# Patient Record
Sex: FEMALE | Race: WHITE | NOT HISPANIC OR LATINO | Employment: UNEMPLOYED | URBAN - METROPOLITAN AREA
[De-identification: names, ages, dates, MRNs, and addresses within clinical notes are randomized per-mention and may not be internally consistent; named-entity substitution may affect disease eponyms.]

---

## 2017-03-02 ENCOUNTER — CONVERSION ENCOUNTER (OUTPATIENT)
Dept: MAMMOGRAPHY | Facility: HOSPITAL | Age: 52
End: 2017-03-02

## 2018-10-01 ENCOUNTER — CONVERSION ENCOUNTER (OUTPATIENT)
Dept: MAMMOGRAPHY | Facility: HOSPITAL | Age: 53
End: 2018-10-01

## 2019-03-28 ENCOUNTER — OFFICE VISIT CONVERTED (OUTPATIENT)
Dept: INTERNAL MEDICINE | Facility: CLINIC | Age: 54
End: 2019-03-28
Attending: NURSE PRACTITIONER

## 2019-03-28 ENCOUNTER — HOSPITAL ENCOUNTER (OUTPATIENT)
Dept: OTHER | Facility: HOSPITAL | Age: 54
Discharge: HOME | End: 2019-03-28
Attending: NURSE PRACTITIONER

## 2019-03-28 LAB
ALBUMIN SERPL-MCNC: 4.4 G/DL (ref 3.5–5)
ALBUMIN/GLOB SERPL: 1.5 {RATIO} (ref 1.4–2.6)
ALP SERPL-CCNC: 60 U/L (ref 53–141)
ALT SERPL-CCNC: 11 U/L (ref 10–40)
ANION GAP SERPL CALC-SCNC: 15 MMOL/L (ref 8–19)
AST SERPL-CCNC: 19 U/L (ref 15–50)
BASOPHILS # BLD AUTO: 0.06 10*3/UL (ref 0–0.2)
BASOPHILS NFR BLD AUTO: 1.2 % (ref 0–3)
BILIRUB SERPL-MCNC: 0.4 MG/DL (ref 0.2–1.3)
BUN SERPL-MCNC: 12 MG/DL (ref 5–25)
BUN/CREAT SERPL: 16 {RATIO} (ref 6–20)
CALCIUM SERPL-MCNC: 9.1 MG/DL (ref 8.7–10.4)
CHLORIDE SERPL-SCNC: 105 MMOL/L (ref 99–111)
CHOLEST SERPL-MCNC: 203 MG/DL (ref 107–200)
CHOLEST/HDLC SERPL: 3.8 {RATIO} (ref 3–6)
CONV ABS IMM GRAN: 0.01 10*3/UL (ref 0–0.2)
CONV CO2: 25 MMOL/L (ref 22–32)
CONV IMMATURE GRAN: 0.2 % (ref 0–1.8)
CONV TOTAL PROTEIN: 7.4 G/DL (ref 6.3–8.2)
CREAT UR-MCNC: 0.77 MG/DL (ref 0.5–0.9)
DEPRECATED RDW RBC AUTO: 41.2 FL (ref 36.4–46.3)
EOSINOPHIL # BLD AUTO: 0.16 10*3/UL (ref 0–0.7)
EOSINOPHIL # BLD AUTO: 3.2 % (ref 0–7)
ERYTHROCYTE [DISTWIDTH] IN BLOOD BY AUTOMATED COUNT: 12.5 % (ref 11.7–14.4)
GFR SERPLBLD BASED ON 1.73 SQ M-ARVRAT: >60 ML/MIN/{1.73_M2}
GLOBULIN UR ELPH-MCNC: 3 G/DL (ref 2–3.5)
GLUCOSE SERPL-MCNC: 84 MG/DL (ref 65–99)
HBA1C MFR BLD: 12.8 G/DL (ref 12–16)
HCT VFR BLD AUTO: 39 % (ref 37–47)
HDLC SERPL-MCNC: 54 MG/DL (ref 40–60)
LDLC SERPL CALC-MCNC: 134 MG/DL (ref 70–100)
LYMPHOCYTES # BLD AUTO: 2.02 10*3/UL (ref 1–5)
MCH RBC QN AUTO: 29.7 PG (ref 27–31)
MCHC RBC AUTO-ENTMCNC: 32.8 G/DL (ref 33–37)
MCV RBC AUTO: 90.5 FL (ref 81–99)
MONOCYTES # BLD AUTO: 0.49 10*3/UL (ref 0.2–1.2)
MONOCYTES NFR BLD AUTO: 9.7 % (ref 3–10)
NEUTROPHILS # BLD AUTO: 2.32 10*3/UL (ref 2–8)
NEUTROPHILS NFR BLD AUTO: 45.8 % (ref 30–85)
NRBC CBCN: 0 % (ref 0–0.7)
OSMOLALITY SERPL CALC.SUM OF ELEC: 291 MOSM/KG (ref 273–304)
PLATELET # BLD AUTO: 223 10*3/UL (ref 130–400)
PMV BLD AUTO: 11.7 FL (ref 9.4–12.3)
POTASSIUM SERPL-SCNC: 4.3 MMOL/L (ref 3.5–5.3)
RBC # BLD AUTO: 4.31 10*6/UL (ref 4.2–5.4)
SODIUM SERPL-SCNC: 141 MMOL/L (ref 135–147)
TRIGL SERPL-MCNC: 77 MG/DL (ref 40–150)
VARIANT LYMPHS NFR BLD MANUAL: 39.9 % (ref 20–45)
VLDLC SERPL-MCNC: 15 MG/DL (ref 5–37)
WBC # BLD AUTO: 5.06 10*3/UL (ref 4.8–10.8)

## 2019-10-03 ENCOUNTER — HOSPITAL ENCOUNTER (OUTPATIENT)
Dept: MAMMOGRAPHY | Facility: HOSPITAL | Age: 54
Discharge: HOME | End: 2019-10-03
Attending: OBSTETRICS & GYNECOLOGY

## 2020-07-07 ENCOUNTER — HOSPITAL ENCOUNTER (OUTPATIENT)
Dept: OTHER | Facility: HOSPITAL | Age: 55
Discharge: HOME | End: 2020-07-07
Attending: NURSE PRACTITIONER

## 2020-07-07 ENCOUNTER — OFFICE VISIT CONVERTED (OUTPATIENT)
Dept: INTERNAL MEDICINE | Facility: CLINIC | Age: 55
End: 2020-07-07
Attending: NURSE PRACTITIONER

## 2020-07-07 LAB
ALBUMIN SERPL-MCNC: 4.6 G/DL (ref 3.5–5)
ALBUMIN/GLOB SERPL: 1.5 {RATIO} (ref 1.4–2.6)
ALP SERPL-CCNC: 65 U/L (ref 53–141)
ALT SERPL-CCNC: 13 U/L (ref 10–40)
ANION GAP SERPL CALC-SCNC: 18 MMOL/L (ref 8–19)
AST SERPL-CCNC: 20 U/L (ref 15–50)
BASOPHILS # BLD AUTO: 0.04 10*3/UL (ref 0–0.2)
BASOPHILS NFR BLD AUTO: 0.6 % (ref 0–3)
BILIRUB SERPL-MCNC: 0.35 MG/DL (ref 0.2–1.3)
BUN SERPL-MCNC: 10 MG/DL (ref 5–25)
BUN/CREAT SERPL: 11 {RATIO} (ref 6–20)
CALCIUM SERPL-MCNC: 9.6 MG/DL (ref 8.7–10.4)
CHLORIDE SERPL-SCNC: 106 MMOL/L (ref 99–111)
CHOLEST SERPL-MCNC: 219 MG/DL (ref 107–200)
CHOLEST/HDLC SERPL: 4.8 {RATIO} (ref 3–6)
CONV ABS IMM GRAN: 0.02 10*3/UL (ref 0–0.2)
CONV CO2: 23 MMOL/L (ref 22–32)
CONV IMMATURE GRAN: 0.3 % (ref 0–1.8)
CONV TOTAL PROTEIN: 7.6 G/DL (ref 6.3–8.2)
CREAT UR-MCNC: 0.88 MG/DL (ref 0.5–0.9)
DEPRECATED RDW RBC AUTO: 41.9 FL (ref 36.4–46.3)
EOSINOPHIL # BLD AUTO: 0.16 10*3/UL (ref 0–0.7)
EOSINOPHIL # BLD AUTO: 2.5 % (ref 0–7)
ERYTHROCYTE [DISTWIDTH] IN BLOOD BY AUTOMATED COUNT: 12.3 % (ref 11.7–14.4)
GFR SERPLBLD BASED ON 1.73 SQ M-ARVRAT: >60 ML/MIN/{1.73_M2}
GLOBULIN UR ELPH-MCNC: 3 G/DL (ref 2–3.5)
GLUCOSE SERPL-MCNC: 93 MG/DL (ref 65–99)
HCT VFR BLD AUTO: 40.1 % (ref 37–47)
HDLC SERPL-MCNC: 46 MG/DL (ref 40–60)
HGB BLD-MCNC: 13 G/DL (ref 12–16)
LDLC SERPL CALC-MCNC: 144 MG/DL (ref 70–100)
LYMPHOCYTES # BLD AUTO: 2.24 10*3/UL (ref 1–5)
LYMPHOCYTES NFR BLD AUTO: 34.7 % (ref 20–45)
MCH RBC QN AUTO: 30 PG (ref 27–31)
MCHC RBC AUTO-ENTMCNC: 32.4 G/DL (ref 33–37)
MCV RBC AUTO: 92.4 FL (ref 81–99)
MONOCYTES # BLD AUTO: 0.57 10*3/UL (ref 0.2–1.2)
MONOCYTES NFR BLD AUTO: 8.8 % (ref 3–10)
NEUTROPHILS # BLD AUTO: 3.43 10*3/UL (ref 2–8)
NEUTROPHILS NFR BLD AUTO: 53.1 % (ref 30–85)
NRBC CBCN: 0 % (ref 0–0.7)
OSMOLALITY SERPL CALC.SUM OF ELEC: 295 MOSM/KG (ref 273–304)
PLATELET # BLD AUTO: 243 10*3/UL (ref 130–400)
PMV BLD AUTO: 10.9 FL (ref 9.4–12.3)
POTASSIUM SERPL-SCNC: 4.4 MMOL/L (ref 3.5–5.3)
RBC # BLD AUTO: 4.34 10*6/UL (ref 4.2–5.4)
SODIUM SERPL-SCNC: 143 MMOL/L (ref 135–147)
TRIGL SERPL-MCNC: 145 MG/DL (ref 40–150)
TSH SERPL-ACNC: 1.73 M[IU]/L (ref 0.27–4.2)
VLDLC SERPL-MCNC: 29 MG/DL (ref 5–37)
WBC # BLD AUTO: 6.46 10*3/UL (ref 4.8–10.8)

## 2021-03-09 ENCOUNTER — OFFICE VISIT CONVERTED (OUTPATIENT)
Dept: INTERNAL MEDICINE | Facility: CLINIC | Age: 56
End: 2021-03-09
Attending: NURSE PRACTITIONER

## 2021-03-12 ENCOUNTER — HOSPITAL ENCOUNTER (OUTPATIENT)
Dept: MAMMOGRAPHY | Facility: HOSPITAL | Age: 56
Discharge: HOME | End: 2021-03-12
Attending: OBSTETRICS & GYNECOLOGY

## 2021-03-23 ENCOUNTER — HOSPITAL ENCOUNTER (OUTPATIENT)
Dept: MAMMOGRAPHY | Facility: HOSPITAL | Age: 56
Discharge: HOME | End: 2021-03-23
Attending: OBSTETRICS & GYNECOLOGY

## 2021-04-06 ENCOUNTER — HOSPITAL ENCOUNTER (OUTPATIENT)
Dept: ULTRASOUND IMAGING | Facility: HOSPITAL | Age: 56
Discharge: HOME | End: 2021-04-06
Attending: OBSTETRICS & GYNECOLOGY

## 2021-05-14 VITALS
WEIGHT: 153 LBS | BODY MASS INDEX: 27.11 KG/M2 | HEIGHT: 63 IN | HEART RATE: 52 BPM | SYSTOLIC BLOOD PRESSURE: 115 MMHG | OXYGEN SATURATION: 96 % | TEMPERATURE: 97.3 F | DIASTOLIC BLOOD PRESSURE: 62 MMHG

## 2021-05-14 NOTE — PROGRESS NOTES
"   Progress Note      Patient Name: Madina Reddy   Patient ID: [id number]   Sex: Female   YOB: 1965    Primary Care Provider: Kady BILL   Referring Provider: Kady BILL    Visit Date: March 9, 2021    Provider: LANDY Borrego   Location: [facility]   Location Address: [address] [place] [address], [address]  [place], [place]  [id number]   Location Phone: [phone]          Chief Complaint  · Pressure in ears       History Of Present Illness  Madina Reddy is a 55 year old /White female who presents for evaluation and treatment of:      Patient reports right ear fullness and pressure x 2 weeks, started after she had an upper respiratory infection. Patient states she flew to [place] to be with her mom after the loss of her stepdad, flying did not seem to worsen or improve the pressure. She has been treating with an OTC decongestant medication without relief. Has also tried peroxide. Denies cough, congestion, fever, chills, lightheadedness. Patient is scheduled with ENT next week.       Past Medical History  Disease Name Date Onset Notes   Anemia --  --    Broken Bones --  --    Forgetfulness --  --    Hyperlipidemia --  --    Night sweats --  --    Seasonal allergies --  --    Sinus trouble --  --          Past Surgical History  Procedure Name Date Notes   heart surgery --  \"closed heart\" 1966         Medication List  Name Date Started Instructions   Black Cohosh Menopause Complex -50(d)/20 -200-100 mg (n) oral tablets, sequential  take as directed   loratadine 10 mg oral tablet  take 1 tablet by oral route daily as needed   multivitamin oral tablet  take 1 tablet by oral route daily         Allergy List  Allergen Name Date Reaction Notes   No known history of drug allergy --  --  --          Family Medical History  Disease Name Relative/Age Notes   Breast Neoplasm, Malignant  --    Heart Disease  --    Diabetes, unspecified type  --    - No Family History of " "Colorectal Cancer  --          Social History  Finding Status Start/Stop Quantity Notes   Alcohol Never --/-- --  --    Tobacco Never --/-- --  --          Review of Systems  · Constitutional  o Denies  o : fever, fatigue, weight loss, weight gain  · HENT  o Admits  o : ear fullness  o Denies  o : headaches, vertigo, lightheadedness, hearing loss, tinnitus, ear pain  · Cardiovascular  o Denies  o : lower extremity edema, chest pressure, palpitations  · Respiratory  o Denies  o : shortness of breath, wheezing, cough, dyspnea on exertion  · Gastrointestinal  o Denies  o : nausea, vomiting, diarrhea, constipation, abdominal pain      Vitals  Date Time BP Position Site L\R Cuff Size HR RR TEMP (F) WT  HT  BMI kg/m2 BSA m2 O2 Sat FR L/min FiO2 HC       03/28/2019 08:35 /78 Sitting    72 - R 16 97.4 141lbs 6oz 5'  3\" 25.04 1.69 98 %      07/07/2020 10:54 /82 Sitting    65 - R 16 98.6 147lbs 8oz    98 %  21%    03/09/2021 01:57 /62 Sitting    52 - R  97.3 153lbs 0oz 5'  3\" 27.1 1.76 96 %            Physical Examination  · Constitutional  o Appearance  o : no acute distress, well-nourished  · Head and Face  o Head  o :   § Inspection  § : atraumatic, normocephalic  · Ears, Nose, Mouth and Throat  o Ears  o :   § External Ears  § : normal  § Otoscopic Examination  § : bilateral TM nonpurulent effusion  o Nose  o :   § Intranasal Exam  § : nares patent  o Oral Cavity  o :   § Oral Mucosa  § : moist mucous membranes  · Respiratory  o Respiratory Effort  o : breathing comfortably, symmetric chest rise  o Auscultation of Lungs  o : clear to asculatation bilaterally, no wheezes, rales, or rhonchii  · Cardiovascular  o Heart  o :   § Auscultation of Heart  § : regular rate and rhythm, no murmurs, rubs, or gallops  o Peripheral Vascular System  o :   § Extremities  § : no edema  · Lymphatic  o Neck  o : no lymphadenopathy present  o Supraclavicular Nodes  o : no supraclavicular nodes  · Skin and Subcutaneous " Tissue  o General Inspection  o : no lesions present, no areas of discoloration, skin turgor normal  · Neurologic  o Mental Status Examination  o :   § Orientation  § : grossly oriented to person, place and time  o Gait and Station  o :   § Gait Screening  § : normal gait              Assessment  · Ear fullness, right     388.8/H93.8X1  Will trial treatment with Flonase and daily Claritin. Patient to call or return to clinic if symptoms worsen or persist. May follow up with ENT as scheduled if symptoms do not improve.    Problems Reconciled  Plan  · Orders  o ACO-39: Current medications updated and reviewed (, 1159F) - - 03/09/2021  · Medications  o Flonase Allergy Relief 50 mcg/actuation nasal spray,suspension   SIG: spray 1 spray (50 mcg) in each nostril by intranasal route once daily   DISP: (1) Container with 2 refills  Prescribed on 03/09/2021     o Claritin 10 mg oral tablet   SIG: take 1 tablet (10 mg) by oral route once daily   DISP: (30) Tablet with 0 refills  Prescribed on 03/09/2021     o loratadine 10 mg oral tablet   SIG: take 1 tablet by oral route daily as needed   DISP: (0) tablet with 0 refills  Discontinued on 03/09/2021     o Medications have been Reconciled  o Transition of Care or Provider Policy  · Instructions  o Take all medications as prescribed/directed.  o Patient was educated/instructed on their diagnosis, treatment and medications prior to discharge from the clinic today.  o Patient instructed to seek medical attention urgently for new or worsening symptoms.  o Call the office with any concerns or questions.  · Disposition  o Call or Return if symptoms worsen or persist.  o Prescriptions sent to pharmacy            Electronically Signed by: LANDY Borrego -Author on March 9, 2021 02:44:51 PM

## 2021-05-15 VITALS
SYSTOLIC BLOOD PRESSURE: 122 MMHG | TEMPERATURE: 97.4 F | HEIGHT: 63 IN | RESPIRATION RATE: 16 BRPM | HEART RATE: 72 BPM | DIASTOLIC BLOOD PRESSURE: 78 MMHG | OXYGEN SATURATION: 98 % | BODY MASS INDEX: 25.05 KG/M2 | WEIGHT: 141.37 LBS

## 2021-05-15 VITALS
SYSTOLIC BLOOD PRESSURE: 120 MMHG | DIASTOLIC BLOOD PRESSURE: 82 MMHG | TEMPERATURE: 98.6 F | RESPIRATION RATE: 16 BRPM | WEIGHT: 147.5 LBS | OXYGEN SATURATION: 98 % | HEART RATE: 65 BPM

## 2021-07-02 PROCEDURE — 86666 EHRLICHIA ANTIBODY: CPT | Performed by: FAMILY MEDICINE

## 2021-07-02 PROCEDURE — 86757 RICKETTSIA ANTIBODY: CPT | Performed by: FAMILY MEDICINE

## 2021-07-02 PROCEDURE — 86618 LYME DISEASE ANTIBODY: CPT | Performed by: FAMILY MEDICINE

## 2021-11-18 ENCOUNTER — TELEPHONE (OUTPATIENT)
Dept: INTERNAL MEDICINE | Facility: CLINIC | Age: 56
End: 2021-11-18

## 2021-11-18 NOTE — TELEPHONE ENCOUNTER
Caller: Madina Reddy    Relationship: Self    Best call back number: [phone]     What is the best time to reach you:any      Who are you requesting to speak with (clinical staff, provider,  specific staff member): CLINICAL     What was the call regarding: PATIENT STATED SHE HAS BEEN EXPERIENCING COLD SYMPTOMS SINCE 11/12 PATIENT STATES SHE IS TAKING MUCINEX AND IT HAS NOT BEEN HELPING PATIENT WOULD LIKE A CALL BACK TO SPEAK WITH NURSE TO SEE IF SHE SHOULD KEEP TAKING MUCINEX     Do you require a callback: YES

## 2021-11-19 ENCOUNTER — OFFICE VISIT (OUTPATIENT)
Dept: INTERNAL MEDICINE | Facility: CLINIC | Age: 56
End: 2021-11-19

## 2021-11-19 VITALS
BODY MASS INDEX: 26.58 KG/M2 | WEIGHT: 150 LBS | OXYGEN SATURATION: 99 % | HEIGHT: 63 IN | TEMPERATURE: 99.5 F | DIASTOLIC BLOOD PRESSURE: 82 MMHG | HEART RATE: 86 BPM | SYSTOLIC BLOOD PRESSURE: 118 MMHG

## 2021-11-19 DIAGNOSIS — R09.81 SINUS CONGESTION: ICD-10-CM

## 2021-11-19 DIAGNOSIS — R52 BODY ACHES: ICD-10-CM

## 2021-11-19 DIAGNOSIS — J01.10 ACUTE NON-RECURRENT FRONTAL SINUSITIS: Primary | ICD-10-CM

## 2021-11-19 LAB
EXPIRATION DATE: NORMAL
FLUAV AG UPPER RESP QL IA.RAPID: NOT DETECTED
FLUBV AG UPPER RESP QL IA.RAPID: NOT DETECTED
INTERNAL CONTROL: NORMAL
Lab: NORMAL
SARS-COV-2 AG UPPER RESP QL IA.RAPID: NOT DETECTED

## 2021-11-19 PROCEDURE — 87428 SARSCOV & INF VIR A&B AG IA: CPT | Performed by: NURSE PRACTITIONER

## 2021-11-19 PROCEDURE — 99213 OFFICE O/P EST LOW 20 MIN: CPT | Performed by: NURSE PRACTITIONER

## 2021-11-19 RX ORDER — AMOXICILLIN AND CLAVULANATE POTASSIUM 875; 125 MG/1; MG/1
1 TABLET, FILM COATED ORAL 2 TIMES DAILY
Qty: 20 TABLET | Refills: 0 | Status: SHIPPED | OUTPATIENT
Start: 2021-11-19 | End: 2021-11-29

## 2021-11-19 RX ORDER — BROMPHENIRAMINE MALEATE, PSEUDOEPHEDRINE HYDROCHLORIDE, AND DEXTROMETHORPHAN HYDROBROMIDE 2; 30; 10 MG/5ML; MG/5ML; MG/5ML
10 SYRUP ORAL EVERY 4 HOURS PRN
Qty: 150 ML | Refills: 0 | Status: SHIPPED | OUTPATIENT
Start: 2021-11-19 | End: 2022-01-31

## 2021-11-19 NOTE — ASSESSMENT & PLAN NOTE
Exam reassuring, lung sounds clear, O2 sat 99%. Influenza and COVID negative in clinic. Due to duration and severity of symptoms will treat as a sinusitis with Augmentin at this time. Bromfed to pharmacy for symptom management. Increase fluids, monitor output. Discussed avoiding exposure to other sick or high risk contacts, cough etiquette, hand hygiene, disinfecting surfaces, and avoiding touching eyes, nose and mouth. Will seek medical attention immediately with fever, cough, shortness of breath. Patient to call or return to clinic if symptoms worsen or persist.

## 2021-11-19 NOTE — PROGRESS NOTES
"Chief Complaint  Headache, Nasal Congestion (right side x 1 week), and Generalized Body Aches    Subjective          Madina Reddy presents to [facility]  Patient reports headaches, nasal congestion, fatigue, sore throat, ear ache, body aches, sinus pressure x 1 week. She has also experienced some dizziness. Denies fever, nausea, vomiting, diarrhea, loss of taste/smell. Decreased appetite and fluid intake. Has been treating with Mucinex, Flonase and Tylenol. Denies known sick contacts. Denies COVID19 exposures. Patient is fully vaccinated.       Objective   Vital Signs:   /82   Pulse 86   Temp 99.5 °F (37.5 °C)   Ht 160 cm (63\")   Wt 68 kg (150 lb)   SpO2 99%   BMI 26.57 kg/m²     Physical Exam  Constitutional:       Appearance: Normal appearance. She is normal weight.   HENT:      Head: Normocephalic and atraumatic.      Comments: Maxillary sinus tenderness on palpation     Ears:      Comments: Bilateral TM nonpurulent effusion     Nose: Nose normal.      Mouth/Throat:      Mouth: Mucous membranes are moist.      Pharynx: Oropharynx is clear.   Eyes:      Extraocular Movements: Extraocular movements intact.      Conjunctiva/sclera: Conjunctivae normal.      Pupils: Pupils are equal, round, and reactive to light.   Cardiovascular:      Rate and Rhythm: Normal rate and regular rhythm.      Heart sounds: Normal heart sounds.   Pulmonary:      Effort: Pulmonary effort is normal.      Breath sounds: Normal breath sounds.   Skin:     General: Skin is warm and dry.   Neurological:      General: No focal deficit present.      Mental Status: She is alert and oriented to person, place, and time.   Psychiatric:         Mood and Affect: Mood normal.         Behavior: Behavior normal.         Thought Content: Thought content normal.        Result Review :                 Assessment and Plan    Diagnoses and all orders for this visit:    1. Acute non-recurrent " frontal sinusitis (Primary)  Assessment & Plan:  Exam reassuring, lung sounds clear, O2 sat 99%. Influenza and COVID negative in clinic. Due to duration and severity of symptoms will treat as a sinusitis with Augmentin at this time. Bromfed to pharmacy for symptom management. Increase fluids, monitor output. Discussed avoiding exposure to other sick or high risk contacts, cough etiquette, hand hygiene, disinfecting surfaces, and avoiding touching eyes, nose and mouth. Will seek medical attention immediately with fever, cough, shortness of breath. Patient to call or return to clinic if symptoms worsen or persist.       Other orders  -     amoxicillin-clavulanate (Augmentin) 875-125 MG per tablet; Take 1 tablet by mouth 2 (Two) Times a Day for 10 days.  Dispense: 20 tablet; Refill: 0  -     brompheniramine-pseudoephedrine-DM 30-2-10 MG/5ML syrup; Take 10 mL by mouth Every 4 (Four) Hours As Needed for Congestion or Cough.  Dispense: 150 mL; Refill: 0      Follow Up   Return if symptoms worsen or fail to improve.  Patient was given instructions and counseling regarding her condition or for health maintenance advice. Please see specific information pulled into the AVS if appropriate.

## 2022-01-31 ENCOUNTER — OFFICE VISIT (OUTPATIENT)
Dept: INTERNAL MEDICINE | Facility: CLINIC | Age: 57
End: 2022-01-31

## 2022-01-31 VITALS
OXYGEN SATURATION: 98 % | TEMPERATURE: 99.2 F | BODY MASS INDEX: 26.4 KG/M2 | SYSTOLIC BLOOD PRESSURE: 118 MMHG | WEIGHT: 149 LBS | HEART RATE: 74 BPM | DIASTOLIC BLOOD PRESSURE: 78 MMHG | HEIGHT: 63 IN

## 2022-01-31 DIAGNOSIS — Z00.00 ANNUAL PHYSICAL EXAM: Primary | ICD-10-CM

## 2022-01-31 DIAGNOSIS — Z80.3 FAMILY HISTORY OF BREAST CANCER IN MOTHER: ICD-10-CM

## 2022-01-31 LAB
ALBUMIN SERPL-MCNC: 4.6 G/DL (ref 3.5–5.2)
ALBUMIN/GLOB SERPL: 1.5 G/DL
ALP SERPL-CCNC: 74 U/L (ref 39–117)
ALT SERPL W P-5'-P-CCNC: 12 U/L (ref 1–33)
ANION GAP SERPL CALCULATED.3IONS-SCNC: 9.5 MMOL/L (ref 5–15)
AST SERPL-CCNC: 20 U/L (ref 1–32)
BASOPHILS # BLD AUTO: 0.07 10*3/MM3 (ref 0–0.2)
BASOPHILS NFR BLD AUTO: 1.1 % (ref 0–1.5)
BILIRUB SERPL-MCNC: 0.2 MG/DL (ref 0–1.2)
BUN SERPL-MCNC: 11 MG/DL (ref 6–20)
BUN/CREAT SERPL: 13.3 (ref 7–25)
CALCIUM SPEC-SCNC: 9.7 MG/DL (ref 8.6–10.5)
CHLORIDE SERPL-SCNC: 105 MMOL/L (ref 98–107)
CHOLEST SERPL-MCNC: 225 MG/DL (ref 0–200)
CO2 SERPL-SCNC: 29.5 MMOL/L (ref 22–29)
CREAT SERPL-MCNC: 0.83 MG/DL (ref 0.57–1)
DEPRECATED RDW RBC AUTO: 40.4 FL (ref 37–54)
EOSINOPHIL # BLD AUTO: 0.12 10*3/MM3 (ref 0–0.4)
EOSINOPHIL NFR BLD AUTO: 1.9 % (ref 0.3–6.2)
ERYTHROCYTE [DISTWIDTH] IN BLOOD BY AUTOMATED COUNT: 12.7 % (ref 12.3–15.4)
GFR SERPL CREATININE-BSD FRML MDRD: 71 ML/MIN/1.73
GLOBULIN UR ELPH-MCNC: 3 GM/DL
GLUCOSE SERPL-MCNC: 78 MG/DL (ref 65–99)
HCT VFR BLD AUTO: 38.1 % (ref 34–46.6)
HDLC SERPL-MCNC: 49 MG/DL (ref 40–60)
HGB BLD-MCNC: 12.9 G/DL (ref 12–15.9)
IMM GRANULOCYTES # BLD AUTO: 0.01 10*3/MM3 (ref 0–0.05)
IMM GRANULOCYTES NFR BLD AUTO: 0.2 % (ref 0–0.5)
LDLC SERPL CALC-MCNC: 157 MG/DL (ref 0–100)
LDLC/HDLC SERPL: 3.16 {RATIO}
LYMPHOCYTES # BLD AUTO: 2.04 10*3/MM3 (ref 0.7–3.1)
LYMPHOCYTES NFR BLD AUTO: 32.4 % (ref 19.6–45.3)
MCH RBC QN AUTO: 29.5 PG (ref 26.6–33)
MCHC RBC AUTO-ENTMCNC: 33.9 G/DL (ref 31.5–35.7)
MCV RBC AUTO: 87.2 FL (ref 79–97)
MONOCYTES # BLD AUTO: 0.62 10*3/MM3 (ref 0.1–0.9)
MONOCYTES NFR BLD AUTO: 9.8 % (ref 5–12)
NEUTROPHILS NFR BLD AUTO: 3.44 10*3/MM3 (ref 1.7–7)
NEUTROPHILS NFR BLD AUTO: 54.6 % (ref 42.7–76)
NRBC BLD AUTO-RTO: 0 /100 WBC (ref 0–0.2)
PLATELET # BLD AUTO: 254 10*3/MM3 (ref 140–450)
PMV BLD AUTO: 11.2 FL (ref 6–12)
POTASSIUM SERPL-SCNC: 4 MMOL/L (ref 3.5–5.2)
PROT SERPL-MCNC: 7.6 G/DL (ref 6–8.5)
RBC # BLD AUTO: 4.37 10*6/MM3 (ref 3.77–5.28)
SODIUM SERPL-SCNC: 144 MMOL/L (ref 136–145)
TRIGL SERPL-MCNC: 106 MG/DL (ref 0–150)
TSH SERPL DL<=0.05 MIU/L-ACNC: 1.59 UIU/ML (ref 0.27–4.2)
VLDLC SERPL-MCNC: 19 MG/DL (ref 5–40)
WBC NRBC COR # BLD: 6.3 10*3/MM3 (ref 3.4–10.8)

## 2022-01-31 PROCEDURE — 80050 GENERAL HEALTH PANEL: CPT | Performed by: NURSE PRACTITIONER

## 2022-01-31 PROCEDURE — 99396 PREV VISIT EST AGE 40-64: CPT | Performed by: NURSE PRACTITIONER

## 2022-01-31 PROCEDURE — 80061 LIPID PANEL: CPT | Performed by: NURSE PRACTITIONER

## 2022-01-31 PROCEDURE — 36415 COLL VENOUS BLD VENIPUNCTURE: CPT | Performed by: NURSE PRACTITIONER

## 2022-01-31 RX ORDER — ASPIRIN 81 MG/1
81 TABLET, CHEWABLE ORAL DAILY
COMMUNITY

## 2022-01-31 NOTE — PROGRESS NOTES
"Chief Complaint  Annual Exam    Subjective          Madina Reddy presents to [facility]  Last labs: 7/2020  PAP: 4/2021  Mammogram: 4/2021, biopsy normal; Family history of breast cancer in mother, diagnosed in 60s  Colonoscopy: 2017, repeat in 10 years; Denies family history of colon cancer  Influenza vaccination: Declines   COVID19 vaccination: Up to date  Shingles vaccination: Declines  Eye exam: 2021  Dental exam: Every 6 months  Smoking history: Denies  Specialists: [facility]     Annual physical exam-  Family history of heart attack in father. Denies chest pain, blurry vision, headache, leg swelling, shortness of breath, seizure activity. Patient has no concerns at this time.          Objective   Vital Signs:   /78   Pulse 74   Temp 99.2 °F (37.3 °C)   Ht 160 cm (63\")   Wt 67.6 kg (149 lb)   SpO2 98%   BMI 26.39 kg/m²     Physical Exam  Constitutional:       Appearance: Normal appearance. She is normal weight.   HENT:      Head: Normocephalic and atraumatic.      Right Ear: Tympanic membrane normal.      Left Ear: Tympanic membrane normal.      Nose: Nose normal.      Mouth/Throat:      Mouth: Mucous membranes are moist.      Pharynx: Oropharynx is clear.   Eyes:      Extraocular Movements: Extraocular movements intact.      Conjunctiva/sclera: Conjunctivae normal.      Pupils: Pupils are equal, round, and reactive to light.   Neck:      Thyroid: No thyroid mass, thyromegaly or thyroid tenderness.   Cardiovascular:      Rate and Rhythm: Normal rate and regular rhythm.      Heart sounds: Normal heart sounds.   Pulmonary:      Effort: Pulmonary effort is normal.      Breath sounds: Normal breath sounds.   Abdominal:      General: Bowel sounds are normal.      Palpations: Abdomen is soft.   Skin:     General: Skin is warm and dry.   Neurological:      General: No focal deficit present.      Mental Status: She is alert and oriented to person, place, and " time.   Psychiatric:         Mood and Affect: Mood normal.         Behavior: Behavior normal.         Thought Content: Thought content normal.        Result Review :                 Assessment and Plan    Diagnoses and all orders for this visit:    1. Annual physical exam (Primary)  Assessment & Plan:  Basic labs in clinic today. Encouraged routine dental and eye exams. Discussed age appropriate immunizations, screenings, nutrition. Age appropriate handout provided.    Orders:  -     Comprehensive Metabolic Panel  -     CBC & Differential  -     TSH  -     Lipid Panel    2. Family history of breast cancer in mother  Assessment & Plan:  Patient scheduled for mammogram through [facility].        Follow Up   Return in about 1 year (around 1/31/2023) for Annual physical.  Patient was given instructions and counseling regarding her condition or for health maintenance advice. Please see specific information pulled into the AVS if appropriate.

## 2022-01-31 NOTE — ASSESSMENT & PLAN NOTE
Basic labs in clinic today. Encouraged routine dental and eye exams. Discussed age appropriate immunizations, screenings, nutrition. Age appropriate handout provided.

## 2022-02-02 ENCOUNTER — TELEPHONE (OUTPATIENT)
Dept: INTERNAL MEDICINE | Facility: CLINIC | Age: 57
End: 2022-02-02

## 2022-02-02 NOTE — TELEPHONE ENCOUNTER
Caller: Madina Reddy    Relationship to patient: Self    Best call back number: [phone]    Type of visit: PAP SMEAR    Additional notes: PATIENT WOULD LIKE TO BE SCHEDULED FOR A PAP SMEAR. SHE VERBALIZED THAT IT HAS BEEN OVER A YEAR SINCE SHE HAS GOTTEN A PAP SMEAR. PLEASE CALL PATIENT TO SCHEDULE.

## 2022-02-09 NOTE — TELEPHONE ENCOUNTER
Caller: Madina Reddy    Relationship to patient: Self    Best call back number: [phone]    Chief complaint: PAP    Type of visit: PAP    Requested date: 02/09-23/22 NO FRIDAYS    If rescheduling, when is the original appointment: 01/30/23     Additional notes:THE PATIENT STATED SHE NEEDS A PAP ONLY SOONER THAN 2023. SHE WOULD LIKE A CALL BACK TO DISCUSS SCHEDULING.

## 2022-02-15 ENCOUNTER — OFFICE VISIT (OUTPATIENT)
Dept: INTERNAL MEDICINE | Facility: CLINIC | Age: 57
End: 2022-02-15

## 2022-02-15 VITALS
BODY MASS INDEX: 26.79 KG/M2 | SYSTOLIC BLOOD PRESSURE: 120 MMHG | RESPIRATION RATE: 18 BRPM | OXYGEN SATURATION: 98 % | DIASTOLIC BLOOD PRESSURE: 68 MMHG | HEART RATE: 85 BPM | WEIGHT: 151.2 LBS | TEMPERATURE: 97.6 F | HEIGHT: 63 IN

## 2022-02-15 DIAGNOSIS — Z01.419 ENCOUNTER FOR ROUTINE GYNECOLOGICAL EXAMINATION WITH PAPANICOLAOU SMEAR OF CERVIX: ICD-10-CM

## 2022-02-15 DIAGNOSIS — Z12.31 VISIT FOR SCREENING MAMMOGRAM: Primary | ICD-10-CM

## 2022-02-15 DIAGNOSIS — Z12.4 SCREENING FOR CERVICAL CANCER: ICD-10-CM

## 2022-02-15 DIAGNOSIS — Z80.3 FAMILY HISTORY OF BREAST CANCER IN MOTHER: ICD-10-CM

## 2022-02-15 PROCEDURE — 99396 PREV VISIT EST AGE 40-64: CPT | Performed by: NURSE PRACTITIONER

## 2022-02-15 PROCEDURE — G0123 SCREEN CERV/VAG THIN LAYER: HCPCS | Performed by: NURSE PRACTITIONER

## 2022-02-15 NOTE — PROGRESS NOTES
Subjective   History of Present Illness    Madina Reddy is a 56 y.o. female who presents for annual exam.    Obstetric History:  OB History    No obstetric history on file.        Menstrual History:     No LMP recorded. Patient is postmenopausal.       Sexual History:         Current contraception: none  History of abnormal Pap smear: no  Received Gardasil immunization: no  Family history of uterine or ovarian cancer: no  Family History of cervical cancer: no  Family History of colon cancer/colon polyps: no  Regular self breast exam: yes  History of abnormal mammogram: yes - clip in place  Family history of breast cancer: yes - mother diagnosed in 70s  History of abnormal lipids: yes -     The following portions of the patient's history were reviewed and updated as appropriate: allergies, current medications, past family history, past medical history, past social history, past surgical history and problem list.      Objective   Physical Exam  Exam conducted with a chaperone present.   Constitutional:       Appearance: Normal appearance. She is normal weight.   HENT:      Head: Normocephalic and atraumatic.      Nose: Nose normal.      Mouth/Throat:      Mouth: Mucous membranes are moist.      Pharynx: Oropharynx is clear.   Eyes:      Extraocular Movements: Extraocular movements intact.      Conjunctiva/sclera: Conjunctivae normal.      Pupils: Pupils are equal, round, and reactive to light.   Cardiovascular:      Rate and Rhythm: Normal rate and regular rhythm.      Heart sounds: Normal heart sounds.   Pulmonary:      Effort: Pulmonary effort is normal.      Breath sounds: Normal breath sounds.   Chest:   Breasts:      Right: Normal.      Left: Normal.       Genitourinary:     Exam position: Lithotomy position.      Vagina: Normal.      Cervix: Normal.      Uterus: Normal.       Adnexa: Right adnexa normal and left adnexa normal.      Rectum: Normal.   Skin:     General: Skin is warm and dry.  "  Neurological:      General: No focal deficit present.      Mental Status: She is alert and oriented to person, place, and time.   Psychiatric:         Mood and Affect: Mood normal.         Behavior: Behavior normal.         Thought Content: Thought content normal.         /68 (BP Location: Left arm, Patient Position: Sitting, Cuff Size: Adult)   Pulse 85   Temp 97.6 °F (36.4 °C)   Resp 18   Ht 160 cm (63\")   Wt 68.6 kg (151 lb 3.2 oz)   SpO2 98%   BMI 26.78 kg/m²       Assessment/Plan   Diagnoses and all orders for this visit:    1. Visit for screening mammogram (Primary)  -     Mammo Screening Digital Tomosynthesis Bilateral With CAD; Future    2. Family history of breast cancer in mother  -     Mammo Screening Digital Tomosynthesis Bilateral With CAD; Future    3. Screening for cervical cancer  -     IGP,rfx Aptima HPV All Pth    4. Encounter for routine gynecological examination with Papanicolaou smear of cervix  Assessment & Plan:  Exam without concern. Discussed preventative care with routine PAP smears and mammogram. Will determine further intervention based on results of PAP smear.        Await pap smear results.             "

## 2022-02-15 NOTE — ASSESSMENT & PLAN NOTE
Exam without concern. Discussed preventative care with routine PAP smears and mammogram. Will determine further intervention based on results of PAP smear.

## 2022-02-21 LAB
CONV .: NORMAL
CYTOLOGIST CVX/VAG CYTO: NORMAL
CYTOLOGY CVX/VAG DOC CYTO: NORMAL
CYTOLOGY CVX/VAG DOC THIN PREP: NORMAL
DX ICD CODE: NORMAL
HIV 1 & 2 AB SER-IMP: NORMAL
OTHER STN SPEC: NORMAL
STAT OF ADQ CVX/VAG CYTO-IMP: NORMAL

## 2022-05-02 ENCOUNTER — HOSPITAL ENCOUNTER (OUTPATIENT)
Dept: MAMMOGRAPHY | Facility: HOSPITAL | Age: 57
Discharge: HOME | End: 2022-05-02
Admitting: NURSE PRACTITIONER

## 2022-05-02 DIAGNOSIS — Z80.3 FAMILY HISTORY OF BREAST CANCER IN MOTHER: ICD-10-CM

## 2022-05-02 DIAGNOSIS — R92.8 ABNORMAL MAMMOGRAM OF RIGHT BREAST: Primary | ICD-10-CM

## 2022-05-02 DIAGNOSIS — Z12.31 VISIT FOR SCREENING MAMMOGRAM: ICD-10-CM

## 2022-05-02 PROCEDURE — 77063 BREAST TOMOSYNTHESIS BI: CPT

## 2022-05-02 PROCEDURE — 77067 SCR MAMMO BI INCL CAD: CPT

## 2022-05-03 ENCOUNTER — TELEPHONE (OUTPATIENT)
Dept: INTERNAL MEDICINE | Facility: CLINIC | Age: 57
End: 2022-05-03

## 2022-05-03 NOTE — TELEPHONE ENCOUNTER
Caller: Madina Reddy    Relationship to patient: Self    Best call back number: [phone]    Patient is needing: PATIENT HAD MAMMOGRAM YESTERDAY AND A .5 MASS WAS FOUND ON HER RIGHT BREAST.  PATIENT WANTS TO KNOW WHAT THE NEXT STEP WILL BE REGARDING THIS PLEASE CONTACT AND ADVISE

## 2022-05-03 NOTE — TELEPHONE ENCOUNTER
Scheduling called stating the US breast right complete needs to be changed from a complete to a limited.

## 2022-05-04 DIAGNOSIS — R92.8 ABNORMAL MAMMOGRAM OF RIGHT BREAST: Primary | ICD-10-CM

## 2022-05-17 ENCOUNTER — OFFICE VISIT (OUTPATIENT)
Dept: INTERNAL MEDICINE | Facility: CLINIC | Age: 57
End: 2022-05-17

## 2022-05-17 VITALS
DIASTOLIC BLOOD PRESSURE: 74 MMHG | OXYGEN SATURATION: 99 % | BODY MASS INDEX: 25.87 KG/M2 | WEIGHT: 146 LBS | HEIGHT: 63 IN | HEART RATE: 90 BPM | TEMPERATURE: 97.3 F | RESPIRATION RATE: 16 BRPM | SYSTOLIC BLOOD PRESSURE: 130 MMHG

## 2022-05-17 DIAGNOSIS — J02.9 SORE THROAT: ICD-10-CM

## 2022-05-17 DIAGNOSIS — U07.1 COVID-19: Primary | ICD-10-CM

## 2022-05-17 DIAGNOSIS — R41.3 MEMORY LOSS: ICD-10-CM

## 2022-05-17 LAB
EXPIRATION DATE: ABNORMAL
EXPIRATION DATE: NORMAL
FLUAV AG UPPER RESP QL IA.RAPID: NOT DETECTED
FLUBV AG UPPER RESP QL IA.RAPID: NOT DETECTED
INTERNAL CONTROL: ABNORMAL
INTERNAL CONTROL: NORMAL
Lab: ABNORMAL
Lab: NORMAL
S PYO AG THROAT QL: NEGATIVE
SARS-COV-2 AG UPPER RESP QL IA.RAPID: DETECTED

## 2022-05-17 PROCEDURE — 87428 SARSCOV & INF VIR A&B AG IA: CPT | Performed by: NURSE PRACTITIONER

## 2022-05-17 PROCEDURE — 87880 STREP A ASSAY W/OPTIC: CPT | Performed by: NURSE PRACTITIONER

## 2022-05-17 PROCEDURE — 99213 OFFICE O/P EST LOW 20 MIN: CPT | Performed by: NURSE PRACTITIONER

## 2022-05-17 RX ORDER — GUAIFENESIN AND DEXTROMETHORPHAN HYDROBROMIDE 600; 30 MG/1; MG/1
1 TABLET, EXTENDED RELEASE ORAL 2 TIMES DAILY PRN
Qty: 30 TABLET | Refills: 0 | Status: SHIPPED | OUTPATIENT
Start: 2022-05-17 | End: 2023-01-23

## 2022-05-17 NOTE — ASSESSMENT & PLAN NOTE
Exam reassuring, lung sounds clear, O2 sat 99%. Influenza negative, COVID19 positive. Discussed course of viral illness. Discussed home quarantine, avoiding exposure to other sick or high risk contacts, cough etiquette, hand hygiene, disinfecting surfaces, and avoiding touching eyes, nose and mouth. Continue supportive care. Increase fluids, monitor output.  Tylenol/Motrin for fever/comfort.  Mucinex DM to pharmacy, patient defers decongestants as they make her feel bad.  She and her  will monitor closely and will seek medical attention immediately with varus/persistent fever, cough, shortness of breath. Patient to call or return to clinic if symptoms worsen or persist.  Memory issues likely secondary to combination of COVID-19 and medication use, however low suspicion for brain imaging in this patient.  They are aware of 24 hour on call service in the event that there are concerns outside of office hours and will seek medical attention immediately with recurrence of symptoms.  Encouraged patient not to drive while she is sick and to monitor symptoms closely.  Will discuss case further with supervising physician to determine if imaging is necessary sooner rather than later.

## 2022-05-17 NOTE — PROGRESS NOTES
"Chief Complaint  Nasal Congestion, Dizziness (Yesterday), Memory Loss (Yesterday), Headache, Sinus Problem, Sore Throat (2 Days), Cough (2 Days), and Generalized Body Aches (2 Days)    Subjective          Madina Reddy presents to [facility]  Patient reports nasal congestion, dizziness, headache, sinus pressure, sore throat, cough, body aches progressing over the past two days. Patient states her biggest concern is an approximate 2-hour timeframe yesterday in which she \"zoned out\".  Patient states she got dressed, cook supper, went to a concert and sang with no recollection of these events.  Patient's  states that he received a phone call from friends at the concert that stated that the patient was \"out of it\".  Patient had also left the stove on, door unlocked and dog in the backyard when she left.  Patient admits to treating with Nydia-Bigelow, Claritin and an over-the-counter antihistamine as well as Motrin and Tylenol.  Patient's  states that she often has weird experiences when she is sick, has frequently passed out with illness.  Denies known changes in vision/speech/hearing/gait, worst headache of her life.  Patient started to return to her normal when she came home.  Patient's  states that he did a stroke evaluation, everything seemed normal at that time.  Patient states that she feels back to normal cognitively today, continues to have sick symptoms. Denies fever, shortness of breath, loss of taste/smell.  Patient admits to a COVID-19 exposure at a singing practice last week.        Objective   Vital Signs:  /74 (BP Location: Right arm, Patient Position: Sitting, Cuff Size: Adult)   Pulse 90   Temp 97.3 °F (36.3 °C)   Resp 16   Ht 160 cm (63\")   Wt 66.2 kg (146 lb)   SpO2 99%   BMI 25.86 kg/m²         Physical Exam  Constitutional:       Appearance: Normal appearance.   HENT:      Head: Normocephalic and atraumatic.      " Nose: Nose normal.      Mouth/Throat:      Mouth: Mucous membranes are moist.      Pharynx: Oropharynx is clear.   Eyes:      Extraocular Movements: Extraocular movements intact.      Conjunctiva/sclera: Conjunctivae normal.      Pupils: Pupils are equal, round, and reactive to light.   Cardiovascular:      Rate and Rhythm: Normal rate and regular rhythm.      Heart sounds: Normal heart sounds.   Pulmonary:      Effort: Pulmonary effort is normal.      Breath sounds: Normal breath sounds.   Skin:     General: Skin is warm and dry.   Neurological:      General: No focal deficit present.      Mental Status: She is alert and oriented to person, place, and time.      Cranial Nerves: Cranial nerves are intact.      Sensory: Sensation is intact.      Motor: Motor function is intact.      Coordination: Coordination is intact.      Gait: Gait is intact.   Psychiatric:         Mood and Affect: Mood normal.         Behavior: Behavior normal.         Thought Content: Thought content normal.        Result Review :                 Assessment and Plan    Diagnoses and all orders for this visit:    1. COVID-19 (Primary)  Assessment & Plan:  Exam reassuring, lung sounds clear, O2 sat 99%. Influenza negative, COVID19 positive. Discussed course of viral illness. Discussed home quarantine, avoiding exposure to other sick or high risk contacts, cough etiquette, hand hygiene, disinfecting surfaces, and avoiding touching eyes, nose and mouth. Continue supportive care. Increase fluids, monitor output.  Tylenol/Motrin for fever/comfort.  Mucinex DM to pharmacy, patient defers decongestants as they make her feel bad.  She and her  will monitor closely and will seek medical attention immediately with varus/persistent fever, cough, shortness of breath. Patient to call or return to clinic if symptoms worsen or persist.  Memory issues likely secondary to combination of COVID-19 and medication use, however low suspicion for brain imaging  in this patient.  They are aware of 24 hour on call service in the event that there are concerns outside of office hours and will seek medical attention immediately with recurrence of symptoms.  Encouraged patient not to drive while she is sick and to monitor symptoms closely.  Will discuss case further with supervising physician to determine if imaging is necessary sooner rather than later.        2. Sore throat  -     POCT SARS-CoV-2 Antigen [place] + Flu  -     POCT rapid strep A    3. Memory loss    Other orders  -     guaifenesin-dextromethorphan (MUCINEX DM)  MG tablet sustained-release 12 hour tablet; Take 1 tablet by mouth 2 (Two) Times a Day As Needed (cough, congestion).  Dispense: 30 tablet; Refill: 0           Follow Up   Return if symptoms worsen or fail to improve.  Patient was given instructions and counseling regarding her condition or for health maintenance advice. Please see specific information pulled into the AVS if appropriate.

## 2022-05-19 ENCOUNTER — TELEPHONE (OUTPATIENT)
Dept: INTERNAL MEDICINE | Facility: CLINIC | Age: 57
End: 2022-05-19

## 2022-05-19 NOTE — TELEPHONE ENCOUNTER
Caller: Madina Reddy    Relationship to patient: Self    Best call back number: [phone]    Date of positive COVID19 test: 05/17/22    Date if possible COVID19 exposure: UNSURE    COVID19 symptoms: COUGH, CONGESTION, DRAINAGE, SORE THROAT       Additional information or concerns: THE PATIENT STATED SHE WAS DIAGNOSED ON 05/17/22 WITH COVID AND SHE WOULD LIKE PCP RECOMMENDATION ON MEDICATION FOR COUGH. SHE STATED THE MUCINEX IS HELPING BUT SHE WOULD LIKE A CALL BACK TO DISCUSS.     What is the patients preferred pharmacy: [address] [facility] - [phone]  - [phone]   [phone]

## 2022-05-19 NOTE — TELEPHONE ENCOUNTER
I called pt back and she is going to try delsym cough syrup, vit C, vit D3, zinc, push fluids, rest, warm salt water gargles and tylenol/ibuprofen for body aches/fevers/chills.

## 2022-05-23 ENCOUNTER — TELEPHONE (OUTPATIENT)
Dept: INTERNAL MEDICINE | Facility: CLINIC | Age: 57
End: 2022-05-23

## 2022-05-23 NOTE — TELEPHONE ENCOUNTER
Patient is feeling so much better, she is feeling some fatigue but all is well.  Patient had no follow up questions or concerns at this time.

## 2022-05-23 NOTE — TELEPHONE ENCOUNTER
Please call and check on patient and see how she is feeling. Ask if she has experienced any more episodes. Thank you!

## 2022-05-31 ENCOUNTER — HOSPITAL ENCOUNTER (OUTPATIENT)
Dept: MAMMOGRAPHY | Facility: HOSPITAL | Age: 57
Discharge: HOME | End: 2022-05-31

## 2022-05-31 ENCOUNTER — HOSPITAL ENCOUNTER (OUTPATIENT)
Dept: ULTRASOUND IMAGING | Facility: HOSPITAL | Age: 57
Discharge: HOME | End: 2022-05-31

## 2022-05-31 DIAGNOSIS — R92.8 ABNORMAL MAMMOGRAM OF RIGHT BREAST: ICD-10-CM

## 2022-05-31 PROCEDURE — G0279 TOMOSYNTHESIS, MAMMO: HCPCS

## 2022-05-31 PROCEDURE — 76642 ULTRASOUND BREAST LIMITED: CPT

## 2022-05-31 PROCEDURE — 77065 DX MAMMO INCL CAD UNI: CPT

## 2022-10-03 ENCOUNTER — TELEPHONE (OUTPATIENT)
Dept: INTERNAL MEDICINE | Facility: CLINIC | Age: 57
End: 2022-10-03

## 2022-10-03 ENCOUNTER — NURSE TRIAGE (OUTPATIENT)
Dept: CALL CENTER | Facility: HOSPITAL | Age: 57
End: 2022-10-03

## 2022-10-03 NOTE — TELEPHONE ENCOUNTER
Patient reports blood pressure readings that are higher than her baseline. C/o headache yesterday and earlier today. No history of HTN. Not taking medication for BP. Was unable to reach PCP. Reviewed protocol with patient. Will contact PCP soon to make an appointment.    Reason for Disposition  • [1] Systolic BP  >= 130 OR Diastolic >= 80 AND [2] not taking BP medications    Additional Information  • Negative: Difficult to awaken or acting confused (e.g., disoriented, slurred speech)  • Negative: SEVERE difficulty breathing (e.g., struggling for each breath, speaks in single words)  • Negative: [1] Weakness of the face, arm or leg on one side of the body AND [2] new-onset  • Negative: [1] Numbness (i.e., loss of sensation) of the face, arm or leg on one side of the body AND [2] new-onset  • Negative: [1] Chest pain lasts > 5 minutes AND [2] history of heart disease  (i.e., heart attack, bypass surgery, angina, angioplasty, CHF)  • Negative: [1] Chest pain AND [2] took nitrogylcerin AND [3] pain was not relieved  • Negative: Sounds like a life-threatening emergency to the triager  • Negative: Symptom is main concern  (e.g., headache, chest pain)  • Negative: Low blood pressure is main concern  • Negative: [1] Systolic BP  >= 160 OR Diastolic >= 100 AND [2] cardiac or neurologic symptoms (e.g., chest pain, difficulty breathing, unsteady gait, blurred vision)  • Negative: [1] Pregnant 20 or more weeks (or postpartum < 6 weeks) AND [2] new hand or face swelling  • Negative: [1] Pregnant 20 or more weeks AND [2] Systolic BP  >= 140 OR Diastolic >= 90  • Negative: [1] Systolic BP  >= 200 OR Diastolic >= 120  AND [2] having NO cardiac or neurologic symptoms  • Negative: [1] Postpartum < 6 weeks AND [2] Systolic BP  >= 140 OR Diastolic >= 90  • Negative: [1] Systolic BP  >= 180 OR Diastolic >= 110 AND [2] missed most recent dose of blood pressure medication  • Negative: Systolic BP  >= 180 OR Diastolic >= 110  • Negative:  "Ran out of BP medications  • Negative: Systolic BP  >= 160 OR Diastolic >= 100  • Negative: [1] Taking BP medications AND [2] feels is having side effects (e.g., impotence, cough, dizzy upon standing)  • Negative: [1] Systolic BP  >= 130 OR Diastolic >= 80 AND [2] pregnant  • Negative: [1] Systolic BP  >= 130 OR Diastolic >= 80 AND [2] taking BP medications    Answer Assessment - Initial Assessment Questions  1. BLOOD PRESSURE: \"What is the blood pressure?\" \"Did you take at least two measurements 5 minutes apart?\"      132/90 pulse 96  2. ONSET: \"When did you take your blood pressure?\"      5 minutes ago  3. HOW: \"How did you obtain the blood pressure?\" (e.g., visiting nurse, automatic home BP monitor)      Home blood pressure cuff  4. HISTORY: \"Do you have a history of high blood pressure?\"      No  5. MEDICATIONS: \"Are you taking any medications for blood pressure?\" \"Have you missed any doses recently?\"      No  6. OTHER SYMPTOMS: \"Do you have any symptoms?\" (e.g., headache, chest pain, blurred vision, difficulty breathing, weakness)      Headache  7. PREGNANCY: \"Is there any chance you are pregnant?\" \"When was your last menstrual period?\"      NA    Protocols used: BLOOD PRESSURE - HIGH-ADULT-AH    "

## 2022-10-03 NOTE — TELEPHONE ENCOUNTER
UNABLE TO REACH OFFICE OR NCC TEAM NO ANSWER ON EITHER LINE    Caller: Madina Reddy    Relationship: Self    Best call back number: [phone]    What is the best time to reach you: ANYTIME    Who are you requesting to speak with (clinical staff, provider,  specific staff member):CLINICAL      What was the call regarding: PATIENT IS CALLING DUE TO HER NOT FEELING WELL AND HER BLOOD PRESSURE IS RUNNING HIGH. PATIENT STATES IT /90 AND 3 MINUTES LATER IT /94. PATIENT IS CONCERNED.    Do you require a callback: YES

## 2022-10-05 ENCOUNTER — TELEPHONE (OUTPATIENT)
Dept: INTERNAL MEDICINE | Facility: CLINIC | Age: 57
End: 2022-10-05

## 2022-10-05 NOTE — TELEPHONE ENCOUNTER
Caller: Madina Reddy    Relationship to patient: Self    Best call back number: [phone]    Patient is needing: PATIENT IS REQUESTING A CALL BACK. SHE STATED THAT HER BLOOD PRESSURE HAS BEEN HIGH. PATIENT CHECKED IT ON 10/05/22 IT /93 AND THAN LATER AFTER 132/90. 128/88 WAS TODAY 10/05/22.SHE HAS SOME CONCERNS AND WANTS TO SPEAK TO MARLEE JOE.PLEASE CALL AND ADVISE.

## 2022-10-07 NOTE — TELEPHONE ENCOUNTER
Called to check on patient and she stated she thinks she was dehydrated so she has been drinking water to help. She took her BP while she was on the phone with me left arm was 136/89 and on right arm it was 117/87. Patient wanted to schedule an appointment. Appointment scheduled with Myra.

## 2022-10-10 ENCOUNTER — OFFICE VISIT (OUTPATIENT)
Dept: INTERNAL MEDICINE | Facility: CLINIC | Age: 57
End: 2022-10-10

## 2022-10-10 VITALS
HEART RATE: 68 BPM | BODY MASS INDEX: 26.93 KG/M2 | WEIGHT: 152 LBS | TEMPERATURE: 97.4 F | SYSTOLIC BLOOD PRESSURE: 118 MMHG | DIASTOLIC BLOOD PRESSURE: 66 MMHG | OXYGEN SATURATION: 98 %

## 2022-10-10 DIAGNOSIS — R03.0 ELEVATED BLOOD PRESSURE READING: Primary | ICD-10-CM

## 2022-10-10 DIAGNOSIS — J30.2 SEASONAL ALLERGIC RHINITIS, UNSPECIFIED TRIGGER: ICD-10-CM

## 2022-10-10 PROCEDURE — 99213 OFFICE O/P EST LOW 20 MIN: CPT | Performed by: PHYSICIAN ASSISTANT

## 2022-10-10 RX ORDER — MONTELUKAST SODIUM 10 MG/1
10 TABLET ORAL NIGHTLY
Qty: 30 TABLET | Refills: 2 | Status: SHIPPED | OUTPATIENT
Start: 2022-10-10 | End: 2023-01-03

## 2022-10-10 NOTE — PROGRESS NOTES
Chief Complaint  Hypertension (144/93 heart rate 96 highest  ave 130/80)    Subjective          Madina Reddy presents to [facility]  History of Present Illness  Elevated blood pressure- 1 week ago patient noticed feeling off a little bit.  Patient noticed some elevation in her blood pressure.  Patient had been taking the decongestant a little over a week for allergy symptoms.  She stopped taking the decongestant a couple days later.  Her blood pressure seemed to regulate better after that.        Objective   Vital Signs:   /66 (BP Location: Right arm, Patient Position: Sitting) Comment: 118/80 left arm sitting  Pulse 68   Temp 97.4 °F (36.3 °C) (Temporal)   Wt 68.9 kg (152 lb)   SpO2 98%   BMI 26.93 kg/m²     Physical Exam  Vitals reviewed.   Constitutional:       Appearance: Normal appearance. She is well-developed.   HENT:      Head: Normocephalic and atraumatic.      Right Ear: Tympanic membrane and ear canal normal.      Left Ear: Tympanic membrane and ear canal normal.   Eyes:      Conjunctiva/sclera: Conjunctivae normal.      Pupils: Pupils are equal, round, and reactive to light.   Cardiovascular:      Rate and Rhythm: Normal rate and regular rhythm.      Heart sounds: No murmur heard.    No friction rub. No gallop.   Pulmonary:      Effort: Pulmonary effort is normal.      Breath sounds: Normal breath sounds. No wheezing or rhonchi.   Abdominal:      General: Abdomen is flat.      Palpations: Abdomen is soft.      Tenderness: There is no abdominal tenderness.   Skin:     General: Skin is warm and dry.   Neurological:      Mental Status: She is alert and oriented to person, place, and time.      Cranial Nerves: No cranial nerve deficit.   Psychiatric:         Mood and Affect: Mood and affect normal.         Behavior: Behavior normal.         Thought Content: Thought content normal.         Judgment: Judgment normal.        Result Review :           Procedures      Assessment and Plan    Diagnoses and all orders for this visit:    1. Elevated blood pressure reading (Primary)  Assessment & Plan:  Normal in office.  Symptoms most likely secondary to dehydration, illness and decongestant.  Continue to monitor BP especially if not feeling well.       2. Seasonal allergic rhinitis, unspecified trigger  Assessment & Plan:  Due to significant allergy symptoms will add Singulair at this time.  Discussed sending to allergist but patient wants to hold off at this time.  Will consider this if Singulair does not help significantly with allergy symptoms.      Other orders  -     montelukast (Singulair) 10 MG tablet; Take 1 tablet by mouth Every Night for 30 days.  Dispense: 30 tablet; Refill: 2            Follow Up   Return if symptoms worsen or fail to improve.  Patient was given instructions and counseling regarding her condition or for health maintenance advice. Please see specific information pulled into the AVS if appropriate.

## 2022-10-10 NOTE — ASSESSMENT & PLAN NOTE
Due to significant allergy symptoms will add Singulair at this time.  Discussed sending to allergist but patient wants to hold off at this time.  Will consider this if Singulair does not help significantly with allergy symptoms.

## 2022-10-10 NOTE — ASSESSMENT & PLAN NOTE
Normal in office.  Symptoms most likely secondary to dehydration, illness and decongestant.  Continue to monitor BP especially if not feeling well.

## 2023-01-03 RX ORDER — MONTELUKAST SODIUM 10 MG/1
TABLET ORAL
Qty: 30 TABLET | Refills: 0 | Status: SHIPPED | OUTPATIENT
Start: 2023-01-03 | End: 2023-02-27 | Stop reason: SDUPTHER

## 2023-01-23 ENCOUNTER — APPOINTMENT (OUTPATIENT)
Dept: CT IMAGING | Facility: HOSPITAL | Age: 58
DRG: 853 | End: 2023-01-23
Payer: COMMERCIAL

## 2023-01-23 ENCOUNTER — HOSPITAL ENCOUNTER (INPATIENT)
Facility: HOSPITAL | Age: 58
LOS: 3 days | Discharge: HOME | DRG: 853 | End: 2023-01-26
Attending: EMERGENCY MEDICINE | Admitting: STUDENT IN AN ORGANIZED HEALTH CARE EDUCATION/TRAINING PROGRAM
Payer: COMMERCIAL

## 2023-01-23 ENCOUNTER — APPOINTMENT (OUTPATIENT)
Dept: GENERAL RADIOLOGY | Facility: HOSPITAL | Age: 58
DRG: 853 | End: 2023-01-23
Payer: COMMERCIAL

## 2023-01-23 DIAGNOSIS — A41.9 SEPSIS, DUE TO UNSPECIFIED ORGANISM, UNSPECIFIED WHETHER ACUTE ORGAN DYSFUNCTION PRESENT: Primary | ICD-10-CM

## 2023-01-23 DIAGNOSIS — R26.2 DIFFICULTY WALKING: ICD-10-CM

## 2023-01-23 DIAGNOSIS — I49.5 SICK SINUS SYNDROME: ICD-10-CM

## 2023-01-23 LAB
ALBUMIN SERPL-MCNC: 4.5 G/DL (ref 3.5–5.2)
ALBUMIN/GLOB SERPL: 1.3 G/DL
ALP SERPL-CCNC: 71 U/L (ref 39–117)
ALT SERPL W P-5'-P-CCNC: 14 U/L (ref 1–33)
ANION GAP SERPL CALCULATED.3IONS-SCNC: 15.6 MMOL/L (ref 5–15)
APTT PPP: 27.4 SECONDS (ref 24.2–34.2)
AST SERPL-CCNC: 17 U/L (ref 1–32)
BASE EXCESS BLDV CALC-SCNC: -3.7 MMOL/L (ref -2–2)
BASOPHILS # BLD AUTO: 0.06 10*3/MM3 (ref 0–0.2)
BASOPHILS NFR BLD AUTO: 0.3 % (ref 0–1.5)
BDY SITE: ABNORMAL
BILIRUB SERPL-MCNC: 0.6 MG/DL (ref 0–1.2)
BUN SERPL-MCNC: 11 MG/DL (ref 6–20)
BUN/CREAT SERPL: 12.2 (ref 7–25)
CALCIUM SPEC-SCNC: 9.4 MG/DL (ref 8.6–10.5)
CHLORIDE SERPL-SCNC: 101 MMOL/L (ref 98–107)
CO2 SERPL-SCNC: 22.4 MMOL/L (ref 22–29)
COHGB MFR BLD: 0.2 % (ref 0–1.5)
CREAT SERPL-MCNC: 0.9 MG/DL (ref 0.57–1)
CRP SERPL-MCNC: 14.53 MG/DL (ref 0–0.5)
D-LACTATE SERPL-SCNC: 2.6 MMOL/L (ref 0.5–2)
D-LACTATE SERPL-SCNC: 2.9 MMOL/L (ref 0.5–2)
DEPRECATED RDW RBC AUTO: 41.5 FL (ref 37–54)
EGFRCR SERPLBLD CKD-EPI 2021: 75.2 ML/MIN/1.73
EOSINOPHIL # BLD AUTO: 0.71 10*3/MM3 (ref 0–0.4)
EOSINOPHIL NFR BLD AUTO: 4.1 % (ref 0.3–6.2)
ERYTHROCYTE [DISTWIDTH] IN BLOOD BY AUTOMATED COUNT: 13.1 % (ref 12.3–15.4)
FHHB: 68.9 % (ref 0–5)
GLOBULIN UR ELPH-MCNC: 3.6 GM/DL
GLUCOSE BLDC GLUCOMTR-MCNC: 123 MG/DL (ref 70–99)
GLUCOSE SERPL-MCNC: 172 MG/DL (ref 65–99)
HCO3 BLDV-SCNC: 21.3 MMOL/L (ref 22–26)
HCT VFR BLD AUTO: 40.2 % (ref 34–46.6)
HGB BLD-MCNC: 13.8 G/DL (ref 12–15.9)
HGB BLDA-MCNC: 13.9 G/DL (ref 11.7–14.6)
HOLD SPECIMEN: NORMAL
HOLD SPECIMEN: NORMAL
IMM GRANULOCYTES # BLD AUTO: 0.09 10*3/MM3 (ref 0–0.05)
IMM GRANULOCYTES NFR BLD AUTO: 0.5 % (ref 0–0.5)
INR PPP: 1.1 (ref 0.86–1.15)
L PNEUMO1 AG UR QL IA: NEGATIVE
LYMPHOCYTES # BLD AUTO: 0.74 10*3/MM3 (ref 0.7–3.1)
LYMPHOCYTES NFR BLD AUTO: 4.3 % (ref 19.6–45.3)
MAGNESIUM SERPL-MCNC: 1.7 MG/DL (ref 1.6–2.6)
MCH RBC QN AUTO: 29.9 PG (ref 26.6–33)
MCHC RBC AUTO-ENTMCNC: 34.3 G/DL (ref 31.5–35.7)
MCV RBC AUTO: 87.2 FL (ref 79–97)
METHGB BLD QL: 1.5 % (ref 0–1.5)
MODALITY: ABNORMAL
MONOCYTES # BLD AUTO: 1.08 10*3/MM3 (ref 0.1–0.9)
MONOCYTES NFR BLD AUTO: 6.3 % (ref 5–12)
MRSA DNA SPEC QL NAA+PROBE: NORMAL
NEUTROPHILS NFR BLD AUTO: 14.55 10*3/MM3 (ref 1.7–7)
NEUTROPHILS NFR BLD AUTO: 84.5 % (ref 42.7–76)
NOTE: ABNORMAL
NRBC BLD AUTO-RTO: 0 /100 WBC (ref 0–0.2)
OXYHGB MFR BLDV: 29.4 % (ref 94–99)
PCO2 BLDV: 38.5 MM HG (ref 41–51)
PH BLDV: 7.36 PH UNITS (ref 7.31–7.41)
PHOSPHATE SERPL-MCNC: 4.2 MG/DL (ref 2.5–4.5)
PLATELET # BLD AUTO: 197 10*3/MM3 (ref 140–450)
PMV BLD AUTO: 9.9 FL (ref 6–12)
PO2 BLDV: <40.5 MM HG (ref 35–42)
POTASSIUM SERPL-SCNC: 3.8 MMOL/L (ref 3.5–5.2)
PROT SERPL-MCNC: 8.1 G/DL (ref 6–8.5)
PROTHROMBIN TIME: 14.3 SECONDS (ref 11.8–14.9)
RBC # BLD AUTO: 4.61 10*6/MM3 (ref 3.77–5.28)
RBC MORPH BLD: NORMAL
S PNEUM AG SPEC QL LA: POSITIVE
SAO2 % BLDCOV: 29.9 % (ref 45–75)
SMALL PLATELETS BLD QL SMEAR: ADEQUATE
SODIUM SERPL-SCNC: 139 MMOL/L (ref 136–145)
WBC MORPH BLD: NORMAL
WBC NRBC COR # BLD: 17.23 10*3/MM3 (ref 3.4–10.8)
WHOLE BLOOD HOLD COAG: NORMAL
WHOLE BLOOD HOLD SPECIMEN: NORMAL

## 2023-01-23 PROCEDURE — 82820 HEMOGLOBIN-OXYGEN AFFINITY: CPT | Performed by: STUDENT IN AN ORGANIZED HEALTH CARE EDUCATION/TRAINING PROGRAM

## 2023-01-23 PROCEDURE — 71045 X-RAY EXAM CHEST 1 VIEW: CPT

## 2023-01-23 PROCEDURE — 87070 CULTURE OTHR SPECIMN AEROBIC: CPT | Performed by: STUDENT IN AN ORGANIZED HEALTH CARE EDUCATION/TRAINING PROGRAM

## 2023-01-23 PROCEDURE — 25010000002 CEFTRIAXONE PER 250 MG: Performed by: EMERGENCY MEDICINE

## 2023-01-23 PROCEDURE — 85730 THROMBOPLASTIN TIME PARTIAL: CPT | Performed by: EMERGENCY MEDICINE

## 2023-01-23 PROCEDURE — 70450 CT HEAD/BRAIN W/O DYE: CPT

## 2023-01-23 PROCEDURE — 93005 ELECTROCARDIOGRAM TRACING: CPT

## 2023-01-23 PROCEDURE — 25010000002 VANCOMYCIN 5 G RECONSTITUTED SOLUTION: Performed by: STUDENT IN AN ORGANIZED HEALTH CARE EDUCATION/TRAINING PROGRAM

## 2023-01-23 PROCEDURE — 93005 ELECTROCARDIOGRAM TRACING: CPT | Performed by: EMERGENCY MEDICINE

## 2023-01-23 PROCEDURE — 25010000002 PIPERACILLIN SOD-TAZOBACTAM PER 1 G: Performed by: STUDENT IN AN ORGANIZED HEALTH CARE EDUCATION/TRAINING PROGRAM

## 2023-01-23 PROCEDURE — 94799 UNLISTED PULMONARY SVC/PX: CPT

## 2023-01-23 PROCEDURE — 87186 SC STD MICRODIL/AGAR DIL: CPT | Performed by: STUDENT IN AN ORGANIZED HEALTH CARE EDUCATION/TRAINING PROGRAM

## 2023-01-23 PROCEDURE — 83735 ASSAY OF MAGNESIUM: CPT | Performed by: EMERGENCY MEDICINE

## 2023-01-23 PROCEDURE — 82962 GLUCOSE BLOOD TEST: CPT

## 2023-01-23 PROCEDURE — 87899 AGENT NOS ASSAY W/OPTIC: CPT | Performed by: STUDENT IN AN ORGANIZED HEALTH CARE EDUCATION/TRAINING PROGRAM

## 2023-01-23 PROCEDURE — 84439 ASSAY OF FREE THYROXINE: CPT | Performed by: INTERNAL MEDICINE

## 2023-01-23 PROCEDURE — 80050 GENERAL HEALTH PANEL: CPT | Performed by: EMERGENCY MEDICINE

## 2023-01-23 PROCEDURE — 93010 ELECTROCARDIOGRAM REPORT: CPT | Performed by: INTERNAL MEDICINE

## 2023-01-23 PROCEDURE — 87040 BLOOD CULTURE FOR BACTERIA: CPT | Performed by: EMERGENCY MEDICINE

## 2023-01-23 PROCEDURE — 85610 PROTHROMBIN TIME: CPT | Performed by: EMERGENCY MEDICINE

## 2023-01-23 PROCEDURE — U0004 COV-19 TEST NON-CDC HGH THRU: HCPCS | Performed by: STUDENT IN AN ORGANIZED HEALTH CARE EDUCATION/TRAINING PROGRAM

## 2023-01-23 PROCEDURE — 82805 BLOOD GASES W/O2 SATURATION: CPT | Performed by: STUDENT IN AN ORGANIZED HEALTH CARE EDUCATION/TRAINING PROGRAM

## 2023-01-23 PROCEDURE — 87205 SMEAR GRAM STAIN: CPT | Performed by: STUDENT IN AN ORGANIZED HEALTH CARE EDUCATION/TRAINING PROGRAM

## 2023-01-23 PROCEDURE — 83605 ASSAY OF LACTIC ACID: CPT | Performed by: EMERGENCY MEDICINE

## 2023-01-23 PROCEDURE — 87449 NOS EACH ORGANISM AG IA: CPT | Performed by: STUDENT IN AN ORGANIZED HEALTH CARE EDUCATION/TRAINING PROGRAM

## 2023-01-23 PROCEDURE — 87077 CULTURE AEROBIC IDENTIFY: CPT | Performed by: STUDENT IN AN ORGANIZED HEALTH CARE EDUCATION/TRAINING PROGRAM

## 2023-01-23 PROCEDURE — 25010000002 AZITHROMYCIN PER 500 MG: Performed by: EMERGENCY MEDICINE

## 2023-01-23 PROCEDURE — 99285 EMERGENCY DEPT VISIT HI MDM: CPT

## 2023-01-23 PROCEDURE — 85007 BL SMEAR W/DIFF WBC COUNT: CPT | Performed by: EMERGENCY MEDICINE

## 2023-01-23 PROCEDURE — 87641 MR-STAPH DNA AMP PROBE: CPT | Performed by: STUDENT IN AN ORGANIZED HEALTH CARE EDUCATION/TRAINING PROGRAM

## 2023-01-23 PROCEDURE — 99223 1ST HOSP IP/OBS HIGH 75: CPT | Performed by: STUDENT IN AN ORGANIZED HEALTH CARE EDUCATION/TRAINING PROGRAM

## 2023-01-23 PROCEDURE — 84100 ASSAY OF PHOSPHORUS: CPT | Performed by: EMERGENCY MEDICINE

## 2023-01-23 PROCEDURE — 86140 C-REACTIVE PROTEIN: CPT | Performed by: EMERGENCY MEDICINE

## 2023-01-23 RX ORDER — ACETAMINOPHEN 500 MG
1000 TABLET ORAL ONCE
Status: COMPLETED | OUTPATIENT
Start: 2023-01-23 | End: 2023-01-23

## 2023-01-23 RX ORDER — SODIUM CHLORIDE 0.9 % (FLUSH) 0.9 %
10 SYRINGE (ML) INJECTION EVERY 12 HOURS SCHEDULED
Status: DISCONTINUED | OUTPATIENT
Start: 2023-01-23 | End: 2023-01-26 | Stop reason: HOSPADM

## 2023-01-23 RX ORDER — SODIUM CHLORIDE 0.9 % (FLUSH) 0.9 %
10 SYRINGE (ML) INJECTION AS NEEDED
Status: DISCONTINUED | OUTPATIENT
Start: 2023-01-23 | End: 2023-01-26 | Stop reason: HOSPADM

## 2023-01-23 RX ORDER — CEFTRIAXONE SODIUM 1 G/50ML
1 INJECTION, SOLUTION INTRAVENOUS ONCE
Status: COMPLETED | OUTPATIENT
Start: 2023-01-23 | End: 2023-01-23

## 2023-01-23 RX ORDER — CEFTRIAXONE SODIUM 1 G/50ML
1 INJECTION, SOLUTION INTRAVENOUS EVERY 24 HOURS
Status: DISCONTINUED | OUTPATIENT
Start: 2023-01-24 | End: 2023-01-23

## 2023-01-23 RX ORDER — ACETAMINOPHEN 325 MG/1
650 TABLET ORAL EVERY 6 HOURS PRN
Status: DISCONTINUED | OUTPATIENT
Start: 2023-01-23 | End: 2023-01-26 | Stop reason: HOSPADM

## 2023-01-23 RX ORDER — SODIUM CHLORIDE, SODIUM LACTATE, POTASSIUM CHLORIDE, CALCIUM CHLORIDE 600; 310; 30; 20 MG/100ML; MG/100ML; MG/100ML; MG/100ML
100 INJECTION, SOLUTION INTRAVENOUS CONTINUOUS
Status: DISCONTINUED | OUTPATIENT
Start: 2023-01-23 | End: 2023-01-24

## 2023-01-23 RX ORDER — SODIUM CHLORIDE 9 MG/ML
40 INJECTION, SOLUTION INTRAVENOUS AS NEEDED
Status: DISCONTINUED | OUTPATIENT
Start: 2023-01-23 | End: 2023-01-26 | Stop reason: HOSPADM

## 2023-01-23 RX ORDER — CIPROFLOXACIN AND DEXAMETHASONE 3; 1 MG/ML; MG/ML
3 SUSPENSION/ DROPS AURICULAR (OTIC) 2 TIMES DAILY
Status: DISCONTINUED | OUTPATIENT
Start: 2023-01-23 | End: 2023-01-26 | Stop reason: HOSPADM

## 2023-01-23 RX ADMIN — ACETAMINOPHEN 1000 MG: 500 TABLET ORAL at 11:15

## 2023-01-23 RX ADMIN — ACETAMINOPHEN 650 MG: 325 TABLET ORAL at 20:09

## 2023-01-23 RX ADMIN — CIPROFLOXACIN AND DEXAMETHASONE 3 DROP: 3; 1 SUSPENSION/ DROPS AURICULAR (OTIC) at 20:02

## 2023-01-23 RX ADMIN — TAZOBACTAM SODIUM AND PIPERACILLIN SODIUM 3.38 G: 375; 3 INJECTION, SOLUTION INTRAVENOUS at 20:08

## 2023-01-23 RX ADMIN — SODIUM CHLORIDE 1000 ML: 9 INJECTION, SOLUTION INTRAVENOUS at 12:19

## 2023-01-23 RX ADMIN — AZITHROMYCIN 500 MG: 500 INJECTION, POWDER, LYOPHILIZED, FOR SOLUTION INTRAVENOUS at 13:13

## 2023-01-23 RX ADMIN — VANCOMYCIN HYDROCHLORIDE 1250 MG: 5 INJECTION, POWDER, LYOPHILIZED, FOR SOLUTION INTRAVENOUS at 18:22

## 2023-01-23 RX ADMIN — CEFTRIAXONE SODIUM 1 G: 1 INJECTION, SOLUTION INTRAVENOUS at 12:19

## 2023-01-23 RX ADMIN — SODIUM CHLORIDE 1000 ML: 9 INJECTION, SOLUTION INTRAVENOUS at 11:11

## 2023-01-23 RX ADMIN — SODIUM CHLORIDE, POTASSIUM CHLORIDE, SODIUM LACTATE AND CALCIUM CHLORIDE 100 ML/HR: 600; 310; 30; 20 INJECTION, SOLUTION INTRAVENOUS at 18:22

## 2023-01-23 RX ADMIN — SODIUM CHLORIDE, POTASSIUM CHLORIDE, SODIUM LACTATE AND CALCIUM CHLORIDE 1000 ML: 600; 310; 30; 20 INJECTION, SOLUTION INTRAVENOUS at 18:21

## 2023-01-23 RX ADMIN — Medication 10 ML: at 20:08

## 2023-01-24 ENCOUNTER — APPOINTMENT (OUTPATIENT)
Dept: CARDIOLOGY | Facility: HOSPITAL | Age: 58
DRG: 853 | End: 2023-01-24
Payer: COMMERCIAL

## 2023-01-24 PROBLEM — I49.5 SICK SINUS SYNDROME: Status: ACTIVE | Noted: 2023-01-23

## 2023-01-24 LAB
ALBUMIN SERPL-MCNC: 3.3 G/DL (ref 3.5–5.2)
ALBUMIN/GLOB SERPL: 1.1 G/DL
ALP SERPL-CCNC: 54 U/L (ref 39–117)
ALT SERPL W P-5'-P-CCNC: 10 U/L (ref 1–33)
ANION GAP SERPL CALCULATED.3IONS-SCNC: 9.2 MMOL/L (ref 5–15)
AST SERPL-CCNC: 14 U/L (ref 1–32)
BASOPHILS # BLD AUTO: 0.05 10*3/MM3 (ref 0–0.2)
BASOPHILS NFR BLD AUTO: 0.3 % (ref 0–1.5)
BILIRUB SERPL-MCNC: 0.5 MG/DL (ref 0–1.2)
BUN SERPL-MCNC: 10 MG/DL (ref 6–20)
BUN/CREAT SERPL: 15.9 (ref 7–25)
CALCIUM SPEC-SCNC: 8.6 MG/DL (ref 8.6–10.5)
CHLORIDE SERPL-SCNC: 108 MMOL/L (ref 98–107)
CO2 SERPL-SCNC: 19.8 MMOL/L (ref 22–29)
CREAT SERPL-MCNC: 0.63 MG/DL (ref 0.57–1)
D-LACTATE SERPL-SCNC: 1.6 MMOL/L (ref 0.5–2)
DEPRECATED RDW RBC AUTO: 43.7 FL (ref 37–54)
EGFRCR SERPLBLD CKD-EPI 2021: 104.3 ML/MIN/1.73
EOSINOPHIL # BLD AUTO: 0.09 10*3/MM3 (ref 0–0.4)
EOSINOPHIL NFR BLD AUTO: 0.5 % (ref 0.3–6.2)
ERYTHROCYTE [DISTWIDTH] IN BLOOD BY AUTOMATED COUNT: 13.4 % (ref 12.3–15.4)
GLOBULIN UR ELPH-MCNC: 3 GM/DL
GLUCOSE BLDC GLUCOMTR-MCNC: 111 MG/DL (ref 70–99)
GLUCOSE SERPL-MCNC: 133 MG/DL (ref 65–99)
HCT VFR BLD AUTO: 31.1 % (ref 34–46.6)
HGB BLD-MCNC: 10.6 G/DL (ref 12–15.9)
IMM GRANULOCYTES # BLD AUTO: 0.26 10*3/MM3 (ref 0–0.05)
IMM GRANULOCYTES NFR BLD AUTO: 1.5 % (ref 0–0.5)
LYMPHOCYTES # BLD AUTO: 1.33 10*3/MM3 (ref 0.7–3.1)
LYMPHOCYTES NFR BLD AUTO: 7.9 % (ref 19.6–45.3)
MAGNESIUM SERPL-MCNC: 1.6 MG/DL (ref 1.6–2.6)
MCH RBC QN AUTO: 30.1 PG (ref 26.6–33)
MCHC RBC AUTO-ENTMCNC: 34.1 G/DL (ref 31.5–35.7)
MCV RBC AUTO: 88.4 FL (ref 79–97)
MONOCYTES # BLD AUTO: 1.07 10*3/MM3 (ref 0.1–0.9)
MONOCYTES NFR BLD AUTO: 6.4 % (ref 5–12)
NEUTROPHILS NFR BLD AUTO: 14.05 10*3/MM3 (ref 1.7–7)
NEUTROPHILS NFR BLD AUTO: 83.4 % (ref 42.7–76)
NRBC BLD AUTO-RTO: 0 /100 WBC (ref 0–0.2)
PHOSPHATE SERPL-MCNC: 1.4 MG/DL (ref 2.5–4.5)
PLATELET # BLD AUTO: 158 10*3/MM3 (ref 140–450)
PMV BLD AUTO: 10.2 FL (ref 6–12)
POTASSIUM SERPL-SCNC: 3.7 MMOL/L (ref 3.5–5.2)
PROT SERPL-MCNC: 6.3 G/DL (ref 6–8.5)
RBC # BLD AUTO: 3.52 10*6/MM3 (ref 3.77–5.28)
SARS-COV-2 RNA PNL SPEC NAA+PROBE: NOT DETECTED
SODIUM SERPL-SCNC: 137 MMOL/L (ref 136–145)
T4 FREE SERPL-MCNC: 1.01 NG/DL (ref 0.93–1.7)
TSH SERPL DL<=0.05 MIU/L-ACNC: 0.67 UIU/ML (ref 0.27–4.2)
WBC NRBC COR # BLD: 16.85 10*3/MM3 (ref 3.4–10.8)

## 2023-01-24 PROCEDURE — 83735 ASSAY OF MAGNESIUM: CPT | Performed by: STUDENT IN AN ORGANIZED HEALTH CARE EDUCATION/TRAINING PROGRAM

## 2023-01-24 PROCEDURE — 85025 COMPLETE CBC W/AUTO DIFF WBC: CPT | Performed by: STUDENT IN AN ORGANIZED HEALTH CARE EDUCATION/TRAINING PROGRAM

## 2023-01-24 PROCEDURE — 25010000002 AZITHROMYCIN PER 500 MG: Performed by: STUDENT IN AN ORGANIZED HEALTH CARE EDUCATION/TRAINING PROGRAM

## 2023-01-24 PROCEDURE — 25010000002 PIPERACILLIN SOD-TAZOBACTAM PER 1 G: Performed by: STUDENT IN AN ORGANIZED HEALTH CARE EDUCATION/TRAINING PROGRAM

## 2023-01-24 PROCEDURE — 36415 COLL VENOUS BLD VENIPUNCTURE: CPT | Performed by: EMERGENCY MEDICINE

## 2023-01-24 PROCEDURE — 94799 UNLISTED PULMONARY SVC/PX: CPT

## 2023-01-24 PROCEDURE — 83605 ASSAY OF LACTIC ACID: CPT | Performed by: EMERGENCY MEDICINE

## 2023-01-24 PROCEDURE — 82962 GLUCOSE BLOOD TEST: CPT

## 2023-01-24 PROCEDURE — 80053 COMPREHEN METABOLIC PANEL: CPT | Performed by: STUDENT IN AN ORGANIZED HEALTH CARE EDUCATION/TRAINING PROGRAM

## 2023-01-24 PROCEDURE — 93306 TTE W/DOPPLER COMPLETE: CPT

## 2023-01-24 PROCEDURE — 99233 SBSQ HOSP IP/OBS HIGH 50: CPT | Performed by: INTERNAL MEDICINE

## 2023-01-24 PROCEDURE — 84100 ASSAY OF PHOSPHORUS: CPT | Performed by: STUDENT IN AN ORGANIZED HEALTH CARE EDUCATION/TRAINING PROGRAM

## 2023-01-24 PROCEDURE — 25010000002 ONDANSETRON PER 1 MG: Performed by: INTERNAL MEDICINE

## 2023-01-24 PROCEDURE — 25010000002 VANCOMYCIN 5 G RECONSTITUTED SOLUTION: Performed by: STUDENT IN AN ORGANIZED HEALTH CARE EDUCATION/TRAINING PROGRAM

## 2023-01-24 RX ORDER — SODIUM BICARBONATE IN D5W 150/1000ML
150 PLASTIC BAG, INJECTION (ML) INTRAVENOUS CONTINUOUS
Status: DISPENSED | OUTPATIENT
Start: 2023-01-24 | End: 2023-01-24

## 2023-01-24 RX ORDER — SACCHAROMYCES BOULARDII 250 MG
500 CAPSULE ORAL 2 TIMES DAILY
Status: DISCONTINUED | OUTPATIENT
Start: 2023-01-24 | End: 2023-01-26 | Stop reason: HOSPADM

## 2023-01-24 RX ORDER — FLUTICASONE PROPIONATE 50 MCG
2 SPRAY, SUSPENSION (ML) NASAL DAILY
Status: DISCONTINUED | OUTPATIENT
Start: 2023-01-24 | End: 2023-01-26 | Stop reason: HOSPADM

## 2023-01-24 RX ORDER — ONDANSETRON 2 MG/ML
4 INJECTION INTRAMUSCULAR; INTRAVENOUS ONCE
Status: COMPLETED | OUTPATIENT
Start: 2023-01-24 | End: 2023-01-24

## 2023-01-24 RX ORDER — FLUTICASONE PROPIONATE 50 MCG
2 SPRAY, SUSPENSION (ML) NASAL DAILY
COMMUNITY
End: 2023-02-27 | Stop reason: SDUPTHER

## 2023-01-24 RX ORDER — ONDANSETRON 2 MG/ML
4 INJECTION INTRAMUSCULAR; INTRAVENOUS EVERY 6 HOURS PRN
Status: DISCONTINUED | OUTPATIENT
Start: 2023-01-24 | End: 2023-01-26 | Stop reason: HOSPADM

## 2023-01-24 RX ADMIN — ONDANSETRON 4 MG: 2 INJECTION INTRAMUSCULAR; INTRAVENOUS at 10:45

## 2023-01-24 RX ADMIN — POTASSIUM PHOSPHATE, MONOBASIC 1000 MG: 500 TABLET, SOLUBLE ORAL at 12:11

## 2023-01-24 RX ADMIN — ACETAMINOPHEN 650 MG: 325 TABLET ORAL at 04:50

## 2023-01-24 RX ADMIN — VANCOMYCIN HYDROCHLORIDE 750 MG: 5 INJECTION, POWDER, LYOPHILIZED, FOR SOLUTION INTRAVENOUS at 05:50

## 2023-01-24 RX ADMIN — ONDANSETRON 4 MG: 2 INJECTION INTRAMUSCULAR; INTRAVENOUS at 21:48

## 2023-01-24 RX ADMIN — VANCOMYCIN HYDROCHLORIDE 1000 MG: 5 INJECTION, POWDER, LYOPHILIZED, FOR SOLUTION INTRAVENOUS at 17:52

## 2023-01-24 RX ADMIN — TAZOBACTAM SODIUM AND PIPERACILLIN SODIUM 3.38 G: 375; 3 INJECTION, SOLUTION INTRAVENOUS at 17:45

## 2023-01-24 RX ADMIN — ACETAMINOPHEN 650 MG: 325 TABLET ORAL at 10:30

## 2023-01-24 RX ADMIN — AZITHROMYCIN MONOHYDRATE 500 MG: 500 INJECTION, POWDER, LYOPHILIZED, FOR SOLUTION INTRAVENOUS at 10:19

## 2023-01-24 RX ADMIN — Medication 150 MEQ: at 11:58

## 2023-01-24 RX ADMIN — TAZOBACTAM SODIUM AND PIPERACILLIN SODIUM 3.38 G: 375; 3 INJECTION, SOLUTION INTRAVENOUS at 01:11

## 2023-01-24 RX ADMIN — FLUTICASONE PROPIONATE 2 SPRAY: 50 SPRAY, METERED NASAL at 12:44

## 2023-01-24 RX ADMIN — POTASSIUM PHOSPHATE, MONOBASIC 1000 MG: 500 TABLET, SOLUBLE ORAL at 06:50

## 2023-01-24 RX ADMIN — Medication 500 MG: at 21:48

## 2023-01-24 RX ADMIN — ACETAMINOPHEN 650 MG: 325 TABLET ORAL at 17:48

## 2023-01-24 RX ADMIN — TAZOBACTAM SODIUM AND PIPERACILLIN SODIUM 3.38 G: 375; 3 INJECTION, SOLUTION INTRAVENOUS at 12:43

## 2023-01-24 RX ADMIN — CIPROFLOXACIN AND DEXAMETHASONE 3 DROP: 3; 1 SUSPENSION/ DROPS AURICULAR (OTIC) at 21:48

## 2023-01-24 RX ADMIN — CIPROFLOXACIN AND DEXAMETHASONE 3 DROP: 3; 1 SUSPENSION/ DROPS AURICULAR (OTIC) at 09:52

## 2023-01-24 RX ADMIN — Medication 150 MEQ: at 04:44

## 2023-01-25 ENCOUNTER — APPOINTMENT (OUTPATIENT)
Dept: GENERAL RADIOLOGY | Facility: HOSPITAL | Age: 58
DRG: 853 | End: 2023-01-25
Payer: COMMERCIAL

## 2023-01-25 LAB
GLUCOSE BLDC GLUCOMTR-MCNC: 102 MG/DL (ref 70–99)
GLUCOSE BLDC GLUCOMTR-MCNC: 111 MG/DL (ref 70–99)

## 2023-01-25 PROCEDURE — 25010000002 AZITHROMYCIN PER 500 MG: Performed by: STUDENT IN AN ORGANIZED HEALTH CARE EDUCATION/TRAINING PROGRAM

## 2023-01-25 PROCEDURE — 25010000002 PIPERACILLIN SOD-TAZOBACTAM PER 1 G: Performed by: INTERNAL MEDICINE

## 2023-01-25 PROCEDURE — 02HK3JZ INSERTION OF PACEMAKER LEAD INTO RIGHT VENTRICLE, PERCUTANEOUS APPROACH: ICD-10-PCS | Performed by: INTERNAL MEDICINE

## 2023-01-25 PROCEDURE — C1785 PMKR, DUAL, RATE-RESP: HCPCS | Performed by: INTERNAL MEDICINE

## 2023-01-25 PROCEDURE — 25010000002 ONDANSETRON PER 1 MG: Performed by: INTERNAL MEDICINE

## 2023-01-25 PROCEDURE — 25010000002 MIDAZOLAM PER 1 MG: Performed by: INTERNAL MEDICINE

## 2023-01-25 PROCEDURE — 25010000002 CEFTRIAXONE PER 250 MG: Performed by: INTERNAL MEDICINE

## 2023-01-25 PROCEDURE — 25010000002 FENTANYL CITRATE (PF) 50 MCG/ML SOLUTION: Performed by: INTERNAL MEDICINE

## 2023-01-25 PROCEDURE — 82962 GLUCOSE BLOOD TEST: CPT

## 2023-01-25 PROCEDURE — C1894 INTRO/SHEATH, NON-LASER: HCPCS | Performed by: INTERNAL MEDICINE

## 2023-01-25 PROCEDURE — 33208 INSRT HEART PM ATRIAL & VENT: CPT | Performed by: INTERNAL MEDICINE

## 2023-01-25 PROCEDURE — 0 IOPAMIDOL PER 1 ML: Performed by: INTERNAL MEDICINE

## 2023-01-25 PROCEDURE — C1898 LEAD, PMKR, OTHER THAN TRANS: HCPCS | Performed by: INTERNAL MEDICINE

## 2023-01-25 PROCEDURE — 99233 SBSQ HOSP IP/OBS HIGH 50: CPT | Performed by: INTERNAL MEDICINE

## 2023-01-25 PROCEDURE — 25010000002 PIPERACILLIN SOD-TAZOBACTAM PER 1 G: Performed by: STUDENT IN AN ORGANIZED HEALTH CARE EDUCATION/TRAINING PROGRAM

## 2023-01-25 PROCEDURE — 25010000002 VANCOMYCIN 5 G RECONSTITUTED SOLUTION: Performed by: STUDENT IN AN ORGANIZED HEALTH CARE EDUCATION/TRAINING PROGRAM

## 2023-01-25 PROCEDURE — 99222 1ST HOSP IP/OBS MODERATE 55: CPT | Performed by: INTERNAL MEDICINE

## 2023-01-25 PROCEDURE — 02H63JZ INSERTION OF PACEMAKER LEAD INTO RIGHT ATRIUM, PERCUTANEOUS APPROACH: ICD-10-PCS | Performed by: INTERNAL MEDICINE

## 2023-01-25 PROCEDURE — 71045 X-RAY EXAM CHEST 1 VIEW: CPT

## 2023-01-25 PROCEDURE — 0JH606Z INSERTION OF PACEMAKER, DUAL CHAMBER INTO CHEST SUBCUTANEOUS TISSUE AND FASCIA, OPEN APPROACH: ICD-10-PCS | Performed by: INTERNAL MEDICINE

## 2023-01-25 PROCEDURE — C1892 INTRO/SHEATH,FIXED,PEEL-AWAY: HCPCS | Performed by: INTERNAL MEDICINE

## 2023-01-25 PROCEDURE — 94799 UNLISTED PULMONARY SVC/PX: CPT

## 2023-01-25 DEVICE — LD PM SOLIA S 53: Type: IMPLANTABLE DEVICE | Status: FUNCTIONAL

## 2023-01-25 DEVICE — IMPLANTABLE DEVICE
Type: IMPLANTABLE DEVICE | Status: FUNCTIONAL
Brand: EDORA 8 DR-T

## 2023-01-25 DEVICE — LD PM SOLIA S 45: Type: IMPLANTABLE DEVICE | Status: FUNCTIONAL

## 2023-01-25 RX ORDER — CEFTRIAXONE SODIUM 1 G/50ML
1 INJECTION, SOLUTION INTRAVENOUS EVERY 24 HOURS
Status: DISCONTINUED | OUTPATIENT
Start: 2023-01-25 | End: 2023-01-26 | Stop reason: HOSPADM

## 2023-01-25 RX ORDER — SODIUM CHLORIDE 9 MG/ML
40 INJECTION, SOLUTION INTRAVENOUS AS NEEDED
Status: DISCONTINUED | OUTPATIENT
Start: 2023-01-25 | End: 2023-01-26 | Stop reason: HOSPADM

## 2023-01-25 RX ORDER — BUTALBITAL, ACETAMINOPHEN AND CAFFEINE 300; 40; 50 MG/1; MG/1; MG/1
1 CAPSULE ORAL EVERY 4 HOURS PRN
Status: DISCONTINUED | OUTPATIENT
Start: 2023-01-25 | End: 2023-01-26 | Stop reason: HOSPADM

## 2023-01-25 RX ORDER — HYDROCODONE BITARTRATE AND ACETAMINOPHEN 5; 325 MG/1; MG/1
1 TABLET ORAL EVERY 4 HOURS PRN
Status: DISCONTINUED | OUTPATIENT
Start: 2023-01-25 | End: 2023-01-26 | Stop reason: HOSPADM

## 2023-01-25 RX ORDER — BUPIVACAINE HYDROCHLORIDE 5 MG/ML
INJECTION, SOLUTION EPIDURAL; INTRACAUDAL
Status: DISCONTINUED | OUTPATIENT
Start: 2023-01-25 | End: 2023-01-25 | Stop reason: HOSPADM

## 2023-01-25 RX ORDER — SODIUM CHLORIDE 0.9 % (FLUSH) 0.9 %
10 SYRINGE (ML) INJECTION EVERY 12 HOURS SCHEDULED
Status: DISCONTINUED | OUTPATIENT
Start: 2023-01-25 | End: 2023-01-26 | Stop reason: HOSPADM

## 2023-01-25 RX ORDER — FENTANYL CITRATE 50 UG/ML
INJECTION, SOLUTION INTRAMUSCULAR; INTRAVENOUS
Status: DISCONTINUED | OUTPATIENT
Start: 2023-01-25 | End: 2023-01-25 | Stop reason: HOSPADM

## 2023-01-25 RX ORDER — MIDAZOLAM HYDROCHLORIDE 1 MG/ML
INJECTION INTRAMUSCULAR; INTRAVENOUS
Status: DISCONTINUED | OUTPATIENT
Start: 2023-01-25 | End: 2023-01-25 | Stop reason: HOSPADM

## 2023-01-25 RX ORDER — ACETAMINOPHEN 325 MG/1
325 TABLET ORAL ONCE
Status: COMPLETED | OUTPATIENT
Start: 2023-01-25 | End: 2023-01-25

## 2023-01-25 RX ORDER — SODIUM CHLORIDE 0.9 % (FLUSH) 0.9 %
10 SYRINGE (ML) INJECTION AS NEEDED
Status: DISCONTINUED | OUTPATIENT
Start: 2023-01-25 | End: 2023-01-26 | Stop reason: HOSPADM

## 2023-01-25 RX ORDER — LIDOCAINE HYDROCHLORIDE 20 MG/ML
INJECTION, SOLUTION INFILTRATION; PERINEURAL
Status: DISCONTINUED | OUTPATIENT
Start: 2023-01-25 | End: 2023-01-25 | Stop reason: HOSPADM

## 2023-01-25 RX ADMIN — TAZOBACTAM SODIUM AND PIPERACILLIN SODIUM 3.38 G: 375; 3 INJECTION, SOLUTION INTRAVENOUS at 10:22

## 2023-01-25 RX ADMIN — TAZOBACTAM SODIUM AND PIPERACILLIN SODIUM 3.38 G: 375; 3 INJECTION, SOLUTION INTRAVENOUS at 01:28

## 2023-01-25 RX ADMIN — BUTALBITAL, ACETAMINOPHEN AND CAFFEINE 1 CAPSULE: 300; 40; 50 CAPSULE ORAL at 21:41

## 2023-01-25 RX ADMIN — AZITHROMYCIN MONOHYDRATE 500 MG: 500 INJECTION, POWDER, LYOPHILIZED, FOR SOLUTION INTRAVENOUS at 12:25

## 2023-01-25 RX ADMIN — Medication 10 ML: at 10:22

## 2023-01-25 RX ADMIN — HYDROCODONE BITARTRATE AND ACETAMINOPHEN 1 TABLET: 5; 325 TABLET ORAL at 17:53

## 2023-01-25 RX ADMIN — HYDROCODONE BITARTRATE AND ACETAMINOPHEN 1 TABLET: 5; 325 TABLET ORAL at 13:29

## 2023-01-25 RX ADMIN — CIPROFLOXACIN AND DEXAMETHASONE 3 DROP: 3; 1 SUSPENSION/ DROPS AURICULAR (OTIC) at 21:42

## 2023-01-25 RX ADMIN — ACETAMINOPHEN 650 MG: 325 TABLET ORAL at 00:30

## 2023-01-25 RX ADMIN — Medication 500 MG: at 10:22

## 2023-01-25 RX ADMIN — Medication 500 MG: at 21:41

## 2023-01-25 RX ADMIN — CEFTRIAXONE SODIUM 1 G: 1 INJECTION, SOLUTION INTRAVENOUS at 16:31

## 2023-01-25 RX ADMIN — BUTALBITAL, ACETAMINOPHEN AND CAFFEINE 1 CAPSULE: 300; 40; 50 CAPSULE ORAL at 17:53

## 2023-01-25 RX ADMIN — ACETAMINOPHEN 325 MG: 325 TABLET ORAL at 18:40

## 2023-01-25 RX ADMIN — Medication 10 ML: at 21:43

## 2023-01-25 RX ADMIN — ACETAMINOPHEN 650 MG: 325 TABLET ORAL at 12:25

## 2023-01-25 RX ADMIN — FLUTICASONE PROPIONATE 2 SPRAY: 50 SPRAY, METERED NASAL at 10:19

## 2023-01-25 RX ADMIN — VANCOMYCIN HYDROCHLORIDE 1000 MG: 5 INJECTION, POWDER, LYOPHILIZED, FOR SOLUTION INTRAVENOUS at 05:59

## 2023-01-25 RX ADMIN — HYDROCODONE BITARTRATE AND ACETAMINOPHEN 1 TABLET: 5; 325 TABLET ORAL at 21:41

## 2023-01-25 RX ADMIN — ONDANSETRON 4 MG: 2 INJECTION INTRAMUSCULAR; INTRAVENOUS at 12:45

## 2023-01-25 RX ADMIN — CIPROFLOXACIN AND DEXAMETHASONE 3 DROP: 3; 1 SUSPENSION/ DROPS AURICULAR (OTIC) at 10:19

## 2023-01-26 ENCOUNTER — READMISSION MANAGEMENT (OUTPATIENT)
Dept: CALL CENTER | Facility: HOSPITAL | Age: 58
End: 2023-01-26
Payer: COMMERCIAL

## 2023-01-26 VITALS
RESPIRATION RATE: 18 BRPM | TEMPERATURE: 97.9 F | DIASTOLIC BLOOD PRESSURE: 86 MMHG | BODY MASS INDEX: 25.12 KG/M2 | OXYGEN SATURATION: 97 % | SYSTOLIC BLOOD PRESSURE: 144 MMHG | WEIGHT: 141.76 LBS | HEART RATE: 64 BPM | HEIGHT: 63 IN

## 2023-01-26 LAB
BACTERIA FLD CULT: ABNORMAL
BACTERIA UR QL AUTO: ABNORMAL /HPF
BH CV ECHO MEAS - AO ROOT DIAM: 2.2 CM
BH CV ECHO MEAS - EDV(MOD-SP2): 93.4 ML
BH CV ECHO MEAS - EDV(MOD-SP4): 96.1 ML
BH CV ECHO MEAS - EF(MOD-BP): 65 %
BH CV ECHO MEAS - ESV(MOD-SP2): 32.6 ML
BH CV ECHO MEAS - ESV(MOD-SP4): 33.3 ML
BH CV ECHO MEAS - IVSD: 0.9 CM
BH CV ECHO MEAS - LA A2CS (ATRIAL LENGTH): 5.3 CM
BH CV ECHO MEAS - LA A4C LENGTH: 5.3 CM
BH CV ECHO MEAS - LA DIMENSION: 3.3 CM
BH CV ECHO MEAS - LAT PEAK E' VEL: 8.9 CM/SEC
BH CV ECHO MEAS - LVIDD: 3.8 CM
BH CV ECHO MEAS - LVIDS: 2.5 CM
BH CV ECHO MEAS - LVOT DIAM: 2 CM
BH CV ECHO MEAS - LVPWD: 0.9 CM
BH CV ECHO MEAS - MED PEAK E' VEL: 8.5 CM/SEC
BH CV ECHO MEAS - MV A MAX VEL: 117 CM/SEC
BH CV ECHO MEAS - MV DEC TIME: 173 MSEC
BH CV ECHO MEAS - MV E MAX VEL: 122 CM/SEC
BH CV ECHO MEAS - MV E/A: 1
BH CV ECHO MEAS - RVDD: 2.2 CM
BH CV ECHO MEASUREMENTS AVERAGE E/E' RATIO: 14.02
BILIRUB UR QL STRIP: NEGATIVE
CLARITY UR: CLEAR
COLOR UR: YELLOW
GLUCOSE BLDC GLUCOMTR-MCNC: 90 MG/DL (ref 70–99)
GLUCOSE UR STRIP-MCNC: NEGATIVE MG/DL
GRAM STN SPEC: ABNORMAL
HGB UR QL STRIP.AUTO: NEGATIVE
HYALINE CASTS UR QL AUTO: ABNORMAL /LPF
IVRT: 53 MSEC
KETONES UR QL STRIP: ABNORMAL
LEFT ATRIUM VOLUME INDEX: 27.8 ML/M2
LEFT ATRIUM VOLUME: 46.5 ML
LEUKOCYTE ESTERASE UR QL STRIP.AUTO: NEGATIVE
MAXIMAL PREDICTED HEART RATE: 164 BPM
NITRITE UR QL STRIP: NEGATIVE
PH UR STRIP.AUTO: 7 [PH] (ref 5–8)
PROT UR QL STRIP: ABNORMAL
RBC # UR STRIP: ABNORMAL /HPF
REF LAB TEST METHOD: ABNORMAL
SP GR UR STRIP: 1.01 (ref 1–1.03)
SQUAMOUS #/AREA URNS HPF: ABNORMAL /HPF
STRESS TARGET HR: 139 BPM
UROBILINOGEN UR QL STRIP: ABNORMAL
WBC # UR STRIP: ABNORMAL /HPF

## 2023-01-26 PROCEDURE — 81001 URINALYSIS AUTO W/SCOPE: CPT | Performed by: INTERNAL MEDICINE

## 2023-01-26 PROCEDURE — 93005 ELECTROCARDIOGRAM TRACING: CPT | Performed by: INTERNAL MEDICINE

## 2023-01-26 PROCEDURE — 82962 GLUCOSE BLOOD TEST: CPT

## 2023-01-26 PROCEDURE — 93010 ELECTROCARDIOGRAM REPORT: CPT | Performed by: INTERNAL MEDICINE

## 2023-01-26 PROCEDURE — 97161 PT EVAL LOW COMPLEX 20 MIN: CPT

## 2023-01-26 PROCEDURE — 99239 HOSP IP/OBS DSCHRG MGMT >30: CPT | Performed by: INTERNAL MEDICINE

## 2023-01-26 PROCEDURE — 94799 UNLISTED PULMONARY SVC/PX: CPT

## 2023-01-26 RX ORDER — AMLODIPINE BESYLATE 2.5 MG/1
2.5 TABLET ORAL
Qty: 30 TABLET | Refills: 0 | Status: SHIPPED | OUTPATIENT
Start: 2023-01-27 | End: 2023-02-27 | Stop reason: SDUPTHER

## 2023-01-26 RX ORDER — LEVOFLOXACIN 500 MG/1
500 TABLET, FILM COATED ORAL DAILY
Qty: 7 TABLET | Refills: 0 | Status: SHIPPED | OUTPATIENT
Start: 2023-01-26 | End: 2023-02-02

## 2023-01-26 RX ORDER — AMLODIPINE BESYLATE 2.5 MG/1
2.5 TABLET ORAL
Status: DISCONTINUED | OUTPATIENT
Start: 2023-01-26 | End: 2023-01-26 | Stop reason: HOSPADM

## 2023-01-26 RX ADMIN — AMLODIPINE BESYLATE 2.5 MG: 2.5 TABLET ORAL at 11:36

## 2023-01-26 RX ADMIN — BUTALBITAL, ACETAMINOPHEN AND CAFFEINE 1 CAPSULE: 300; 40; 50 CAPSULE ORAL at 04:49

## 2023-01-26 RX ADMIN — Medication 10 ML: at 08:33

## 2023-01-26 RX ADMIN — CIPROFLOXACIN AND DEXAMETHASONE 3 DROP: 3; 1 SUSPENSION/ DROPS AURICULAR (OTIC) at 08:32

## 2023-01-26 RX ADMIN — FLUTICASONE PROPIONATE 2 SPRAY: 50 SPRAY, METERED NASAL at 08:32

## 2023-01-26 RX ADMIN — ACETAMINOPHEN 650 MG: 325 TABLET ORAL at 12:45

## 2023-01-26 RX ADMIN — HYDROCODONE BITARTRATE AND ACETAMINOPHEN 1 TABLET: 5; 325 TABLET ORAL at 04:50

## 2023-01-26 RX ADMIN — Medication 500 MG: at 08:32

## 2023-01-27 ENCOUNTER — TRANSITIONAL CARE MANAGEMENT TELEPHONE ENCOUNTER (OUTPATIENT)
Dept: CALL CENTER | Facility: HOSPITAL | Age: 58
End: 2023-01-27
Payer: COMMERCIAL

## 2023-01-27 NOTE — OUTREACH NOTE
Prep Survey    Flowsheet Row Responses   [facility] patient discharged from? [facility]   Is LACE score < 7 ? Yes   Eligibility [facility]   Date of Admission 01/23/23   Date of Discharge 01/26/23   Discharge Disposition Home or Self Care   Discharge diagnosis Severe Sepsis  pneumonia  Heart block   Does the patient have one of the following disease processes/diagnoses(primary or secondary)? Sepsis   Does the patient have Home health ordered? No   Is there a DME ordered? No   Prep survey completed? Yes          TASHA BUCHANAN - Registered Nurse

## 2023-01-27 NOTE — OUTREACH NOTE
Call Center TCM Note    Flowsheet Row Responses   [facility] patient discharged from? [facility]   Does the patient have one of the following disease processes/diagnoses(primary or secondary)? Sepsis  [Verbal release on file-Spouse]   TCM attempt successful? Yes  [verbal release on file-Spouse]   Call start time 1109   Call end time 1113   Discharge diagnosis Severe Sepsis  pneumonia  Heart block   Is patient permission given to speak with other caregiver? Yes   List who call center can speak with Spouse   Person spoke with today (if not patient) and relationship Spouse   Meds reviewed with patient/caregiver? Yes   Is the patient having any side effects they believe may be caused by any medication additions or changes? No   Does the patient have all medications related to this admission filled (includes all antibiotics, inhalers, nebulizers,steroids,etc.) Yes   Is the patient taking all medications as directed (includes completed medication regime)? Yes   Comments Hospital d/c follow up 1/30/23@0915   Does the patient have an appointment with their PCP within 7 days of discharge? Yes   Has home health visited the patient within 72 hours of discharge? N/A   Has all DME been delivered? No   Psychosocial issues? No   Did the patient receive a copy of their discharge instructions? Yes   Nursing interventions Reviewed instructions with patient   What is the patient's perception of their health status since discharge? Improving   Nursing interventions Nurse provided patient education   Is the patient/caregiver able to teach back TIME? T emperature - higher or lower than normal, I nfection - may have signs and symptoms of an infection, M ental Decline - confused, sleepy, difficult to arouse, E xtremely Ill - severe pain, discomfort, shortness of breath   Nursing interventions Nurse provided patient education   Is patient/caregiver able to teach back steps to recovery at home? Set small, achievable goals for return to  baseline health, Eat a balanced diet, Rest and regain strength   Is the patient/caregiver able to teach back signs and symptoms of worsening condition: Fever, Altered mental status(confusion/coma), Shortness of breath/rapid respiratory rate   If the patient is a current smoker, are they able to teach back resources for cessation? Not a smoker   Is the patient/caregiver able to teach back the hierarchy of who to call/visit for symptoms/problems? PCP, Specialist, Home health nurse, Urgent Care, ED, 911 Yes   TCM call completed? Yes   Call end time 1113   Would this patient benefit from a Referral to [facility] Social Work? No   Is the patient interested in additional calls from an ambulatory ?  NOTE:  applies to high risk patients requiring additional follow-up. No          Pamela Gonzalez RN    1/27/2023, 11:15 EST

## 2023-01-28 LAB
BACTERIA SPEC AEROBE CULT: NORMAL
BACTERIA SPEC AEROBE CULT: NORMAL
QT INTERVAL: 313 MS

## 2023-01-30 ENCOUNTER — OFFICE VISIT (OUTPATIENT)
Dept: INTERNAL MEDICINE | Facility: CLINIC | Age: 58
End: 2023-01-30
Payer: COMMERCIAL

## 2023-01-30 VITALS
BODY MASS INDEX: 26.05 KG/M2 | SYSTOLIC BLOOD PRESSURE: 136 MMHG | DIASTOLIC BLOOD PRESSURE: 80 MMHG | TEMPERATURE: 97.8 F | WEIGHT: 147 LBS | OXYGEN SATURATION: 100 % | RESPIRATION RATE: 16 BRPM | HEART RATE: 95 BPM | HEIGHT: 63 IN

## 2023-01-30 DIAGNOSIS — A49.1 STREPTOCOCCUS PNEUMONIAE: ICD-10-CM

## 2023-01-30 DIAGNOSIS — H92.11 OTORRHEA OF RIGHT EAR: ICD-10-CM

## 2023-01-30 DIAGNOSIS — Z95.810 ICD (IMPLANTABLE CARDIOVERTER-DEFIBRILLATOR) IN PLACE: ICD-10-CM

## 2023-01-30 DIAGNOSIS — R53.83 LETHARGY: ICD-10-CM

## 2023-01-30 DIAGNOSIS — R55 SYNCOPE, UNSPECIFIED SYNCOPE TYPE: Primary | ICD-10-CM

## 2023-01-30 DIAGNOSIS — J18.9 PNEUMONIA OF LEFT LOWER LOBE DUE TO INFECTIOUS ORGANISM: ICD-10-CM

## 2023-01-30 DIAGNOSIS — I45.9 HEART BLOCK: ICD-10-CM

## 2023-01-30 DIAGNOSIS — A41.9 SEPSIS, DUE TO UNSPECIFIED ORGANISM, UNSPECIFIED WHETHER ACUTE ORGAN DYSFUNCTION PRESENT: ICD-10-CM

## 2023-01-30 DIAGNOSIS — I49.5 SICK SINUS SYNDROME: ICD-10-CM

## 2023-01-30 PROBLEM — U07.1 COVID-19: Status: RESOLVED | Noted: 2022-05-17 | Resolved: 2023-01-30

## 2023-01-30 PROCEDURE — 99214 OFFICE O/P EST MOD 30 MIN: CPT | Performed by: PHYSICIAN ASSISTANT

## 2023-01-30 RX ORDER — ACETAMINOPHEN 325 MG/1
650 TABLET ORAL EVERY 6 HOURS PRN
COMMUNITY

## 2023-01-30 NOTE — ASSESSMENT & PLAN NOTE
Reviewed hospital d/c summary. Will repeat labs today to make sure WBC trending downward. Keep upcoming apt with ENT and cardiology for outpt follow up. Pt and  understand and agree with plan.

## 2023-01-30 NOTE — PROGRESS NOTES
Transitional Care Follow Up Visit  Subjective     Madina is a 57 y.o. female who presents for a transitional care management visit.    Within 48 business hours after discharge our office contacted her via telephone to coordinate her care and needs.      I reviewed and discussed the details of that call along with the discharge summary, hospital problems, inpatient lab results, inpatient diagnostic studies, and consultation reports with Madina.     Current outpatient and discharge medications have been reconciled for the patient.  Reviewed by: Olinda Burk PA-C      Date of TCM Phone Call 1/26/2023   Hospital  [facility]   Date of Admission 1/23/2023   Date of Discharge 1/26/2023   Discharge Disposition Home or Self Care       Risk for Readmission (LACE) Score: 6 (1/26/2023  6:00 AM)      History of Present Illness  Patient went to the ER for lethargy.  She had a unwitnessed fall in the bathroom,  heard noise when she was on the floor.  A few minutes later the patient regained consciousness.  Denies lightheadedness, dizziness, palpitation prior to fall.  Admits to some nausea.-Fall patient complained of right ear pain.  She also noticed fluid from the ear regularly.  Cough x2 days.  Patient minimally responsive this morning.  She was admitted for evaluation.  CT of the head showed no acute abnormality.  Air-fluid level in right maxillary sinus, acute fracture not excluded.  Opacification of the right mastoid air cells, correlate for mastoiditis.  Chest x-ray showed mild opacity of the left lung base.  Her elevated lobe.  Right ear grew out strep pneumo. 1/25 Pt had dual chamber pacemaker implant placed.  Patient was discharged on 1-26 with follow-up with PCP, ENT, cardiology.    Pt dx with recurrent syncope secondary to cardiac arrhyhtmia (nonsustained V tach, significant and recurrent nonconducted P waves),  Newly dx with HTN- start amlodipine 2.5mg. Scheduled for outpt stress test and follow up with  "cardiology 2/1/23.    Lethargy has resolved.   Pt was unable to stand or talk before discharge but normal mental status now.     Denies fever since discharge.   She is taking tylenol which has helped R ear pain.   Denies ringing in ear. Ear feels stopped up.  ENT follow up tomorrow.     Denies syncope since discharge  Denies chest pain, sob, dizziness      Course During Hospital Stay The following information was reviewed by: Olinda Burk PA-C on 01/30/2023     The following portions of the patient's history were reviewed and updated as appropriate: allergies, current medications, past family history, past medical history, past social history, past surgical history and problem list.     Vitals:    01/30/23 0902   BP: 136/80   Pulse: 95   Resp: 16   Temp: 97.8 °F (36.6 °C)   SpO2: 100%   Weight: 66.7 kg (147 lb)   Height: 160 cm (63\")       Review of Systems    Objective   Physical Exam  Vitals reviewed.   Constitutional:       Appearance: Normal appearance.   HENT:      Head: Normocephalic and atraumatic.      Nose: Nose normal.      Mouth/Throat:      Mouth: Mucous membranes are moist.   Eyes:      Extraocular Movements: Extraocular movements intact.      Conjunctiva/sclera: Conjunctivae normal.      Pupils: Pupils are equal, round, and reactive to light.   Cardiovascular:      Rate and Rhythm: Normal rate and regular rhythm.   Pulmonary:      Effort: Pulmonary effort is normal.      Breath sounds: Normal breath sounds.   Abdominal:      General: Abdomen is flat. Bowel sounds are normal.      Palpations: Abdomen is soft.   Musculoskeletal:         General: Normal range of motion.   Neurological:      General: No focal deficit present.      Mental Status: She is alert and oriented to person, place, and time.   Psychiatric:         Mood and Affect: Mood normal.           Assessment & Plan     Diagnoses and all orders for this visit:    1. Syncope, unspecified syncope type (Primary)    2. Sepsis, due to " unspecified organism, unspecified whether acute organ dysfunction present (HCC)  Assessment & Plan:  Reviewed hospital d/c summary. Will repeat labs today to make sure WBC trending downward. Keep upcoming apt with ENT and cardiology for outpt follow up. Pt and  understand and agree with plan.    Orders:  -     Comprehensive Metabolic Panel  -     CBC & Differential  -     Phosphorus    3. Pneumonia of left lower lobe due to infectious organism  Comments:  Discussed cxr showing PNA. Cont levaquin. No fever today. O2 wnl. Pt will let us know if pulm sx develop.    4. Otorrhea of right ear    5. Streptococcus pneumoniae  Comments:  Reviewed culture, continue Levaquin as rx    6. Heart block    7. Lethargy    8. ICD (implantable cardioverter-defibrillator) in place    9. Sick sinus syndrome (HCC)  Comments:  reviewed cardiology note. Keep upcoming appt with cardiology and outpt stress test.      Follow Up     Return in about 1 month (around 2/28/2023).

## 2023-02-01 ENCOUNTER — CLINICAL SUPPORT NO REQUIREMENTS (OUTPATIENT)
Dept: CARDIOLOGY | Facility: CLINIC | Age: 58
End: 2023-02-01
Payer: COMMERCIAL

## 2023-02-01 ENCOUNTER — CLINICAL SUPPORT (OUTPATIENT)
Dept: INTERNAL MEDICINE | Facility: CLINIC | Age: 58
End: 2023-02-01
Payer: COMMERCIAL

## 2023-02-01 DIAGNOSIS — Z95.0 PRESENCE OF CARDIAC PACEMAKER: ICD-10-CM

## 2023-02-01 DIAGNOSIS — R03.0 ELEVATED BLOOD PRESSURE READING: ICD-10-CM

## 2023-02-01 DIAGNOSIS — I49.5 SICK SINUS SYNDROME: Primary | ICD-10-CM

## 2023-02-01 LAB
ALBUMIN SERPL-MCNC: 4.3 G/DL (ref 3.5–5.2)
ALBUMIN/GLOB SERPL: 1.2 G/DL
ALP SERPL-CCNC: 58 U/L (ref 39–117)
ALT SERPL W P-5'-P-CCNC: 18 U/L (ref 1–33)
ANION GAP SERPL CALCULATED.3IONS-SCNC: 10.9 MMOL/L (ref 5–15)
AST SERPL-CCNC: 18 U/L (ref 1–32)
BASOPHILS # BLD AUTO: 0.08 10*3/MM3 (ref 0–0.2)
BASOPHILS NFR BLD AUTO: 0.7 % (ref 0–1.5)
BILIRUB SERPL-MCNC: 0.4 MG/DL (ref 0–1.2)
BUN SERPL-MCNC: 9 MG/DL (ref 6–20)
BUN/CREAT SERPL: 12.3 (ref 7–25)
CALCIUM SPEC-SCNC: 9.6 MG/DL (ref 8.6–10.5)
CHLORIDE SERPL-SCNC: 105 MMOL/L (ref 98–107)
CO2 SERPL-SCNC: 25.1 MMOL/L (ref 22–29)
CREAT SERPL-MCNC: 0.73 MG/DL (ref 0.57–1)
DEPRECATED RDW RBC AUTO: 41.3 FL (ref 37–54)
EGFRCR SERPLBLD CKD-EPI 2021: 96.7 ML/MIN/1.73
EOSINOPHIL # BLD AUTO: 0.34 10*3/MM3 (ref 0–0.4)
EOSINOPHIL NFR BLD AUTO: 3.1 % (ref 0.3–6.2)
ERYTHROCYTE [DISTWIDTH] IN BLOOD BY AUTOMATED COUNT: 13.3 % (ref 12.3–15.4)
GLOBULIN UR ELPH-MCNC: 3.5 GM/DL
GLUCOSE SERPL-MCNC: 85 MG/DL (ref 65–99)
HCT VFR BLD AUTO: 37.8 % (ref 34–46.6)
HGB BLD-MCNC: 12.3 G/DL (ref 12–15.9)
IMM GRANULOCYTES # BLD AUTO: 0.11 10*3/MM3 (ref 0–0.05)
IMM GRANULOCYTES NFR BLD AUTO: 1 % (ref 0–0.5)
LYMPHOCYTES # BLD AUTO: 3.06 10*3/MM3 (ref 0.7–3.1)
LYMPHOCYTES NFR BLD AUTO: 28.1 % (ref 19.6–45.3)
MCH RBC QN AUTO: 28.2 PG (ref 26.6–33)
MCHC RBC AUTO-ENTMCNC: 32.5 G/DL (ref 31.5–35.7)
MCV RBC AUTO: 86.7 FL (ref 79–97)
MONOCYTES # BLD AUTO: 0.86 10*3/MM3 (ref 0.1–0.9)
MONOCYTES NFR BLD AUTO: 7.9 % (ref 5–12)
NEUTROPHILS NFR BLD AUTO: 59.2 % (ref 42.7–76)
NEUTROPHILS NFR BLD AUTO: 6.43 10*3/MM3 (ref 1.7–7)
NRBC BLD AUTO-RTO: 0 /100 WBC (ref 0–0.2)
PHOSPHATE SERPL-MCNC: 2.8 MG/DL (ref 2.5–4.5)
PLATELET # BLD AUTO: 403 10*3/MM3 (ref 140–450)
PMV BLD AUTO: 9.7 FL (ref 6–12)
POTASSIUM SERPL-SCNC: 3.6 MMOL/L (ref 3.5–5.2)
PROT SERPL-MCNC: 7.8 G/DL (ref 6–8.5)
RBC # BLD AUTO: 4.36 10*6/MM3 (ref 3.77–5.28)
SODIUM SERPL-SCNC: 141 MMOL/L (ref 136–145)
WBC NRBC COR # BLD: 10.88 10*3/MM3 (ref 3.4–10.8)

## 2023-02-01 PROCEDURE — 93280 PM DEVICE PROGR EVAL DUAL: CPT | Performed by: INTERNAL MEDICINE

## 2023-02-01 PROCEDURE — 80053 COMPREHEN METABOLIC PANEL: CPT | Performed by: PHYSICIAN ASSISTANT

## 2023-02-01 PROCEDURE — 36415 COLL VENOUS BLD VENIPUNCTURE: CPT | Performed by: NURSE PRACTITIONER

## 2023-02-01 PROCEDURE — 84100 ASSAY OF PHOSPHORUS: CPT | Performed by: PHYSICIAN ASSISTANT

## 2023-02-01 PROCEDURE — 85025 COMPLETE CBC W/AUTO DIFF WBC: CPT | Performed by: PHYSICIAN ASSISTANT

## 2023-02-01 NOTE — PROGRESS NOTES
Normal Dual Chamber Pacemaker Device Interrogation and Device Testing.  Normal evaluation of device function and lead measurements.  No optimization was needed of parameters or maximization of device longevity.    Patients wound incision is healing and all corners are intact.  There are no signs of infection, no drainage, no swelling and cool to touch.  We also discussed the six week healing process and the do's and don'ts of activity that it specific to the patient.  She will follow up with her primary cardiologist Dr. Corbett.  I spoke to Dr. Corbett staff to ensure she had a follow up.

## 2023-02-02 ENCOUNTER — TELEPHONE (OUTPATIENT)
Dept: INTERNAL MEDICINE | Facility: CLINIC | Age: 58
End: 2023-02-02

## 2023-02-02 LAB — QT INTERVAL: 417 MS

## 2023-02-02 NOTE — TELEPHONE ENCOUNTER
Caller: Madina Reddy    Relationship: Self    Best call back number: [phone]    Caller requesting test results: YES    What test was performed: BLOOD WORK    When was the test performed: 2/1/23    Where was the test performed: IN OFFICE    Additional notes: [facility]

## 2023-02-03 ENCOUNTER — OFFICE VISIT (OUTPATIENT)
Dept: OTOLARYNGOLOGY | Facility: CLINIC | Age: 58
End: 2023-02-03
Payer: COMMERCIAL

## 2023-02-03 VITALS — BODY MASS INDEX: 25.69 KG/M2 | TEMPERATURE: 97.4 F | WEIGHT: 145 LBS | HEIGHT: 63 IN

## 2023-02-03 DIAGNOSIS — J30.1 SEASONAL ALLERGIC RHINITIS DUE TO POLLEN: ICD-10-CM

## 2023-02-03 DIAGNOSIS — H65.01 NON-RECURRENT ACUTE SEROUS OTITIS MEDIA OF RIGHT EAR: ICD-10-CM

## 2023-02-03 DIAGNOSIS — H61.21 IMPACTED CERUMEN OF RIGHT EAR: Primary | ICD-10-CM

## 2023-02-03 PROCEDURE — 99204 OFFICE O/P NEW MOD 45 MIN: CPT | Performed by: OTOLARYNGOLOGY

## 2023-02-08 PROBLEM — H61.22 IMPACTED CERUMEN OF LEFT EAR: Status: ACTIVE | Noted: 2023-02-08

## 2023-02-08 PROBLEM — H65.02 NON-RECURRENT ACUTE SEROUS OTITIS MEDIA OF LEFT EAR: Status: ACTIVE | Noted: 2023-02-08

## 2023-02-08 PROBLEM — J30.1 SEASONAL ALLERGIC RHINITIS DUE TO POLLEN: Status: ACTIVE | Noted: 2022-10-10

## 2023-02-08 PROCEDURE — 69210 REMOVE IMPACTED EAR WAX UNI: CPT | Performed by: OTOLARYNGOLOGY

## 2023-02-08 NOTE — PROGRESS NOTES
Patient Name: Madina Reddy   Visit Date: 02/03/2023   Patient ID: [id number]  Provider: Kevin Mondragon MD    Sex: female  Location: [facility]   YOB: 1965  Location Address: [address] [place] [address], [address],?[place]?[id number]    Primary Care Provider Kady Vernon APRN  Location Phone: [phone]    Referring Provider: No ref. provider found        Chief Complaint  Ear Drainage, Sinusitis, and Dizziness    Subjective    History of Present Illness  Madina Reddy is a 57 y.o. female who presents to [facility] today as a consult from No ref. provider found.    She presents to the clinic today for evaluation of eustachian tube dysfunction, decreased hearing on the right side, and cerumen impactions.  The patient had sustained a fall and noted experiencing pain around the right ear as well as tenderness around the auricle.  She also notes that she had a popping sound in the right ear and had some fluid mixed with blood draining out of the ear.  She notes that the fluid has persisted.  She did have some upper respiratory symptoms and productive cough at the time.  Ear drainage was cultured and was positive for strep pneumo.  She was treated with antibiotics and the drainage had improved.  CT scan was performed on January 23, and I reviewed the imaging with her today.  This reveals air-fluid levels within the right maxillary sinus as well as partial opacification of the right mastoid air cells.  There was motion artifact that made it difficult to rule out any fractures.  She notes that her symptoms have improved somewhat, but she continues to have decreased hearing.  She does try to pop her ear.  Denies any major ear issues in the past.    Past Medical History:   Diagnosis Date   • Allergies    • Dizziness Madina   • Headache Madina   • Hypertension Madina   • TMJ dysfunction Madina       Past Surgical History:   Procedure Laterality  "Date   • BREAST BIOPSY     • CARDIAC ELECTROPHYSIOLOGY PROCEDURE N/A 01/25/2023    Procedure: Device Implant;  Surgeon: VINCE Alejandre MD;  Location: [facility] INVASIVE LOCATION;  Service: Cardiology;  Laterality: N/A;   • CARDIAC SURGERY      heart surgery as an infant   • PACEMAKER IMPLANTATION           Current Outpatient Medications:   •  acetaminophen (TYLENOL) 325 MG tablet, Take 650 mg by mouth Every 6 (Six) Hours As Needed for Mild Pain., Disp: , Rfl:   •  amLODIPine (NORVASC) 2.5 MG tablet, Take 1 tablet by mouth Daily for 30 days., Disp: 30 tablet, Rfl: 0  •  aspirin 81 MG chewable tablet, Chew 81 mg Daily., Disp: , Rfl:   •  fluticasone (FLONASE) 50 MCG/ACT nasal spray, 2 sprays into the nostril(s) as directed by provider Daily., Disp: , Rfl:   •  montelukast (SINGULAIR) 10 MG tablet, TAKE 1 TABLET BY MOUTH ONCE DAILY AT NIGHT FOR 30 DAYS (Patient taking differently: Take 10 mg by mouth.), Disp: 30 tablet, Rfl: 0     No Known Allergies    Family History   Problem Relation Age of Onset   • Cancer Mother    • Heart disease Father    • Other Son         Scoliosis        Social History     Social History Narrative   • Not on file       Objective     Vital Signs:   Temp 97.4 °F (36.3 °C) (Tympanic)   Ht 160 cm (63\")   Wt 65.8 kg (145 lb)   BMI 25.69 kg/m²       Physical Exam    Ear Cerumen Removal    Date/Time: 2/8/2023 2:21 PM  Performed by: Kevin Mondragon MD  Authorized by: Kevin Mondragon MD     Anesthesia:  Local Anesthetic: none  Location details: left ear  Procedure type: instrumentation, curette   Sedation:  Patient sedated: no            Constitutional   Appearance  · : well developed, well-nourished, alert and in no acute distress, voice clear and strong    Head  Inspection  · : no deformities or lesions  Face  Inspection  · : No facial lesions; House-Brackmann I/VI bilaterally  Palpation  · : No TMJ crepitus nor  muscle tenderness bilaterally    Eyes  Vision  Visual " Giraldo  · : Extraocular movements are intact. No spontaneous or gaze-induced nystagmus.  Conjunctivae  · : clear  Sclerae  · : clear  Pupils and Irises  · : pupils equal, round, and reactive to light.     Ears, Nose, Mouth and Throat    Ears    External Ears  · : appearance within normal limits, no lesions present  Otoscopic Examination  · : Cerumen removed from the right ear canal, otitis media with serous fluid and some bubbles seen in the middle ear, tympanic membrane shows no evidence of perforation  Hearing  · : intact to conversational voice both ears  Tunning fork testing:     :    Nose    External Nose  · : appearance normal  Intranasal Exam  · : mucosa within normal limits, vestibules normal, no intranasal lesions present, septum midline, sinuses non tender to percussion  Oral Cavity    Oral Mucosa  · : oral mucosa normal without pallor or cyanosis  Lips  · : lip appearance normal  Teeth  · : normal dentition for age  Gums  · : gums pink, non-swollen, no bleeding present  Tongue  · : tongue appearance normal; normal mobility  Palate  · : hard palate normal, soft palate appearance normal with symmetric mobility    Throat    Oropharynx  · : no inflammation or lesions present, tonsils within normal limits  Hypopharynx  · : appearance within normal limits, superior epiglottis within normal limits  Larynx  · : appearance within normal limits, vocal cords within normal limits, no lesions present    Neck  Inspection/Palpation  · : normal appearance, no masses or tenderness, trachea midline; thyroid size normal, nontender, no nodules or masses present on palpation    Respiratory  Respiratory Effort  · : breathing unlabored  Inspection of Chest  · : normal appearance, no retractions    Cardiovascular  Heart  · : regular rate and rhythm    Lymphatic  Neck  · : no lymphadenopathy present  Supraclavicular Nodes  · : no lymphadenopathy present  Preauricular Nodes  · : no lymphadenopathy present    Skin and Subcutaneous  Tissue  General Inspection  · : Regarding face and neck - there are no rashes present, no lesions present, and no areas of discoloration    Neurologic  Cranial Nerves  · : cranial nerves II-XII are grossly intact bilaterally  Gait and Station  · : normal gait, able to stand without diffculty    Psychiatric  Judgement and Insight  · : judgment and insight intact  Mood and Affect  · : mood normal, affect appropriate          Assessment and Plan    Diagnoses and all orders for this visit:    1. Impacted cerumen of right ear (Primary)    2. Non-recurrent acute serous otitis media of right ear    3. Seasonal allergic rhinitis due to pollen    Other orders  -     Ear Cerumen Removal    Examination today revealed debris and cerumen in the ear canal which I was able to remove.  There is fluid in the middle ear and we discussed using Valsalva maneuvers to try to clear the fluid.  No evidence of eardrum perforation or purulent infection.  I have also suggested nasal saline washes.  We discussed ear tubes, but I think this will resolve on its own.  I have made a follow-up for her to see me back in 6 weeks if she has persistent issues and the fluid is not resolving we will proceed with ear tubes.    Follow Up   No follow-ups on file.  Patient was given instructions and counseling regarding her condition or for health maintenance advice. Please see specific information pulled into the AVS if appropriate.

## 2023-02-27 ENCOUNTER — OFFICE VISIT (OUTPATIENT)
Dept: INTERNAL MEDICINE | Facility: CLINIC | Age: 58
End: 2023-02-27
Payer: COMMERCIAL

## 2023-02-27 VITALS
BODY MASS INDEX: 26.12 KG/M2 | SYSTOLIC BLOOD PRESSURE: 142 MMHG | TEMPERATURE: 98.7 F | WEIGHT: 147.4 LBS | HEIGHT: 63 IN | DIASTOLIC BLOOD PRESSURE: 101 MMHG | HEART RATE: 78 BPM | OXYGEN SATURATION: 95 %

## 2023-02-27 DIAGNOSIS — Z11.59 NEED FOR HEPATITIS C SCREENING TEST: ICD-10-CM

## 2023-02-27 DIAGNOSIS — Z00.00 ANNUAL PHYSICAL EXAM: ICD-10-CM

## 2023-02-27 DIAGNOSIS — J30.1 SEASONAL ALLERGIC RHINITIS DUE TO POLLEN: ICD-10-CM

## 2023-02-27 DIAGNOSIS — Z12.31 ENCOUNTER FOR SCREENING MAMMOGRAM FOR MALIGNANT NEOPLASM OF BREAST: ICD-10-CM

## 2023-02-27 DIAGNOSIS — I49.5 SICK SINUS SYNDROME: Primary | ICD-10-CM

## 2023-02-27 DIAGNOSIS — Z95.0 PRESENCE OF CARDIAC PACEMAKER: ICD-10-CM

## 2023-02-27 PROCEDURE — 99396 PREV VISIT EST AGE 40-64: CPT | Performed by: PHYSICIAN ASSISTANT

## 2023-02-27 RX ORDER — MONTELUKAST SODIUM 10 MG/1
10 TABLET ORAL NIGHTLY
Qty: 90 TABLET | Refills: 1 | Status: SHIPPED | OUTPATIENT
Start: 2023-02-27 | End: 2023-03-31

## 2023-02-27 RX ORDER — FLUTICASONE PROPIONATE 50 MCG
2 SPRAY, SUSPENSION (ML) NASAL DAILY
Qty: 11.1 ML | Refills: 2 | Status: SHIPPED | OUTPATIENT
Start: 2023-02-27

## 2023-02-27 RX ORDER — AMLODIPINE BESYLATE 2.5 MG/1
2.5 TABLET ORAL
Qty: 30 TABLET | Refills: 0 | Status: SHIPPED | OUTPATIENT
Start: 2023-02-27 | End: 2023-03-29

## 2023-02-27 NOTE — PROGRESS NOTES
"Chief Complaint  Annual Exam    Subjective          Madina Reddy presents to [facility]  History of Present Illness  SSS- diagnosed at hospital during admission on 1/26/23.  Patient states that she had a history of passing out but was told that it was normal when she was younger.    Hypertension- elevated in office, when rechecked 124/96.  Patient without any symptoms currently.  No chest pain, shortness of breath or leg swelling.  Patient states that she has been doing very well since pacemaker was placed.    Seasonal allergies-patient doing well with Flonase and Singulair    Colon cancer screening- last done in 2017.  Patient thinks that she was cleared for 10 years.     Needs hep C screening    Pap smear- last year    Mammogram-May 2022      Objective   Vital Signs:   BP (!) 142/101 (BP Location: Left arm, Patient Position: Sitting, Cuff Size: Adult)   Pulse 78   Temp 98.7 °F (37.1 °C) (Temporal)   Ht 160 cm (63\")   Wt 66.9 kg (147 lb 6.4 oz)   SpO2 95%   BMI 26.11 kg/m²     Physical Exam  Vitals reviewed.   Constitutional:       Appearance: Normal appearance. She is well-developed.   HENT:      Head: Normocephalic and atraumatic.      Right Ear: Tympanic membrane, ear canal and external ear normal.      Left Ear: Tympanic membrane, ear canal and external ear normal.   Eyes:      Conjunctiva/sclera: Conjunctivae normal.      Pupils: Pupils are equal, round, and reactive to light.   Cardiovascular:      Rate and Rhythm: Normal rate and regular rhythm.      Heart sounds: No murmur heard.    No friction rub. No gallop.   Pulmonary:      Effort: Pulmonary effort is normal.      Breath sounds: Normal breath sounds. No wheezing or rhonchi.   Skin:     General: Skin is warm and dry.   Neurological:      Mental Status: She is alert and oriented to person, place, and time.      Cranial Nerves: No cranial nerve deficit.   Psychiatric:         Mood and Affect: Mood " and affect normal.         Behavior: Behavior normal.         Thought Content: Thought content normal.         Judgment: Judgment normal.        Result Review :          Procedures      Assessment and Plan    Diagnoses and all orders for this visit:    1. Sick sinus syndrome (HCC) (Primary)  Assessment & Plan:  Due to pacemaker and history of SSS will get patient set up with cardiology ASAP for evaluation postop.  If she has any cardiac issues she needs to go to the ER or follow-up with cardiology office for any concerns.    Orders:  -     Cancel: Ambulatory Referral to Cardiology  -     CBC & Differential; Future  -     Comprehensive Metabolic Panel; Future  -     Lipid Panel; Future  -     Ambulatory Referral to Cardiology    2. Need for hepatitis C screening test  -     Hepatitis C antibody; Future    3. Encounter for screening mammogram for malignant neoplasm of breast  -     Mammo Screening Bilateral With CAD; Future    4. Seasonal allergic rhinitis due to pollen  Assessment & Plan:  Well-controlled with Flonase and Singulair, okay to use Claritin as needed as well.      5. Presence of cardiac pacemaker  -     Ambulatory Referral to Cardiology    6. Annual physical exam  Assessment & Plan:  Age-appropriate screenings discussed and documented.  Will order basic labs today.  Discussed healthy habits such as diet, exercise and appropriate amount of sleep.  Encourage patient to be up-to-date with vaccines.  Follow up for any concerns.         Other orders  -     montelukast (SINGULAIR) 10 MG tablet; Take 1 tablet by mouth Every Night for 30 days.  Dispense: 90 tablet; Refill: 1  -     fluticasone (FLONASE) 50 MCG/ACT nasal spray; 2 sprays into the nostril(s) as directed by provider Daily.  Dispense: 11.1 mL; Refill: 2  -     amLODIPine (NORVASC) 2.5 MG tablet; Take 1 tablet by mouth Daily for 30 days.  Dispense: 30 tablet; Refill: 0            Follow Up   Return in about 3 months (around 5/27/2023).  Patient was  given instructions and counseling regarding her condition or for health maintenance advice. Please see specific information pulled into the AVS if appropriate.

## 2023-02-28 PROBLEM — Z12.31 ENCOUNTER FOR SCREENING MAMMOGRAM FOR MALIGNANT NEOPLASM OF BREAST: Status: ACTIVE | Noted: 2023-02-28

## 2023-02-28 PROBLEM — Z11.59 NEED FOR HEPATITIS C SCREENING TEST: Status: ACTIVE | Noted: 2023-02-28

## 2023-02-28 PROBLEM — Z00.00 ANNUAL PHYSICAL EXAM: Status: ACTIVE | Noted: 2023-02-28

## 2023-02-28 NOTE — ASSESSMENT & PLAN NOTE
Age-appropriate screenings discussed and documented.  Will order basic labs today.  Discussed healthy habits such as diet, exercise and appropriate amount of sleep.  Encourage patient to be up-to-date with vaccines.  Follow up for any concerns.

## 2023-02-28 NOTE — ASSESSMENT & PLAN NOTE
Due to pacemaker and history of SSS will get patient set up with cardiology ASAP for evaluation postop.  If she has any cardiac issues she needs to go to the ER or follow-up with cardiology office for any concerns.

## 2023-03-08 ENCOUNTER — CLINICAL SUPPORT (OUTPATIENT)
Dept: INTERNAL MEDICINE | Facility: CLINIC | Age: 58
End: 2023-03-08
Payer: COMMERCIAL

## 2023-03-08 DIAGNOSIS — Z11.59 NEED FOR HEPATITIS C SCREENING TEST: ICD-10-CM

## 2023-03-08 DIAGNOSIS — I49.5 SICK SINUS SYNDROME: ICD-10-CM

## 2023-03-08 LAB
ALBUMIN SERPL-MCNC: 4.2 G/DL (ref 3.5–5.2)
ALBUMIN/GLOB SERPL: 1.3 G/DL
ALP SERPL-CCNC: 63 U/L (ref 39–117)
ALT SERPL W P-5'-P-CCNC: 14 U/L (ref 1–33)
ANION GAP SERPL CALCULATED.3IONS-SCNC: 10.1 MMOL/L (ref 5–15)
AST SERPL-CCNC: 17 U/L (ref 1–32)
BASOPHILS # BLD AUTO: 0.07 10*3/MM3 (ref 0–0.2)
BASOPHILS NFR BLD AUTO: 1.2 % (ref 0–1.5)
BILIRUB SERPL-MCNC: 0.4 MG/DL (ref 0–1.2)
BUN SERPL-MCNC: 10 MG/DL (ref 6–20)
BUN/CREAT SERPL: 11.6 (ref 7–25)
CALCIUM SPEC-SCNC: 9.7 MG/DL (ref 8.6–10.5)
CHLORIDE SERPL-SCNC: 106 MMOL/L (ref 98–107)
CHOLEST SERPL-MCNC: 236 MG/DL (ref 0–200)
CO2 SERPL-SCNC: 26.9 MMOL/L (ref 22–29)
CREAT SERPL-MCNC: 0.86 MG/DL (ref 0.57–1)
DEPRECATED RDW RBC AUTO: 42.7 FL (ref 37–54)
EGFRCR SERPLBLD CKD-EPI 2021: 79.4 ML/MIN/1.73
EOSINOPHIL # BLD AUTO: 0.2 10*3/MM3 (ref 0–0.4)
EOSINOPHIL NFR BLD AUTO: 3.5 % (ref 0.3–6.2)
ERYTHROCYTE [DISTWIDTH] IN BLOOD BY AUTOMATED COUNT: 13.3 % (ref 12.3–15.4)
GLOBULIN UR ELPH-MCNC: 3.3 GM/DL
GLUCOSE SERPL-MCNC: 88 MG/DL (ref 65–99)
HCT VFR BLD AUTO: 37.8 % (ref 34–46.6)
HCV AB SER DONR QL: NORMAL
HDLC SERPL-MCNC: 46 MG/DL (ref 40–60)
HGB BLD-MCNC: 12.7 G/DL (ref 12–15.9)
IMM GRANULOCYTES # BLD AUTO: 0.01 10*3/MM3 (ref 0–0.05)
IMM GRANULOCYTES NFR BLD AUTO: 0.2 % (ref 0–0.5)
LDLC SERPL CALC-MCNC: 175 MG/DL (ref 0–100)
LDLC/HDLC SERPL: 3.76 {RATIO}
LYMPHOCYTES # BLD AUTO: 2.41 10*3/MM3 (ref 0.7–3.1)
LYMPHOCYTES NFR BLD AUTO: 42.1 % (ref 19.6–45.3)
MCH RBC QN AUTO: 29.8 PG (ref 26.6–33)
MCHC RBC AUTO-ENTMCNC: 33.6 G/DL (ref 31.5–35.7)
MCV RBC AUTO: 88.7 FL (ref 79–97)
MONOCYTES # BLD AUTO: 0.51 10*3/MM3 (ref 0.1–0.9)
MONOCYTES NFR BLD AUTO: 8.9 % (ref 5–12)
NEUTROPHILS NFR BLD AUTO: 2.53 10*3/MM3 (ref 1.7–7)
NEUTROPHILS NFR BLD AUTO: 44.1 % (ref 42.7–76)
NRBC BLD AUTO-RTO: 0 /100 WBC (ref 0–0.2)
PLATELET # BLD AUTO: 239 10*3/MM3 (ref 140–450)
PMV BLD AUTO: 11 FL (ref 6–12)
POTASSIUM SERPL-SCNC: 4.6 MMOL/L (ref 3.5–5.2)
PROT SERPL-MCNC: 7.5 G/DL (ref 6–8.5)
RBC # BLD AUTO: 4.26 10*6/MM3 (ref 3.77–5.28)
SODIUM SERPL-SCNC: 143 MMOL/L (ref 136–145)
TRIGL SERPL-MCNC: 86 MG/DL (ref 0–150)
VLDLC SERPL-MCNC: 15 MG/DL (ref 5–40)
WBC NRBC COR # BLD: 5.73 10*3/MM3 (ref 3.4–10.8)

## 2023-03-08 PROCEDURE — 80061 LIPID PANEL: CPT | Performed by: PHYSICIAN ASSISTANT

## 2023-03-08 PROCEDURE — 86803 HEPATITIS C AB TEST: CPT | Performed by: PHYSICIAN ASSISTANT

## 2023-03-08 PROCEDURE — 85025 COMPLETE CBC W/AUTO DIFF WBC: CPT | Performed by: PHYSICIAN ASSISTANT

## 2023-03-08 PROCEDURE — 80053 COMPREHEN METABOLIC PANEL: CPT | Performed by: PHYSICIAN ASSISTANT

## 2023-03-08 PROCEDURE — 36415 COLL VENOUS BLD VENIPUNCTURE: CPT | Performed by: NURSE PRACTITIONER

## 2023-03-10 ENCOUNTER — PATIENT MESSAGE (OUTPATIENT)
Dept: INTERNAL MEDICINE | Facility: CLINIC | Age: 58
End: 2023-03-10
Payer: COMMERCIAL

## 2023-03-15 NOTE — TELEPHONE ENCOUNTER
From: Madina Reddy  To: Toshia Jensen  Sent: 3/10/2023 8:46 AM EST  Subject: Lab work    Jude Pope, I just got my lab results. My cholesterol is high. Do you recommend a medication? I don’t think Kady is back yet. Do you want to prescribe me something?   Thanks, Madina Reddy

## 2023-03-17 ENCOUNTER — TELEPHONE (OUTPATIENT)
Dept: INTERNAL MEDICINE | Facility: CLINIC | Age: 58
End: 2023-03-17
Payer: COMMERCIAL

## 2023-03-17 NOTE — TELEPHONE ENCOUNTER
I called pt and went over lab results with patient. She understands she will discuss elevated cholesterol with cardiologist. Pt rescheduled her June 9 appt with Kady for June 6 @2:45 as she will be out of town on June 9.

## 2023-03-30 ENCOUNTER — OFFICE VISIT (OUTPATIENT)
Dept: OTOLARYNGOLOGY | Facility: CLINIC | Age: 58
End: 2023-03-30
Payer: COMMERCIAL

## 2023-03-30 VITALS — HEIGHT: 63 IN | WEIGHT: 149 LBS | BODY MASS INDEX: 26.4 KG/M2 | TEMPERATURE: 97.2 F

## 2023-03-30 DIAGNOSIS — H65.111 ACUTE MUCOID OTITIS MEDIA OF RIGHT EAR: ICD-10-CM

## 2023-03-30 DIAGNOSIS — H69.81 DYSFUNCTION OF RIGHT EUSTACHIAN TUBE: Primary | ICD-10-CM

## 2023-03-31 ENCOUNTER — OFFICE VISIT (OUTPATIENT)
Dept: CARDIOLOGY | Facility: CLINIC | Age: 58
End: 2023-03-31
Payer: COMMERCIAL

## 2023-03-31 VITALS
HEART RATE: 75 BPM | DIASTOLIC BLOOD PRESSURE: 75 MMHG | BODY MASS INDEX: 26.4 KG/M2 | SYSTOLIC BLOOD PRESSURE: 128 MMHG | HEIGHT: 63 IN | WEIGHT: 149 LBS

## 2023-03-31 DIAGNOSIS — Z91.89 AT RISK FOR CORONARY ARTERY DISEASE: Primary | ICD-10-CM

## 2023-03-31 DIAGNOSIS — Z95.0 PRESENCE OF CARDIAC PACEMAKER: ICD-10-CM

## 2023-03-31 DIAGNOSIS — I49.5 SICK SINUS SYNDROME: ICD-10-CM

## 2023-03-31 DIAGNOSIS — E78.2 HYPERLIPEMIA, MIXED: ICD-10-CM

## 2023-03-31 RX ORDER — AMLODIPINE BESYLATE 2.5 MG/1
2.5 TABLET ORAL DAILY
COMMUNITY
End: 2023-03-31 | Stop reason: SDUPTHER

## 2023-03-31 RX ORDER — AMLODIPINE BESYLATE 2.5 MG/1
2.5 TABLET ORAL DAILY
Qty: 90 TABLET | Refills: 3 | Status: SHIPPED | OUTPATIENT
Start: 2023-03-31

## 2023-03-31 NOTE — PROGRESS NOTES
Chief Complaint  Loss of Consciousness and [facility] ED 1/25/23    Subjective            Madina Reddy presents to [facility] CARDIOLOGY  History of Present Illness    Ms. Guevara is here for follow-up evaluation management of syncope, sinus node dysfunction, dual-chamber permanent pacemaker placed in January.  She is changing cardiology providers and was previously following with Dr. Carols Souza.  She has not had any further syncopal episodes since January.  Her lipids are elevated with mainly LDL of 175.  She is otherwise low cardiovascular risk.    [facility]  Past Medical History:   Diagnosis Date   • Allergies    • Dizziness Madina   • Headache Madina   • Hypertension Madina   • TMJ dysfunction Madina         SURGICALHX  Past Surgical History:   Procedure Laterality Date   • BREAST BIOPSY     • CARDIAC ELECTROPHYSIOLOGY PROCEDURE N/A 01/25/2023    Procedure: Device Implant;  Surgeon: VINCE Alejandre MD;  Location: [facility] INVASIVE LOCATION;  Service: Cardiology;  Laterality: N/A;   • CARDIAC SURGERY      heart surgery as an infant   • PACEMAKER IMPLANTATION          SOC  Social History     Socioeconomic History   • Marital status:    Tobacco Use   • Smoking status: Never     Passive exposure: Never   • Smokeless tobacco: Never   Vaping Use   • Vaping Use: Never used   Substance and Sexual Activity   • Alcohol use: Never   • Drug use: Not Currently   • Sexual activity: Yes     Partners: Male     Birth control/protection: None         FAMHX  Family History   Problem Relation Age of Onset   • Cancer Mother    • Heart disease Father    • Other Son         Scoliosis          ALLERGY  No Known Allergies     MEDSCURRENT    Current Outpatient Medications:   •  acetaminophen (TYLENOL) 325 MG tablet, Take 2 tablets by mouth Every 6 (Six) Hours As Needed for Mild Pain., Disp: , Rfl:   •  amLODIPine (NORVASC) 2.5 MG tablet, Take 1 tablet by mouth Daily., Disp: 90 tablet, Rfl: 3  •  aspirin 81 MG  "chewable tablet, Chew 1 tablet Daily., Disp: , Rfl:   •  fluticasone (FLONASE) 50 MCG/ACT nasal spray, 2 sprays into the nostril(s) as directed by provider Daily., Disp: 11.1 mL, Rfl: 2  •  montelukast (SINGULAIR) 10 MG tablet, Take 1 tablet by mouth Every Night for 30 days., Disp: 90 tablet, Rfl: 1      Review of Systems   Cardiovascular: Negative for chest pain, dyspnea on exertion, irregular heartbeat and syncope.        Objective     /75   Pulse 75   Ht 160 cm (63\")   Wt 67.6 kg (149 lb)   BMI 26.39 kg/m²       General Appearance:   · well developed  · well nourished  HENT:   · oropharynx moist  · lips not cyanotic  Neck:  · thyroid not enlarged  · supple  Respiratory:  · no respiratory distress  · normal breath sounds  · no rales  Cardiovascular:  · no jugular venous distention  · regular rhythm  · apical impulse normal  · S1 normal, S2 normal  · no S3, no S4   · no murmur  · no rub, no thrill  · carotid pulses normal; no bruit  · pedal pulses normal  · lower extremity edema: none    Musculoskeletal:  · no clubbing of fingers.   · normocephalic, head atraumatic  Skin:   · warm, dry  Psychiatric:  · judgement and insight appropriate  · normal mood and affect      Result Review :     The following data was reviewed by: Kirt Alejandre MD on 03/31/2023:    CMP    CMP 1/24/23 2/1/23 3/8/23   Glucose 133 (A) 85 88   BUN 10 9 10   Creatinine 0.63 0.73 0.86   eGFR 104.3 96.7 79.4   Sodium 137 141 143   Potassium 3.7 3.6 4.6   Chloride 108 (A) 105 106   Calcium 8.6 9.6 9.7   Total Protein 6.3 7.8 7.5   Albumin 3.3 (A) 4.3 4.2   Globulin 3.0 3.5 3.3   Total Bilirubin 0.5 0.4 0.4   Alkaline Phosphatase 54 58 63   AST (SGOT) 14 18 17   ALT (SGPT) 10 18 14   Albumin/Globulin Ratio 1.1 1.2 1.3   BUN/Creatinine Ratio 15.9 12.3 11.6   Anion Gap 9.2 10.9 10.1   (A) Abnormal value            CBC    CBC 1/24/23 2/1/23 3/8/23   WBC 16.85 (A) 10.88 (A) 5.73   RBC 3.52 (A) 4.36 4.26   Hemoglobin 10.6 (A) 12.3 " 12.7   Hematocrit 31.1 (A) 37.8 37.8   MCV 88.4 86.7 88.7   MCH 30.1 28.2 29.8   MCHC 34.1 32.5 33.6   RDW 13.4 13.3 13.3   Platelets 158 403 239   (A) Abnormal value            Lipid Panel    Lipid Panel 3/8/23   Total Cholesterol 236 (A)   Triglycerides 86   HDL Cholesterol 46   VLDL Cholesterol 15   LDL Cholesterol  175 (A)   LDL/HDL Ratio 3.76   (A) Abnormal value            TSH    TSH 1/23/23   TSH 0.673             Data reviewed: Previous cardiology records reviewed     Procedures                  Assessment and Plan        ASSESSMENT:  Encounter Diagnoses   Name Primary?   • At risk for coronary artery disease Yes   • Sick sinus syndrome (HCC)    • Presence of cardiac pacemaker    • Hyperlipemia, mixed          PLAN:    1.  Mixed hyperlipidemia-significantly elevated LDL.  However, using traditional risk modeling the patient is low cardiovascular risk.  I recommend a calcium score to look for early plaquing.  If this is elevated statin therapy would clearly be indicated at 58 years of age.  She is agreeable with this plan.  2.  Sick sinus syndrome with previous syncope, dual-chamber permanent pacemaker placed in January-she has had no significant recurrent symptoms.  We will continue device monitoring  3.  Mixed hyperlipidemia-as per #1    Annual follow-up will be arranged otherwise pending calcium score results, we will try to get remote monitoring established with her pacemaker          Patient was given instructions and counseling regarding her condition or for health maintenance advice. Please see specific information pulled into the AVS if appropriate.             VINCE Alejandre MD  3/31/2023    12:02 EDT

## 2023-04-03 ENCOUNTER — TRANSCRIBE ORDERS (OUTPATIENT)
Dept: ADMINISTRATIVE | Facility: HOSPITAL | Age: 58
End: 2023-04-03
Payer: COMMERCIAL

## 2023-04-03 DIAGNOSIS — Z12.31 SCREENING MAMMOGRAM, ENCOUNTER FOR: Primary | ICD-10-CM

## 2023-04-26 ENCOUNTER — HOSPITAL ENCOUNTER (OUTPATIENT)
Dept: CT IMAGING | Facility: HOSPITAL | Age: 58
Discharge: HOME | End: 2023-04-26
Payer: COMMERCIAL

## 2023-04-26 DIAGNOSIS — Z91.89 AT RISK FOR CORONARY ARTERY DISEASE: ICD-10-CM

## 2023-04-26 PROCEDURE — 75571 CT HRT W/O DYE W/CA TEST: CPT

## 2023-06-06 ENCOUNTER — OFFICE VISIT (OUTPATIENT)
Dept: INTERNAL MEDICINE | Facility: CLINIC | Age: 58
End: 2023-06-06
Payer: COMMERCIAL

## 2023-06-06 VITALS
HEART RATE: 85 BPM | SYSTOLIC BLOOD PRESSURE: 104 MMHG | BODY MASS INDEX: 26.61 KG/M2 | TEMPERATURE: 98.1 F | DIASTOLIC BLOOD PRESSURE: 69 MMHG | WEIGHT: 150.2 LBS | OXYGEN SATURATION: 97 %

## 2023-06-06 DIAGNOSIS — G89.29 CHRONIC PAIN OF BOTH KNEES: ICD-10-CM

## 2023-06-06 DIAGNOSIS — I49.5 SICK SINUS SYNDROME: Primary | ICD-10-CM

## 2023-06-06 DIAGNOSIS — R68.2 DRY MOUTH: ICD-10-CM

## 2023-06-06 DIAGNOSIS — Z82.69 FAMILY HISTORY OF SJOGREN'S DISEASE: ICD-10-CM

## 2023-06-06 DIAGNOSIS — M25.561 CHRONIC PAIN OF BOTH KNEES: ICD-10-CM

## 2023-06-06 DIAGNOSIS — I10 ESSENTIAL HYPERTENSION: ICD-10-CM

## 2023-06-06 DIAGNOSIS — Z82.61 FAMILY HISTORY OF RHEUMATOID ARTHRITIS: ICD-10-CM

## 2023-06-06 DIAGNOSIS — Z95.0 PRESENCE OF CARDIAC PACEMAKER: ICD-10-CM

## 2023-06-06 DIAGNOSIS — M25.562 CHRONIC PAIN OF BOTH KNEES: ICD-10-CM

## 2023-06-06 LAB
CHROMATIN AB SERPL-ACNC: <10 IU/ML (ref 0–14)
CRP SERPL-MCNC: <0.3 MG/DL (ref 0–0.5)

## 2023-06-06 PROCEDURE — 86225 DNA ANTIBODY NATIVE: CPT | Performed by: NURSE PRACTITIONER

## 2023-06-06 PROCEDURE — 86140 C-REACTIVE PROTEIN: CPT | Performed by: NURSE PRACTITIONER

## 2023-06-06 PROCEDURE — 85652 RBC SED RATE AUTOMATED: CPT | Performed by: NURSE PRACTITIONER

## 2023-06-06 PROCEDURE — 86038 ANTINUCLEAR ANTIBODIES: CPT | Performed by: NURSE PRACTITIONER

## 2023-06-06 PROCEDURE — 86431 RHEUMATOID FACTOR QUANT: CPT | Performed by: NURSE PRACTITIONER

## 2023-06-06 PROCEDURE — 86200 CCP ANTIBODY: CPT | Performed by: NURSE PRACTITIONER

## 2023-06-06 NOTE — PROGRESS NOTES
Chief Complaint  Hypertension (Elevated blood pressure reading follow up)    Subjective         Madina Reddy presents to [facility]  HPI     Patient diagnosed with sick sinus syndrome earlier this year, dual chamber pacemaker placed 1/2023. Patient established with cardiology and started on amlodipine for hypertension. Reports since that time she has experienced no further episodes of syncope. Now admits she had been experiencing these most of her adult life. Had calcium test through cardiology with a score of 0, statin was not started. She is not checking her blood pressure at home. Denies chest pain, shortness of breath.    Knee pain-  Bilateral. Typically with steps. Does well with walking or playing outside with her dog. Denies injury. Will have a clicking sound in the right knee when she goes up steps. Denies erythema, edema, warmth.     Patient states her mom has Sjogren's and RA. Patient with dry mouth, is wondering if she may also have this. Denies further joint pain other than the knees.     Mammogram: Scheduled        Objective     Vitals:    06/06/23 1436   BP: 104/69   Pulse: 85   Temp: 98.1 °F (36.7 °C)   TempSrc: Temporal   SpO2: 97%   Weight: 68.1 kg (150 lb 3.2 oz)      Body mass index is 26.61 kg/m².    Wt Readings from Last 3 Encounters:   06/06/23 68.1 kg (150 lb 3.2 oz)   03/31/23 67.6 kg (149 lb)   03/30/23 67.6 kg (149 lb)     BP Readings from Last 3 Encounters:   06/06/23 104/69   03/31/23 128/75   02/27/23 (!) 142/101       [unfilled]       Physical Exam  Constitutional:       Appearance: Normal appearance.   HENT:      Head: Normocephalic and atraumatic.      Nose: Nose normal.      Mouth/Throat:      Mouth: Mucous membranes are moist.      Pharynx: Oropharynx is clear.   Eyes:      Extraocular Movements: Extraocular movements intact.      Conjunctiva/sclera: Conjunctivae normal.      Pupils: Pupils are equal, round, and reactive  to light.   Cardiovascular:      Rate and Rhythm: Normal rate and regular rhythm.      Heart sounds: Normal heart sounds.   Pulmonary:      Effort: Pulmonary effort is normal.      Breath sounds: Normal breath sounds.   Skin:     General: Skin is warm and dry.   Neurological:      General: No focal deficit present.      Mental Status: She is alert and oriented to person, place, and time.   Psychiatric:         Mood and Affect: Mood normal.         Behavior: Behavior normal.         Thought Content: Thought content normal.        Result Review :   The following data was reviewed by: LANDY Borrego on 06/06/2023:      Procedures    Assessment and Plan   Diagnoses and all orders for this visit:    1. Sick sinus syndrome (Primary)  Assessment & Plan:  Continue to follow with cardiology.       2. Presence of cardiac pacemaker    3. Essential hypertension  Assessment & Plan:  Continue amlodipine. Patient should monitor blood pressure at home and call or return to clinic with consistent elevations greater than 130/80. Labs in clinic today.        4. Chronic pain of both knees  Assessment & Plan:  Topical voltaren gel to pharmacy. Xrays in clinic today. Referral to PT and orthopedics for further evaluation. Will determine if further intervention is warranted based on results of imaging.     Orders:  -     XR Knee 3 View Right  -     XR Knee 3 View Left  -     Ambulatory Referral to Physical Therapy Evaluate and treat  -     Ambulatory Referral to Orthopedic Surgery    5. Dry mouth  Assessment & Plan:  Discussed artificial saliva products. Will check autoimmune labs due to strong family history. Could consider rheumatology referral in the future if warranted.     Orders:  -     JASMIN  -     Sedimentation rate, automated  -     C-reactive protein  -     Cyclic Citrul Peptide Antibody, IgG / IgA  -     Rheumatoid Factor    6. Family history of rheumatoid arthritis  -     JASMIN  -     Sedimentation rate, automated  -      C-reactive protein  -     Cyclic Citrul Peptide Antibody, IgG / IgA  -     Rheumatoid Factor    7. Family history of Sjogren's disease  -     JASMIN  -     Sedimentation rate, automated  -     C-reactive protein  -     Cyclic Citrul Peptide Antibody, IgG / IgA  -     Rheumatoid Factor    Other orders  -     Diclofenac Sodium (Voltaren) 1 % gel gel; Apply 4 g topically to the appropriate area as directed 4 (Four) Times a Day As Needed (hand pain).  Dispense: 100 g; Refill: 1          Follow Up   Return in about 6 months (around 12/6/2023).  Patient was given instructions and counseling regarding her condition or for health maintenance advice. Please see specific information pulled into the AVS if appropriate.

## 2023-06-07 PROBLEM — Z01.419 ENCOUNTER FOR ROUTINE GYNECOLOGICAL EXAMINATION WITH PAPANICOLAOU SMEAR OF CERVIX: Status: RESOLVED | Noted: 2022-01-31 | Resolved: 2023-06-07

## 2023-06-07 PROBLEM — H65.02 NON-RECURRENT ACUTE SEROUS OTITIS MEDIA OF LEFT EAR: Status: RESOLVED | Noted: 2023-02-08 | Resolved: 2023-06-07

## 2023-06-07 PROBLEM — M25.561 CHRONIC PAIN OF BOTH KNEES: Status: ACTIVE | Noted: 2023-06-07

## 2023-06-07 PROBLEM — H61.22 IMPACTED CERUMEN OF LEFT EAR: Status: RESOLVED | Noted: 2023-02-08 | Resolved: 2023-06-07

## 2023-06-07 PROBLEM — Z12.31 ENCOUNTER FOR SCREENING MAMMOGRAM FOR MALIGNANT NEOPLASM OF BREAST: Status: RESOLVED | Noted: 2023-02-28 | Resolved: 2023-06-07

## 2023-06-07 PROBLEM — J01.10 ACUTE NON-RECURRENT FRONTAL SINUSITIS: Status: RESOLVED | Noted: 2021-11-19 | Resolved: 2023-06-07

## 2023-06-07 PROBLEM — I10 ESSENTIAL HYPERTENSION: Status: ACTIVE | Noted: 2023-06-07

## 2023-06-07 PROBLEM — R68.2 DRY MOUTH: Status: ACTIVE | Noted: 2023-06-07

## 2023-06-07 PROBLEM — Z82.69 FAMILY HISTORY OF SJOGREN'S DISEASE: Status: ACTIVE | Noted: 2023-06-07

## 2023-06-07 PROBLEM — G89.29 CHRONIC PAIN OF BOTH KNEES: Status: ACTIVE | Noted: 2023-06-07

## 2023-06-07 PROBLEM — Z82.61 FAMILY HISTORY OF RHEUMATOID ARTHRITIS: Status: ACTIVE | Noted: 2023-06-07

## 2023-06-07 PROBLEM — M25.562 CHRONIC PAIN OF BOTH KNEES: Status: ACTIVE | Noted: 2023-06-07

## 2023-06-07 LAB — ERYTHROCYTE [SEDIMENTATION RATE] IN BLOOD: 8 MM/HR (ref 0–30)

## 2023-06-07 NOTE — ASSESSMENT & PLAN NOTE
Continue amlodipine. Patient should monitor blood pressure at home and call or return to clinic with consistent elevations greater than 130/80. Labs in clinic today.

## 2023-06-07 NOTE — ASSESSMENT & PLAN NOTE
Discussed artificial saliva products. Will check autoimmune labs due to strong family history. Could consider rheumatology referral in the future if warranted.

## 2023-06-07 NOTE — ASSESSMENT & PLAN NOTE
Topical voltaren gel to pharmacy. Xrays in clinic today. Referral to PT and orthopedics for further evaluation. Will determine if further intervention is warranted based on results of imaging.

## 2023-06-08 LAB
CCP IGA+IGG SERPL IA-ACNC: 8 UNITS (ref 0–19)
DSDNA IGG SERPL IA-ACNC: NEGATIVE [IU]/ML
NUCLEAR IGG SER IA-RTO: NEGATIVE

## 2023-06-08 NOTE — PROGRESS NOTES
Patient Name: Madina Reddy   Visit Date: 03/30/2023   Patient ID: [id number]  Provider: Kevin Mondragon MD    Sex: female  Location: [facility]   YOB: 1965  Location Address: [address] [place] [address], [address],?[place]?[id number]    Primary Care Provider Kady Vernon APRN  Location Phone: [phone]    Referring Provider: No ref. provider found        Chief Complaint  healing of ear    Subjective    History of Present Illness  Madina Reddy is a 58 y.o. female who presents to [facility] today as a consult from No ref. provider found.    She presents to the clinic today for follow-up regarding her ears and hearing.  At last clinic visit I noted fluid in the right middle ear and we discussed treatment options including Valsalva maneuvers and conservative treatments as well as myringotomy and PE tube placement if the fluid does not resolve spontaneously.  She informs me that she is doing much better, and her hearing seems to be back to normal.    Past Medical History:   Diagnosis Date    Allergies     Dizziness Madina    Headache Madina    Hyperlipidemia March    Hypertension Madina    TMJ dysfunction Madina       Past Surgical History:   Procedure Laterality Date    BREAST BIOPSY      CARDIAC ELECTROPHYSIOLOGY PROCEDURE N/A 01/25/2023    Procedure: Device Implant;  Surgeon: VINCE Alejandre MD;  [facility];  Service: Cardiology;  Laterality: N/A;    CARDIAC SURGERY      heart surgery as an infant    PACEMAKER IMPLANTATION           Current Outpatient Medications:     acetaminophen (TYLENOL) 325 MG tablet, Take 2 tablets by mouth Every 6 (Six) Hours As Needed for Mild Pain., Disp: , Rfl:     aspirin 81 MG chewable tablet, Chew 1 tablet Daily., Disp: , Rfl:     fluticasone (FLONASE) 50 MCG/ACT nasal spray, 2 sprays into the nostril(s) as directed by provider Daily., Disp: 11.1 mL, Rfl: 2    amLODIPine  "(NORVASC) 2.5 MG tablet, Take 1 tablet by mouth Daily., Disp: 90 tablet, Rfl: 3    Diclofenac Sodium (Voltaren) 1 % gel gel, Apply 4 g topically to the appropriate area as directed 4 (Four) Times a Day As Needed (hand pain)., Disp: 100 g, Rfl: 1    montelukast (SINGULAIR) 10 MG tablet, Take 1 tablet by mouth Every Night for 30 days., Disp: 90 tablet, Rfl: 1     No Known Allergies    Family History   Problem Relation Age of Onset    Cancer Mother     Thyroid disease Mother     Heart disease Father     Other Son         Scoliosis    Asthma Daughter         Social History     Social History Narrative    Not on file       Objective     Vital Signs:   Temp 97.2 °F (36.2 °C) (Temporal)   Ht 160 cm (63\")   Wt 67.6 kg (149 lb)   BMI 26.39 kg/m²       Physical Exam         Constitutional   Appearance  : well developed, well-nourished, alert and in no acute distress, voice clear and strong    Head  Inspection  : no deformities or lesions  Face  Inspection  : No facial lesions; House-Brackmann I/VI bilaterally  Palpation  : No TMJ crepitus nor  muscle tenderness bilaterally    Eyes  Vision  Visual Fields  : Extraocular movements are intact. No spontaneous or gaze-induced nystagmus.  Conjunctivae  : clear  Sclerae  : clear  Pupils and Irises  : pupils equal, round, and reactive to light.     Ears, Nose, Mouth and Throat    Ears    External Ears  : appearance within normal limits, no lesions present  Otoscopic Examination  : Tympanic membrane appearance within normal limits bilaterally without perforations, well-aerated middle ears  Hearing  : intact to conversational voice both ears  Tunning fork testing:     :    Nose    External Nose  : appearance normal  Intranasal Exam  : mucosa within normal limits, vestibules normal, no intranasal lesions present, septum midline, sinuses non tender to percussion  Oral Cavity    Oral Mucosa  : oral mucosa normal without pallor or cyanosis  Lips  : lip appearance " normal  Teeth  : normal dentition for age  Gums  : gums pink, non-swollen, no bleeding present  Tongue  : tongue appearance normal; normal mobility  Palate  : hard palate normal, soft palate appearance normal with symmetric mobility    Throat    Oropharynx  : no inflammation or lesions present, tonsils within normal limits  Hypopharynx  : appearance within normal limits, superior epiglottis within normal limits  Larynx  : appearance within normal limits, vocal cords within normal limits, no lesions present    Neck  Inspection/Palpation  : normal appearance, no masses or tenderness, trachea midline; thyroid size normal, nontender, no nodules or masses present on palpation    Respiratory  Respiratory Effort  : breathing unlabored  Inspection of Chest  : normal appearance, no retractions    Cardiovascular  Heart  : regular rate and rhythm    Lymphatic  Neck  : no lymphadenopathy present  Supraclavicular Nodes  : no lymphadenopathy present  Preauricular Nodes  : no lymphadenopathy present    Skin and Subcutaneous Tissue  General Inspection  : Regarding face and neck - there are no rashes present, no lesions present, and no areas of discoloration    Neurologic  Cranial Nerves  : cranial nerves II-XII are grossly intact bilaterally  Gait and Station  : normal gait, able to stand without diffculty    Psychiatric  Judgement and Insight  : judgment and insight intact  Mood and Affect  : mood normal, affect appropriate          Assessment and Plan    Diagnoses and all orders for this visit:    1. Dysfunction of right eustachian tube (Primary)    2. Acute mucoid otitis media of right ear    Examination today reveals resolution of the middle ear fluid and ear exam was normal today.  She notes that her hearing is back to baseline in that ear.  I have asked that she contact me if she has any further symptoms or issues and I will be glad to see her back in the clinic.     Follow Up   No follow-ups on file.  Patient was given  instructions and counseling regarding her condition or for health maintenance advice. Please see specific information pulled into the AVS if appropriate.

## 2023-09-11 ENCOUNTER — TELEPHONE (OUTPATIENT)
Dept: INTERNAL MEDICINE | Facility: CLINIC | Age: 58
End: 2023-09-11

## 2023-09-11 ENCOUNTER — OFFICE VISIT (OUTPATIENT)
Dept: INTERNAL MEDICINE | Facility: CLINIC | Age: 58
End: 2023-09-11
Payer: COMMERCIAL

## 2023-09-11 VITALS
BODY MASS INDEX: 27.64 KG/M2 | HEIGHT: 63 IN | SYSTOLIC BLOOD PRESSURE: 126 MMHG | DIASTOLIC BLOOD PRESSURE: 78 MMHG | WEIGHT: 156 LBS | TEMPERATURE: 98.8 F | OXYGEN SATURATION: 98 % | HEART RATE: 93 BPM

## 2023-09-11 DIAGNOSIS — R50.9 FEVER, UNSPECIFIED FEVER CAUSE: ICD-10-CM

## 2023-09-11 DIAGNOSIS — R51.9 ACUTE INTRACTABLE HEADACHE, UNSPECIFIED HEADACHE TYPE: Primary | ICD-10-CM

## 2023-09-11 DIAGNOSIS — R05.9 COUGH, UNSPECIFIED TYPE: ICD-10-CM

## 2023-09-11 LAB
EXPIRATION DATE: NORMAL
EXPIRATION DATE: NORMAL
FLUAV AG UPPER RESP QL IA.RAPID: NOT DETECTED
FLUBV AG UPPER RESP QL IA.RAPID: NOT DETECTED
INTERNAL CONTROL: NORMAL
INTERNAL CONTROL: NORMAL
Lab: 6887
Lab: NORMAL
S PYO AG THROAT QL: NEGATIVE
SARS-COV-2 AG UPPER RESP QL IA.RAPID: NOT DETECTED

## 2023-09-11 PROCEDURE — 87880 STREP A ASSAY W/OPTIC: CPT | Performed by: PHYSICIAN ASSISTANT

## 2023-09-11 PROCEDURE — 87081 CULTURE SCREEN ONLY: CPT | Performed by: PHYSICIAN ASSISTANT

## 2023-09-11 PROCEDURE — 87428 SARSCOV & INF VIR A&B AG IA: CPT | Performed by: PHYSICIAN ASSISTANT

## 2023-09-11 PROCEDURE — 99213 OFFICE O/P EST LOW 20 MIN: CPT | Performed by: PHYSICIAN ASSISTANT

## 2023-09-11 NOTE — PROGRESS NOTES
"Chief Complaint  Fatigue, Chills, Fever (Symptoms started on Saturday. ), and Generalized Body Aches (Shoulders, head, neck are weak.)    Subjective          Madina Reddy presents to [facility]    Fatigue, headache, fever- symptoms started yesterday.  Chills started this morning.  She has noticed some discomfort in her right ear.  No sick contacts that she is aware of.  She has taken some tylenol and motrin.     tachycardia- patient has been wearing a heart monitor for elevated HR.  She has brought in her multivitamin which has 100mg of caffeine in it.  Suggested patient take multivitamin without caffeine or added energy sources.    Objective   Vital Signs:   /78 (BP Location: Left arm, Patient Position: Sitting, Cuff Size: Adult)   Pulse 93   Temp 98.8 °F (37.1 °C) (Temporal)   Ht 160 cm (62.99\")   Wt 70.8 kg (156 lb)   SpO2 98%   BMI 27.64 kg/m²     Physical Exam  Vitals reviewed.   Constitutional:       Appearance: Normal appearance. She is well-developed.   HENT:      Head: Normocephalic and atraumatic.   Eyes:      Conjunctiva/sclera: Conjunctivae normal.      Pupils: Pupils are equal, round, and reactive to light.   Cardiovascular:      Rate and Rhythm: Normal rate and regular rhythm.      Heart sounds: No murmur heard.    No friction rub. No gallop.   Pulmonary:      Effort: Pulmonary effort is normal.      Breath sounds: Normal breath sounds. No wheezing or rhonchi.   Skin:     General: Skin is warm and dry.   Neurological:      Mental Status: She is alert and oriented to person, place, and time.      Cranial Nerves: No cranial nerve deficit.   Psychiatric:         Mood and Affect: Mood and affect normal.         Behavior: Behavior normal.         Thought Content: Thought content normal.         Judgment: Judgment normal.      Result Review :          Procedures      Assessment and Plan    Diagnoses and all orders for this visit:    1. Acute " intractable headache, unspecified headache type (Primary)  Assessment & Plan:  Likely viral etiology.  Would expect symptoms to be self limiting within the next few days.  Continue conservative treatment at this time.  Watch closely for new or worsening symptoms, especially if patient develops fevers, difficulty breathing or signs of dehydration.  Call or return if symptoms persist or worsen.         2. Fever, unspecified fever cause  -     POCT SARS-CoV-2 Antigen [place] + Flu  -     POCT rapid strep A  -     Beta Strep Culture, Throat - , Throat; Future  -     Beta Strep Culture, Throat - Swab, Throat    3. Cough, unspecified type  -     Beta Strep Culture, Throat - , Throat; Future  -     Beta Strep Culture, Throat - Swab, Throat              Follow Up   No follow-ups on file.  Patient was given instructions and counseling regarding her condition or for health maintenance advice. Please see specific information pulled into the AVS if appropriate.

## 2023-09-13 LAB — BACTERIA SPEC AEROBE CULT: NORMAL

## 2023-09-15 ENCOUNTER — TELEPHONE (OUTPATIENT)
Dept: INTERNAL MEDICINE | Facility: CLINIC | Age: 58
End: 2023-09-15

## 2023-09-15 NOTE — TELEPHONE ENCOUNTER
Provider: MARLEE JOE    Caller: CHRISTINA LINCOLN    Relationship to Patient: SELF    Pharmacy: [address] [facility] [phone]  - [phone]      Phone Number:     [phone]       Reason for Call: PATIENT STATES SHE IS FEELING BETTER, BUT %. TEMP WAS 97.4 THIS MORNING, FELT KIND OF SHAKY DURING THE NIGHT. COVID, FLU AND STREP ALL CAME BACK NEGATIVE.    WOULD LIKE FOR A NURSE TO CALL HER REGARDING THIS    When was the patient last seen: 9/11/23

## 2023-09-25 ENCOUNTER — OFFICE VISIT (OUTPATIENT)
Dept: INTERNAL MEDICINE | Facility: CLINIC | Age: 58
End: 2023-09-25

## 2023-09-25 VITALS
HEIGHT: 63 IN | HEART RATE: 76 BPM | OXYGEN SATURATION: 97 % | TEMPERATURE: 98 F | SYSTOLIC BLOOD PRESSURE: 126 MMHG | WEIGHT: 158.8 LBS | DIASTOLIC BLOOD PRESSURE: 80 MMHG | BODY MASS INDEX: 28.14 KG/M2

## 2023-09-25 DIAGNOSIS — J01.10 ACUTE NON-RECURRENT FRONTAL SINUSITIS: Primary | ICD-10-CM

## 2023-09-25 PROCEDURE — 99213 OFFICE O/P EST LOW 20 MIN: CPT | Performed by: PHYSICIAN ASSISTANT

## 2023-09-25 RX ORDER — AMOXICILLIN AND CLAVULANATE POTASSIUM 875; 125 MG/1; MG/1
1 TABLET, FILM COATED ORAL 2 TIMES DAILY
Qty: 20 TABLET | Refills: 0 | Status: SHIPPED | OUTPATIENT
Start: 2023-09-25 | End: 2023-10-05

## 2023-09-25 RX ORDER — MONTELUKAST SODIUM 10 MG/1
10 TABLET ORAL NIGHTLY
Qty: 90 TABLET | Refills: 1 | Status: SHIPPED | OUTPATIENT
Start: 2023-09-25 | End: 2023-10-25

## 2023-09-25 NOTE — PROGRESS NOTES
"Chief Complaint  Fatigue, Fever (Low grade fever almost every morning.), and Dizziness    Subjective          Madina Reddy presents to [facility]    Fatigue, fever, dizziness- symptoms present over the past few days.  Patient recently had viral illness a couple weeks ago which improved on its own.  She has had worsening fatigue.  No shortness of breath or cough.  Still having slight post nasal drainage.  Right ear still bothering her some.  She has noticed some sinus pressure and headache.      Objective   Vital Signs:   /80 (BP Location: Left arm, Patient Position: Sitting, Cuff Size: Adult)   Pulse 76   Temp 98 °F (36.7 °C) (Temporal)   Ht 160 cm (62.99\")   Wt 72 kg (158 lb 12.8 oz)   SpO2 97%   BMI 28.14 kg/m²     Physical Exam  Vitals reviewed.   Constitutional:       Appearance: Normal appearance. She is well-developed.   HENT:      Head: Normocephalic and atraumatic.      Comments: Frontal sinus tenderness     Right Ear: Tympanic membrane, ear canal and external ear normal.      Left Ear: Tympanic membrane, ear canal and external ear normal.      Nose: Nose normal.      Mouth/Throat:      Mouth: Mucous membranes are moist.      Pharynx: No posterior oropharyngeal erythema.   Eyes:      Conjunctiva/sclera: Conjunctivae normal.      Pupils: Pupils are equal, round, and reactive to light.   Cardiovascular:      Rate and Rhythm: Normal rate and regular rhythm.      Heart sounds: No murmur heard.    No friction rub. No gallop.   Pulmonary:      Effort: Pulmonary effort is normal.      Breath sounds: Normal breath sounds. No wheezing or rhonchi.   Skin:     General: Skin is warm and dry.   Neurological:      Mental Status: She is alert and oriented to person, place, and time.      Cranial Nerves: No cranial nerve deficit.   Psychiatric:         Mood and Affect: Mood and affect normal.         Behavior: Behavior normal.         Thought Content: Thought " content normal.         Judgment: Judgment normal.      Result Review :          Procedures      Assessment and Plan    Diagnoses and all orders for this visit:    1. Acute non-recurrent frontal sinusitis (Primary)  Assessment & Plan:  Due to duration of symptoms and physical exam findings will treat with Augmentin due to concern of bacterial rhinosinusitis.  Okay to continue conservative treatment as well with Mucinex, Flonase and ibuprofen as needed.  If symptoms persist or worsen patient needs to return.        Other orders  -     montelukast (SINGULAIR) 10 MG tablet; Take 1 tablet by mouth Every Night for 30 days.  Dispense: 90 tablet; Refill: 1  -     amoxicillin-clavulanate (AUGMENTIN) 875-125 MG per tablet; Take 1 tablet by mouth 2 (Two) Times a Day for 10 days.  Dispense: 20 tablet; Refill: 0              Follow Up   No follow-ups on file.  Patient was given instructions and counseling regarding her condition or for health maintenance advice. Please see specific information pulled into the AVS if appropriate.

## 2023-09-25 NOTE — ASSESSMENT & PLAN NOTE
Due to duration of symptoms and physical exam findings will treat with Augmentin due to concern of bacterial rhinosinusitis.  Okay to continue conservative treatment as well with Mucinex, Flonase and ibuprofen as needed.  If symptoms persist or worsen patient needs to return.

## 2023-12-06 ENCOUNTER — OFFICE VISIT (OUTPATIENT)
Dept: INTERNAL MEDICINE | Facility: CLINIC | Age: 58
End: 2023-12-06
Payer: COMMERCIAL

## 2023-12-06 VITALS
BODY MASS INDEX: 28.39 KG/M2 | OXYGEN SATURATION: 99 % | DIASTOLIC BLOOD PRESSURE: 72 MMHG | WEIGHT: 160.2 LBS | TEMPERATURE: 97.1 F | HEART RATE: 69 BPM | SYSTOLIC BLOOD PRESSURE: 116 MMHG

## 2023-12-06 DIAGNOSIS — J34.89 SINUS PRESSURE: ICD-10-CM

## 2023-12-06 DIAGNOSIS — Z95.0 PRESENCE OF CARDIAC PACEMAKER: ICD-10-CM

## 2023-12-06 DIAGNOSIS — E78.5 HYPERLIPIDEMIA, UNSPECIFIED HYPERLIPIDEMIA TYPE: ICD-10-CM

## 2023-12-06 DIAGNOSIS — I49.5 SICK SINUS SYNDROME: Primary | ICD-10-CM

## 2023-12-06 DIAGNOSIS — I10 ESSENTIAL HYPERTENSION: ICD-10-CM

## 2023-12-06 PROBLEM — Z11.59 NEED FOR HEPATITIS C SCREENING TEST: Status: RESOLVED | Noted: 2023-02-28 | Resolved: 2023-12-06

## 2023-12-06 PROBLEM — R03.0 ELEVATED BLOOD PRESSURE READING: Status: RESOLVED | Noted: 2022-10-10 | Resolved: 2023-12-06

## 2023-12-06 PROBLEM — R51.9 ACUTE INTRACTABLE HEADACHE: Status: RESOLVED | Noted: 2023-09-11 | Resolved: 2023-12-06

## 2023-12-06 PROBLEM — J01.10 ACUTE NON-RECURRENT FRONTAL SINUSITIS: Status: RESOLVED | Noted: 2021-11-19 | Resolved: 2023-12-06

## 2023-12-06 PROBLEM — R50.9 FEVER: Status: RESOLVED | Noted: 2023-09-11 | Resolved: 2023-12-06

## 2023-12-06 LAB
ALBUMIN SERPL-MCNC: 4.5 G/DL (ref 3.5–5.2)
ALBUMIN/GLOB SERPL: 1.4 G/DL
ALP SERPL-CCNC: 72 U/L (ref 39–117)
ALT SERPL W P-5'-P-CCNC: 13 U/L (ref 1–33)
ANION GAP SERPL CALCULATED.3IONS-SCNC: 8.9 MMOL/L (ref 5–15)
AST SERPL-CCNC: 20 U/L (ref 1–32)
BASOPHILS # BLD AUTO: 0.03 10*3/MM3 (ref 0–0.2)
BASOPHILS NFR BLD AUTO: 0.4 % (ref 0–1.5)
BILIRUB SERPL-MCNC: 0.3 MG/DL (ref 0–1.2)
BUN SERPL-MCNC: 10 MG/DL (ref 6–20)
BUN/CREAT SERPL: 7.6 (ref 7–25)
CALCIUM SPEC-SCNC: 9.5 MG/DL (ref 8.6–10.5)
CHLORIDE SERPL-SCNC: 108 MMOL/L (ref 98–107)
CHOLEST SERPL-MCNC: 228 MG/DL (ref 0–200)
CO2 SERPL-SCNC: 26.1 MMOL/L (ref 22–29)
CREAT SERPL-MCNC: 1.32 MG/DL (ref 0.57–1)
DEPRECATED RDW RBC AUTO: 40.2 FL (ref 37–54)
EGFRCR SERPLBLD CKD-EPI 2021: 47.2 ML/MIN/1.73
EOSINOPHIL # BLD AUTO: 0.15 10*3/MM3 (ref 0–0.4)
EOSINOPHIL NFR BLD AUTO: 1.8 % (ref 0.3–6.2)
ERYTHROCYTE [DISTWIDTH] IN BLOOD BY AUTOMATED COUNT: 12.7 % (ref 12.3–15.4)
GLOBULIN UR ELPH-MCNC: 3.2 GM/DL
GLUCOSE SERPL-MCNC: 100 MG/DL (ref 65–99)
HCT VFR BLD AUTO: 37.5 % (ref 34–46.6)
HDLC SERPL-MCNC: 43 MG/DL (ref 40–60)
HGB BLD-MCNC: 12.7 G/DL (ref 12–15.9)
IMM GRANULOCYTES # BLD AUTO: 0.02 10*3/MM3 (ref 0–0.05)
IMM GRANULOCYTES NFR BLD AUTO: 0.2 % (ref 0–0.5)
LDLC SERPL CALC-MCNC: 163 MG/DL (ref 0–100)
LDLC/HDLC SERPL: 3.75 {RATIO}
LYMPHOCYTES # BLD AUTO: 2.23 10*3/MM3 (ref 0.7–3.1)
LYMPHOCYTES NFR BLD AUTO: 26.9 % (ref 19.6–45.3)
MCH RBC QN AUTO: 29.7 PG (ref 26.6–33)
MCHC RBC AUTO-ENTMCNC: 33.9 G/DL (ref 31.5–35.7)
MCV RBC AUTO: 87.6 FL (ref 79–97)
MONOCYTES # BLD AUTO: 0.62 10*3/MM3 (ref 0.1–0.9)
MONOCYTES NFR BLD AUTO: 7.5 % (ref 5–12)
NEUTROPHILS NFR BLD AUTO: 5.25 10*3/MM3 (ref 1.7–7)
NEUTROPHILS NFR BLD AUTO: 63.2 % (ref 42.7–76)
NRBC BLD AUTO-RTO: 0 /100 WBC (ref 0–0.2)
PLATELET # BLD AUTO: 223 10*3/MM3 (ref 140–450)
PMV BLD AUTO: 11.3 FL (ref 6–12)
POTASSIUM SERPL-SCNC: 5 MMOL/L (ref 3.5–5.2)
PROT SERPL-MCNC: 7.7 G/DL (ref 6–8.5)
RBC # BLD AUTO: 4.28 10*6/MM3 (ref 3.77–5.28)
SODIUM SERPL-SCNC: 143 MMOL/L (ref 136–145)
TRIGL SERPL-MCNC: 119 MG/DL (ref 0–150)
TSH SERPL DL<=0.05 MIU/L-ACNC: 1.72 UIU/ML (ref 0.27–4.2)
VLDLC SERPL-MCNC: 22 MG/DL (ref 5–40)
WBC NRBC COR # BLD AUTO: 8.3 10*3/MM3 (ref 3.4–10.8)

## 2023-12-06 PROCEDURE — 80050 GENERAL HEALTH PANEL: CPT | Performed by: NURSE PRACTITIONER

## 2023-12-06 PROCEDURE — 80061 LIPID PANEL: CPT | Performed by: NURSE PRACTITIONER

## 2023-12-06 NOTE — ASSESSMENT & PLAN NOTE
Symptoms x 3 days. She will continue Mucinex for now. Discussed option for Bromfed, will postpone for now due. She will notify clinic if symptoms persist greater than 7-10 days.

## 2023-12-06 NOTE — ASSESSMENT & PLAN NOTE
Well controlled, continue amlodipine. She should monitor blood pressure at home and call or return to clinic with consistent elevations greater than 130/80. Labs in clinic today.

## 2023-12-11 DIAGNOSIS — R94.4 DECREASED GFR: ICD-10-CM

## 2023-12-11 DIAGNOSIS — R73.09 ELEVATED GLUCOSE: Primary | ICD-10-CM

## 2023-12-11 RX ORDER — AMOXICILLIN AND CLAVULANATE POTASSIUM 875; 125 MG/1; MG/1
1 TABLET, FILM COATED ORAL 2 TIMES DAILY
Qty: 20 TABLET | Refills: 0 | Status: SHIPPED | OUTPATIENT
Start: 2023-12-11 | End: 2023-12-21

## 2023-12-12 ENCOUNTER — CLINICAL SUPPORT (OUTPATIENT)
Dept: INTERNAL MEDICINE | Facility: CLINIC | Age: 58
End: 2023-12-12
Payer: COMMERCIAL

## 2023-12-12 VITALS — WEIGHT: 155 LBS | BODY MASS INDEX: 27.46 KG/M2

## 2023-12-12 DIAGNOSIS — R73.09 ELEVATED GLUCOSE: ICD-10-CM

## 2023-12-12 DIAGNOSIS — R94.4 DECREASED GFR: ICD-10-CM

## 2023-12-12 LAB
ALBUMIN SERPL-MCNC: 4.5 G/DL (ref 3.5–5.2)
ALBUMIN/GLOB SERPL: 1.4 G/DL
ALP SERPL-CCNC: 70 U/L (ref 39–117)
ALT SERPL W P-5'-P-CCNC: 13 U/L (ref 1–33)
ANION GAP SERPL CALCULATED.3IONS-SCNC: 12.2 MMOL/L (ref 5–15)
AST SERPL-CCNC: 22 U/L (ref 1–32)
BILIRUB SERPL-MCNC: 0.4 MG/DL (ref 0–1.2)
BUN SERPL-MCNC: 10 MG/DL (ref 6–20)
BUN/CREAT SERPL: 11.9 (ref 7–25)
CALCIUM SPEC-SCNC: 10 MG/DL (ref 8.6–10.5)
CHLORIDE SERPL-SCNC: 104 MMOL/L (ref 98–107)
CO2 SERPL-SCNC: 24.8 MMOL/L (ref 22–29)
CREAT SERPL-MCNC: 0.84 MG/DL (ref 0.57–1)
EGFRCR SERPLBLD CKD-EPI 2021: 81.2 ML/MIN/1.73
GLOBULIN UR ELPH-MCNC: 3.3 GM/DL
GLUCOSE SERPL-MCNC: 90 MG/DL (ref 65–99)
HBA1C MFR BLD: 5.3 % (ref 4.8–5.6)
POTASSIUM SERPL-SCNC: 4.3 MMOL/L (ref 3.5–5.2)
PROT SERPL-MCNC: 7.8 G/DL (ref 6–8.5)
SODIUM SERPL-SCNC: 141 MMOL/L (ref 136–145)

## 2023-12-12 PROCEDURE — 83036 HEMOGLOBIN GLYCOSYLATED A1C: CPT | Performed by: NURSE PRACTITIONER

## 2023-12-12 PROCEDURE — 36415 COLL VENOUS BLD VENIPUNCTURE: CPT | Performed by: NURSE PRACTITIONER

## 2023-12-12 PROCEDURE — 80053 COMPREHEN METABOLIC PANEL: CPT | Performed by: NURSE PRACTITIONER

## 2023-12-12 NOTE — PROGRESS NOTES
Venipuncture Blood Specimen Collection  Venipuncture performed in [facility] by Reena Fernandes RN with good hemostasis. Patient tolerated the procedure well without complications.   12/12/23   Reena Fernandes RN

## 2024-01-18 RX ORDER — MONTELUKAST SODIUM 10 MG/1
10 TABLET ORAL NIGHTLY
Qty: 90 TABLET | Refills: 0 | Status: SHIPPED | OUTPATIENT
Start: 2024-01-18

## 2024-03-11 RX ORDER — AMLODIPINE BESYLATE 2.5 MG/1
2.5 TABLET ORAL DAILY
Qty: 30 TABLET | Refills: 0 | Status: SHIPPED | OUTPATIENT
Start: 2024-03-11

## 2024-03-25 ENCOUNTER — OFFICE VISIT (OUTPATIENT)
Dept: INTERNAL MEDICINE | Facility: CLINIC | Age: 59
End: 2024-03-25
Payer: COMMERCIAL

## 2024-03-25 VITALS
WEIGHT: 155.2 LBS | TEMPERATURE: 98.3 F | HEIGHT: 63 IN | SYSTOLIC BLOOD PRESSURE: 132 MMHG | BODY MASS INDEX: 27.5 KG/M2 | HEART RATE: 68 BPM | DIASTOLIC BLOOD PRESSURE: 70 MMHG | OXYGEN SATURATION: 97 %

## 2024-03-25 DIAGNOSIS — Z12.31 VISIT FOR SCREENING MAMMOGRAM: ICD-10-CM

## 2024-03-25 DIAGNOSIS — Z95.0 PRESENCE OF CARDIAC PACEMAKER: ICD-10-CM

## 2024-03-25 DIAGNOSIS — E78.5 HYPERLIPIDEMIA, UNSPECIFIED HYPERLIPIDEMIA TYPE: ICD-10-CM

## 2024-03-25 DIAGNOSIS — J30.9 ALLERGIC RHINITIS, UNSPECIFIED SEASONALITY, UNSPECIFIED TRIGGER: ICD-10-CM

## 2024-03-25 DIAGNOSIS — I49.5 SICK SINUS SYNDROME: ICD-10-CM

## 2024-03-25 DIAGNOSIS — D22.9 NEVUS: ICD-10-CM

## 2024-03-25 DIAGNOSIS — Z00.00 ANNUAL PHYSICAL EXAM: Primary | ICD-10-CM

## 2024-03-25 DIAGNOSIS — I10 ESSENTIAL HYPERTENSION: ICD-10-CM

## 2024-03-25 DIAGNOSIS — Z80.8 FAMILY HISTORY OF SKIN CANCER: ICD-10-CM

## 2024-03-25 DIAGNOSIS — Z23 NEED FOR TETANUS, DIPHTHERIA, AND ACELLULAR PERTUSSIS (TDAP) VACCINE: ICD-10-CM

## 2024-03-25 LAB
ALBUMIN SERPL-MCNC: 4.6 G/DL (ref 3.5–5.2)
ALBUMIN/GLOB SERPL: 1.6 G/DL
ALP SERPL-CCNC: 74 U/L (ref 39–117)
ALT SERPL W P-5'-P-CCNC: 14 U/L (ref 1–33)
ANION GAP SERPL CALCULATED.3IONS-SCNC: 12 MMOL/L (ref 5–15)
AST SERPL-CCNC: 20 U/L (ref 1–32)
BASOPHILS # BLD AUTO: 0.07 10*3/MM3 (ref 0–0.2)
BASOPHILS NFR BLD AUTO: 0.9 % (ref 0–1.5)
BILIRUB SERPL-MCNC: 0.2 MG/DL (ref 0–1.2)
BUN SERPL-MCNC: 17 MG/DL (ref 6–20)
BUN/CREAT SERPL: 17.7 (ref 7–25)
CALCIUM SPEC-SCNC: 9.6 MG/DL (ref 8.6–10.5)
CHLORIDE SERPL-SCNC: 104 MMOL/L (ref 98–107)
CHOLEST SERPL-MCNC: 214 MG/DL (ref 0–200)
CO2 SERPL-SCNC: 24 MMOL/L (ref 22–29)
CREAT SERPL-MCNC: 0.96 MG/DL (ref 0.57–1)
DEPRECATED RDW RBC AUTO: 40 FL (ref 37–54)
EGFRCR SERPLBLD CKD-EPI 2021: 68.7 ML/MIN/1.73
EOSINOPHIL # BLD AUTO: 0.15 10*3/MM3 (ref 0–0.4)
EOSINOPHIL NFR BLD AUTO: 2 % (ref 0.3–6.2)
ERYTHROCYTE [DISTWIDTH] IN BLOOD BY AUTOMATED COUNT: 12.7 % (ref 12.3–15.4)
GLOBULIN UR ELPH-MCNC: 2.8 GM/DL
GLUCOSE SERPL-MCNC: 83 MG/DL (ref 65–99)
HCT VFR BLD AUTO: 38.8 % (ref 34–46.6)
HDLC SERPL-MCNC: 48 MG/DL (ref 40–60)
HGB BLD-MCNC: 12.6 G/DL (ref 12–15.9)
IMM GRANULOCYTES # BLD AUTO: 0.06 10*3/MM3 (ref 0–0.05)
IMM GRANULOCYTES NFR BLD AUTO: 0.8 % (ref 0–0.5)
LDLC SERPL CALC-MCNC: 145 MG/DL (ref 0–100)
LDLC/HDLC SERPL: 2.98 {RATIO}
LYMPHOCYTES # BLD AUTO: 2.91 10*3/MM3 (ref 0.7–3.1)
LYMPHOCYTES NFR BLD AUTO: 38.5 % (ref 19.6–45.3)
MCH RBC QN AUTO: 28.3 PG (ref 26.6–33)
MCHC RBC AUTO-ENTMCNC: 32.5 G/DL (ref 31.5–35.7)
MCV RBC AUTO: 87 FL (ref 79–97)
MONOCYTES # BLD AUTO: 0.55 10*3/MM3 (ref 0.1–0.9)
MONOCYTES NFR BLD AUTO: 7.3 % (ref 5–12)
NEUTROPHILS NFR BLD AUTO: 3.82 10*3/MM3 (ref 1.7–7)
NEUTROPHILS NFR BLD AUTO: 50.5 % (ref 42.7–76)
NRBC BLD AUTO-RTO: 0 /100 WBC (ref 0–0.2)
PLATELET # BLD AUTO: 250 10*3/MM3 (ref 140–450)
PMV BLD AUTO: 11.9 FL (ref 6–12)
POTASSIUM SERPL-SCNC: 4 MMOL/L (ref 3.5–5.2)
PROT SERPL-MCNC: 7.4 G/DL (ref 6–8.5)
RBC # BLD AUTO: 4.46 10*6/MM3 (ref 3.77–5.28)
SODIUM SERPL-SCNC: 140 MMOL/L (ref 136–145)
TRIGL SERPL-MCNC: 115 MG/DL (ref 0–150)
TSH SERPL DL<=0.05 MIU/L-ACNC: 0.85 UIU/ML (ref 0.27–4.2)
VLDLC SERPL-MCNC: 21 MG/DL (ref 5–40)
WBC NRBC COR # BLD AUTO: 7.56 10*3/MM3 (ref 3.4–10.8)

## 2024-03-25 PROCEDURE — 80061 LIPID PANEL: CPT | Performed by: NURSE PRACTITIONER

## 2024-03-25 PROCEDURE — 80050 GENERAL HEALTH PANEL: CPT | Performed by: NURSE PRACTITIONER

## 2024-03-25 RX ORDER — METOPROLOL SUCCINATE 25 MG/1
1 TABLET, EXTENDED RELEASE ORAL DAILY
COMMUNITY
Start: 2024-03-06

## 2024-03-25 RX ORDER — FLUTICASONE PROPIONATE 50 MCG
2 SPRAY, SUSPENSION (ML) NASAL DAILY
Qty: 11.1 ML | Refills: 2 | Status: SHIPPED | OUTPATIENT
Start: 2024-03-25

## 2024-03-25 RX ORDER — MONTELUKAST SODIUM 10 MG/1
10 TABLET ORAL NIGHTLY
Qty: 90 TABLET | Refills: 1 | Status: SHIPPED | OUTPATIENT
Start: 2024-03-25

## 2024-03-25 NOTE — PROGRESS NOTES
"Chief Complaint  Annual Exam (physical)    Subjective      Madina Reddy is a 59 y.o. female who presents to [facility]     Last labs: 12/2023  PAP: 2/2022  Mammogram: 6/2023; Family history of breast cancer in mother  Hepatitis C screening: Completed   Colonoscopy: 8/2017; due 2027  Influenza vaccination: Declines  COVID19 vaccination: Declines   Shingles vaccination: Declines   Eye exam: Up to date   Dental exam: Up to date   Smoking history: Denies   Specialists: Cardiology     Annual physical exam-  Family history of heart attack in father.     Sick sinus syndrome/HTN/HLD-  Patient with pacemaker, doing well. Patient started on metoprolol by Dr. Carlos Souza for tachycardia. Continues amlodipine. Reports home blood pressure readings 130s/70s. Scheduled for follow up with cardiology in April. Denies chest pain, blurry vision, headache, leg swelling. Most recent . Negative calcium score through cardiology. Has started a new diet, is clean eating.     Allergic rhinitis-  Doing well with Singulair and Flonase.     Would like full skin check, family history of skin cancer in sister.    Objective   Vital Signs:   Vitals:    03/25/24 1324   BP: 132/70   BP Location: Left arm   Patient Position: Sitting   Cuff Size: Adult   Pulse: 68   Temp: 98.3 °F (36.8 °C)   TempSrc: Temporal   SpO2: 97%   Weight: 70.4 kg (155 lb 3.2 oz)   Height: 160 cm (62.99\")     Body mass index is 27.5 kg/m².    Wt Readings from Last 3 Encounters:   03/25/24 70.4 kg (155 lb 3.2 oz)   12/12/23 70.3 kg (155 lb)   12/06/23 72.7 kg (160 lb 3.2 oz)     BP Readings from Last 3 Encounters:   03/25/24 132/70   12/06/23 116/72   09/25/23 126/80       Health Maintenance   Topic Date Due    ANNUAL PHYSICAL  02/27/2024    COVID-19 Vaccine (3 - 2023-24 season) 03/27/2024 (Originally 9/1/2023)    INFLUENZA VACCINE  03/31/2024 (Originally 8/1/2023)    ZOSTER VACCINE (1 of 2) 12/06/2024 (Originally " 3/13/2016)    BMI FOLLOWUP  07/05/2024    LIPID PANEL  12/06/2024    PAP SMEAR  02/15/2025    MAMMOGRAM  06/22/2025    COLORECTAL CANCER SCREENING  08/15/2027    TDAP/TD VACCINES (2 - Td or Tdap) 03/25/2034    HEPATITIS C SCREENING  Completed    Pneumococcal Vaccine 0-64  Aged Out       Physical Exam  Constitutional:       Appearance: Normal appearance.   HENT:      Head: Normocephalic and atraumatic.      Right Ear: Tympanic membrane normal.      Left Ear: Tympanic membrane normal.      Nose: Nose normal.      Mouth/Throat:      Mouth: Mucous membranes are moist.      Pharynx: Oropharynx is clear.   Eyes:      Extraocular Movements: Extraocular movements intact.      Conjunctiva/sclera: Conjunctivae normal.      Pupils: Pupils are equal, round, and reactive to light.   Neck:      Thyroid: No thyroid mass, thyromegaly or thyroid tenderness.   Cardiovascular:      Rate and Rhythm: Normal rate and regular rhythm.      Heart sounds: Normal heart sounds.   Pulmonary:      Effort: Pulmonary effort is normal.      Breath sounds: Normal breath sounds.   Abdominal:      General: Bowel sounds are normal.      Palpations: Abdomen is soft.   Skin:     General: Skin is warm and dry.   Neurological:      General: No focal deficit present.      Mental Status: She is alert and oriented to person, place, and time.   Psychiatric:         Mood and Affect: Mood normal.         Behavior: Behavior normal.         Thought Content: Thought content normal.          Result Review :  The following data was reviewed by: LANDY Borrego on 03/25/2024:  Common labs          12/6/2023    08:53 12/12/2023    08:17   Common Labs   Glucose 100  90    BUN 10  10    Creatinine 1.32  0.84    Sodium 143  141    Potassium 5.0  4.3    Chloride 108  104    Calcium 9.5  10.0    Albumin 4.5  4.5    Total Bilirubin 0.3  0.4    Alkaline Phosphatase 72  70    AST (SGOT) 20  22    ALT (SGPT) 13  13    WBC 8.30     Hemoglobin 12.7     Hematocrit 37.5      Platelets 223     Total Cholesterol 228     Triglycerides 119     HDL Cholesterol 43     LDL Cholesterol  163     Hemoglobin A1C  5.30           Procedures          Assessment & Plan  Annual physical exam  Basic labs in clinic today. Encouraged routine dental and eye exams. Discussed age appropriate immunizations, screenings. Age appropriate handout provided, including information on nutrition and physical activity.  Sick sinus syndrome  Continue to follow with cardiology as scheduled.   Presence of cardiac pacemaker    Essential hypertension  Mild elevation today. Discussed goal less than 130/80. She will continue to monitor daily and follow up with cardiology as scheduled to discuss potential medication adjustments. Labs today.  Hyperlipidemia, unspecified hyperlipidemia type  Repeat lipid panel today. Calcium score 0 in .   Allergic rhinitis, unspecified seasonality, unspecified trigger  Continue Singulair and Flonase.  Nevus  Referral to dermatology for further evaluation.   Family history of skin cancer    Need for tetanus, diphtheria, and acellular pertussis (Tdap) vaccine  Tdap today.  Visit for screening mammogram  Mammogram ordered.    Orders Placed This Encounter   Procedures    Mammo Screening Digital Tomosynthesis Bilateral With CAD    Tdap Vaccine Greater Than or Equal To 6yo IM    Comprehensive Metabolic Panel    TSH    Lipid Panel    CBC Auto Differential    Ambulatory Referral to Dermatology    CBC & Differential     New Medications Ordered This Visit   Medications    fluticasone (FLONASE) 50 MCG/ACT nasal spray     Si sprays into the nostril(s) as directed by provider Daily.     Dispense:  11.1 mL     Refill:  2    montelukast (SINGULAIR) 10 MG tablet     Sig: Take 1 tablet by mouth Every Night.     Dispense:  90 tablet     Refill:  1                    FOLLOW UP  Return for Annual physical.  Patient was given instructions and counseling regarding her condition or for health maintenance  advice. Please see specific information pulled into the AVS if appropriate.       Kady Saulo, LANDY  03/25/24  14:45 EDT    CURRENT & DISCONTINUED MEDICATIONS  Current Outpatient Medications   Medication Instructions    amLODIPine (NORVASC) 2.5 mg, Oral, Daily    aspirin 81 mg, Oral, Daily    Diclofenac Sodium (VOLTAREN) 4 g, Topical, 4 Times Daily PRN    fluticasone (FLONASE) 50 MCG/ACT nasal spray 2 sprays, Nasal, Daily    metoprolol succinate XL (TOPROL-XL) 25 MG 24 hr tablet 1 tablet, Oral, Daily    montelukast (SINGULAIR) 10 mg, Oral, Nightly       Medications Discontinued During This Encounter   Medication Reason    fluticasone (FLONASE) 50 MCG/ACT nasal spray Reorder    montelukast (SINGULAIR) 10 MG tablet Reorder

## 2024-03-25 NOTE — ASSESSMENT & PLAN NOTE
Mild elevation today. Discussed goal less than 130/80. She will continue to monitor daily and follow up with cardiology as scheduled to discuss potential medication adjustments. Labs today.

## 2024-04-15 RX ORDER — AMLODIPINE BESYLATE 2.5 MG/1
2.5 TABLET ORAL DAILY
Qty: 30 TABLET | Refills: 0 | Status: SHIPPED | OUTPATIENT
Start: 2024-04-15

## 2024-06-25 ENCOUNTER — HOSPITAL ENCOUNTER (OUTPATIENT)
Dept: MAMMOGRAPHY | Facility: HOSPITAL | Age: 59
Discharge: HOME | End: 2024-06-25
Admitting: NURSE PRACTITIONER
Payer: COMMERCIAL

## 2024-06-25 DIAGNOSIS — Z12.31 VISIT FOR SCREENING MAMMOGRAM: ICD-10-CM

## 2024-06-25 PROCEDURE — 77067 SCR MAMMO BI INCL CAD: CPT

## 2024-06-25 PROCEDURE — 77063 BREAST TOMOSYNTHESIS BI: CPT

## 2024-08-12 ENCOUNTER — OFFICE VISIT (OUTPATIENT)
Dept: INTERNAL MEDICINE | Facility: CLINIC | Age: 59
End: 2024-08-12
Payer: COMMERCIAL

## 2024-08-12 VITALS
SYSTOLIC BLOOD PRESSURE: 124 MMHG | BODY MASS INDEX: 27.64 KG/M2 | WEIGHT: 156 LBS | TEMPERATURE: 98.2 F | DIASTOLIC BLOOD PRESSURE: 80 MMHG | HEART RATE: 74 BPM | HEIGHT: 63 IN | OXYGEN SATURATION: 100 %

## 2024-08-12 DIAGNOSIS — J30.9 ALLERGIC RHINITIS, UNSPECIFIED SEASONALITY, UNSPECIFIED TRIGGER: ICD-10-CM

## 2024-08-12 DIAGNOSIS — Z86.79 HISTORY OF SICK SINUS SYNDROME: ICD-10-CM

## 2024-08-12 DIAGNOSIS — R53.83 FATIGUE, UNSPECIFIED TYPE: Primary | ICD-10-CM

## 2024-08-12 LAB
EXPIRATION DATE: NORMAL
FLUAV AG UPPER RESP QL IA.RAPID: NOT DETECTED
FLUBV AG UPPER RESP QL IA.RAPID: NOT DETECTED
INTERNAL CONTROL: NORMAL
Lab: 9737
SARS-COV-2 AG UPPER RESP QL IA.RAPID: NOT DETECTED

## 2024-08-12 PROCEDURE — 99213 OFFICE O/P EST LOW 20 MIN: CPT | Performed by: NURSE PRACTITIONER

## 2024-08-12 PROCEDURE — 93000 ELECTROCARDIOGRAM COMPLETE: CPT | Performed by: NURSE PRACTITIONER

## 2024-08-12 PROCEDURE — 87428 SARSCOV & INF VIR A&B AG IA: CPT | Performed by: NURSE PRACTITIONER

## 2024-08-12 RX ORDER — CETIRIZINE HYDROCHLORIDE 10 MG/1
10 TABLET ORAL DAILY
Qty: 90 TABLET | Refills: 1 | Status: SHIPPED | OUTPATIENT
Start: 2024-08-12

## 2024-08-12 NOTE — PROGRESS NOTES
"Chief Complaint  Headache (Head ache ) and Fatigue (Fatigue )    Subjective      Madina Reddy is a 59 y.o. female who presents to [facility]     Patient reports headache x 4 days, worse in the morning. Improves with Tylenol and Motrin. She has been blowing her nose more than normal. She is currently treating with loratadine, Singulair and Flonase. States she switched from Zyrtec three days ago because she ran out. She has been doing sinus rinses multiple times day. States in Judaism yesterday she felt weak and tired. She has not felt dizzy, no syncope since January 2023. Denies fever, shortness of breath, vomiting, diarrhea.  Eating/drinking well.  Plenty of urine output. Denies known COVID-19 exposures. History of sick sinus syndrome, pacemaker rate set at 65. States cardiology states this has not been triggered since placement.     Objective   Vital Signs:   Vitals:    08/12/24 1005   BP: 124/80   BP Location: Right arm   Patient Position: Sitting   Cuff Size: Adult   Pulse: 74   Temp: 98.2 °F (36.8 °C)   TempSrc: Temporal   SpO2: 100%   Weight: 70.8 kg (156 lb)   Height: 160 cm (62.99\")     Body mass index is 27.64 kg/m².    Wt Readings from Last 3 Encounters:   08/12/24 70.8 kg (156 lb)   03/25/24 70.4 kg (155 lb 3.2 oz)   12/12/23 70.3 kg (155 lb)     BP Readings from Last 3 Encounters:   08/12/24 124/80   03/25/24 132/70   12/06/23 116/72       Health Maintenance   Topic Date Due    COVID-19 Vaccine (3 - 2023-24 season) 09/01/2023    BMI FOLLOWUP  07/05/2024    INFLUENZA VACCINE  08/01/2024    ZOSTER VACCINE (1 of 2) 12/06/2024 (Originally 3/13/2016)    PAP SMEAR  02/15/2025    ANNUAL PHYSICAL  03/25/2025    LIPID PANEL  03/25/2025    MAMMOGRAM  06/25/2026    COLORECTAL CANCER SCREENING  08/15/2027    TDAP/TD VACCINES (2 - Td or Tdap) 03/25/2034    HEPATITIS C SCREENING  Completed    Pneumococcal Vaccine 0-64  Aged Out       Physical Exam  Constitutional:      "  Appearance: Normal appearance.   HENT:      Head: Normocephalic and atraumatic.      Comments: Right maxillary sinus pressure on palpation      Nose: Nose normal.      Mouth/Throat:      Mouth: Mucous membranes are moist.      Pharynx: Oropharynx is clear.   Eyes:      Extraocular Movements: Extraocular movements intact.      Conjunctiva/sclera: Conjunctivae normal.      Pupils: Pupils are equal, round, and reactive to light.   Cardiovascular:      Rate and Rhythm: Normal rate and regular rhythm.      Heart sounds: Normal heart sounds.   Pulmonary:      Effort: Pulmonary effort is normal.      Breath sounds: Normal breath sounds.   Skin:     General: Skin is warm and dry.   Neurological:      General: No focal deficit present.      Mental Status: She is alert and oriented to person, place, and time.   Psychiatric:         Mood and Affect: Mood normal.         Behavior: Behavior normal.         Thought Content: Thought content normal.          Result Review :  The following data was reviewed by: LANDY Borrego on 08/12/2024:           ECG 12 Lead    Date/Time: 8/12/2024 10:58 AM  Performed by: Reena Amezquita RegSched Rep    Authorized by: Kady Vernon APRN  Comparison: compared with previous ECG   Rhythm: sinus rhythm  Rate: normal    Clinical impression: normal ECG                Assessment & Plan  Fatigue, unspecified type  Discussed differential, including viral illness, allergic rhinitis. Negative for influenza and COVID. EKG without concern, low suspicion for cardiac involvement. Will restart Zyrtec and discontinue loratadine as symptoms worsened when she made the initial switch. Continue Singulair and Flonase. She will continue to monitor and notify clinic if symptoms worsen or persist. Could consider treatment for sinusitis in the future if symptoms persist greater than 7-10 days. She should seek medical attention immediately with chest pain, shortness of breath, syncope or presyncope.   History of  sick sinus syndrome    Allergic rhinitis, unspecified seasonality, unspecified trigger      Orders Placed This Encounter   Procedures    POCT SARS-CoV-2 Antigen [place] + Flu    ECG 12 Lead     New Medications Ordered This Visit   Medications    cetirizine (zyrTEC) 10 MG tablet     Sig: Take 1 tablet by mouth Daily.     Dispense:  90 tablet     Refill:  1       FOLLOW UP  Return if symptoms worsen or fail to improve.  Patient was given instructions and counseling regarding her condition or for health maintenance advice. Please see specific information pulled into the AVS if appropriate.       LANDY Borrego  08/12/24  11:01 EDT    CURRENT & DISCONTINUED MEDICATIONS  Current Outpatient Medications   Medication Instructions    amLODIPine (NORVASC) 2.5 mg, Oral, Daily    aspirin 81 mg, Oral, Daily    cetirizine (ZYRTEC) 10 mg, Oral, Daily    Diclofenac Sodium (VOLTAREN) 4 g, Topical, 4 Times Daily PRN    fluticasone (FLONASE) 50 MCG/ACT nasal spray 2 sprays, Nasal, Daily    metoprolol succinate XL (TOPROL-XL) 25 MG 24 hr tablet 1 tablet, Oral, Daily    montelukast (SINGULAIR) 10 mg, Oral, Nightly       There are no discontinued medications.

## 2024-09-03 ENCOUNTER — TELEPHONE (OUTPATIENT)
Dept: INTERNAL MEDICINE | Facility: CLINIC | Age: 59
End: 2024-09-03
Payer: COMMERCIAL

## 2024-09-03 NOTE — TELEPHONE ENCOUNTER
Pt notified of medication sent to pharmacy, pt verbalized understanding and had no further questions at this time.

## 2024-09-03 NOTE — TELEPHONE ENCOUNTER
Please let patient know Augmentin was sent to the pharmacy.  This may cause some stomach upset, she should notify clinic if symptoms do not improve with antibiotic.

## 2024-09-03 NOTE — TELEPHONE ENCOUNTER
Spoke with patient and she stated she was told at last appt that if her symptoms did not go away to call us back, please advise

## 2024-09-03 NOTE — TELEPHONE ENCOUNTER
Caller: Madina Reddy    Relationship: Self    Best call back number: [phone]     What medication are you requesting: ANTIBIOTIC    What are your current symptoms: HEADACHE, CONGESTION    How long have you been experiencing symptoms: SIX DAYS    Have you had these symptoms before:    [x] Yes  [] No    Have you been treated for these symptoms before:   [x] Yes  [] No    If a prescription is needed, what is your preferred pharmacy and phone number: [address] - [phone]  - [phone]

## 2024-11-11 ENCOUNTER — TELEPHONE (OUTPATIENT)
Dept: INTERNAL MEDICINE | Facility: CLINIC | Age: 59
End: 2024-11-11
Payer: COMMERCIAL

## 2024-11-11 NOTE — TELEPHONE ENCOUNTER
I would recommend patient's start daily MiraLAX for constipation relief.  Increase daily water and fiber intake.

## 2024-11-11 NOTE — TELEPHONE ENCOUNTER
Patient called this morning wanting to speak with provider, patient stated she is having constipation again and last time she went septic and she is concern, pt did scheduled an appt for tomorrow but would like to know what provider would recommend, please advise

## 2024-11-13 ENCOUNTER — OFFICE VISIT (OUTPATIENT)
Dept: INTERNAL MEDICINE | Facility: CLINIC | Age: 59
End: 2024-11-13
Payer: COMMERCIAL

## 2024-11-13 VITALS
OXYGEN SATURATION: 98 % | WEIGHT: 155 LBS | TEMPERATURE: 97.9 F | BODY MASS INDEX: 27.46 KG/M2 | SYSTOLIC BLOOD PRESSURE: 118 MMHG | HEART RATE: 95 BPM | DIASTOLIC BLOOD PRESSURE: 68 MMHG | HEIGHT: 63 IN | RESPIRATION RATE: 18 BRPM

## 2024-11-13 DIAGNOSIS — K59.00 CONSTIPATION, UNSPECIFIED CONSTIPATION TYPE: Primary | ICD-10-CM

## 2024-11-13 DIAGNOSIS — R10.30 LOWER ABDOMINAL PAIN: ICD-10-CM

## 2024-11-13 LAB
ALBUMIN SERPL-MCNC: 4.3 G/DL (ref 3.5–5.2)
ALBUMIN/GLOB SERPL: 1.4 G/DL
ALP SERPL-CCNC: 86 U/L (ref 39–117)
ALT SERPL W P-5'-P-CCNC: 12 U/L (ref 1–33)
AMYLASE SERPL-CCNC: 60 U/L (ref 28–100)
ANION GAP SERPL CALCULATED.3IONS-SCNC: 12 MMOL/L (ref 5–15)
AST SERPL-CCNC: 14 U/L (ref 1–32)
BASOPHILS # BLD AUTO: 0.06 10*3/MM3 (ref 0–0.2)
BASOPHILS NFR BLD AUTO: 0.6 % (ref 0–1.5)
BILIRUB SERPL-MCNC: <0.2 MG/DL (ref 0–1.2)
BUN SERPL-MCNC: 11 MG/DL (ref 6–20)
BUN/CREAT SERPL: 21.2 (ref 7–25)
CALCIUM SPEC-SCNC: 9.4 MG/DL (ref 8.6–10.5)
CHLORIDE SERPL-SCNC: 101 MMOL/L (ref 98–107)
CO2 SERPL-SCNC: 26 MMOL/L (ref 22–29)
CREAT SERPL-MCNC: 0.52 MG/DL (ref 0.57–1)
DEPRECATED RDW RBC AUTO: 36.5 FL (ref 37–54)
EGFRCR SERPLBLD CKD-EPI 2021: 107.8 ML/MIN/1.73
EOSINOPHIL # BLD AUTO: 0.12 10*3/MM3 (ref 0–0.4)
EOSINOPHIL NFR BLD AUTO: 1.2 % (ref 0.3–6.2)
ERYTHROCYTE [DISTWIDTH] IN BLOOD BY AUTOMATED COUNT: 11.5 % (ref 12.3–15.4)
GLOBULIN UR ELPH-MCNC: 3.1 GM/DL
GLUCOSE SERPL-MCNC: 79 MG/DL (ref 65–99)
HCT VFR BLD AUTO: 37.1 % (ref 34–46.6)
HGB BLD-MCNC: 12.2 G/DL (ref 12–15.9)
IMM GRANULOCYTES # BLD AUTO: 0.02 10*3/MM3 (ref 0–0.05)
IMM GRANULOCYTES NFR BLD AUTO: 0.2 % (ref 0–0.5)
LIPASE SERPL-CCNC: 33 U/L (ref 13–60)
LYMPHOCYTES # BLD AUTO: 2.12 10*3/MM3 (ref 0.7–3.1)
LYMPHOCYTES NFR BLD AUTO: 21.4 % (ref 19.6–45.3)
MCH RBC QN AUTO: 28.8 PG (ref 26.6–33)
MCHC RBC AUTO-ENTMCNC: 32.9 G/DL (ref 31.5–35.7)
MCV RBC AUTO: 87.7 FL (ref 79–97)
MONOCYTES # BLD AUTO: 1.07 10*3/MM3 (ref 0.1–0.9)
MONOCYTES NFR BLD AUTO: 10.8 % (ref 5–12)
NEUTROPHILS NFR BLD AUTO: 6.5 10*3/MM3 (ref 1.7–7)
NEUTROPHILS NFR BLD AUTO: 65.8 % (ref 42.7–76)
NRBC BLD AUTO-RTO: 0 /100 WBC (ref 0–0.2)
PLATELET # BLD AUTO: 346 10*3/MM3 (ref 140–450)
PMV BLD AUTO: 10.8 FL (ref 6–12)
POTASSIUM SERPL-SCNC: 3.7 MMOL/L (ref 3.5–5.2)
PROT SERPL-MCNC: 7.4 G/DL (ref 6–8.5)
RBC # BLD AUTO: 4.23 10*6/MM3 (ref 3.77–5.28)
SODIUM SERPL-SCNC: 139 MMOL/L (ref 136–145)
WBC NRBC COR # BLD AUTO: 9.89 10*3/MM3 (ref 3.4–10.8)

## 2024-11-13 PROCEDURE — 82150 ASSAY OF AMYLASE: CPT | Performed by: NURSE PRACTITIONER

## 2024-11-13 PROCEDURE — 80053 COMPREHEN METABOLIC PANEL: CPT | Performed by: NURSE PRACTITIONER

## 2024-11-13 PROCEDURE — 99214 OFFICE O/P EST MOD 30 MIN: CPT | Performed by: NURSE PRACTITIONER

## 2024-11-13 PROCEDURE — 83690 ASSAY OF LIPASE: CPT | Performed by: NURSE PRACTITIONER

## 2024-11-13 PROCEDURE — 85025 COMPLETE CBC W/AUTO DIFF WBC: CPT | Performed by: NURSE PRACTITIONER

## 2024-11-13 RX ORDER — POLYETHYLENE GLYCOL 3350 17 G/17G
17 POWDER, FOR SOLUTION ORAL DAILY
Qty: 578 G | Refills: 0 | Status: SHIPPED | OUTPATIENT
Start: 2024-11-13

## 2024-11-13 RX ORDER — ALBENDAZOLE 200 MG/1
400 TABLET, FILM COATED ORAL DAILY
Qty: 6 TABLET | Refills: 0 | Status: CANCELLED | OUTPATIENT
Start: 2024-11-13 | End: 2024-11-16

## 2024-11-13 NOTE — PROGRESS NOTES
"Chief Complaint  Constipation (Started around Halloween. Started taking Senokot. Constipation for 2 weeks. Found worm in stool today.), Diarrhea (2 days), Abdominal Pain (2 weeks lower abdominal pain ), Nausea, and Hip Pain (Bilateral- off and on for 2 weeks with abdominal pain )      Subjective      History of Present Illness  The patient is a 59-year-old female who presents today with concerns from abdominal pain. She is accompanied by her .    Patient reports that she had a diet change approximately 2 to 3 weeks ago where she is eating more Mediterranean preprepared meals.  She then began having constipation and went several days without a bowel movement.  She reports feeling low abdominal pressure and bloating of the abdomen.  Approximately 3 to 4 days ago she began taking an over-the-counter Senokot all-natural stool softener.  She reports having bowel movements over the last 2 days but is continued to have lower abdominal pressure.  She reports bowel movements were more loose stools.  She denies fever, chills, body aches    She also reports noticing something in the toilet following a diarrhea bowel movement.  She retrieve this from the toilet and was concerned that this was moving on its own and may be some type of worm.  She has not had any exposure to others with known pinworms.  She denies any rectal itching or rectal pain.  She denies any vomiting or nausea             Objective   Vital Signs:   Vitals:    11/13/24 1341   BP: 118/68   BP Location: Left arm   Patient Position: Sitting   Cuff Size: Adult   Pulse: 95   Resp: 18   Temp: 97.9 °F (36.6 °C)   TempSrc: Temporal   SpO2: 98%   Weight: 70.3 kg (155 lb)   Height: 160 cm (62.99\")     Body mass index is 27.47 kg/m².    Wt Readings from Last 3 Encounters:   11/13/24 70.3 kg (155 lb)   08/12/24 70.8 kg (156 lb)   03/25/24 70.4 kg (155 lb 3.2 oz)     BP Readings from Last 3 Encounters:   11/13/24 118/68   08/12/24 124/80   03/25/24 132/70 "       Health Maintenance   Topic Date Due    INFLUENZA VACCINE  08/01/2024    COVID-19 Vaccine (3 - 2024-25 season) 09/01/2024    ZOSTER VACCINE (1 of 2) 12/06/2024 (Originally 3/13/2016)    PAP SMEAR  02/15/2025    ANNUAL PHYSICAL  03/25/2025    LIPID PANEL  03/25/2025    BMI FOLLOWUP  08/12/2025    MAMMOGRAM  06/25/2026    COLORECTAL CANCER SCREENING  08/15/2027    TDAP/TD VACCINES (2 - Td or Tdap) 03/25/2034    HEPATITIS C SCREENING  Completed    Pneumococcal Vaccine 0-64  Aged Out       Physical Exam  Vitals and nursing note reviewed.   Constitutional:       General: She is not in acute distress.     Appearance: Normal appearance.   HENT:      Head: Normocephalic and atraumatic.      Right Ear: External ear normal.      Left Ear: External ear normal.      Nose: Nose normal.      Mouth/Throat:      Mouth: Mucous membranes are moist.   Eyes:      Conjunctiva/sclera: Conjunctivae normal.   Cardiovascular:      Rate and Rhythm: Normal rate and regular rhythm.      Pulses: Normal pulses.      Heart sounds: Normal heart sounds. No murmur heard.     No friction rub. No gallop.   Pulmonary:      Effort: Pulmonary effort is normal. No respiratory distress.      Breath sounds: No wheezing, rhonchi or rales.   Abdominal:      General: Bowel sounds are normal. There is no distension.      Palpations: Abdomen is soft. There is no mass.      Tenderness: There is abdominal tenderness (lower abdomen). There is no guarding.   Musculoskeletal:      Cervical back: Neck supple.      Right lower leg: No edema.      Left lower leg: No edema.   Skin:     General: Skin is warm and dry.   Neurological:      General: No focal deficit present.      Mental Status: She is alert and oriented to person, place, and time.   Psychiatric:         Mood and Affect: Mood normal.         Behavior: Behavior normal.        Physical Exam        Result Review :  The following data was reviewed by: LANDY Guadarrama on 11/13/2024:         Results               Procedures            Assessment & Plan  Constipation, unspecified constipation type    Orders:    XR Abdomen 3 View    CBC Auto Differential    Comprehensive Metabolic Panel    Lipase    Amylase    Lower abdominal pain    Orders:    XR Abdomen 3 View    CBC Auto Differential    Comprehensive Metabolic Panel    Lipase    Amylase         Assessment & Plan  1. Suspected pinworm infection.  Her symptoms do not align with a typical pinworm infection. Specimen that she brought to the office does not appear to be a pin worm.  Discussed symptoms of pinworms and when to return for further testing if she begins to have these.    2. Constipation.  She will start taking MiraLAX once daily in the evening. This osmotic laxative will help by pulling more fluid into the bowel, making stool easier to pass. She is advised to continue this regimen until her symptoms resolve. If the x-ray shows any abnormalities, the treatment plan will be adjusted accordingly.    3. Lower abdominal pain.  The lower abdominal pain may be related to the constipation and cramping from peristalsis. The x-ray and lab work will help determine if there are any other underlying issues.          Patient or patient representative verbalized consent for the use of Ambient Listening during the visit with  LANDY Guadarrama for chart documentation. 11/13/2024  15:02 EST    I spent 30 minutes caring for Madina on this date of service. This time includes time spent by me in the following activities:preparing for the visit, reviewing tests, obtaining and/or reviewing a separately obtained history, performing a medically appropriate examination and/or evaluation , counseling and educating the patient/family/caregiver, ordering medications, tests, or procedures, referring and communicating with other health care professionals , and documenting information in the medical record  FOLLOW UP  Return if symptoms worsen or fail to improve.  Patient was given  instructions and counseling regarding her condition or for health maintenance advice. Please see specific information pulled into the AVS if appropriate.     Dedra Chang, APRN  11/13/24  15:04 EST    CURRENT & DISCONTINUED MEDICATIONS  Current Outpatient Medications   Medication Instructions    amLODIPine (NORVASC) 2.5 mg, Oral, Daily    aspirin 81 mg, Daily    cetirizine (ZYRTEC) 10 mg, Oral, Daily    Diclofenac Sodium (VOLTAREN) 4 g, Topical, 4 Times Daily PRN    fluticasone (FLONASE) 50 MCG/ACT nasal spray 2 sprays, Nasal, Daily    metoprolol succinate XL (TOPROL-XL) 25 MG 24 hr tablet 1 tablet, Daily    montelukast (SINGULAIR) 10 mg, Oral, Nightly    polyethylene glycol (MIRALAX) 17 g, Oral, Daily       There are no discontinued medications.

## 2024-11-18 ENCOUNTER — TELEPHONE (OUTPATIENT)
Dept: INTERNAL MEDICINE | Facility: CLINIC | Age: 59
End: 2024-11-18
Payer: COMMERCIAL

## 2024-11-18 NOTE — TELEPHONE ENCOUNTER
Caller: Madina Reddy    Relationship: Self    Best call back number: [phone]    What is the best time to reach you: ANYTIME    Who are you requesting to speak with (clinical staff, provider,  specific staff member): NURSE OR DR. ELMER GALAN     What was the call regarding: PATIENT IS CALLING STATING THAT SHE IS FEELING MUCH BETTER WAND WANTED TO SEE IF SHE NEEDS TO CONTINUE TO TAKE THE MEDICATION THAT YOU GAVE HER AND ALSO IF SHE NEEDS TO CONTINUE ON THE BLAND DIET.  PLEASE ADVISE.

## 2024-11-18 NOTE — TELEPHONE ENCOUNTER
Called and spoke with pt, explained to pt since she is feeling better she can advance diet slowly as tolerated, I recommended to pt to introduce food slowly since she has been on a bland diet, and to monitor her symptoms, explained to pt she can continue to take the miralax as a maintenance medication, I explained to pt it will help promote regular bowel movements, did explain to pt if she wants to stop the medication she can but I recommended adding more fiber to her diet to help promote regularity . Pt verbalized understanding and had no further questions at this time.

## 2024-11-25 ENCOUNTER — TELEPHONE (OUTPATIENT)
Dept: INTERNAL MEDICINE | Facility: CLINIC | Age: 59
End: 2024-11-25

## 2024-11-25 ENCOUNTER — OFFICE VISIT (OUTPATIENT)
Dept: INTERNAL MEDICINE | Facility: CLINIC | Age: 59
End: 2024-11-25
Payer: COMMERCIAL

## 2024-11-25 VITALS
BODY MASS INDEX: 27.32 KG/M2 | HEART RATE: 79 BPM | WEIGHT: 154.2 LBS | DIASTOLIC BLOOD PRESSURE: 76 MMHG | RESPIRATION RATE: 16 BRPM | SYSTOLIC BLOOD PRESSURE: 126 MMHG | TEMPERATURE: 97.6 F | HEIGHT: 63 IN | OXYGEN SATURATION: 98 %

## 2024-11-25 DIAGNOSIS — R10.84 GENERALIZED ABDOMINAL PAIN: Primary | ICD-10-CM

## 2024-11-25 DIAGNOSIS — R10.30 LOWER ABDOMINAL PAIN: ICD-10-CM

## 2024-11-25 DIAGNOSIS — R10.32 BILATERAL GROIN PAIN: ICD-10-CM

## 2024-11-25 DIAGNOSIS — K59.00 CONSTIPATION, UNSPECIFIED CONSTIPATION TYPE: Primary | ICD-10-CM

## 2024-11-25 DIAGNOSIS — R10.31 BILATERAL GROIN PAIN: ICD-10-CM

## 2024-11-25 LAB
BACTERIA UR QL AUTO: ABNORMAL /HPF
BILIRUB UR QL STRIP: NEGATIVE
CLARITY UR: CLEAR
COD CRY URNS QL: ABNORMAL /HPF
COLOR UR: YELLOW
GLUCOSE UR STRIP-MCNC: NEGATIVE MG/DL
HGB UR QL STRIP.AUTO: ABNORMAL
HYALINE CASTS UR QL AUTO: ABNORMAL /LPF
KETONES UR QL STRIP: NEGATIVE
LEUKOCYTE ESTERASE UR QL STRIP.AUTO: ABNORMAL
NITRITE UR QL STRIP: NEGATIVE
PH UR STRIP.AUTO: 7 [PH] (ref 5–8)
PROT UR QL STRIP: ABNORMAL
RBC # UR STRIP: ABNORMAL /HPF
REF LAB TEST METHOD: ABNORMAL
SP GR UR STRIP: 1.02 (ref 1–1.03)
SQUAMOUS #/AREA URNS HPF: ABNORMAL /HPF
TRANS CELLS #/AREA URNS HPF: ABNORMAL /HPF
UROBILINOGEN UR QL STRIP: ABNORMAL
WBC # UR STRIP: ABNORMAL /HPF

## 2024-11-25 PROCEDURE — 81001 URINALYSIS AUTO W/SCOPE: CPT | Performed by: NURSE PRACTITIONER

## 2024-11-25 PROCEDURE — 87086 URINE CULTURE/COLONY COUNT: CPT | Performed by: NURSE PRACTITIONER

## 2024-11-25 PROCEDURE — 99213 OFFICE O/P EST LOW 20 MIN: CPT | Performed by: NURSE PRACTITIONER

## 2024-11-25 NOTE — PROGRESS NOTES
"Chief Complaint  Constipation, Fatigue, and Pain (Groin pain on both sides, upper thigh pain, separate from the groin pain.)    Subjective      Madina Reddy is a 59 y.o. female who presents to [facility]     Patient states her symptoms started around Halloween with constipation, was evaluated in the clinic and started on Miralax. States the constipation improved.  She ultimately stopped the medication after she developed abdominal cramping.  Bowel movements have become more regular.  She is now experiencing pain in the bilateral groin area, is no longer experiencing the abdominal cramping.  Denies dysuria, hematuria, hematochezia, diarrhea, straining with bowel movements or rectal pain.  Admits that she feels more tired, like she has no energy.  She has not been eating well, appetite has been decreased.  However, has had many days where she has been able to eat well.  Had lunch today without any issue.  Patient states she just feels unwell.  She does not feel like this is related to her mental health, however has been more anxious lately due to not being able to leave the house due to feeling unwell.    Objective   Vital Signs:   Vitals:    11/25/24 1349   BP: 126/76   BP Location: Left arm   Patient Position: Sitting   Cuff Size: Adult   Pulse: 79   Resp: 16   Temp: 97.6 °F (36.4 °C)   TempSrc: Temporal   SpO2: 98%   Weight: 69.9 kg (154 lb 3.2 oz)   Height: 160 cm (63\")     Body mass index is 27.32 kg/m².    Wt Readings from Last 3 Encounters:   11/25/24 69.9 kg (154 lb 3.2 oz)   11/13/24 70.3 kg (155 lb)   08/12/24 70.8 kg (156 lb)     BP Readings from Last 3 Encounters:   11/25/24 126/76   11/13/24 118/68   08/12/24 124/80       Health Maintenance   Topic Date Due    COVID-19 Vaccine (3 - 2024-25 season) 09/01/2024    ZOSTER VACCINE (1 of 2) 12/06/2024 (Originally 3/13/2016)    PAP SMEAR  02/15/2025    ANNUAL PHYSICAL  03/25/2025    LIPID PANEL  03/25/2025    " BMI FOLLOWUP  08/12/2025    MAMMOGRAM  06/25/2026    COLORECTAL CANCER SCREENING  08/15/2027    TDAP/TD VACCINES (2 - Td or Tdap) 03/25/2034    HEPATITIS C SCREENING  Completed    INFLUENZA VACCINE  Completed    Pneumococcal Vaccine 0-64  Aged Out       Physical Exam  Constitutional:       Appearance: Normal appearance.   HENT:      Head: Normocephalic and atraumatic.      Nose: Nose normal.      Mouth/Throat:      Mouth: Mucous membranes are moist.      Pharynx: Oropharynx is clear.   Eyes:      Extraocular Movements: Extraocular movements intact.      Conjunctiva/sclera: Conjunctivae normal.      Pupils: Pupils are equal, round, and reactive to light.   Cardiovascular:      Rate and Rhythm: Normal rate and regular rhythm.      Heart sounds: Normal heart sounds.   Pulmonary:      Effort: Pulmonary effort is normal.      Breath sounds: Normal breath sounds.   Abdominal:      Comments: Bilateral groin tenderness on exam; without abnormality or obvious mass on palpation   Skin:     General: Skin is warm and dry.   Neurological:      General: No focal deficit present.      Mental Status: She is alert and oriented to person, place, and time.   Psychiatric:         Mood and Affect: Mood normal.         Behavior: Behavior normal.         Thought Content: Thought content normal.          Result Review :  The following data was reviewed by: LANDY Borrego on 11/25/2024:         Procedures          Assessment & Plan  Generalized abdominal pain  Due to duration and severity of symptoms will schedule for CT of the abdomen and pelvis for further evaluation.  She may continue MiraLAX as needed for constipation.  Previous referral placed for GI for further evaluation.  She should increase foods as tolerated and seek medical attention immediately if she feels that her symptoms are worsening.  Discussed the option for more in depth labs, patient defers for now but may consider in the future.  Will obtain urinalysis today.   Patient voices understanding and is agreeable to plan.  Orders:    Urinalysis With Culture If Indicated - Urine, Clean Catch    CT Abdomen Pelvis With & Without Contrast; Future    Bilateral groin pain    Orders:    Urinalysis With Culture If Indicated - Urine, Clean Catch    CT Abdomen Pelvis With & Without Contrast; Future        FOLLOW UP  Return if symptoms worsen or fail to improve.  Patient was given instructions and counseling regarding her condition or for health maintenance advice. Please see specific information pulled into the AVS if appropriate.       Kady Vernon, LANDY  11/25/24  14:36 EST    CURRENT & DISCONTINUED MEDICATIONS  Current Outpatient Medications   Medication Instructions    amLODIPine (NORVASC) 2.5 mg, Oral, Daily    aspirin 81 mg, Daily    cetirizine (ZYRTEC) 10 mg, Oral, Daily    Diclofenac Sodium (VOLTAREN) 4 g, Topical, 4 Times Daily PRN    fluticasone (FLONASE) 50 MCG/ACT nasal spray 2 sprays, Nasal, Daily    metoprolol succinate XL (TOPROL-XL) 25 MG 24 hr tablet 1 tablet, Daily    montelukast (SINGULAIR) 10 mg, Oral, Nightly       Medications Discontinued During This Encounter   Medication Reason    polyethylene glycol (MiraLax) 17 GM/SCOOP powder

## 2024-11-25 NOTE — TELEPHONE ENCOUNTER
Called and spoke with pt, pt stated she came in on the 13th and was constipated and was given miralax now pt is experiencing decrease appetite weak, trying to trying to be functional bowel movements better but not normal and pt isn't eating much either.

## 2024-11-25 NOTE — TELEPHONE ENCOUNTER
Called and spoke with pt, explained to pt referral has been placed, pt requested apt with provider, apt scheduled today per pt request.

## 2024-11-25 NOTE — TELEPHONE ENCOUNTER
Please let patient know I have placed the referral to GI, however it can take many months to get an appointment scheduled. If her symptoms do not continue to improve I would recommend she schedule an appointment for further evaluation.

## 2024-11-25 NOTE — TELEPHONE ENCOUNTER
Caller: Madina Reddy    Relationship: Self    Best call back number: [phone]      What is the medical concern/diagnosis: STOMACH ISSUES    What specialty or service is being requested: GASTROENTEROLOGY    What is the provider, practice or medical service name: BHAVNA MORALES     What is the office location: [facility]   FAX:  [phone]     What is the office phone number:      Any additional details:

## 2024-11-26 ENCOUNTER — TELEPHONE (OUTPATIENT)
Dept: INTERNAL MEDICINE | Facility: CLINIC | Age: 59
End: 2024-11-26

## 2024-11-26 DIAGNOSIS — R10.31 BILATERAL GROIN PAIN: ICD-10-CM

## 2024-11-26 DIAGNOSIS — R10.32 BILATERAL GROIN PAIN: ICD-10-CM

## 2024-11-26 DIAGNOSIS — R31.9 HEMATURIA, UNSPECIFIED TYPE: Primary | ICD-10-CM

## 2024-11-26 DIAGNOSIS — R10.84 GENERALIZED ABDOMINAL PAIN: ICD-10-CM

## 2024-11-26 NOTE — TELEPHONE ENCOUNTER
I have changed order for CT to STAT for further evaluation for kidney stone. We have also sent the urine for culture to rule out UTI. She should receive a phone call to schedule CT.

## 2024-11-26 NOTE — TELEPHONE ENCOUNTER
Caller: Madina Reddy    Relationship to patient: Self    Best call back number: [phone]    Patient is needing: PATIENT CALLED STATING SHE WAS TOLD BY LANDY JOE IF THERE WERE ANY CHANGES AFTER HER APPT YESTERDAY TO PLEASE CALL. PATIENT STATES SHE IS NOW HAVING BLOOD IN HER URINE AND STILL HAVING STOMACH PAIN. PATIENT STATES SHE SAW ONLINE THAT HER UA CAME BACK ABNORMAL AND PATIENT WOULD LIKE TO KNOW WHAT SHE SHOULD DO GOING FORWARD.

## 2024-11-27 DIAGNOSIS — N83.8 OVARIAN MASS: ICD-10-CM

## 2024-11-27 DIAGNOSIS — R10.84 GENERALIZED ABDOMINAL PAIN: ICD-10-CM

## 2024-11-27 DIAGNOSIS — R93.5 ABNORMAL CT OF THE ABDOMEN: Primary | ICD-10-CM

## 2024-11-27 DIAGNOSIS — Z95.0 PRESENCE OF CARDIAC PACEMAKER: ICD-10-CM

## 2024-11-27 DIAGNOSIS — R10.31 BILATERAL GROIN PAIN: ICD-10-CM

## 2024-11-27 DIAGNOSIS — R10.32 BILATERAL GROIN PAIN: ICD-10-CM

## 2024-11-27 LAB — BACTERIA SPEC AEROBE CULT: NO GROWTH

## 2024-12-02 ENCOUNTER — HOSPITAL ENCOUNTER (OUTPATIENT)
Dept: ULTRASOUND IMAGING | Facility: HOSPITAL | Age: 59
Discharge: HOME | End: 2024-12-02
Admitting: NURSE PRACTITIONER
Payer: COMMERCIAL

## 2024-12-02 DIAGNOSIS — R10.84 GENERALIZED ABDOMINAL PAIN: ICD-10-CM

## 2024-12-02 DIAGNOSIS — R10.31 BILATERAL GROIN PAIN: ICD-10-CM

## 2024-12-02 DIAGNOSIS — N83.8 OVARIAN MASS: ICD-10-CM

## 2024-12-02 DIAGNOSIS — R10.32 BILATERAL GROIN PAIN: ICD-10-CM

## 2024-12-02 DIAGNOSIS — R93.5 ABNORMAL CT OF THE ABDOMEN: ICD-10-CM

## 2024-12-02 DIAGNOSIS — R93.5 ABNORMAL CT OF THE ABDOMEN: Primary | ICD-10-CM

## 2024-12-02 PROCEDURE — 76830 TRANSVAGINAL US NON-OB: CPT

## 2024-12-03 ENCOUNTER — TELEPHONE (OUTPATIENT)
Dept: INTERNAL MEDICINE | Facility: CLINIC | Age: 59
End: 2024-12-03
Payer: COMMERCIAL

## 2024-12-03 NOTE — TELEPHONE ENCOUNTER
Caller: Barry Madina Jo    Relationship: Self    Best call back number: [phone]     Caller requesting test results: YES    What test was performed: ULTRASOUND    When was the test performed: 12/2/24    Where was the test performed: [facility]     Additional notes: CALLER STATED THAT SHE HAS INCREASED PAIN AND PRESSURE.  SHE HAS SEEN RESULTS ON MYCHART AND WANTING TO DISCUSS CARE PLAN.

## 2024-12-05 ENCOUNTER — TELEPHONE (OUTPATIENT)
Dept: INTERNAL MEDICINE | Facility: CLINIC | Age: 59
End: 2024-12-05
Payer: COMMERCIAL

## 2024-12-05 NOTE — TELEPHONE ENCOUNTER
Caller: Madina Reddy    Relationship: Self    Best call back number: [phone]    What is the best time to reach you: ANY     Who are you requesting to speak with (clinical staff, provider,  specific staff member): CLINICAL     What was the call regarding: PATIENT WANTED TO CALL AND UPDATE HER PROVIDER. TUESDAY EVENING PATIENT STARTED TO HAVE REALLY BAD STOMACH PAIN AND THEN HAD TO GO TO THE BATHROOM AND THROW UP. PATIENT DID FEEL RELIEF AFTER THROWING UP BUT WORRIED A CYST COULD HAVE RUPTURED AND WOULD LIKE A CALL BACK TO SPEAK WITH A NURSE REGARDING HER CONCERN.     Is it okay if the provider responds through MyChart: YES

## 2024-12-05 NOTE — TELEPHONE ENCOUNTER
Spoke with patient and she stated her pain has subside, she also mention scheduled 12/20 10:30 with Dr Osorio. She also was needing to know if she should still get her PET scan done or wait until she sees gyn, please advise

## 2024-12-09 NOTE — TELEPHONE ENCOUNTER
Pt return call to the office, pt stated she had reached out to Dr. Driver office about some other things and had mentioned the PET scan and they told her she could cancel it, pt has canceled the PET scan.

## 2024-12-09 NOTE — TELEPHONE ENCOUNTER
This is completely up to her. If she would like to wait to discuss this with Dr. Osorio at her appointment she can decide what she wants to do based on his recommendation since it is only one day prior.

## 2024-12-10 ENCOUNTER — TELEPHONE (OUTPATIENT)
Dept: INTERNAL MEDICINE | Facility: CLINIC | Age: 59
End: 2024-12-10
Payer: COMMERCIAL

## 2024-12-10 NOTE — TELEPHONE ENCOUNTER
Caller: Madina Reddy    Relationship: Self    Best call back number: [phone]     What is the best time to reach you: ANY    Who are you requesting to speak with (clinical staff, provider,  specific staff member): CLINICAL STAFF    What was the call regarding: PATIENT CALLED ABOUT HER REFERRAL FOR HER HER CYSTS. SHE WOULD LIKE TO KNOW IF THE OFFICE COULD SOMEHOW EXPEDITE HER APPOINTMENT THAT SHE HAS WITH THEM ON 12/20/24.

## 2024-12-10 NOTE — TELEPHONE ENCOUNTER
Called and spoke with pt, explained to pt we have already put the referral in as urgent, explained to pt she could call Dr. Osorio's office and see if they have a wait list pt can be added to or see if the office has any cancellations. I explained to pt since we already put the referral in as urgent we were unable to expedite pt's apt. Pt verbalized understanding and had no further questions at this time.

## 2024-12-13 RX ORDER — MONTELUKAST SODIUM 10 MG/1
10 TABLET ORAL NIGHTLY
Qty: 90 TABLET | Refills: 0 | Status: SHIPPED | OUTPATIENT
Start: 2024-12-13

## 2025-01-01 ENCOUNTER — TELEPHONE (OUTPATIENT)
Dept: ONCOLOGY | Facility: HOSPITAL | Age: 60
End: 2025-01-01

## 2025-01-02 ENCOUNTER — TELEPHONE (OUTPATIENT)
Dept: INTERNAL MEDICINE | Facility: CLINIC | Age: 60
End: 2025-01-02
Payer: COMMERCIAL

## 2025-01-02 NOTE — TELEPHONE ENCOUNTER
Caller: Madina Reddy    Relationship: Self    Best call back number: [phone]    What orders are you requesting (i.e. lab or imaging): CT WITH CONTRAST ON ABDOMEN     In what timeframe would the patient need to come in: AS SOON AS POSSIBLE     Where will you receive your lab/imaging services: [facility]     Additional notes: PATIENT WOULD LIKE TO HAVE THIS DONE IN [place] SHE WOULD PREFER NOT TO GO TO [place] WITH THE UPCOMING STORMS BUT WOULD LIKE THIS DONE AS SOON AS POSSIBLE.

## 2025-01-03 NOTE — TELEPHONE ENCOUNTER
Please call and get more information from the patient, she recently had a CT of the abdomen/pelvis with contrast 11/2024.  I still cannot see the notes from the oncology office so I am unable to see their plan.

## 2025-01-03 NOTE — TELEPHONE ENCOUNTER
Called and spoke with pt, explained to pt we are unable to see what the notes are from Oncology, pt did state she is scheduled for a biopsy this coming Tuesday per oncology. I explained to pt she could reach out to the Oncology office and let them know PCP is unable to see notes from their office and see if they could fax us notes from visits. Pt stated she will let oncology know, pt stated she will proceed with the biopsy and let us know any new information.

## 2025-01-07 ENCOUNTER — HOSPITAL ENCOUNTER (INPATIENT)
Facility: HOSPITAL | Age: 60
LOS: 1 days | Discharge: OTHER INSTITUTION | DRG: 755 | End: 2025-01-11
Attending: EMERGENCY MEDICINE | Admitting: INTERNAL MEDICINE
Payer: COMMERCIAL

## 2025-01-07 ENCOUNTER — TELEPHONE (OUTPATIENT)
Dept: INTERNAL MEDICINE | Facility: CLINIC | Age: 60
End: 2025-01-07
Payer: COMMERCIAL

## 2025-01-07 ENCOUNTER — APPOINTMENT (OUTPATIENT)
Dept: CT IMAGING | Facility: HOSPITAL | Age: 60
DRG: 755 | End: 2025-01-07
Payer: COMMERCIAL

## 2025-01-07 DIAGNOSIS — R52 INTRACTABLE PAIN: ICD-10-CM

## 2025-01-07 DIAGNOSIS — R26.2 DIFFICULTY WALKING: ICD-10-CM

## 2025-01-07 DIAGNOSIS — C56.9 MALIGNANT NEOPLASM OF OVARY, UNSPECIFIED LATERALITY: Primary | ICD-10-CM

## 2025-01-07 PROBLEM — R19.00 PELVIC MASS: Status: ACTIVE | Noted: 2025-01-07

## 2025-01-07 LAB
ALBUMIN SERPL-MCNC: 4 G/DL (ref 3.5–5.2)
ALBUMIN/GLOB SERPL: 1.1 G/DL
ALP SERPL-CCNC: 81 U/L (ref 39–117)
ALT SERPL W P-5'-P-CCNC: 13 U/L (ref 1–33)
ANION GAP SERPL CALCULATED.3IONS-SCNC: 15.7 MMOL/L (ref 5–15)
AST SERPL-CCNC: 19 U/L (ref 1–32)
BACTERIA UR QL AUTO: ABNORMAL /HPF
BASOPHILS # BLD AUTO: 0.06 10*3/MM3 (ref 0–0.2)
BASOPHILS NFR BLD AUTO: 0.6 % (ref 0–1.5)
BILIRUB SERPL-MCNC: 0.6 MG/DL (ref 0–1.2)
BILIRUB UR QL STRIP: NEGATIVE
BUN SERPL-MCNC: 13 MG/DL (ref 6–20)
BUN/CREAT SERPL: 19.7 (ref 7–25)
CALCIUM SPEC-SCNC: 10.5 MG/DL (ref 8.6–10.5)
CHLORIDE SERPL-SCNC: 97 MMOL/L (ref 98–107)
CLARITY UR: ABNORMAL
CO2 SERPL-SCNC: 25.3 MMOL/L (ref 22–29)
COLOR UR: YELLOW
CREAT SERPL-MCNC: 0.66 MG/DL (ref 0.57–1)
D-LACTATE SERPL-SCNC: 1.6 MMOL/L (ref 0.5–2)
DEPRECATED RDW RBC AUTO: 36.8 FL (ref 37–54)
EGFRCR SERPLBLD CKD-EPI 2021: 101.8 ML/MIN/1.73
EOSINOPHIL # BLD AUTO: 0.04 10*3/MM3 (ref 0–0.4)
EOSINOPHIL NFR BLD AUTO: 0.4 % (ref 0.3–6.2)
ERYTHROCYTE [DISTWIDTH] IN BLOOD BY AUTOMATED COUNT: 12.1 % (ref 12.3–15.4)
GLOBULIN UR ELPH-MCNC: 3.7 GM/DL
GLUCOSE SERPL-MCNC: 115 MG/DL (ref 65–99)
GLUCOSE UR STRIP-MCNC: NEGATIVE MG/DL
GRAN CASTS URNS QL MICRO: ABNORMAL /LPF
HCT VFR BLD AUTO: 34.9 % (ref 34–46.6)
HGB BLD-MCNC: 11.3 G/DL (ref 12–15.9)
HGB UR QL STRIP.AUTO: NEGATIVE
HOLD SPECIMEN: NORMAL
HOLD SPECIMEN: NORMAL
HYALINE CASTS UR QL AUTO: ABNORMAL /LPF
IMM GRANULOCYTES # BLD AUTO: 0.04 10*3/MM3 (ref 0–0.05)
IMM GRANULOCYTES NFR BLD AUTO: 0.4 % (ref 0–0.5)
KETONES UR QL STRIP: ABNORMAL
LEUKOCYTE ESTERASE UR QL STRIP.AUTO: ABNORMAL
LIPASE SERPL-CCNC: 20 U/L (ref 13–60)
LYMPHOCYTES # BLD AUTO: 1.87 10*3/MM3 (ref 0.7–3.1)
LYMPHOCYTES NFR BLD AUTO: 19.1 % (ref 19.6–45.3)
MCH RBC QN AUTO: 27.1 PG (ref 26.6–33)
MCHC RBC AUTO-ENTMCNC: 32.4 G/DL (ref 31.5–35.7)
MCV RBC AUTO: 83.7 FL (ref 79–97)
MONOCYTES # BLD AUTO: 0.98 10*3/MM3 (ref 0.1–0.9)
MONOCYTES NFR BLD AUTO: 10 % (ref 5–12)
NEUTROPHILS NFR BLD AUTO: 6.79 10*3/MM3 (ref 1.7–7)
NEUTROPHILS NFR BLD AUTO: 69.5 % (ref 42.7–76)
NITRITE UR QL STRIP: NEGATIVE
NRBC BLD AUTO-RTO: 0 /100 WBC (ref 0–0.2)
PH UR STRIP.AUTO: 6.5 [PH] (ref 5–8)
PLATELET # BLD AUTO: 388 10*3/MM3 (ref 140–450)
PMV BLD AUTO: 10.4 FL (ref 6–12)
POTASSIUM SERPL-SCNC: 3.3 MMOL/L (ref 3.5–5.2)
PROT SERPL-MCNC: 7.7 G/DL (ref 6–8.5)
PROT UR QL STRIP: ABNORMAL
RBC # BLD AUTO: 4.17 10*6/MM3 (ref 3.77–5.28)
RBC # UR STRIP: ABNORMAL /HPF
REF LAB TEST METHOD: ABNORMAL
SODIUM SERPL-SCNC: 138 MMOL/L (ref 136–145)
SP GR UR STRIP: 1.02 (ref 1–1.03)
SQUAMOUS #/AREA URNS HPF: ABNORMAL /HPF
UROBILINOGEN UR QL STRIP: ABNORMAL
WBC # UR STRIP: ABNORMAL /HPF
WBC NRBC COR # BLD AUTO: 9.78 10*3/MM3 (ref 3.4–10.8)
WHOLE BLOOD HOLD COAG: NORMAL
WHOLE BLOOD HOLD SPECIMEN: NORMAL

## 2025-01-07 PROCEDURE — 83690 ASSAY OF LIPASE: CPT | Performed by: EMERGENCY MEDICINE

## 2025-01-07 PROCEDURE — 83735 ASSAY OF MAGNESIUM: CPT | Performed by: STUDENT IN AN ORGANIZED HEALTH CARE EDUCATION/TRAINING PROGRAM

## 2025-01-07 PROCEDURE — 74177 CT ABD & PELVIS W/CONTRAST: CPT

## 2025-01-07 PROCEDURE — G0378 HOSPITAL OBSERVATION PER HR: HCPCS

## 2025-01-07 PROCEDURE — 83605 ASSAY OF LACTIC ACID: CPT | Performed by: EMERGENCY MEDICINE

## 2025-01-07 PROCEDURE — 25810000003 LACTATED RINGERS SOLUTION: Performed by: EMERGENCY MEDICINE

## 2025-01-07 PROCEDURE — 81001 URINALYSIS AUTO W/SCOPE: CPT | Performed by: EMERGENCY MEDICINE

## 2025-01-07 PROCEDURE — 25510000001 IOPAMIDOL PER 1 ML: Performed by: EMERGENCY MEDICINE

## 2025-01-07 PROCEDURE — 85025 COMPLETE CBC W/AUTO DIFF WBC: CPT

## 2025-01-07 PROCEDURE — 99222 1ST HOSP IP/OBS MODERATE 55: CPT | Performed by: STUDENT IN AN ORGANIZED HEALTH CARE EDUCATION/TRAINING PROGRAM

## 2025-01-07 PROCEDURE — 25010000002 ONDANSETRON PER 1 MG: Performed by: EMERGENCY MEDICINE

## 2025-01-07 PROCEDURE — 25010000002 HYDROMORPHONE 1 MG/ML SOLUTION: Performed by: EMERGENCY MEDICINE

## 2025-01-07 PROCEDURE — 99285 EMERGENCY DEPT VISIT HI MDM: CPT

## 2025-01-07 PROCEDURE — 80053 COMPREHEN METABOLIC PANEL: CPT | Performed by: EMERGENCY MEDICINE

## 2025-01-07 RX ORDER — ONDANSETRON 2 MG/ML
4 INJECTION INTRAMUSCULAR; INTRAVENOUS ONCE
Status: COMPLETED | OUTPATIENT
Start: 2025-01-07 | End: 2025-01-07

## 2025-01-07 RX ORDER — IOPAMIDOL 755 MG/ML
100 INJECTION, SOLUTION INTRAVASCULAR
Status: COMPLETED | OUTPATIENT
Start: 2025-01-07 | End: 2025-01-07

## 2025-01-07 RX ORDER — SODIUM CHLORIDE 0.9 % (FLUSH) 0.9 %
10 SYRINGE (ML) INJECTION AS NEEDED
Status: DISCONTINUED | OUTPATIENT
Start: 2025-01-07 | End: 2025-01-11 | Stop reason: HOSPADM

## 2025-01-07 RX ADMIN — ONDANSETRON 4 MG: 2 INJECTION INTRAMUSCULAR; INTRAVENOUS at 22:06

## 2025-01-07 RX ADMIN — SODIUM CHLORIDE, POTASSIUM CHLORIDE, SODIUM LACTATE AND CALCIUM CHLORIDE 1000 ML: 600; 310; 30; 20 INJECTION, SOLUTION INTRAVENOUS at 22:07

## 2025-01-07 RX ADMIN — HYDROMORPHONE HYDROCHLORIDE 1 MG: 1 INJECTION, SOLUTION INTRAMUSCULAR; INTRAVENOUS; SUBCUTANEOUS at 22:05

## 2025-01-07 RX ADMIN — IOPAMIDOL 100 ML: 755 INJECTION, SOLUTION INTRAVENOUS at 18:57

## 2025-01-07 NOTE — TELEPHONE ENCOUNTER
Patients  call office stating patient is feeling really weak, he states she is barely able to stand up and walk, he is wanting to know what pcp recommends, I advise him to take pt to hospital but he wants to make sure that is what he needs to do, he states he is not able to get out of his driveway, please advise

## 2025-01-07 NOTE — TELEPHONE ENCOUNTER
I would also recommend he take her to the ER, [facility] would probably be best as this is where Dr. Osorio is affiliated. He may consider calling his office to see if they recommend she go elsewhere. If they cannot get out of their driveway then I would recommend calling EMS.

## 2025-01-07 NOTE — ED PROVIDER NOTES
Time: 5:28 PM EST  Date of encounter:  1/7/2025  Independent Historian/Clinical History and Information was obtained by:   Patient    History is limited by: N/A    Chief Complaint   Patient presents with    Abdominal Pain    Nausea         History of Present Illness:  Patient is a 59 y.o. year old female who presents to the emergency department for evaluation of abdominal pain, nausea, vomiting.  Patient has had decreased appetite.  Per family, patient was diagnosed with a large mass in her lower abdomen, biopsy pending.  They feel like the mass is contributing to her symptoms.  She has also had weakness.  Family states that she almost fell today. (Triage Provider: Mike Lee PA-C).      Patient Care Team  Primary Care Provider: Kady Vernon APRN    Past Medical History:     No Known Allergies  Past Medical History:   Diagnosis Date    Allergies     Dizziness Madina    Headache Madina    Hyperlipidemia March    Hypertension Madina    Pacemaker     Pneumonia Jan 2023    TMJ dysfunction Madina     Past Surgical History:   Procedure Laterality Date    BREAST BIOPSY      CARDIAC ELECTROPHYSIOLOGY PROCEDURE N/A 01/25/2023    Procedure: Device Implant;  Surgeon: VINCE Alejandre MD;  Location: [facility] INVASIVE LOCATION;  Service: Cardiology;  Laterality: N/A;    CARDIAC SURGERY      heart surgery as an infant    COLONOSCOPY  August 2017    PACEMAKER IMPLANTATION       Family History   Problem Relation Age of Onset    Cancer Mother     Thyroid disease Mother     Heart disease Father     Asthma Daughter        Home Medications:  Prior to Admission medications    Medication Sig Start Date End Date Taking? Authorizing Provider   amLODIPine (NORVASC) 2.5 MG tablet Take 1 tablet by mouth once daily 4/15/24   VINCE Alejandre MD   aspirin 81 MG chewable tablet Chew 1 tablet Daily.    Provider, MD Johnathon   cetirizine (zyrTEC) 10 MG tablet Take 1 tablet by mouth Daily. 8/12/24   Kady Vernon APRN   Diclofenac  "Sodium (Voltaren) 1 % gel gel Apply 4 g topically to the appropriate area as directed 4 (Four) Times a Day As Needed (hand pain). 6/6/23   Kady Vernon APRN   fluticasone (FLONASE) 50 MCG/ACT nasal spray 2 sprays into the nostril(s) as directed by provider Daily. 3/25/24   Kady Vernon APRN   metoprolol succinate XL (TOPROL-XL) 25 MG 24 hr tablet Take 1 tablet by mouth Daily. 3/6/24   Provider, MD Johnathon   montelukast (SINGULAIR) 10 MG tablet TAKE 1 TABLET BY MOUTH ONCE DAILY AT NIGHT 12/13/24   Kady Vernon APRN        Social History:   Social History     Tobacco Use    Smoking status: Never     Passive exposure: Never    Smokeless tobacco: Never   Vaping Use    Vaping status: Never Used   Substance Use Topics    Alcohol use: Never    Drug use: Never         Review of Systems:  Review of Systems   Constitutional:  Positive for activity change, appetite change and fatigue. Negative for chills and fever.   HENT:  Negative for congestion, ear pain and sore throat.    Eyes:  Negative for pain.   Respiratory:  Negative for cough, chest tightness and shortness of breath.    Cardiovascular:  Negative for chest pain.   Gastrointestinal:  Positive for abdominal pain and nausea. Negative for diarrhea and vomiting.   Genitourinary:  Negative for flank pain and hematuria.   Musculoskeletal:  Negative for joint swelling.   Skin:  Negative for pallor.   Neurological:  Negative for seizures and headaches.   All other systems reviewed and are negative.       Physical Exam:  /78 (BP Location: Left arm, Patient Position: Lying)   Pulse 98   Temp 98.6 °F (37 °C) (Oral)   Resp 18   Ht 160 cm (62.99\")   Wt 64.2 kg (141 lb 8.6 oz)   SpO2 98%   BMI 25.08 kg/m²         Physical Exam  Vitals and nursing note reviewed.   Constitutional:       General: She is not in acute distress.     Appearance: Normal appearance. She is not toxic-appearing.   HENT:      Head: Normocephalic and atraumatic.      Mouth/Throat:      " Mouth: Mucous membranes are moist.   Eyes:      General: No scleral icterus.  Cardiovascular:      Rate and Rhythm: Normal rate and regular rhythm.      Pulses: Normal pulses.      Heart sounds: Normal heart sounds.   Pulmonary:      Effort: Pulmonary effort is normal. No respiratory distress.      Breath sounds: Normal breath sounds.   Abdominal:      General: Abdomen is flat.      Palpations: Abdomen is soft.      Tenderness: There is generalized abdominal tenderness.   Musculoskeletal:         General: Normal range of motion.      Cervical back: Normal range of motion and neck supple.   Skin:     General: Skin is warm and dry.   Neurological:      Mental Status: She is alert and oriented to person, place, and time. Mental status is at baseline.                            Medical Decision Making:      Comorbidities that affect care:    Cancer    External Notes reviewed:    Previous Clinic Note: office visit with Gyn Onc      The following orders were placed and all results were independently analyzed by me:  Orders Placed This Encounter   Procedures    CT Abdomen Pelvis With Contrast    CT Chest Without Contrast Diagnostic    CT Needle Biopsy Liver    XR Chest 1 View    [place] Draw    Comprehensive Metabolic Panel    Lipase    Urinalysis With Microscopic If Indicated (No Culture) - Urine, Clean Catch    Lactic Acid, Plasma    CBC Auto Differential    Urinalysis, Microscopic Only - Urine, Clean Catch    Magnesium    Magnesium    CBC Auto Differential        Protime-INR    aPTT    Comprehensive Metabolic Panel    CBC Auto Differential    Diet: Regular/House; Texture: Regular (IDDSI 7); Fluid Consistency: Thin (IDDSI 0)    Undress & Gown    Vital Signs    Intake & Output    Weigh Patient    Oral Care    Remote Cardiac Monitor    Maintain IV Access    Obtain Informed Consent    Vital Signs (Specify Frequency)    Assess Puncture Site & Watch for Signs of Pain & Bleeding    Code Status and Medical Interventions:  CPR (Attempt to Resuscitate); Full Support    IP General Consult (Use specialty-specific consult if known)    Hospitalist (on-call MD unless specified)    Hematology & Oncology Inpatient Consult    Inpatient Case Management  Consult    Inpatient Nutrition Consult    Dietary Nutrition Supplements Boost Plus (Ensure Plus)    PT Consult: Eval & Treat Functional Mobility Below Baseline    PT Plan of Care Cert / Re-Cert    Insert Peripheral IV    Insert Peripheral IV    Initiate Observation Status    CBC & Differential    Green Top (Gel)    Lavender Top    Gold Top - SST    Light Blue Top    CBC & Differential       Medications Given in the Emergency Department:  Medications   sodium chloride 0.9 % flush 10 mL (has no administration in time range)   sodium chloride 0.9 % flush 10 mL (10 mL Intravenous Given 1/9/25 0953)   sodium chloride 0.9 % flush 10 mL (10 mL Intravenous Given 1/8/25 0717)   sodium chloride 0.9 % infusion 40 mL (has no administration in time range)   aluminum-magnesium hydroxide-simethicone (MAALOX MAX) 400-400-40 MG/5ML suspension 15 mL (has no administration in time range)   melatonin tablet 5 mg (has no administration in time range)   Enoxaparin Sodium (LOVENOX) syringe 40 mg (40 mg Subcutaneous Given 1/9/25 0953)   lactated ringers infusion (0 mL/hr Intravenous Stopped 1/9/25 0757)   acetaminophen (TYLENOL) tablet 1,000 mg (1,000 mg Oral Given 1/9/25 0953)   sennosides-docusate (PERICOLACE) 8.6-50 MG per tablet 2 tablet (2 tablets Oral Given 1/9/25 0953)     And   polyethylene glycol (MIRALAX) packet 17 g (has no administration in time range)     And   bisacodyl (DULCOLAX) EC tablet 5 mg (has no administration in time range)     And   bisacodyl (DULCOLAX) suppository 10 mg (has no administration in time range)   prochlorperazine (COMPAZINE) injection 5 mg (5 mg Intravenous Given 1/8/25 1921)   lidocaine (XYLOCAINE) 2% injection  - ADS Override Pull (has no administration in time  range)   oxyCODONE (ROXICODONE) immediate release tablet 10 mg (has no administration in time range)   ondansetron ODT (ZOFRAN-ODT) disintegrating tablet 4 mg (has no administration in time range)   iopamidol (ISOVUE-370) 76 % injection 100 mL (100 mL Intravenous Given 1/7/25 1857)   lactated ringers bolus 1,000 mL (0 mL Intravenous Stopped 1/8/25 0034)   HYDROmorphone (DILAUDID) injection 1 mg (1 mg Intravenous Given 1/7/25 2205)   ondansetron (ZOFRAN) injection 4 mg (4 mg Intravenous Given 1/7/25 2206)   Midazolam HCl (PF) (VERSED) injection (1 mg Intravenous Given 1/8/25 1602)   fentaNYL citrate (PF) (SUBLIMAZE) injection (50 mcg Intravenous Given 1/8/25 1606)   HYDROmorphone (DILAUDID) injection 0.5 mg (0.5 mg Intravenous Given 1/9/25 0424)        ED Course:    The patient was initially evaluated in the triage area where orders were placed. The patient was later dispositioned by Lew Whittington DO.      The patient was advised to stay for completion of workup which includes but is not limited to communication of labs and radiological results, reassessment and plan. The patient was advised that leaving prior to disposition by a provider could result in critical findings that are not communicated to the patient.          Labs:    Lab Results (last 24 hours)       Procedure Component Value Units Date/Time    Tissue Pathology Exam [[id number]] Collected: 01/08/25 1625    Specimen: Tissue from Liver Updated: 01/09/25 0740    Non-gynecologic Cytology [[id number]] Collected: 01/08/25 1625    Specimen: Aspirate from Liver Updated: 01/09/25 0949    CBC & Differential [[id number]]  (Abnormal) Collected: 01/09/25 0533    Specimen: Blood from Arm, Right Updated: 01/09/25 0618    Narrative:      The following orders were created for panel order CBC & Differential.  Procedure                               Abnormality         Status                     ---------                               -----------         ------                      CBC Auto Differential[[id number]]        Abnormal            Final result                 Please view results for these tests on the individual orders.    Magnesium [[id number]]  (Normal) Collected: 01/09/25 0533    Specimen: Blood from Arm, Right Updated: 01/09/25 0628     Magnesium 1.7 mg/dL     Comprehensive Metabolic Panel [[id number]]  (Abnormal) Collected: 01/09/25 0533    Specimen: Blood from Arm, Right Updated: 01/09/25 0628     Glucose 154 mg/dL      BUN 11 mg/dL      Creatinine 0.53 mg/dL      Sodium 136 mmol/L      Potassium 3.3 mmol/L      Chloride 100 mmol/L      CO2 24.6 mmol/L      Calcium 9.9 mg/dL      Total Protein 6.9 g/dL      Albumin 3.6 g/dL      ALT (SGPT) 13 U/L      AST (SGOT) 19 U/L      Alkaline Phosphatase 73 U/L      Total Bilirubin 0.4 mg/dL      Globulin 3.3 gm/dL      A/G Ratio 1.1 g/dL      BUN/Creatinine Ratio 20.8     Anion Gap 11.4 mmol/L      eGFR 107.4 mL/min/1.73     Narrative:      GFR Categories in Chronic Kidney Disease (CKD)      GFR Category          GFR (mL/min/1.73)    Interpretation  G1                     90 or greater         Normal or high (1)  G2                      60-89                Mild decrease (1)  G3a                   45-59                Mild to moderate decrease  G3b                   30-44                Moderate to severe decrease  G4                    15-29                Severe decrease  G5                    14 or less           Kidney failure          (1)In the absence of evidence of kidney disease, neither GFR category G1 or G2 fulfill the criteria for CKD.    eGFR calculation 2021 CKD-EPI creatinine equation, which does not include race as a factor    CBC Auto Differential [[id number]]  (Abnormal) Collected: 01/09/25 0533    Specimen: Blood from Arm, Right Updated: 01/09/25 0618     WBC 14.79 10*3/mm3      RBC 3.94 10*6/mm3      Hemoglobin 10.6 g/dL      Hematocrit 33.4 %      MCV 84.8 fL      MCH 26.9 pg      MCHC 31.7 g/dL      RDW 12.1  %      RDW-SD 37.1 fl      MPV 10.4 fL      Platelets 294 10*3/mm3      Neutrophil % 83.9 %      Lymphocyte % 6.2 %      Monocyte % 8.9 %      Eosinophil % 0.1 %      Basophil % 0.3 %      Immature Grans % 0.6 %      Neutrophils, Absolute 12.43 10*3/mm3      Lymphocytes, Absolute 0.91 10*3/mm3      Monocytes, Absolute 1.31 10*3/mm3      Eosinophils, Absolute 0.01 10*3/mm3      Basophils, Absolute 0.04 10*3/mm3      Immature Grans, Absolute 0.09 10*3/mm3      nRBC 0.0 /100 WBC              Imaging:    XR Chest 1 View    Result Date: 1/8/2025  XR CHEST 1 VW Date of Exam: 1/8/2025 5:58 PM EST Indication: s/p liver lesion biopsy , next to diaphragm Comparison: Chest radiograph 1/25/2023. Chest CT scan and CT-guided liver biopsy images 1/8/2025 Findings: Left-sided dual-lead pacemaker is noted. The heart mediastinum and pulmonary vasculature appear within normal limits. Lungs appear normally inflated and clear. In particular no effusion or pneumothorax is seen. There is a very faint, thin linear lucency associated with the dome of the right hemidiaphragm that may represent a trace amount of postprocedural air, expected for recent biopsy. Mild levoconvex thoracolumbar scoliosis is incidentally noted.     Impression: No evidence of postprocedure pneumothorax, pleural effusion, or other active chest disease. Electronically Signed: Akira Padilla MD  1/8/2025 6:32 PM EST  Workstation ID: [id number]    CT Needle Biopsy Liver    Result Date: 1/8/2025  CT NEEDLE BIOPSY LIVER Date of Exam: 1/8/2025 4:14 PM EST Indication: Suspected ovarian carcinoma. Comparison: None available. Consent: The risk benefits and alternatives of the procedure were explained to the patient. The chief risks discussed were bleeding, infection and injury to the liver or other adjacent structures. The patient indicated they understood what was discussed and elected to proceed. The patient provided written consent. Findings:  The patient was placed on the CT in  a right posterior oblique position and a preliminary CT was performed. The skin overlying the right hepatic lobe was prepped and draped in normal sterile fashion. 1mg versed and 50 micrograms of fentanyl was given for conscious sedation Total length of conscious sedation time was 25 minutes.  2% lidocaine was injected along the anticipated tract of the needle. A 17 gauge introducer needle was advanced into the right hepatic lobe under CT fluoroscopic guidance. 2 core biopsies were then performed with an 18-gauge core needle device. Cystic type fluid was expressed. Adequate tissue was suggested per preliminary cytopathology. One additional 18-gauge core sample obtained.  Gelfoam slurry was used for hemostasis.  Post procedure CT demonstrated no immediate complications. There is gas noted adjacent to the liver due to biopsy and Gelfoam.     Impression: 1.Successful CT-guided percutaneous core needle biopsy of right lobe liver lesion with no immediate complication. Due to proximity to the diaphragm, chest x-ray will be obtained in 2 hours. Electronically Signed: Silvestre Tubbs MD  1/8/2025 5:03 PM EST  Workstation ID: [id number]       Differential Diagnosis and Discussion:      Abdominal Pain: Based on the patient's signs and symptoms, I considered abdominal aortic aneurysm, small bowel obstruction, pancreatitis, acute cholecystitis, acute appendecitis, peptic ulcer disease, gastritis, colitis, endocrine disorders, irritable bowel syndrome and other differential diagnosis an etiology of the patient's abdominal pain.    PROCEDURES:    Labs were collected in the emergency department and all labs were reviewed and interpreted by me.  X-ray were performed in the emergency department and all X-ray impressions were independently interpreted by me.    No orders to display        Procedures    MDM                   Patient Care Considerations:    MRI: I considered ordering an MRI however this can be performed as an  inpatient      Consultants/Shared Management Plan:    Hospitalist: I have discussed the case with hospitalist who agrees to accept the patient for admission.    Social Determinants of Health:    Patient is unable to carry out activities of daily life. Escalation of care is necessary.       Disposition and Care Coordination:    Admit:   Through independent evaluation of the patient's history, physical, and imperical data, the patient meets criteria for inpatient admission to the hospital.        Final diagnoses:   Malignant neoplasm of ovary, unspecified laterality   Intractable pain        ED Disposition       ED Disposition   Decision to Admit    Condition   --    Comment   Level of Care: Remote Telemetry [26]   Diagnosis: Pelvic mass [202955]   Admitting Physician: ANIRUDH STEPHENS [[id number]]   Attending Physician: ANIRUDH STEPHENS [[id number]]                 This medical record created using voice recognition software.             Lew Whittington,   01/09/25 1451

## 2025-01-08 ENCOUNTER — APPOINTMENT (OUTPATIENT)
Dept: CT IMAGING | Facility: HOSPITAL | Age: 60
DRG: 755 | End: 2025-01-08
Payer: COMMERCIAL

## 2025-01-08 ENCOUNTER — APPOINTMENT (OUTPATIENT)
Dept: GENERAL RADIOLOGY | Facility: HOSPITAL | Age: 60
DRG: 755 | End: 2025-01-08
Payer: COMMERCIAL

## 2025-01-08 LAB
ANION GAP SERPL CALCULATED.3IONS-SCNC: 10 MMOL/L (ref 5–15)
APTT PPP: 27.4 SECONDS (ref 24.2–34.2)
BASOPHILS # BLD AUTO: 0.05 10*3/MM3 (ref 0–0.2)
BASOPHILS NFR BLD AUTO: 0.6 % (ref 0–1.5)
BUN SERPL-MCNC: 10 MG/DL (ref 6–20)
BUN/CREAT SERPL: 17.2 (ref 7–25)
CALCIUM SPEC-SCNC: 9.7 MG/DL (ref 8.6–10.5)
CANCER AG125 SERPL QL: 1491 U/ML (ref 0–38.1)
CHLORIDE SERPL-SCNC: 100 MMOL/L (ref 98–107)
CO2 SERPL-SCNC: 27 MMOL/L (ref 22–29)
CREAT SERPL-MCNC: 0.58 MG/DL (ref 0.57–1)
DEPRECATED RDW RBC AUTO: 36.4 FL (ref 37–54)
EGFRCR SERPLBLD CKD-EPI 2021: 105 ML/MIN/1.73
EOSINOPHIL # BLD AUTO: 0.01 10*3/MM3 (ref 0–0.4)
EOSINOPHIL NFR BLD AUTO: 0.1 % (ref 0.3–6.2)
ERYTHROCYTE [DISTWIDTH] IN BLOOD BY AUTOMATED COUNT: 11.9 % (ref 12.3–15.4)
GLUCOSE SERPL-MCNC: 128 MG/DL (ref 65–99)
HCT VFR BLD AUTO: 32.5 % (ref 34–46.6)
HGB BLD-MCNC: 10.4 G/DL (ref 12–15.9)
IMM GRANULOCYTES # BLD AUTO: 0.04 10*3/MM3 (ref 0–0.05)
IMM GRANULOCYTES NFR BLD AUTO: 0.5 % (ref 0–0.5)
INR PPP: 1.28 (ref 0.86–1.15)
LYMPHOCYTES # BLD AUTO: 1.64 10*3/MM3 (ref 0.7–3.1)
LYMPHOCYTES NFR BLD AUTO: 18.9 % (ref 19.6–45.3)
MAGNESIUM SERPL-MCNC: 1.8 MG/DL (ref 1.6–2.6)
MAGNESIUM SERPL-MCNC: 1.8 MG/DL (ref 1.6–2.6)
MCH RBC QN AUTO: 27 PG (ref 26.6–33)
MCHC RBC AUTO-ENTMCNC: 32 G/DL (ref 31.5–35.7)
MCV RBC AUTO: 84.4 FL (ref 79–97)
MONOCYTES # BLD AUTO: 0.97 10*3/MM3 (ref 0.1–0.9)
MONOCYTES NFR BLD AUTO: 11.2 % (ref 5–12)
NEUTROPHILS NFR BLD AUTO: 5.97 10*3/MM3 (ref 1.7–7)
NEUTROPHILS NFR BLD AUTO: 68.7 % (ref 42.7–76)
NRBC BLD AUTO-RTO: 0 /100 WBC (ref 0–0.2)
PLATELET # BLD AUTO: 329 10*3/MM3 (ref 140–450)
PMV BLD AUTO: 10.2 FL (ref 6–12)
POTASSIUM SERPL-SCNC: 3.1 MMOL/L (ref 3.5–5.2)
PROTHROMBIN TIME: 16.3 SECONDS (ref 11.8–14.9)
RBC # BLD AUTO: 3.85 10*6/MM3 (ref 3.77–5.28)
SODIUM SERPL-SCNC: 137 MMOL/L (ref 136–145)
WBC NRBC COR # BLD AUTO: 8.68 10*3/MM3 (ref 3.4–10.8)

## 2025-01-08 PROCEDURE — 88342 IMHCHEM/IMCYTCHM 1ST ANTB: CPT | Performed by: INTERNAL MEDICINE

## 2025-01-08 PROCEDURE — 0TB03ZX EXCISION OF RIGHT KIDNEY, PERCUTANEOUS APPROACH, DIAGNOSTIC: ICD-10-PCS | Performed by: RADIOLOGY

## 2025-01-08 PROCEDURE — 97161 PT EVAL LOW COMPLEX 20 MIN: CPT

## 2025-01-08 PROCEDURE — 77012 CT SCAN FOR NEEDLE BIOPSY: CPT

## 2025-01-08 PROCEDURE — 86304 IMMUNOASSAY TUMOR CA 125: CPT | Performed by: INTERNAL MEDICINE

## 2025-01-08 PROCEDURE — 71045 X-RAY EXAM CHEST 1 VIEW: CPT

## 2025-01-08 PROCEDURE — 25010000002 FENTANYL CITRATE (PF) 50 MCG/ML SOLUTION: Performed by: RADIOLOGY

## 2025-01-08 PROCEDURE — 85730 THROMBOPLASTIN TIME PARTIAL: CPT | Performed by: RADIOLOGY

## 2025-01-08 PROCEDURE — 25010000002 PROCHLORPERAZINE 10 MG/2ML SOLUTION: Performed by: STUDENT IN AN ORGANIZED HEALTH CARE EDUCATION/TRAINING PROGRAM

## 2025-01-08 PROCEDURE — 88307 TISSUE EXAM BY PATHOLOGIST: CPT | Performed by: INTERNAL MEDICINE

## 2025-01-08 PROCEDURE — 88305 TISSUE EXAM BY PATHOLOGIST: CPT | Performed by: INTERNAL MEDICINE

## 2025-01-08 PROCEDURE — 80048 BASIC METABOLIC PNL TOTAL CA: CPT | Performed by: STUDENT IN AN ORGANIZED HEALTH CARE EDUCATION/TRAINING PROGRAM

## 2025-01-08 PROCEDURE — G0378 HOSPITAL OBSERVATION PER HR: HCPCS

## 2025-01-08 PROCEDURE — 88341 IMHCHEM/IMCYTCHM EA ADD ANTB: CPT | Performed by: INTERNAL MEDICINE

## 2025-01-08 PROCEDURE — 83735 ASSAY OF MAGNESIUM: CPT | Performed by: STUDENT IN AN ORGANIZED HEALTH CARE EDUCATION/TRAINING PROGRAM

## 2025-01-08 PROCEDURE — 71250 CT THORAX DX C-: CPT

## 2025-01-08 PROCEDURE — 25810000003 LACTATED RINGERS PER 1000 ML: Performed by: STUDENT IN AN ORGANIZED HEALTH CARE EDUCATION/TRAINING PROGRAM

## 2025-01-08 PROCEDURE — 99232 SBSQ HOSP IP/OBS MODERATE 35: CPT | Performed by: INTERNAL MEDICINE

## 2025-01-08 PROCEDURE — 99222 1ST HOSP IP/OBS MODERATE 55: CPT | Performed by: INTERNAL MEDICINE

## 2025-01-08 PROCEDURE — 85025 COMPLETE CBC W/AUTO DIFF WBC: CPT | Performed by: STUDENT IN AN ORGANIZED HEALTH CARE EDUCATION/TRAINING PROGRAM

## 2025-01-08 PROCEDURE — 25010000002 MORPHINE PER 10 MG: Performed by: STUDENT IN AN ORGANIZED HEALTH CARE EDUCATION/TRAINING PROGRAM

## 2025-01-08 PROCEDURE — 88173 CYTOPATH EVAL FNA REPORT: CPT | Performed by: INTERNAL MEDICINE

## 2025-01-08 PROCEDURE — 25010000002 MIDAZOLAM PER 1MG: Performed by: RADIOLOGY

## 2025-01-08 PROCEDURE — 85610 PROTHROMBIN TIME: CPT | Performed by: RADIOLOGY

## 2025-01-08 RX ORDER — SODIUM CHLORIDE 9 MG/ML
40 INJECTION, SOLUTION INTRAVENOUS AS NEEDED
Status: DISCONTINUED | OUTPATIENT
Start: 2025-01-08 | End: 2025-01-11 | Stop reason: HOSPADM

## 2025-01-08 RX ORDER — SODIUM CHLORIDE, SODIUM LACTATE, POTASSIUM CHLORIDE, CALCIUM CHLORIDE 600; 310; 30; 20 MG/100ML; MG/100ML; MG/100ML; MG/100ML
100 INJECTION, SOLUTION INTRAVENOUS CONTINUOUS
Status: ACTIVE | OUTPATIENT
Start: 2025-01-08 | End: 2025-01-08

## 2025-01-08 RX ORDER — ENOXAPARIN SODIUM 100 MG/ML
40 INJECTION SUBCUTANEOUS DAILY
Status: DISCONTINUED | OUTPATIENT
Start: 2025-01-09 | End: 2025-01-11 | Stop reason: HOSPADM

## 2025-01-08 RX ORDER — ALUMINA, MAGNESIA, AND SIMETHICONE 2400; 2400; 240 MG/30ML; MG/30ML; MG/30ML
15 SUSPENSION ORAL EVERY 6 HOURS PRN
Status: DISCONTINUED | OUTPATIENT
Start: 2025-01-08 | End: 2025-01-11 | Stop reason: HOSPADM

## 2025-01-08 RX ORDER — SODIUM CHLORIDE 0.9 % (FLUSH) 0.9 %
10 SYRINGE (ML) INJECTION AS NEEDED
Status: DISCONTINUED | OUTPATIENT
Start: 2025-01-08 | End: 2025-01-11 | Stop reason: HOSPADM

## 2025-01-08 RX ORDER — SODIUM CHLORIDE 0.9 % (FLUSH) 0.9 %
10 SYRINGE (ML) INJECTION EVERY 12 HOURS SCHEDULED
Status: DISCONTINUED | OUTPATIENT
Start: 2025-01-08 | End: 2025-01-11 | Stop reason: HOSPADM

## 2025-01-08 RX ORDER — MIDAZOLAM HYDROCHLORIDE 2 MG/2ML
INJECTION, SOLUTION INTRAMUSCULAR; INTRAVENOUS AS NEEDED
Status: COMPLETED | OUTPATIENT
Start: 2025-01-08 | End: 2025-01-08

## 2025-01-08 RX ORDER — OXYCODONE HYDROCHLORIDE 5 MG/1
5 TABLET ORAL EVERY 6 HOURS PRN
Status: DISCONTINUED | OUTPATIENT
Start: 2025-01-08 | End: 2025-01-09

## 2025-01-08 RX ORDER — LIDOCAINE HYDROCHLORIDE 20 MG/ML
INJECTION, SOLUTION INFILTRATION; PERINEURAL
Status: DISPENSED
Start: 2025-01-08 | End: 2025-01-09

## 2025-01-08 RX ORDER — BISACODYL 5 MG/1
5 TABLET, DELAYED RELEASE ORAL DAILY PRN
Status: DISCONTINUED | OUTPATIENT
Start: 2025-01-08 | End: 2025-01-11 | Stop reason: HOSPADM

## 2025-01-08 RX ORDER — MORPHINE SULFATE 2 MG/ML
2 INJECTION, SOLUTION INTRAMUSCULAR; INTRAVENOUS EVERY 4 HOURS PRN
Status: DISCONTINUED | OUTPATIENT
Start: 2025-01-08 | End: 2025-01-09

## 2025-01-08 RX ORDER — POLYETHYLENE GLYCOL 3350 17 G/17G
17 POWDER, FOR SOLUTION ORAL DAILY PRN
Status: DISCONTINUED | OUTPATIENT
Start: 2025-01-08 | End: 2025-01-11 | Stop reason: HOSPADM

## 2025-01-08 RX ORDER — ACETAMINOPHEN 500 MG
1000 TABLET ORAL EVERY 8 HOURS
Status: DISCONTINUED | OUTPATIENT
Start: 2025-01-08 | End: 2025-01-10

## 2025-01-08 RX ORDER — BISACODYL 10 MG
10 SUPPOSITORY, RECTAL RECTAL DAILY PRN
Status: DISCONTINUED | OUTPATIENT
Start: 2025-01-08 | End: 2025-01-11 | Stop reason: HOSPADM

## 2025-01-08 RX ORDER — PROCHLORPERAZINE EDISYLATE 5 MG/ML
5 INJECTION INTRAMUSCULAR; INTRAVENOUS EVERY 6 HOURS PRN
Status: DISCONTINUED | OUTPATIENT
Start: 2025-01-08 | End: 2025-01-11 | Stop reason: HOSPADM

## 2025-01-08 RX ORDER — AMOXICILLIN 250 MG
2 CAPSULE ORAL 2 TIMES DAILY
Status: DISCONTINUED | OUTPATIENT
Start: 2025-01-08 | End: 2025-01-11 | Stop reason: HOSPADM

## 2025-01-08 RX ORDER — FENTANYL CITRATE 50 UG/ML
INJECTION, SOLUTION INTRAMUSCULAR; INTRAVENOUS AS NEEDED
Status: COMPLETED | OUTPATIENT
Start: 2025-01-08 | End: 2025-01-08

## 2025-01-08 RX ADMIN — PROCHLORPERAZINE EDISYLATE 5 MG: 5 INJECTION INTRAMUSCULAR; INTRAVENOUS at 07:15

## 2025-01-08 RX ADMIN — Medication 10 ML: at 13:10

## 2025-01-08 RX ADMIN — SODIUM CHLORIDE, POTASSIUM CHLORIDE, SODIUM LACTATE AND CALCIUM CHLORIDE 100 ML/HR: 600; 310; 30; 20 INJECTION, SOLUTION INTRAVENOUS at 01:52

## 2025-01-08 RX ADMIN — PROCHLORPERAZINE EDISYLATE 5 MG: 5 INJECTION INTRAMUSCULAR; INTRAVENOUS at 19:21

## 2025-01-08 RX ADMIN — FENTANYL CITRATE 50 MCG: 50 INJECTION, SOLUTION INTRAMUSCULAR; INTRAVENOUS at 16:06

## 2025-01-08 RX ADMIN — Medication 10 ML: at 02:05

## 2025-01-08 RX ADMIN — Medication 10 ML: at 01:53

## 2025-01-08 RX ADMIN — Medication 10 ML: at 07:17

## 2025-01-08 RX ADMIN — MORPHINE SULFATE 2 MG: 2 INJECTION, SOLUTION INTRAMUSCULAR; INTRAVENOUS at 23:26

## 2025-01-08 RX ADMIN — MIDAZOLAM HYDROCHLORIDE 1 MG: 1 INJECTION, SOLUTION INTRAMUSCULAR; INTRAVENOUS at 16:02

## 2025-01-08 RX ADMIN — MORPHINE SULFATE 2 MG: 2 INJECTION, SOLUTION INTRAMUSCULAR; INTRAVENOUS at 02:17

## 2025-01-08 RX ADMIN — Medication 10 ML: at 23:27

## 2025-01-08 RX ADMIN — MORPHINE SULFATE 2 MG: 2 INJECTION, SOLUTION INTRAMUSCULAR; INTRAVENOUS at 07:21

## 2025-01-08 RX ADMIN — PROCHLORPERAZINE EDISYLATE 5 MG: 5 INJECTION INTRAMUSCULAR; INTRAVENOUS at 13:00

## 2025-01-08 NOTE — THERAPY EVALUATION
Acute Care - Physical Therapy Initial Evaluation   [facility]     Patient Name: Madina Reddy  : 1965  MRN: [id number]  Today's Date: 2025      Visit Dx:     ICD-10-CM ICD-9-CM   1. Malignant neoplasm of ovary, unspecified laterality  C56.9 183.0   2. Intractable pain  R52 780.96   3. Difficulty walking  R26.2 719.7     Patient Active Problem List   Diagnosis    Family history of breast cancer in mother    Seasonal allergic rhinitis due to pollen    Sick sinus syndrome    ICD (implantable cardioverter-defibrillator) in place    Presence of cardiac pacemaker    Annual physical exam    Essential hypertension    Chronic pain of both knees    Dry mouth    Family history of Sjogren's disease    Family history of rheumatoid arthritis    Hyperlipidemia    Sinus pressure    Allergic rhinitis    Pelvic mass     Past Medical History:   Diagnosis Date    Allergies     Dizziness Madina    Headache Madina    Hyperlipidemia March    Hypertension Madina    Pacemaker     Pneumonia 2023    TMJ dysfunction Madina     Past Surgical History:   Procedure Laterality Date    BREAST BIOPSY      CARDIAC ELECTROPHYSIOLOGY PROCEDURE N/A 2023    Procedure: Device Implant;  Surgeon: VINCE Alejandre MD;  Location: [facility] INVASIVE LOCATION;  Service: Cardiology;  Laterality: N/A;    CARDIAC SURGERY      heart surgery as an infant    COLONOSCOPY  2017    PACEMAKER IMPLANTATION       PT Assessment (Last 12 Hours)       PT Evaluation and Treatment       Row Name 25 1400          Physical Therapy Time and Intention    Subjective Information complains of;weakness;fatigue  -AV     Document Type evaluation  -AV     Mode of Treatment individual therapy;physical therapy  -AV       Row Name 25 1400          General Information    Patient Profile Reviewed yes  -AV     Patient Observations alert;cooperative;agree to therapy  -AV     Prior Level of Function --  Typically (I) with ADLs, some difficulty  showering due to decreased endurance. Ambulated without an assistive device. No home O2.  -AV     Equipment Currently Used at Home none  -AV     Existing Precautions/Restrictions fall  -AV       Row Name 01/08/25 1400          Living Environment    Current Living Arrangements home  -AV     Home Accessibility stairs within home  -AV     People in Home spouse  -AV       Row Name 01/08/25 1400          Stairs Within Home, Primary    Stairs, Within Home, Primary Flight to basement  -AV       Row Name 01/08/25 1400          Cognition    Orientation Status (Cognition) oriented x 3  -AV       Row Name 01/08/25 1400          Range of Motion (ROM)    Range of Motion bilateral lower extremities;ROM is WFL  -AV       Row Name 01/08/25 1400          Strength (Manual Muscle Testing)    Strength (Manual Muscle Testing) bilateral lower extremities;strength is WFL  -AV       Row Name 01/08/25 1400          Bed Mobility    Bed Mobility supine-sit;sit-supine  -AV     Supine-Sit [place] (Bed Mobility) standby assist  -AV     Sit-Supine [place] (Bed Mobility) standby assist  -AV       Row Name 01/08/25 1400          Transfers    Transfers sit-stand transfer;stand-sit transfer  -AV       Row Name 01/08/25 1400          Sit-Stand Transfer    Sit-Stand [place] (Transfers) standby assist  -AV     Assistive Device (Sit-Stand Transfers) --  No AD  -AV       Row Name 01/08/25 1400          Stand-Sit Transfer    Stand-Sit [place] (Transfers) standby assist  -AV     Assistive Device (Stand-Sit Transfers) --  No AD  -AV       Row Name 01/08/25 1400          Gait/Stairs (Locomotion)    Gait/Stairs Locomotion gait/ambulation independence;gait/ambulation assistive device;distance ambulated  -AV     [place] Level (Gait) contact guard;standby assist  -AV     Distance in Feet (Gait) 50  -AV       Row Name 01/08/25 1400          Safety Issues/Impairments Affecting Functional Mobility    Impairments Affecting Function (Mobility)  balance;endurance/activity tolerance;pain  -AV       Row Name 01/08/25 1400          Balance    Balance Assessment standing dynamic balance  -AV     Dynamic Standing Balance contact guard;standby assist  -AV     Position/Device Used, Standing Balance unsupported  -AV       Row Name 01/08/25 1400          Plan of Care Review    Plan of Care Reviewed With patient  -AV     Progress no change  -AV     Outcome Evaluation Patient presents with deficits in balance, endurance, transfers, and ambulation. Patient will benefit from skilled PT services to address these mobility deficits and decrease risk of falls.  -AV       Row Name 01/08/25 1400          Positioning and Restraints    Pre-Treatment Position in bed  -AV     Post Treatment Position bed  -AV     In Bed call light within reach;encouraged to call for assist;supine  No alarms active upon entry  -AV       Row Name 01/08/25 1400          Therapy Assessment/Plan (PT)    Rehab Potential (PT) good  -AV     Criteria for Skilled Interventions Met (PT) yes;meets criteria  -AV     Therapy Frequency (PT) daily  -AV     Predicted Duration of Therapy Intervention (PT) 10 days  -AV     Problem List (PT) problems related to;balance;mobility;pain  -AV     Activity Limitations Related to Problem List (PT) unable to transfer safely;unable to ambulate safely  -AV       Row Name 01/08/25 1400          PT Evaluation Complexity    History, PT Evaluation Complexity 1-2 personal factors and/or comorbidities  -AV     Examination of Body Systems (PT Eval Complexity) total of 4 or more elements  -AV     Clinical Presentation (PT Evaluation Complexity) stable  -AV     Clinical Decision Making (PT Evaluation Complexity) low complexity  -AV     Overall Complexity (PT Evaluation Complexity) low complexity  -AV       Row Name 01/08/25 1400          Therapy Plan Review/Discharge Plan (PT)    Therapy Plan Review (PT) evaluation/treatment results reviewed;patient  -AV       Row Name 01/08/25 1400           Physical Therapy Goals    Transfer Goal Selection (PT) transfer, PT goal 1  -AV     Gait Training Goal Selection (PT) gait training, PT goal 1  -AV       Row Name 01/08/25 1400          Transfer Goal 1 (PT)    Activity/Assistive Device (Transfer Goal 1, PT) sit-to-stand/stand-to-sit;bed-to-chair/chair-to-bed  -AV     [place] Level/Cues Needed (Transfer Goal 1, PT) independent  -AV     Time Frame (Transfer Goal 1, PT) 10 days  -AV       Row Name 01/08/25 1400          Gait Training Goal 1 (PT)    Activity/Assistive Device (Gait Training Goal 1, PT) gait (walking locomotion)  -AV     [place] Level (Gait Training Goal 1, PT) independent  -AV     Distance (Gait Training Goal 1, PT) 200  -AV     Time Frame (Gait Training Goal 1, PT) 10 days  -AV               User Key  (r) = Recorded By, (t) = Taken By, (c) = Cosigned By      Initials Name Provider Type    AV Siddharth Ricci, PT Physical Therapist                    Physical Therapy Education       Title: PT OT SLP Therapies (In Progress)       Topic: Physical Therapy (In Progress)       Point: Mobility training (Done)       Learning Progress Summary            Patient Acceptance, E,TB, VU by AV at 1/8/2025 1435                      Point: Home exercise program (Not Started)       Learner Progress:  Not documented in this visit.              Point: Body mechanics (Done)       Learning Progress Summary            Patient Acceptance, E,TB, VU by AV at 1/8/2025 1435                      Point: Precautions (Done)       Learning Progress Summary            Patient Acceptance, E,TB, VU by AV at 1/8/2025 1435                                      User Key       Initials Effective Dates Name Provider Type Discipline     06/11/21 -  Siddharth Ricci, PT Physical Therapist PT                  PT Recommendation and Plan  Anticipated Discharge Disposition (PT): home with assist, home with home health  Planned Therapy Interventions (PT): balance training, bed  mobility training, gait training, home exercise program, neuromuscular re-education, strengthening, transfer training  Therapy Frequency (PT): daily  Plan of Care Reviewed With: patient  Progress: no change  Outcome Evaluation: Patient presents with deficits in balance, endurance, transfers, and ambulation. Patient will benefit from skilled PT services to address these mobility deficits and decrease risk of falls.   Outcome Measures       Row Name 01/08/25 1400             How much help from another person do you currently need...    Turning from your back to your side while in flat bed without using bedrails? 4  -AV      Moving from lying on back to sitting on the side of a flat bed without bedrails? 4  -AV      Moving to and from a bed to a chair (including a wheelchair)? 3  -AV      Standing up from a chair using your arms (e.g., wheelchair, bedside chair)? 4  -AV      Climbing 3-5 steps with a railing? 3  -AV      To walk in hospital room? 3  -AV      AM-PAC 6 Clicks Score (PT) 21  -AV         Functional Assessment    Outcome Measure Options AM-PAC 6 Clicks Basic Mobility (PT)  -AV                User Key  (r) = Recorded By, (t) = Taken By, (c) = Cosigned By      Initials Name Provider Type    AV Siddharth Ricci, PT Physical Therapist                     Time Calculation:    PT Charges       Row Name 01/08/25 1435             Time Calculation    PT Received On 01/08/25  -AV      PT Goal Re-Cert Due Date 01/17/25  -AV         Untimed Charges    PT Eval/Re-eval Minutes 18  -AV         Total Minutes    Untimed Charges Total Minutes 18  -AV       Total Minutes 18  -AV                User Key  (r) = Recorded By, (t) = Taken By, (c) = Cosigned By      Initials Name Provider Type    AV Siddharth Ricci, PT Physical Therapist                  Therapy Charges for Today       Code Description Service Date Service Provider Modifiers Qty    10853042280 HC PT EVAL LOW COMPLEXITY 2 1/8/2025 Siddharth Ricci, PT GP 1             PT G-Codes  Outcome Measure Options: AM-PAC 6 Clicks Basic Mobility (PT)  AM-PAC 6 Clicks Score (PT): 21    Siddharth Ricci, PT  1/8/2025

## 2025-01-08 NOTE — PAYOR COMM NOTE
"Madina Reddy (59 y.o. Female)       Date of Birth   1965    Social Security Number       Address   [address] [place], [place] [address]    Home Phone   [phone]    MRN   [id number]       Denominational   Quaker    Marital Status                               Admission Date   25    Admission Type   Emergency    Admitting Provider   Gus Lee MD    Attending Provider   Kavin Griffin MD    Department, Room/Bed   [address], 4028/1       Discharge Date       Discharge Disposition       Discharge Destination                                 Attending Provider: Kavin Griffin MD    Allergies: No Known Allergies    Isolation: None   Infection: None   Code Status: CPR    Ht: 160 cm (62.99\")   Wt: 64.2 kg (141 lb 8.6 oz)    Admission Cmt: None   Principal Problem: Pelvic mass [R19.00]                   Active Insurance as of 2025       Primary Coverage       Payor Plan Insurance Group Employer/Plan Group    [id number] [id number]       Payor Plan Address Payor Plan Phone Number Payor Plan Fax Number Effective Dates    PO [address] [id number] [phone]  2023 - None Entered    [place] [id number]         Subscriber Name Subscriber Birth Date Member ID       MADINA REDDY 1965 [id number]                     Emergency Contacts        (Rel.) Home Phone Work Phone Mobile Phone    JAVON REDDY (Spouse) -- -- [phone]    ERON DOWNEY (Son) -- -- [phone]                 History & Physical        Gus Lee MD at [address] [place] [address] HISTORY AND PHYSICAL  Date: 2025   Patient Name: Madina Reddy  : 1965  MRN: [id number]  Primary Care Physician:  Kady Vernon APRN  Date of admission: 2025    Subjective  Subjective     Chief Complaint: Abdominal pain    HPI:    Madina Reddy is a 59 y.o. female past medical history of hypertension, permanent pacemaker, hyperlipidemia and recent " diagnosis of pelvic mass who comes into the ER due to worsening pelvic pain.  Patient has been having pelvic pain for a few weeks now and is even been evaluated in the outpatient setting by [facility] surgeon who is planning to have biopsy done earlier today however her pain in the abdomen it acutely worsened to the point that she needed to be evaluated in the ER.      Upon arrival here she is tachycardic but otherwise stable.  Lab workup is unremarkable except for hypokalemia of 3.3 and bacteriuria.  CT down pelvis with contrast was done here and showed bilateral primary ovarian carcinoma until proven otherwise with extensive peritoneal carcinomatosis and mild bleeding ascites as well as a 1.4 x 2.2 cm enhancing lesion in the right lobe of the liver suspicious for metastatic disease as well as a possible metastatic lesion in the spleen.  Her oncologic surgeon at [facility] was notified and advised that patient be admitted to our facility for definitive biopsy with oncology evaluation done here as well.  Hospitalist was then contacted for admission    Personal History     Past Medical History:  Past Medical History:   Diagnosis Date    Allergies     Dizziness Madina    Headache Madina    Hyperlipidemia March    Hypertension Madina    Pacemaker     Pneumonia Jan 2023    TMJ dysfunction Madina         Past Surgical History:  Past Surgical History:   Procedure Laterality Date    BREAST BIOPSY      CARDIAC ELECTROPHYSIOLOGY PROCEDURE N/A 01/25/2023    Procedure: Device Implant;  Surgeon: VINCE Alejandre MD;  Location: [facility] INVASIVE LOCATION;  Service: Cardiology;  Laterality: N/A;    CARDIAC SURGERY      heart surgery as an infant    COLONOSCOPY  August 2017    PACEMAKER IMPLANTATION           Family History:   Reviewed and noncontributory except as mentioned in HPI    Social History:   Social Drivers of Health     Tobacco Use: Low Risk  (1/7/2025)    Patient History     Smoking Tobacco Use: Never      Smokeless Tobacco Use: Never     Passive Exposure: Never   Alcohol Use: Not At Risk (1/23/2023)    AUDIT-C     Frequency of Alcohol Consumption: Never     Average Number of Drinks: Patient does not drink     Frequency of Binge Drinking: Never   Financial Resource Strain: Not on file   Food Insecurity: No Food Insecurity (1/24/2023)    Hunger Vital Sign     Worried About Running Out of Food in the Last Year: Never true     Ran Out of Food in the Last Year: Never true   Transportation Needs: Not on file   Physical Activity: Insufficiently Active (1/24/2023)    Exercise Vital Sign     Days of Exercise per Week: 4 days     Minutes of Exercise per Session: 30 min   Stress: Not on file   Social Connections: Not on file   Interpersonal Safety: Not At Risk (1/7/2025)    Abuse Screen     Unsafe at Home or Work/School: no     Feels Threatened by Someone?: no     Does Anyone Keep You from Contacting Others or Doint Things Outside the Home?: no     Physical Sign of Abuse Present: no   Depression: Not at risk (3/25/2024)    PHQ-2     PHQ-2 Score: 0   Housing Stability: Unknown (1/26/2023)    Housing Stability     Current Living Arrangements: home     Potentially Unsafe Housing Conditions: Not on file   Utilities: Not on file   Health Literacy: Unknown (1/24/2023)    Education     Help with school or training?: Not on file     Preferred Language: English   Employment: Not on file   Disabilities: Not At Risk (1/23/2023)    Disabilities     Concentrating, Remembering, or Making Decisions Difficulty: no     Doing Errands Independently Difficulty: no         Home Medications:  Diclofenac Sodium, amLODIPine, aspirin, cetirizine, fluticasone, metoprolol succinate XL, and montelukast    Allergies:  No Known Allergies    Review of Systems   All systems were reviewed and negative except for: Pelvic pain    Objective  Objective     Vitals:   Temp:  [98 °F (36.7 °C)] 98 °F (36.7 °C)  Heart Rate:  [106] 106  Resp:  [18] 18  BP: (122)/(71)  122/71    Physical Exam    Constitutional: Awake, alert, no acute distress   Eyes: Pupils equal, sclerae anicteric, no conjunctival injection   HENT: NCAT, mucous membranes moist   Neck: Supple, no thyromegaly, no lymphadenopathy, trachea midline   Respiratory: Clear to auscultation bilaterally, nonlabored respirations    Cardiovascular: RRR, no murmurs, rubs, or gallops, palpable pedal pulses bilaterally   Gastrointestinal: Positive bowel sounds, soft, tender diffusely without rebound or guarding, nondistended   Musculoskeletal: No bilateral ankle edema, no clubbing or cyanosis to extremities   Psychiatric: Appropriate affect, cooperative   Neurologic: Oriented x 3, strength symmetric in all extremities, Cranial Nerves grossly intact to confrontation, speech clear   Skin: No rashes     Result Review   Result Review:  I have personally reviewed the results from the time of this admission to 1/7/2025 23:03 EST and agree with these findings:  []  Laboratory  []  Microbiology  []  Radiology  []  EKG/Telemetry   []  Cardiology/Vascular   []  Pathology  []  Old records  []  Other:      Assessment & Plan  Assessment / Plan     Assessment/Plan:   Suspected ovarian carcinoma with peritoneal carcinomatosis  Suspected cancer related pain  Hepatic lesion, suspected metastatic  Hypokalemia  Hypertension  Hyperlipidemia    Plan  - Place in observation on hospitalist service  - I will get oncology input on whether patient would benefit from biopsy while inpatient, also will get CT chest to assess for further metastatic spread. Will defer tumor marker testing to oncology.  Continue pain control, limit IV narcotics as much as possible.  I have started scheduled Tylenol  - Replace potassium, check magnesium  - Continue home BP medicines  - Continue home statin      Discussed with ER Physician and Nurse    All labs/imaging studies were personally reviewed and findings are as noted above      DVT Prophylaxis: Lovenox    CODE STATUS:     Code Status (Patient has no pulse and is not breathing): CPR (Attempt to Resuscitate)  Medical Interventions (Patient has pulse or is breathing): Full Support      Admission Status:  I believe this patient meets observation status.    Electronically signed by Gus Lee MD, 01/07/25, 10:56 PM EST.               Electronically signed by Gus Lee MD at 01/08/25 0409       Facility-Administered Medications as of 1/8/2025   Medication Dose Route Frequency Provider Last Rate Last Admin    acetaminophen (TYLENOL) tablet 1,000 mg  1,000 mg Oral Q8H Gus Lee MD        aluminum-magnesium hydroxide-simethicone (MAALOX MAX) 400-400-40 MG/5ML suspension 15 mL  15 mL Oral Q6H PRN Gus Lee MD        sennosides-docusate (PERICOLACE) 8.6-50 MG per tablet 2 tablet  2 tablet Oral BID Gus Lee MD        And    polyethylene glycol (MIRALAX) packet 17 g  17 g Oral Daily PRN Gus Lee MD        And    bisacodyl (DULCOLAX) EC tablet 5 mg  5 mg Oral Daily PRN Gus Lee MD        And    bisacodyl (DULCOLAX) suppository 10 mg  10 mg Rectal Daily PRN Gus Lee MD        [START ON 1/9/2025] Enoxaparin Sodium (LOVENOX) syringe 40 mg  40 mg Subcutaneous Daily Gus Lee MD        [COMPLETED] HYDROmorphone (DILAUDID) injection 1 mg  1 mg Intravenous Once Lew Whittington DO   1 mg at 01/07/25 2205    [COMPLETED] iopamidol (ISOVUE-370) 76 % injection 100 mL  100 mL Intravenous Once in imaging Lew Whittington DO   100 mL at 01/07/25 1857    [COMPLETED] lactated ringers bolus 1,000 mL  1,000 mL Intravenous Once Lew Whittington DO   Stopped at 01/08/25 0034    lactated ringers infusion  100 mL/hr Intravenous Continuous Gus Lee  mL/hr at 01/08/25 0756 100 mL/hr at 01/08/25 0756    melatonin tablet 5 mg  5 mg Oral Nightly PRN Gus Lee MD        morphine injection 2 mg  2 mg Intravenous Q4H PRN Gus Lee MD   2 mg at 01/08/25 0721    [COMPLETED] ondansetron (ZOFRAN) injection 4 mg  4 mg Intravenous  Once Lew Whittington, DO   4 mg at 01/07/25 2206    oxyCODONE (ROXICODONE) immediate release tablet 5 mg  5 mg Oral Q6H PRN Gus Lee MD        prochlorperazine (COMPAZINE) injection 5 mg  5 mg Intravenous Q6H PRN Gus Lee MD   5 mg at 01/08/25 0715    sodium chloride 0.9 % flush 10 mL  10 mL Intravenous PRN Gus Lee MD        sodium chloride 0.9 % flush 10 mL  10 mL Intravenous Q12H Gus Lee MD   10 mL at 01/08/25 0205    sodium chloride 0.9 % flush 10 mL  10 mL Intravenous PRN Gus Lee MD   10 mL at 01/08/25 0717    sodium chloride 0.9 % infusion 40 mL  40 mL Intravenous PRN Gus Lee MD         Lab Results (last 24 hours)       Procedure Component Value Units Date/Time     [[id number]] Collected: 01/08/25 0555    Specimen: Blood from Arm, Right Updated: 01/08/25 0811    Basic Metabolic Panel [[id number]]  (Abnormal) Collected: 01/08/25 0555    Specimen: Blood from Arm, Right Updated: 01/08/25 0638     Glucose 128 mg/dL      BUN 10 mg/dL      Creatinine 0.58 mg/dL      Sodium 137 mmol/L      Potassium 3.1 mmol/L      Chloride 100 mmol/L      CO2 27.0 mmol/L      Calcium 9.7 mg/dL      BUN/Creatinine Ratio 17.2     Anion Gap 10.0 mmol/L      eGFR 105.0 mL/min/1.73     Narrative:      GFR Categories in Chronic Kidney Disease (CKD)      GFR Category          GFR (mL/min/1.73)    Interpretation  G1                     90 or greater         Normal or high (1)  G2                      60-89                Mild decrease (1)  G3a                   45-59                Mild to moderate decrease  G3b                   30-44                Moderate to severe decrease  G4                    15-29                Severe decrease  G5                    14 or less           Kidney failure          (1)In the absence of evidence of kidney disease, neither GFR category G1 or G2 fulfill the criteria for CKD.    eGFR calculation 2021 CKD-EPI creatinine equation, which does not include race as a factor     Magnesium [[id number]]  (Normal) Collected: 01/08/25 0555    Specimen: Blood from Arm, Right Updated: 01/08/25 0638     Magnesium 1.8 mg/dL     CBC & Differential [[id number]]  (Abnormal) Collected: 01/08/25 0555    Specimen: Blood from Arm, Right Updated: 01/08/25 0614    Narrative:      The following orders were created for panel order CBC & Differential.  Procedure                               Abnormality         Status                     ---------                               -----------         ------                     CBC Auto Differential[[id number]]        Abnormal            Final result                 Please view results for these tests on the individual orders.    CBC Auto Differential [[id number]]  (Abnormal) Collected: 01/08/25 0555    Specimen: Blood from Arm, Right Updated: 01/08/25 0614     WBC 8.68 10*3/mm3      RBC 3.85 10*6/mm3      Hemoglobin 10.4 g/dL      Hematocrit 32.5 %      MCV 84.4 fL      MCH 27.0 pg      MCHC 32.0 g/dL      RDW 11.9 %      RDW-SD 36.4 fl      MPV 10.2 fL      Platelets 329 10*3/mm3      Neutrophil % 68.7 %      Lymphocyte % 18.9 %      Monocyte % 11.2 %      Eosinophil % 0.1 %      Basophil % 0.6 %      Immature Grans % 0.5 %      Neutrophils, Absolute 5.97 10*3/mm3      Lymphocytes, Absolute 1.64 10*3/mm3      Monocytes, Absolute 0.97 10*3/mm3      Eosinophils, Absolute 0.01 10*3/mm3      Basophils, Absolute 0.05 10*3/mm3      Immature Grans, Absolute 0.04 10*3/mm3      nRBC 0.0 /100 WBC     Magnesium [[id number]]  (Normal) Collected: 01/07/25 1800    Specimen: Blood Updated: 01/08/25 0113     Magnesium 1.8 mg/dL     Urinalysis, Microscopic Only - Urine, Clean Catch [[id number]]  (Abnormal) Collected: 01/07/25 1820    Specimen: Urine, Clean Catch Updated: 01/07/25 1901     RBC, UA None Seen /HPF      WBC, UA 6-10 /HPF      Bacteria, UA 3+ /HPF      Squamous Epithelial Cells, UA 3-6 /HPF      Hyaline Casts, UA 0-2 /LPF      Granular Casts, UA 0-2 /LPF      Methodology  Manual Light Microscopy    Comprehensive Metabolic Panel [[id number]]  (Abnormal) Collected: 01/07/25 1800    Specimen: Blood Updated: 01/07/25 1836     Glucose 115 mg/dL      BUN 13 mg/dL      Creatinine 0.66 mg/dL      Sodium 138 mmol/L      Potassium 3.3 mmol/L      Chloride 97 mmol/L      CO2 25.3 mmol/L      Calcium 10.5 mg/dL      Total Protein 7.7 g/dL      Albumin 4.0 g/dL      ALT (SGPT) 13 U/L      AST (SGOT) 19 U/L      Alkaline Phosphatase 81 U/L      Total Bilirubin 0.6 mg/dL      Globulin 3.7 gm/dL      A/G Ratio 1.1 g/dL      BUN/Creatinine Ratio 19.7     Anion Gap 15.7 mmol/L      eGFR 101.8 mL/min/1.73     Narrative:      GFR Categories in Chronic Kidney Disease (CKD)      GFR Category          GFR (mL/min/1.73)    Interpretation  G1                     90 or greater         Normal or high (1)  G2                      60-89                Mild decrease (1)  G3a                   45-59                Mild to moderate decrease  G3b                   30-44                Moderate to severe decrease  G4                    15-29                Severe decrease  G5                    14 or less           Kidney failure          (1)In the absence of evidence of kidney disease, neither GFR category G1 or G2 fulfill the criteria for CKD.    eGFR calculation 2021 CKD-EPI creatinine equation, which does not include race as a factor    Lipase [[id number]]  (Normal) Collected: 01/07/25 1800    Specimen: Blood Updated: 01/07/25 1836     Lipase 20 U/L     Urinalysis With Microscopic If Indicated (No Culture) - Urine, Clean Catch [[id number]]  (Abnormal) Collected: 01/07/25 1820    Specimen: Urine, Clean Catch Updated: 01/07/25 1833     Color, UA Yellow     Appearance, UA Turbid     pH, UA 6.5     Specific Gravity, UA 1.018     Glucose, UA Negative     Ketones, UA 40 mg/dL (2+)     Bilirubin, UA Negative     Blood, UA Negative     Protein, UA 30 mg/dL (1+)     Leuk Esterase, UA Small (1+)     Nitrite, UA Negative      Urobilinogen, UA 1.0 E.U./dL    Lactic Acid, Plasma [[id number]]  (Normal) Collected: 01/07/25 1800    Specimen: Blood Updated: 01/07/25 1833     Lactate 1.6 mmol/L     CBC & Differential [[id number]]  (Abnormal) Collected: 01/07/25 1800    Specimen: Blood Updated: 01/07/25 1815    Narrative:      The following orders were created for panel order CBC & Differential.  Procedure                               Abnormality         Status                     ---------                               -----------         ------                     CBC Auto Differential[[id number]]        Abnormal            Final result                 Please view results for these tests on the individual orders.    CBC Auto Differential [[id number]]  (Abnormal) Collected: 01/07/25 1800    Specimen: Blood Updated: 01/07/25 1815     WBC 9.78 10*3/mm3      RBC 4.17 10*6/mm3      Hemoglobin 11.3 g/dL      Hematocrit 34.9 %      MCV 83.7 fL      MCH 27.1 pg      MCHC 32.4 g/dL      RDW 12.1 %      RDW-SD 36.8 fl      MPV 10.4 fL      Platelets 388 10*3/mm3      Neutrophil % 69.5 %      Lymphocyte % 19.1 %      Monocyte % 10.0 %      Eosinophil % 0.4 %      Basophil % 0.6 %      Immature Grans % 0.4 %      Neutrophils, Absolute 6.79 10*3/mm3      Lymphocytes, Absolute 1.87 10*3/mm3      Monocytes, Absolute 0.98 10*3/mm3      Eosinophils, Absolute 0.04 10*3/mm3      Basophils, Absolute 0.06 10*3/mm3      Immature Grans, Absolute 0.04 10*3/mm3      nRBC 0.0 /100 WBC     [place] Draw [[id number]] Collected: 01/07/25 1800    Specimen: Blood Updated: 01/07/25 1814    Narrative:      The following orders were created for panel order [place] Draw.  Procedure                               Abnormality         Status                     ---------                               -----------         ------                     Green Top (Gel)[[id number]]                                  Final result               Lavender Top[[id number]]                                      Final result               Gold Top - SST[[id number]]                                   Final result               Light Blue Top[[id number]]                                   Final result                 Please view results for these tests on the individual orders.    Gold Top - SST [[id number]] Collected: 01/07/25 1800    Specimen: Blood Updated: 01/07/25 1814     Extra Tube Hold for add-ons.     Comment: Auto resulted.       Green Top (Gel) [[id number]] Collected: 01/07/25 1800    Specimen: Blood Updated: 01/07/25 1814     Extra Tube Hold for add-ons.     Comment: Auto resulted.       Light Blue Top [[id number]] Collected: 01/07/25 1800    Specimen: Blood Updated: 01/07/25 1814     Extra Tube Hold for add-ons.     Comment: Auto resulted       Lavender Top [[id number]] Collected: 01/07/25 1800    Specimen: Blood Updated: 01/07/25 1814     Extra Tube hold for add-on     Comment: Auto resulted             Imaging Results (Last 24 Hours)       Procedure Component Value Units Date/Time    CT Chest Without Contrast Diagnostic [[id number]] Collected: 01/08/25 0815     Updated: 01/08/25 0847    Narrative:      CT CHEST WO CONTRAST DIAGNOSTIC    Date of Exam: 1/8/2025 7:28 AM EST    Indication: assess for metastatic disease.    Comparison: CT abdomen pelvis January 7, 2025.    Technique: Axial CT images were obtained of the chest without contrast administration.  Reconstructed coronal and sagittal images were also obtained. Automated exposure control and iterative construction methods were used.      Findings:  Heart size appears within normal limits. There appears to be a trace amount of pericardial fluid, nonspecific.. Pacemaker is present. There is a right-sided aortic arch with apparent mirror image branching of the aortic arch vessels. There is minimal   calcific atherosclerosis of the thoracic aorta. Proximal descending thoracic aorta appears mildly enlarged estimated to measure up to 3 cm. There does not appear to be  significant enlargement of the ascending aorta on this exam.    There are 2 subcentimeter epicardial lymph nodes on axial series 2 images 108-110, appearing slightly more prominent than is typically seen, but not definitely enlarged by CT size criteria. No definite hilar adenopathy on this limited noncontrast exam.   Visualized mediastinal lymph nodes do not appear enlarged by CT size criteria. There also does not appear to be axillary or supraclavicular lymphadenopathy on this exam.    On the left, there are ovoid nodular densities seen along the posterior-medial pleural surface on series 2 image 85 and image 111 in the intercostal spaces between the medial 7-8 and 9-10 ribs. The larger area of nodularity on image 85 measures   approximately 3.4 x 1 cm in axial dimension and up to 1.5 cm craniocaudal on coronal image 92. Attenuation is approximately 15-20 Hounsfield units. No significant additional pleural or chest wall lesions are seen. No significant pleural effusion.    There is some motion in the left lower lung, somewhat limiting assessment. No significant focal pulmonary lesion is identified. No definite suspicious pulmonary infiltrate. No pneumothorax. There is mild central bronchial wall thickening.    Abdominal findings are better evaluated on prior contrast-enhanced CT. There is a small amount of ascites and evidence of peritoneal carcinomatosis, as before. Slightly exophytic hypoattenuating lesion at the medial right hepatic lobe on image 137 of   series 2, as before. Cholelithiasis. Excreted contrast is present in the renal collecting systems.    Mild pectus deformity. There are degenerative changes in the thoracic spine with mild left convex curvature of the upper thoracic spine. No definite aggressive osseous lesion.      Impression:      Impression:  1.Small amount of ascites, right hepatic lesion, and peritoneal carcinomatosis in the visualized upper abdomen, as seen on the prior CT of the abdomen  and pelvis.   2.Two ovoid nodular densities along the pleural surface in the posterior-medial left chest between the medial 7-8 and 9-10 left ribs. Given the findings in the abdomen, these findings are concerning for metastatic disease until proven otherwise.  3.Epicardial lymph nodes appear slightly more prominent than is typically seen, but are not definitely enlarged by CT size criteria. These could also indicate early metastatic disease considering the findings in the abdomen.  4.Right-sided aortic arch with mirror image branching of the aortic arch vessels.  5.Mild central bronchial wall thickening which could indicate acute or chronic bronchitis.  6.Small amount of ascites and evidence of peritoneal carcinomatosis, as before.  7.Cholelithiasis.            Electronically Signed: Douglas Conn    1/8/2025 8:45 AM EST    Workstation ID: [id number]    CT Abdomen Pelvis With Contrast [[id number]] Collected: 01/07/25 1930     Updated: 01/07/25 1949    Narrative:      CT ABDOMEN PELVIS W CONTRAST    Date of Exam: 1/7/2025 6:51 PM EST    Indication: Abdominal pain, history of an abdominal mass..    Comparison: KUB performed on 11/13/2024. Comparison is also made with a pelvic ultrasound from 12/2/2024.    Technique: Axial CT images were obtained of the abdomen and pelvis after the uneventful intravenous administration of iodinated contrast. Reconstructed coronal and sagittal images were also obtained. Automated exposure control and iterative construction   methods were used.      Findings:  The study is markedly abnormal. There are heterogeneous ill-defined mixed cystic/solid masses in the adnexal regions which compress and distort the uterus. This represents bilateral primary ovarian carcinoma until proven otherwise. There are multiple   enhancing peritoneal masses consistent with peritoneal carcinomatosis. The largest mass is located in the right aspect of the mid abdomen on image 91 of series 2 measuring 5.1 x 7.5 cm.  "There is mild ascites with enhancement of the peritoneum consistent   with malignant ascites. There is a enhancing lesion in the medial aspect of the posterior segment of the right lobe of the liver on image 49 of series 2. It measures 1.4 x 2.2 cm and is suspicious for hepatic metastatic disease. There is a calcified   gallstone. The adrenal glands and pancreas are normal. There may be a metastatic lesion in the spleen near the hilum on image 48 of series 2 measuring 2.0 x 1.3 cm. The kidneys are within range of normal. There is mass effect on the urinary bladder   secondary to the heterogeneous enhancing mixed cystic/solid pelvic masses. There are a few scattered atherosclerotic vascular calcifications. The lung bases are clear. There are no suspicious osteolytic or sclerotic lesions within the bony structures.   There are mild degenerative changes.      Impression:      Impression:  1.Bilateral primary ovarian carcinoma until proven otherwise.  2.Extensive peritoneal carcinomatosis. There is mild malignant ascites.  3.There is a 1.4 x 2.2 cm enhancing lesion in the right lobe of the liver suspicious for hepatic metastatic disease.  4.There may be a metastatic lesion in the spleen near the hilum.  5.Cholelithiasis.  6.Additional findings as noted above.        Electronically Signed: Max Hawley MD    1/7/2025 7:47 PM EST    Workstation ID: [id number]                   Orders (active)        Start     Ordered    01/09/25 0900  Enoxaparin Sodium (LOVENOX) syringe 40 mg  Daily         01/08/25 0105 01/08/25 0950  Obtain Informed Consent  Once         01/08/25 0949    01/08/25 0950  Tissue Pathology Exam  Once         01/08/25 0949    01/08/25 0950  Non-gynecologic Cytology  Once         01/08/25 0949    01/08/25 0900  sennosides-docusate (PERICOLACE) 8.6-50 MG per tablet 2 tablet  2 Times Daily        Placed in \"And\" Linked Group    01/08/25 0105 01/08/25 0855  CT Needle Biopsy Abdomen  1 Time Imaging         " 01/08/25 0855    01/08/25 0804    Once         01/08/25 0808    01/08/25 0800  Oral Care  2 Times Daily       01/08/25 0105    01/08/25 0600  Basic Metabolic Panel  Daily       01/08/25 0105    01/08/25 0600  CBC & Differential  Daily       01/08/25 0105    01/08/25 0600  Magnesium  Daily       01/08/25 0105    01/08/25 0503  NPO Diet NPO Type: Sips with Meds  Diet Effective Midnight         01/08/25 0502    01/08/25 0502  prochlorperazine (COMPAZINE) injection 5 mg  Every 6 Hours PRN         01/08/25 0502    01/08/25 0410  PT Consult: Eval & Treat Functional Mobility Below Baseline  Once        Comments: Reason Why PT Needed: decline in function since current condition over the last 2-3 weeks    01/08/25 0409    01/08/25 0400  Vital Signs  Every 4 Hours       01/08/25 0105    01/08/25 0334  Inpatient Case Management  Consult  Once        Provider:  (Not yet assigned)    01/08/25 0334    01/08/25 0334  Inpatient Nutrition Consult  Once        Provider:  (Not yet assigned)    01/08/25 0334    01/08/25 0200  sodium chloride 0.9 % flush 10 mL  Every 12 Hours Scheduled         01/08/25 0105    01/08/25 0200  lactated ringers infusion  Continuous         01/08/25 0105    01/08/25 0106  Weigh Patient  Once         01/08/25 0105    01/08/25 0106  Remote Cardiac Monitor  Continuous         01/08/25 0105    01/08/25 0106  Maintain IV Access  Continuous         01/08/25 0105    01/08/25 0106  Hematology & Oncology Inpatient Consult  Once        Specialty:  Hematology and Oncology  Provider:  Waylon Arndt MD    01/08/25 0105    01/08/25 0105  Intake & Output  Every Shift       01/08/25 0105    01/08/25 0105  sodium chloride 0.9 % flush 10 mL  As Needed         01/08/25 0105    01/08/25 0105  sodium chloride 0.9 % infusion 40 mL  As Needed         01/08/25 0105    01/08/25 0105  aluminum-magnesium hydroxide-simethicone (MAALOX MAX) 400-400-40 MG/5ML suspension 15 mL  Every 6 Hours PRN         01/08/25  "0105    01/08/25 0105  melatonin tablet 5 mg  Nightly PRN         01/08/25 0105    01/08/25 0105  acetaminophen (TYLENOL) tablet 1,000 mg  Every 8 Hours         01/08/25 0105    01/08/25 0105  oxyCODONE (ROXICODONE) immediate release tablet 5 mg  Every 6 Hours PRN         01/08/25 0105    01/08/25 0105  morphine injection 2 mg  Every 4 Hours PRN         01/08/25 0105    01/08/25 0105  polyethylene glycol (MIRALAX) packet 17 g  Daily PRN        Placed in \"And\" Linked Group    01/08/25 0105 01/08/25 0105  bisacodyl (DULCOLAX) EC tablet 5 mg  Daily PRN        Placed in \"And\" Linked Group    01/08/25 0105 01/08/25 0105  bisacodyl (DULCOLAX) suppository 10 mg  Daily PRN        Placed in \"And\" Linked Group    01/08/25 0105 01/07/25 2304  Code Status and Medical Interventions: CPR (Attempt to Resuscitate); Full Support  Continuous         01/07/25 2303 01/07/25 2139  Hospitalist (on-call MD unless specified)  Once        Specialty:  Hospitalist  Provider:  Gus Lee MD    01/07/25 2138 01/07/25 2034  IP General Consult (Use specialty-specific consult if known)  Once        Provider:  (Not yet assigned)    01/07/25 2033 01/07/25 1727  Insert Peripheral IV  Once         01/07/25 1726    01/07/25 1726  sodium chloride 0.9 % flush 10 mL  As Needed         01/07/25 1726    Pending  Initiate & Follow Hypercapnic Monitoring Guideline for Opioid Administration via EtCO2 and / or SpO2  Continuous        Comments: Follow Hypercapnic Monitoring Guideline As Outlined in Process Instructions (Open Order Report to View Full Instructions)    Pending    Pending  Opioid Administration - Document EtCO2 and / or SpO2 With Each Set of Vitals & Any Change in Patient Status  Per Order Details        Comments: With Each Set of Vitals & Any Change in Patient Status    Pending    Pending  Opioid Administration - Notify Provider Hypercapnic Monitoring  Continuous        Comments: Open Order Report to View Parameters Requiring " Provider Notification    Pending    Pending  Opioid Administration - Continuous Pulse Oximetry (SpO2)  Continuous         Pending

## 2025-01-08 NOTE — H&P
[facility]   Interventional Radiology H&P    Patient Name: Madina Reddy  : 1965  MRN: [id number]  Primary Care Physician:  Kady Vernon APRN  Referring Physician: No ref. provider found  Date of admission: 2025    Subjective   Subjective     HPI:  Madina Reddy is a 59 y.o. female liver and omental lesions    Review of Systems:   Constitutional no fever,  no weight loss       Otolaryngeal no difficulty swallowing   Cardiovascular no chest pain   Pulmonary no cough, no sputum production   Gastrointestinal no constipation, no diarrhea                         Personal History       Past Medical/Surgical History:   Past Medical History:   Diagnosis Date    Allergies     Dizziness Madina    Headache Madina    Hyperlipidemia March    Hypertension Madina    Pacemaker     Pneumonia 2023    TMJ dysfunction Madina     Past Surgical History:   Procedure Laterality Date    BREAST BIOPSY      CARDIAC ELECTROPHYSIOLOGY PROCEDURE N/A 2023    Procedure: Device Implant;  Surgeon: VINCE Alejandre MD;  Location: [facility] INVASIVE LOCATION;  Service: Cardiology;  Laterality: N/A;    CARDIAC SURGERY      heart surgery as an infant    COLONOSCOPY  2017    PACEMAKER IMPLANTATION         Social History:  reports that she has never smoked. She has never been exposed to tobacco smoke. She has never used smokeless tobacco. She reports that she does not drink alcohol and does not use drugs.    Medications:  Medications Prior to Admission   Medication Sig Dispense Refill Last Dose/Taking    amLODIPine (NORVASC) 2.5 MG tablet Take 1 tablet by mouth once daily 30 tablet 0     aspirin 81 MG chewable tablet Chew 1 tablet Daily.       cetirizine (zyrTEC) 10 MG tablet Take 1 tablet by mouth Daily. 90 tablet 1     Diclofenac Sodium (Voltaren) 1 % gel gel Apply 4 g topically to the appropriate area as directed 4 (Four) Times a Day As Needed (hand pain). 100 g 1     fluticasone (FLONASE) 50 MCG/ACT  "nasal spray 2 sprays into the nostril(s) as directed by provider Daily. 11.1 mL 2     metoprolol succinate XL (TOPROL-XL) 25 MG 24 hr tablet Take 1 tablet by mouth Daily.       montelukast (SINGULAIR) 10 MG tablet TAKE 1 TABLET BY MOUTH ONCE DAILY AT NIGHT 90 tablet 0      Current medications:  acetaminophen, 1,000 mg, Oral, Q8H  [START ON 1/9/2025] enoxaparin, 40 mg, Subcutaneous, Daily  senna-docusate sodium, 2 tablet, Oral, BID  sodium chloride, 10 mL, Intravenous, Q12H      Current IV drips:       Allergies:  No Known Allergies    Objective    Objective     Vitals:   Temp:  [97.9 °F (36.6 °C)-98.1 °F (36.7 °C)] 97.9 °F (36.6 °C)  Heart Rate:  [] 86  Resp:  [12-18] 12  BP: (116-127)/(69-94) 119/78      Physical Exam:   Constitutional: Awake, alert, No acute distress    Respiratory: Clear to auscultation bilaterally, nonlabored respirations    Cardiovascular: RRR, no murmurs, rubs, or gallops, palpable pedal pulses bilaterally   Gastrointestinal: Positive bowel sounds, soft, nontender, nondistended        ASA SCALE ASSESSMENT:  3-Severe systemic disease that results in functional limitation    MALLAMPATI CLASSIFICATION:  3-Only the soft palate and base of uvula are visible       Result Review        Result Review:     Sodium   Date Value Ref Range Status   01/08/2025 137 136 - 145 mmol/L Final   01/07/2025 138 136 - 145 mmol/L Final       Potassium   Date Value Ref Range Status   01/08/2025 3.1 (L) 3.5 - 5.2 mmol/L Final   01/07/2025 3.3 (L) 3.5 - 5.2 mmol/L Final       Chloride   Date Value Ref Range Status   01/08/2025 100 98 - 107 mmol/L Final   01/07/2025 97 (L) 98 - 107 mmol/L Final       No results found for: \"PLASMABICARB\"    BUN   Date Value Ref Range Status   01/08/2025 10 6 - 20 mg/dL Final   01/07/2025 13 6 - 20 mg/dL Final       Creatinine   Date Value Ref Range Status   01/08/2025 0.58 0.57 - 1.00 mg/dL Final   01/07/2025 0.66 0.57 - 1.00 mg/dL Final       Calcium   Date Value Ref Range Status " "  01/08/2025 9.7 8.6 - 10.5 mg/dL Final   01/07/2025 10.5 8.6 - 10.5 mg/dL Final           No components found for: \"GLUCOSE.*\"  Results from last 7 days   Lab Units 01/08/25  0555   WBC 10*3/mm3 8.68   HEMOGLOBIN g/dL 10.4*   HEMATOCRIT % 32.5*   PLATELETS 10*3/mm3 329      Results from last 7 days   Lab Units 01/08/25  1019   INR  1.28*           Assessment / Plan     Assesment:   Liver and omental lesions      Plan:   Ct guided biopsy of liver or omental lesion    The risks and benefits of the procedure were discussed with the patient.    Electronically signed by Silvestre Tubbs MD, 01/08/25, 3:45 PM EST.   "

## 2025-01-08 NOTE — PLAN OF CARE
Goal Outcome Evaluation:  Plan of Care Reviewed With: patient        Progress: no change  Outcome Evaluation: Patient presents with deficits in balance, endurance, transfers, and ambulation. Patient will benefit from skilled PT services to address these mobility deficits and decrease risk of falls.    Anticipated Discharge Disposition (PT): home with assist, home with home health

## 2025-01-08 NOTE — PROGRESS NOTES
[facility]   Hospitalist Progress Note  Date: 2025  Patient Name: Madina Reddy  : 1965  MRN: [id number]  Date of admission: 2025  Room/Bed: [facility]/      Subjective   Subjective     Chief Complaint:   Abdominal pain    Summary:  Madina Reddy is a 59 y.o. female with medical history of hypertension, permanent pacemaker, hyperlipidemia and recent diagnosis of pelvic mass who comes into the ER due to worsening pelvic pain.  Patient has been having pelvic pain for a few weeks now and is even been evaluated in the outpatient setting by gynecological oncology who was planning to have biopsy performed, however her pain acutely worsened to the point she needed to be evaluated in the ER.      Upon arrival here she is tachycardic but otherwise stable.  Lab workup is unremarkable except for hypokalemia of 3.3 and bacteriuria.  CT abdomen and pelvis with contrast showed bilateral primary ovarian carcinoma until proven otherwise with extensive peritoneal carcinomatosis and mild bleeding ascites as well as a 1.4 x 2.2 cm enhancing lesion in the right lobe of the liver suspicious for metastatic disease as well as a possible metastatic lesion in the spleen.  Her oncologic surgeon at [facility] was notified and advised patient be admitted to our facility for definitive biopsy with oncology evaluation done here as well.  Hospitalist was then contacted for admission    Interval Followup:   Patient continues to have abdominal pain this morning.  Able to coordinate with oncology and interventional radiology.  Patient will have IR perform CT-guided biopsy of liver today.  Patient has complaints of constipation, starting bowel regiment.  Will have diet available once patient is back from her procedure.  Patient able to establish with oncology as well.  Recommendations for MRI of abdomen and pelvis by gynecological oncologist, however given patient's permanent pacemaker, this cannot be obtained in a reasonable amount  of time with current weather conditions.      Objective   Objective     Vitals:   Temp:  [97.9 °F (36.6 °C)-98.1 °F (36.7 °C)] 97.9 °F (36.6 °C)  Heart Rate:  [] 84  Resp:  [12-18] 14  BP: (108-134)/(59-97) 108/59    Physical Exam               Constitutional: Awake, alert, uncomfortable appearing              Eyes: Pupils equal, sclerae anicteric, no conjunctival injection              HENT: NCAT, mucous membranes moist              Neck: Supple, no thyromegaly, no lymphadenopathy, trachea midline              Respiratory: Clear to auscultation bilaterally, nonlabored respirations               Cardiovascular: RRR, no murmurs, rubs, or gallops, palpable pedal pulses bilaterally              Gastrointestinal: Positive bowel sounds, soft, tender diffusely without rebound or guarding, nondistended              Musculoskeletal: No bilateral ankle edema, no clubbing or cyanosis to extremities              Psychiatric: Appropriate affect, cooperative              Neurologic: Oriented x 3, strength symmetric in all extremities, Cranial Nerves grossly intact to confrontation, speech clear              Skin: No rashes        Result Review    Result Review:  I have personally reviewed these results:  [x]  Laboratory      Lab 01/08/25  1019 01/08/25  0555 01/07/25  1800   WBC  --  8.68 9.78   HEMOGLOBIN  --  10.4* 11.3*   HEMATOCRIT  --  32.5* 34.9   PLATELETS  --  329 388   NEUTROS ABS  --  5.97 6.79   IMMATURE GRANS (ABS)  --  0.04 0.04   LYMPHS ABS  --  1.64 1.87   MONOS ABS  --  0.97* 0.98*   EOS ABS  --  0.01 0.04   MCV  --  84.4 83.7   LACTATE  --   --  1.6   PROTIME 16.3*  --   --    APTT 27.4  --   --          Lab 01/08/25  0555 01/07/25  1800   SODIUM 137 138   POTASSIUM 3.1* 3.3*   CHLORIDE 100 97*   CO2 27.0 25.3   ANION GAP 10.0 15.7*   BUN 10 13   CREATININE 0.58 0.66   EGFR 105.0 101.8   GLUCOSE 128* 115*   CALCIUM 9.7 10.5   MAGNESIUM 1.8 1.8         Lab 01/07/25  1800   TOTAL PROTEIN 7.7   ALBUMIN 4.0    GLOBULIN 3.7   ALT (SGPT) 13   AST (SGOT) 19   BILIRUBIN 0.6   ALK PHOS 81   LIPASE 20         Lab 01/08/25  1019   PROTIME 16.3*   INR 1.28*                 Brief Urine Lab Results  (Last result in the past 365 days)        Color   Clarity   Blood   Leuk Est   Nitrite   Protein   CREAT   Urine HCG        01/07/25 1820 Yellow   Turbid   Negative   Small (1+)   Negative   30 mg/dL (1+)                 [x]  Microbiology   Microbiology Results (last 10 days)       ** No results found for the last 240 hours. **          [x]  Radiology  CT Needle Biopsy Liver    Result Date: 1/8/2025  Impression: 1.Successful CT-guided percutaneous core needle biopsy of right lobe liver lesion with no immediate complication. Due to proximity to the diaphragm, chest x-ray will be obtained in 2 hours. Electronically Signed: Silvestre Tubbs MD  1/8/2025 5:03 PM EST  Workstation ID: [id number]    CT Chest Without Contrast Diagnostic    Result Date: 1/8/2025  Impression: 1.Small amount of ascites, right hepatic lesion, and peritoneal carcinomatosis in the visualized upper abdomen, as seen on the prior CT of the abdomen and pelvis. 2.Two ovoid nodular densities along the pleural surface in the posterior-medial left chest between the medial 7-8 and 9-10 left ribs. Given the findings in the abdomen, these findings are concerning for metastatic disease until proven otherwise. 3.Epicardial lymph nodes appear slightly more prominent than is typically seen, but are not definitely enlarged by CT size criteria. These could also indicate early metastatic disease considering the findings in the abdomen. 4.Right-sided aortic arch with mirror image branching of the aortic arch vessels. 5.Mild central bronchial wall thickening which could indicate acute or chronic bronchitis. 6.Small amount of ascites and evidence of peritoneal carcinomatosis, as before. 7.Cholelithiasis. Electronically Signed: Douglas Conn  1/8/2025 8:45 AM EST  Workstation ID:  [id number]    CT Abdomen Pelvis With Contrast    Result Date: 1/7/2025  Impression: 1.Bilateral primary ovarian carcinoma until proven otherwise. 2.Extensive peritoneal carcinomatosis. There is mild malignant ascites. 3.There is a 1.4 x 2.2 cm enhancing lesion in the right lobe of the liver suspicious for hepatic metastatic disease. 4.There may be a metastatic lesion in the spleen near the hilum. 5.Cholelithiasis. 6.Additional findings as noted above. Electronically Signed: Max Hawley MD  1/7/2025 7:47 PM EST  Workstation ID: [id number]   []  EKG/Telemetry   []  Cardiology/Vascular   []  Pathology  []  Old records  []  Other:    Assessment & Plan   Assessment / Plan     Assessment:  Suspected ovarian cancer  Cancer related pain  Constipation  Hypokalemia  Hypertension  Hyperlipidemia    Plan:  Patient remains admitted to the hospital for further care management  Oncology and IR consulted, appreciate assistance  IR to complete CT-guided biopsy, going for liver lesion  Additional order of  has been placed.  Recommendations for MRI of abdomen by patient's gynecological oncologist, however given permanent pacemaker this cannot be obtained.  Rep for patient's pacer would not be able to come into the hospital given current weather conditions.  Imaging of patient's chest also shows concern for metastatic spread  Will discuss with oncology again tomorrow disposition plans, will try and also reach out to patient's gynecological oncologist to ensure information is available     Discussed with RN.  Discussed with IR, discussed with oncology    VTE Prophylaxis:  Pharmacologic VTE prophylaxis orders are present.        CODE STATUS:   Code Status (Patient has no pulse and is not breathing): CPR (Attempt to Resuscitate)  Medical Interventions (Patient has pulse or is breathing): Full Support      Electronically signed by Kavin Griffin MD, 1/8/2025, 17:16 EST.

## 2025-01-08 NOTE — CONSULTS
[facility]   Consult Note    Patient Name: Madina Reddy  : 1965  MRN: [id number]  Primary Care Physician:  Kady Vernon APRN  Referring Physician: No ref. provider found  Date of admission: 2025    Subjective   Subjective     Reason for Consult/ Chief Complaint: Abdominal pain    HPI:  Madina Reddy is a 59 y.o. female admitted to the hospital for abdominal pain.  Patient notes that she was recently found to have ovarian and abdominal masses.  She was seen by Dr. Watson with GYN oncology [facility]. consult note reviewed.  Biopsy was planned but she has not yet undergone that procedure.  She had increasing pain yesterday and presented to the emergency room for that reason.  Repeat CT abdomen pelvis again demonstrates bilateral ovarian masses, peritoneal implants, ascites, 2.2 cm enhancing lesion in the right lobe liver.      Personal History     Past Medical History:   Diagnosis Date    Allergies     Dizziness Madina    Headache Madina    Hyperlipidemia March    Hypertension Madina    Pacemaker     Pneumonia 2023    TMJ dysfunction Madina       Past Surgical History:   Procedure Laterality Date    BREAST BIOPSY      CARDIAC ELECTROPHYSIOLOGY PROCEDURE N/A 2023    Procedure: Device Implant;  Surgeon: VINCE Alejandre MD;  Location: [facility] INVASIVE LOCATION;  Service: Cardiology;  Laterality: N/A;    CARDIAC SURGERY      heart surgery as an infant    COLONOSCOPY  2017    PACEMAKER IMPLANTATION         Family History: family history includes Asthma in her daughter; Cancer in her mother; Heart disease in her father; Thyroid disease in her mother. Otherwise pertinent FHx was reviewed and not pertinent to current issue.    Social History:  reports that she has never smoked. She has never been exposed to tobacco smoke. She has never used smokeless tobacco. She reports that she does not drink alcohol and does not use drugs.    Home  Medications:  Diclofenac Sodium, amLODIPine, aspirin, cetirizine, fluticasone, metoprolol succinate XL, and montelukast    Allergies:  No Known Allergies    Objective    Objective     Vitals:   Temp:  [98 °F (36.7 °C)-98.1 °F (36.7 °C)] 98.1 °F (36.7 °C)  Heart Rate:  [] 92  Resp:  [18] 18  BP: (116-124)/(71-94) 116/72    Physical Exam:   Constitutional: Awake, alert   Eyes: PERRLA, sclerae anicteric, no conjunctival injection   HENT: NCAT, mucous membranes moist   Neck: Supple, no thyromegaly, no lymphadenopathy, trachea midline   Respiratory: Clear to auscultation bilaterally, nonlabored respirations    Cardiovascular: RRR, no murmurs, rubs, or gallops, palpable pedal pulses bilaterally   Gastrointestinal: Positive bowel sounds, soft, nontender, nondistended   Musculoskeletal: No bilateral ankle edema, no clubbing or cyanosis to extremities   Psychiatric: Appropriate affect, cooperative   Neurologic: Oriented x 3, strength symmetric in all extremities, Cranial Nerves grossly intact to confrontation, speech clear   Skin: No rashes  Lymph Node:     Result Review    Result Review:  I have personally reviewed the results from the time of this admission to 1/8/2025 12:05 EST and agree with these findings:  [x]  Laboratory  []  Microbiology  [x]  Radiology CT abdomen pelvis, CT chest reviewed  []  EKG/Telemetry   []  Cardiology/Vascular   []  Pathology  [x]  Old records Dr. Osorio, [facility] GYN oncology  []  Other:  Most notable findings include: Bilateral ovarian lesions, peritoneal carcinomatosis, mild ascites, pleural-based nodules    Assessment & Plan   Assessment / Plan     Brief Patient Summary:  Madina Reddy is a 59 y.o. female who is admitted to the hospital for abdominal pain.  Currently being evaluated by GYN oncology.    Active Hospital Problems:  Active Hospital Problems    Diagnosis     **Pelvic mass      Plan:   Ovarian mass-bilateral  Omental nodules  Liver lesion  Pleural  nodules    The constellation of findings is most concerning for GYN malignancy, specifically ovarian cancer.  She has been evaluated by GYN oncology at [facility].  She will need biopsy to confirm the histology.  I discussed the case with Dr. Tubbs, interventional radiology.  The liver lesion appears to be the most amenable which would provide histologic subtype as well as confirm stage.  I will order  in addition.  She will need to follow-up with GYN oncology after biopsy and we can coordinate with [facility] for systemic treatments.  She is okay to be discharged from the oncology standpoint once biopsy is complete as long as her other issues are stable.      Electronically signed by Waylon Arndt MD, 01/08/25, 12:05 PM EST.

## 2025-01-08 NOTE — CONSULTS
"Nutrition Services    Patient Name: Madina Reddy  YOB: 1965  MRN: [id number]  Admission date: 1/7/2025      CLINICAL NUTRITION ASSESSMENT      Reason for Assessment  MST Score 2+     H&P:  Past Medical History:   Diagnosis Date    Allergies     Dizziness Madina    Headache Madina    Hyperlipidemia March    Hypertension Madina    Pacemaker     Pneumonia Jan 2023    TMJ dysfunction Madina        Current Problems:   Active Hospital Problems    Diagnosis     **Pelvic mass         Nutrition/Diet History         Narrative   Pt reports decreased po intake and wt loss. Admission wt of 141 lbs. Chart review indicates wt loss of 15 lbs x 3 months. Currently NPO. Recommend ONS upon diet advancement. RD to follow per protocol.     Anthropometrics        Current Height, Weight Height: 160 cm (62.99\")  Weight: 64.2 kg (141 lb 8.6 oz)   Current BMI Body mass index is 25.08 kg/m².   BMI Classification Overweight   % % (52.4 kg)    Adjusted Body Weight (ABW) 57.1 kg   Weight Hx  Wt Readings from Last 30 Encounters:   01/08/25 0108 64.2 kg (141 lb 8.6 oz)   01/07/25 1801 66.8 kg (147 lb 4.3 oz)   11/25/24 1349 69.9 kg (154 lb 3.2 oz)   11/13/24 1341 70.3 kg (155 lb)   08/12/24 1005 70.8 kg (156 lb)   03/25/24 1324 70.4 kg (155 lb 3.2 oz)   12/12/23 0700 70.3 kg (155 lb)   12/06/23 0808 72.7 kg (160 lb 3.2 oz)   09/25/23 0920 72 kg (158 lb 12.8 oz)   09/11/23 1408 70.8 kg (156 lb)   07/05/23 0959 69.4 kg (153 lb)   06/06/23 1436 68.1 kg (150 lb 3.2 oz)   03/31/23 0947 67.6 kg (149 lb)   03/30/23 1526 67.6 kg (149 lb)   02/27/23 1321 66.9 kg (147 lb 6.4 oz)   02/03/23 1503 65.8 kg (145 lb)   01/30/23 0902 66.7 kg (147 lb)   01/23/23 1058 64.3 kg (141 lb 12.1 oz)   10/10/22 0839 68.9 kg (152 lb)   05/17/22 1524 66.2 kg (146 lb)   02/15/22 1312 68.6 kg (151 lb 3.2 oz)   01/31/22 1048 67.6 kg (149 lb)   11/19/21 0814 68 kg (150 lb)   07/02/21 1058 68 kg (150 lb)   03/09/21 0000 69.4 kg (153 lb)   07/07/20 " 0000 66.9 kg (147 lb 8 oz)   03/28/19 0000 64.1 kg (141 lb 6 oz)          Wt Change Observation Wt loss of 15 lbs x 3 months (9.6%)      Estimated/Assessed Needs  Estimated Needs based on: Current Body Weight       Energy Requirements 25-30 kcal/kg   EST Needs (kcal/day) 7659-4196 kcal        Protein Requirements 1.2-1.5 g.kg IBW    EST Daily Needs (g/day) 62-78 g       Fluid Requirements 25-30 ml/kg    Estimated Needs (mL/day) 2536-0214 ml     Labs/Medications         Pertinent Labs Reviewed.   Results from last 7 days   Lab Units 01/08/25  0555 01/07/25  1800   SODIUM mmol/L 137 138   POTASSIUM mmol/L 3.1* 3.3*   CHLORIDE mmol/L 100 97*   CO2 mmol/L 27.0 25.3   BUN mg/dL 10 13   CREATININE mg/dL 0.58 0.66   CALCIUM mg/dL 9.7 10.5   BILIRUBIN mg/dL  --  0.6   ALK PHOS U/L  --  81   ALT (SGPT) U/L  --  13   AST (SGOT) U/L  --  19   GLUCOSE mg/dL 128* 115*     Results from last 7 days   Lab Units 01/08/25  0555 01/07/25  1800   MAGNESIUM mg/dL 1.8 1.8   HEMOGLOBIN g/dL 10.4* 11.3*   HEMATOCRIT % 32.5* 34.9     COVID19   Date Value Ref Range Status   01/23/2023 Not Detected Not Detected - Ref. Range Final     Lab Results   Component Value Date    HGBA1C 5.30 12/12/2023         Pertinent Medications Reviewed.     Malnutrition Severity Assessment      Patient meets criteria for : Moderate (non-severe) Malnutrition  Malnutrition Type (Last 8 Hours)       Malnutrition Severity Assessment       Row Name 01/08/25 1539       Malnutrition Severity Assessment    Malnutrition Type Chronic Disease - Related Malnutrition      Row Name 01/08/25 1539       Insufficient Energy Intake     Insufficient Energy Intake  <75% of est. energy requirement for > or equal to 1 month      Row Name 01/08/25 1539       Unintentional Weight Loss     Unintentional Weight Loss  Weight loss greater than 7.5% in three months      Row Name 01/08/25 1539       Criteria Met (Must meet criteria for severity in at least 2 of these categories: M Wasting,  Fat Loss, Fluid, Secondary Signs, Wt. Status, Intake)    Patient meets criteria for  Moderate (non-severe) Malnutrition                     Nutrition Diagnosis         Nutrition Dx Problem 1 Moderate malnutrition related to inadequate oral Intake as evidenced by decreased appetite. and unintended weight change.     Nutrition Intervention           Current Nutrition Orders & Evaluation of Intake       Current PO Diet NPO Diet NPO Type: Sips with Meds   Supplement No active supplement orders           Nutrition Intervention/Prescription        Advance diet as feasible        Medical Nutrition Therapy/Nutrition Education          Learner     Readiness Patient  Acceptance     Method     Response Explanation  Verbalizes understanding     Monitor/Evaluation        Monitor Diet advancement     Nutrition Discharge Plan         To be determined     Electronically signed by:  Tangela Clark RD  01/08/25 15:41 EST

## 2025-01-08 NOTE — H&P
[facility]   HOSPITALIST HISTORY AND PHYSICAL  Date: 2025   Patient Name: Madina Reddy  : 1965  MRN: [id number]  Primary Care Physician:  Kady Vernon APRN  Date of admission: 2025    Subjective   Subjective     Chief Complaint: Abdominal pain    HPI:    Madina Reddy is a 59 y.o. female past medical history of hypertension, permanent pacemaker, hyperlipidemia and recent diagnosis of pelvic mass who comes into the ER due to worsening pelvic pain.  Patient has been having pelvic pain for a few weeks now and is even been evaluated in the outpatient setting by [facility]ha Irwin surgeon who is planning to have biopsy done earlier today however her pain in the abdomen it acutely worsened to the point that she needed to be evaluated in the ER.      Upon arrival here she is tachycardic but otherwise stable.  Lab workup is unremarkable except for hypokalemia of 3.3 and bacteriuria.  CT down pelvis with contrast was done here and showed bilateral primary ovarian carcinoma until proven otherwise with extensive peritoneal carcinomatosis and mild bleeding ascites as well as a 1.4 x 2.2 cm enhancing lesion in the right lobe of the liver suspicious for metastatic disease as well as a possible metastatic lesion in the spleen.  Her oncologic surgeon at [facility] was notified and advised that patient be admitted to our facility for definitive biopsy with oncology evaluation done here as well.  Hospitalist was then contacted for admission    Personal History     Past Medical History:  Past Medical History:   Diagnosis Date    Allergies     Dizziness Madina    Headache Madina    Hyperlipidemia March    Hypertension Madina    Pacemaker     Pneumonia 2023    TMJ dysfunction Madina         Past Surgical History:  Past Surgical History:   Procedure Laterality Date    BREAST BIOPSY      CARDIAC ELECTROPHYSIOLOGY PROCEDURE N/A 2023    Procedure: Device Implant;  Surgeon: VINCE Alejandre MD;  Location:   [facility];  Service: Cardiology;  Laterality: N/A;    CARDIAC SURGERY      heart surgery as an infant    COLONOSCOPY  August 2017    PACEMAKER IMPLANTATION           Family History:   Reviewed and noncontributory except as mentioned in HPI    Social History:   Social Drivers of Health     Tobacco Use: Low Risk  (1/7/2025)    Patient History     Smoking Tobacco Use: Never     Smokeless Tobacco Use: Never     Passive Exposure: Never   Alcohol Use: Not At Risk (1/23/2023)    AUDIT-C     Frequency of Alcohol Consumption: Never     Average Number of Drinks: Patient does not drink     Frequency of Binge Drinking: Never   Financial Resource Strain: Not on file   Food Insecurity: No Food Insecurity (1/24/2023)    Hunger Vital Sign     Worried About Running Out of Food in the Last Year: Never true     Ran Out of Food in the Last Year: Never true   Transportation Needs: Not on file   Physical Activity: Insufficiently Active (1/24/2023)    Exercise Vital Sign     Days of Exercise per Week: 4 days     Minutes of Exercise per Session: 30 min   Stress: Not on file   Social Connections: Not on file   Interpersonal Safety: Not At Risk (1/7/2025)    Abuse Screen     Unsafe at Home or Work/School: no     Feels Threatened by Someone?: no     Does Anyone Keep You from Contacting Others or Doint Things Outside the Home?: no     Physical Sign of Abuse Present: no   Depression: Not at risk (3/25/2024)    PHQ-2     PHQ-2 Score: 0   Housing Stability: Unknown (1/26/2023)    Housing Stability     Current Living Arrangements: home     Potentially Unsafe Housing Conditions: Not on file   Utilities: Not on file   Health Literacy: Unknown (1/24/2023)    Education     Help with school or training?: Not on file     Preferred Language: English   Employment: Not on file   Disabilities: Not At Risk (1/23/2023)    Disabilities     Concentrating, Remembering, or Making Decisions Difficulty: no     Doing Errands Independently  Difficulty: no         Home Medications:  Diclofenac Sodium, amLODIPine, aspirin, cetirizine, fluticasone, metoprolol succinate XL, and montelukast    Allergies:  No Known Allergies    Review of Systems   All systems were reviewed and negative except for: Pelvic pain    Objective   Objective     Vitals:   Temp:  [98 °F (36.7 °C)] 98 °F (36.7 °C)  Heart Rate:  [106] 106  Resp:  [18] 18  BP: (122)/(71) 122/71    Physical Exam    Constitutional: Awake, alert, no acute distress   Eyes: Pupils equal, sclerae anicteric, no conjunctival injection   HENT: NCAT, mucous membranes moist   Neck: Supple, no thyromegaly, no lymphadenopathy, trachea midline   Respiratory: Clear to auscultation bilaterally, nonlabored respirations    Cardiovascular: RRR, no murmurs, rubs, or gallops, palpable pedal pulses bilaterally   Gastrointestinal: Positive bowel sounds, soft, tender diffusely without rebound or guarding, nondistended   Musculoskeletal: No bilateral ankle edema, no clubbing or cyanosis to extremities   Psychiatric: Appropriate affect, cooperative   Neurologic: Oriented x 3, strength symmetric in all extremities, Cranial Nerves grossly intact to confrontation, speech clear   Skin: No rashes     Result Review    Result Review:  I have personally reviewed the results from the time of this admission to 1/7/2025 23:03 EST and agree with these findings:  []  Laboratory  []  Microbiology  []  Radiology  []  EKG/Telemetry   []  Cardiology/Vascular   []  Pathology  []  Old records  []  Other:      Assessment & Plan   Assessment / Plan     Assessment/Plan:   Suspected ovarian carcinoma with peritoneal carcinomatosis  Suspected cancer related pain  Hepatic lesion, suspected metastatic  Hypokalemia  Hypertension  Hyperlipidemia    Plan  - Place in observation on hospitalist service  - I will get oncology input on whether patient would benefit from biopsy while inpatient, also will get CT chest to assess for further metastatic spread. Will  defer tumor marker testing to oncology.  Continue pain control, limit IV narcotics as much as possible.  I have started scheduled Tylenol  - Replace potassium, check magnesium  - Continue home BP medicines  - Continue home statin      Discussed with ER Physician and Nurse    All labs/imaging studies were personally reviewed and findings are as noted above      DVT Prophylaxis: Lovenox    CODE STATUS:    Code Status (Patient has no pulse and is not breathing): CPR (Attempt to Resuscitate)  Medical Interventions (Patient has pulse or is breathing): Full Support      Admission Status:  I believe this patient meets observation status.    Electronically signed by Gus Lee MD, 01/07/25, 10:56 PM EST.

## 2025-01-08 NOTE — PLAN OF CARE
Goal Outcome Evaluation:  Plan of Care Reviewed With: patient        Progress: no change  Outcome Evaluation: PT admitted from ED for observation with mass in abdomen. Was scheduled for biopsy, but pain in abdomen increased and presented to ed. CT of abdomen completed in ED see chart. CT ordered to be completed today of chest, hematology/oncology consult ordered. PRN pain med x 1 this shift with effective result. Contacted MD for intermittent nausea, see MAR. Up ad lorne, no current complaints

## 2025-01-09 LAB
ALBUMIN SERPL-MCNC: 3.6 G/DL (ref 3.5–5.2)
ALBUMIN/GLOB SERPL: 1.1 G/DL
ALP SERPL-CCNC: 73 U/L (ref 39–117)
ALT SERPL W P-5'-P-CCNC: 13 U/L (ref 1–33)
ANION GAP SERPL CALCULATED.3IONS-SCNC: 11.4 MMOL/L (ref 5–15)
AST SERPL-CCNC: 19 U/L (ref 1–32)
BASOPHILS # BLD AUTO: 0.04 10*3/MM3 (ref 0–0.2)
BASOPHILS NFR BLD AUTO: 0.3 % (ref 0–1.5)
BILIRUB SERPL-MCNC: 0.4 MG/DL (ref 0–1.2)
BUN SERPL-MCNC: 11 MG/DL (ref 6–20)
BUN/CREAT SERPL: 20.8 (ref 7–25)
CALCIUM SPEC-SCNC: 9.9 MG/DL (ref 8.6–10.5)
CHLORIDE SERPL-SCNC: 100 MMOL/L (ref 98–107)
CO2 SERPL-SCNC: 24.6 MMOL/L (ref 22–29)
CREAT SERPL-MCNC: 0.53 MG/DL (ref 0.57–1)
DEPRECATED RDW RBC AUTO: 37.1 FL (ref 37–54)
EGFRCR SERPLBLD CKD-EPI 2021: 107.4 ML/MIN/1.73
EOSINOPHIL # BLD AUTO: 0.01 10*3/MM3 (ref 0–0.4)
EOSINOPHIL NFR BLD AUTO: 0.1 % (ref 0.3–6.2)
ERYTHROCYTE [DISTWIDTH] IN BLOOD BY AUTOMATED COUNT: 12.1 % (ref 12.3–15.4)
GLOBULIN UR ELPH-MCNC: 3.3 GM/DL
GLUCOSE SERPL-MCNC: 154 MG/DL (ref 65–99)
HCT VFR BLD AUTO: 33.4 % (ref 34–46.6)
HGB BLD-MCNC: 10.6 G/DL (ref 12–15.9)
IMM GRANULOCYTES # BLD AUTO: 0.09 10*3/MM3 (ref 0–0.05)
IMM GRANULOCYTES NFR BLD AUTO: 0.6 % (ref 0–0.5)
LYMPHOCYTES # BLD AUTO: 0.91 10*3/MM3 (ref 0.7–3.1)
LYMPHOCYTES NFR BLD AUTO: 6.2 % (ref 19.6–45.3)
MAGNESIUM SERPL-MCNC: 1.7 MG/DL (ref 1.6–2.6)
MCH RBC QN AUTO: 26.9 PG (ref 26.6–33)
MCHC RBC AUTO-ENTMCNC: 31.7 G/DL (ref 31.5–35.7)
MCV RBC AUTO: 84.8 FL (ref 79–97)
MONOCYTES # BLD AUTO: 1.31 10*3/MM3 (ref 0.1–0.9)
MONOCYTES NFR BLD AUTO: 8.9 % (ref 5–12)
NEUTROPHILS NFR BLD AUTO: 12.43 10*3/MM3 (ref 1.7–7)
NEUTROPHILS NFR BLD AUTO: 83.9 % (ref 42.7–76)
NRBC BLD AUTO-RTO: 0 /100 WBC (ref 0–0.2)
PLATELET # BLD AUTO: 294 10*3/MM3 (ref 140–450)
PMV BLD AUTO: 10.4 FL (ref 6–12)
POTASSIUM SERPL-SCNC: 3.3 MMOL/L (ref 3.5–5.2)
PROT SERPL-MCNC: 6.9 G/DL (ref 6–8.5)
RBC # BLD AUTO: 3.94 10*6/MM3 (ref 3.77–5.28)
SODIUM SERPL-SCNC: 136 MMOL/L (ref 136–145)
WBC NRBC COR # BLD AUTO: 14.79 10*3/MM3 (ref 3.4–10.8)

## 2025-01-09 PROCEDURE — 83735 ASSAY OF MAGNESIUM: CPT | Performed by: STUDENT IN AN ORGANIZED HEALTH CARE EDUCATION/TRAINING PROGRAM

## 2025-01-09 PROCEDURE — 25010000002 MORPHINE PER 10 MG: Performed by: STUDENT IN AN ORGANIZED HEALTH CARE EDUCATION/TRAINING PROGRAM

## 2025-01-09 PROCEDURE — G0378 HOSPITAL OBSERVATION PER HR: HCPCS

## 2025-01-09 PROCEDURE — 80053 COMPREHEN METABOLIC PANEL: CPT | Performed by: INTERNAL MEDICINE

## 2025-01-09 PROCEDURE — 25010000002 HYDROMORPHONE 1 MG/ML SOLUTION: Performed by: STUDENT IN AN ORGANIZED HEALTH CARE EDUCATION/TRAINING PROGRAM

## 2025-01-09 PROCEDURE — 99232 SBSQ HOSP IP/OBS MODERATE 35: CPT | Performed by: INTERNAL MEDICINE

## 2025-01-09 PROCEDURE — 25010000002 ENOXAPARIN PER 10 MG: Performed by: STUDENT IN AN ORGANIZED HEALTH CARE EDUCATION/TRAINING PROGRAM

## 2025-01-09 PROCEDURE — 63710000001 ONDANSETRON ODT 4 MG TABLET DISPERSIBLE: Performed by: INTERNAL MEDICINE

## 2025-01-09 PROCEDURE — 25010000002 PROCHLORPERAZINE 10 MG/2ML SOLUTION: Performed by: STUDENT IN AN ORGANIZED HEALTH CARE EDUCATION/TRAINING PROGRAM

## 2025-01-09 PROCEDURE — 85025 COMPLETE CBC W/AUTO DIFF WBC: CPT | Performed by: STUDENT IN AN ORGANIZED HEALTH CARE EDUCATION/TRAINING PROGRAM

## 2025-01-09 RX ORDER — ONDANSETRON 4 MG/1
4 TABLET, ORALLY DISINTEGRATING ORAL EVERY 6 HOURS PRN
Status: DISCONTINUED | OUTPATIENT
Start: 2025-01-09 | End: 2025-01-11 | Stop reason: HOSPADM

## 2025-01-09 RX ORDER — OXYCODONE HYDROCHLORIDE 5 MG/1
10 TABLET ORAL EVERY 6 HOURS PRN
Status: DISCONTINUED | OUTPATIENT
Start: 2025-01-09 | End: 2025-01-11 | Stop reason: HOSPADM

## 2025-01-09 RX ADMIN — ACETAMINOPHEN 1000 MG: 500 TABLET ORAL at 17:19

## 2025-01-09 RX ADMIN — ONDANSETRON 4 MG: 4 TABLET, ORALLY DISINTEGRATING ORAL at 17:19

## 2025-01-09 RX ADMIN — Medication 10 ML: at 09:53

## 2025-01-09 RX ADMIN — SENNOSIDES AND DOCUSATE SODIUM 2 TABLET: 50; 8.6 TABLET ORAL at 09:53

## 2025-01-09 RX ADMIN — ACETAMINOPHEN 1000 MG: 500 TABLET ORAL at 09:53

## 2025-01-09 RX ADMIN — HYDROMORPHONE HYDROCHLORIDE 0.5 MG: 1 INJECTION, SOLUTION INTRAMUSCULAR; INTRAVENOUS; SUBCUTANEOUS at 04:24

## 2025-01-09 RX ADMIN — Medication 10 ML: at 20:09

## 2025-01-09 RX ADMIN — ENOXAPARIN SODIUM 40 MG: 100 INJECTION SUBCUTANEOUS at 09:53

## 2025-01-09 RX ADMIN — PROCHLORPERAZINE EDISYLATE 5 MG: 5 INJECTION INTRAMUSCULAR; INTRAVENOUS at 19:25

## 2025-01-09 RX ADMIN — OXYCODONE 5 MG: 5 TABLET ORAL at 00:22

## 2025-01-09 RX ADMIN — MORPHINE SULFATE 2 MG: 2 INJECTION, SOLUTION INTRAMUSCULAR; INTRAVENOUS at 04:29

## 2025-01-09 RX ADMIN — SENNOSIDES AND DOCUSATE SODIUM 2 TABLET: 50; 8.6 TABLET ORAL at 19:26

## 2025-01-09 NOTE — PLAN OF CARE
Goal Outcome Evaluation:           Progress: no change  Outcome Evaluation: Pt remained A&Ox4 this shift. Also, pt remained on room air maintaining stable oxygen saturation. Pt was medicated with PRN Zofran to help relieve nausea. Pt was able to shower independently. Also, pt walked in halls this shift.

## 2025-01-09 NOTE — SIGNIFICANT NOTE
01/09/25 1021   Physical Therapy Time and Intention   Session Not Performed patient/family declined treatment   Comment, Session Not Performed Pt declined, stating her main focus currently was taking a shower, then resting afterward.  Family reports they had assisted pt to the bathroom and back with a rolling walker just before PTA entered room. Will check back later if able.

## 2025-01-09 NOTE — PROGRESS NOTES
[facility]   Hospitalist Progress Note  Date: 2025  Patient Name: Madina Reddy  : 1965  MRN: [id number]  Date of admission: 2025  Room/Bed: [facility]      Subjective   Subjective     Chief Complaint:   Abdominal pain    Summary:  Madina Reddy is a 59 y.o. female with medical history of hypertension, permanent pacemaker, hyperlipidemia and recent diagnosis of pelvic mass who comes into the ER due to worsening pelvic pain.  Patient has been having pelvic pain for a few weeks now and is even been evaluated in the outpatient setting by gynecological oncology who was planning to have biopsy performed, however her pain acutely worsened to the point she needed to be evaluated in the ER.      Upon arrival here she is tachycardic but otherwise stable.  Lab workup is unremarkable except for hypokalemia of 3.3 and bacteriuria.  CT abdomen and pelvis with contrast showed bilateral primary ovarian carcinoma until proven otherwise with extensive peritoneal carcinomatosis and mild bleeding ascites as well as a 1.4 x 2.2 cm enhancing lesion in the right lobe of the liver suspicious for metastatic disease as well as a possible metastatic lesion in the spleen.  Her oncologic surgeon at [facility] was notified and advised patient be admitted to our facility for definitive biopsy with oncology evaluation done here as well.  Hospitalist was then contacted for admission    Interval Followup:   Biopsy performed yesterday, awaiting results at this time.  Discussed with family at bedside, at this time we wish to better control her symptoms including nausea pain before discharging home.  Still without a bowel movement.  Continues on scheduled bowel regiment.  Patient having trouble consuming large quantities, adding boost to diet. Confirmed with oncology service, no further inpatient workup needed at this time.     Objective   Objective     Vitals:   Temp:  [97.8 °F (36.6 °C)-98.6 °F (37 °C)] 98.1 °F (36.7 °C)  Heart  Rate:  [] 80  Resp:  [14-18] 16  BP: (104-133)/(59-81) 119/68    Physical Exam               Constitutional: Awake, alert, comfortable on rounds              Eyes: Pupils equal, sclerae anicteric, no conjunctival injection              HENT: NCAT, mucous membranes moist              Neck: Supple, no thyromegaly, no lymphadenopathy, trachea midline              Respiratory: Clear to auscultation bilaterally, nonlabored respirations               Cardiovascular: RRR, no murmurs, rubs, or gallops, palpable pedal pulses bilaterally              Gastrointestinal: Positive bowel sounds, soft, tender diffusely without rebound or guarding, nondistended              Musculoskeletal: No bilateral ankle edema, no clubbing or cyanosis to extremities              Psychiatric: Appropriate affect, cooperative              Neurologic: Oriented x 3, strength symmetric in all extremities, Cranial Nerves grossly intact to confrontation, speech clear              Skin: No rashes        Result Review    Result Review:  I have personally reviewed these results:  [x]  Laboratory      Lab 01/09/25  0533 01/08/25  1019 01/08/25  0555 01/07/25  1800   WBC 14.79*  --  8.68 9.78   HEMOGLOBIN 10.6*  --  10.4* 11.3*   HEMATOCRIT 33.4*  --  32.5* 34.9   PLATELETS 294  --  329 388   NEUTROS ABS 12.43*  --  5.97 6.79   IMMATURE GRANS (ABS) 0.09*  --  0.04 0.04   LYMPHS ABS 0.91  --  1.64 1.87   MONOS ABS 1.31*  --  0.97* 0.98*   EOS ABS 0.01  --  0.01 0.04   MCV 84.8  --  84.4 83.7   LACTATE  --   --   --  1.6   PROTIME  --  16.3*  --   --    APTT  --  27.4  --   --          Lab 01/09/25  0533 01/08/25  0555 01/07/25  1800   SODIUM 136 137 138   POTASSIUM 3.3* 3.1* 3.3*   CHLORIDE 100 100 97*   CO2 24.6 27.0 25.3   ANION GAP 11.4 10.0 15.7*   BUN 11 10 13   CREATININE 0.53* 0.58 0.66   EGFR 107.4 105.0 101.8   GLUCOSE 154* 128* 115*   CALCIUM 9.9 9.7 10.5   MAGNESIUM 1.7 1.8 1.8         Lab 01/09/25  0533 01/07/25  1800   TOTAL PROTEIN 6.9  7.7   ALBUMIN 3.6 4.0   GLOBULIN 3.3 3.7   ALT (SGPT) 13 13   AST (SGOT) 19 19   BILIRUBIN 0.4 0.6   ALK PHOS 73 81   LIPASE  --  20         Lab 01/08/25  1019   PROTIME 16.3*   INR 1.28*                 Brief Urine Lab Results  (Last result in the past 365 days)        Color   Clarity   Blood   Leuk Est   Nitrite   Protein   CREAT   Urine HCG        01/07/25 1820 Yellow   Turbid   Negative   Small (1+)   Negative   30 mg/dL (1+)                 [x]  Microbiology   Microbiology Results (last 10 days)       ** No results found for the last 240 hours. **          [x]  Radiology  XR Chest 1 View    Result Date: 1/8/2025  Impression: No evidence of postprocedure pneumothorax, pleural effusion, or other active chest disease. Electronically Signed: Akira Padilla MD  1/8/2025 6:32 PM EST  Workstation ID: [id number]    CT Needle Biopsy Liver    Result Date: 1/8/2025  Impression: 1.Successful CT-guided percutaneous core needle biopsy of right lobe liver lesion with no immediate complication. Due to proximity to the diaphragm, chest x-ray will be obtained in 2 hours. Electronically Signed: Silvestre Tubbs MD  1/8/2025 5:03 PM EST  Workstation ID: [id number]    CT Chest Without Contrast Diagnostic    Result Date: 1/8/2025  Impression: 1.Small amount of ascites, right hepatic lesion, and peritoneal carcinomatosis in the visualized upper abdomen, as seen on the prior CT of the abdomen and pelvis. 2.Two ovoid nodular densities along the pleural surface in the posterior-medial left chest between the medial 7-8 and 9-10 left ribs. Given the findings in the abdomen, these findings are concerning for metastatic disease until proven otherwise. 3.Epicardial lymph nodes appear slightly more prominent than is typically seen, but are not definitely enlarged by CT size criteria. These could also indicate early metastatic disease considering the findings in the abdomen. 4.Right-sided aortic arch with mirror image branching of the aortic arch  vessels. 5.Mild central bronchial wall thickening which could indicate acute or chronic bronchitis. 6.Small amount of ascites and evidence of peritoneal carcinomatosis, as before. 7.Cholelithiasis. Electronically Signed: Douglas Conn  1/8/2025 8:45 AM EST  Workstation ID: [id number]    CT Abdomen Pelvis With Contrast    Result Date: 1/7/2025  Impression: 1.Bilateral primary ovarian carcinoma until proven otherwise. 2.Extensive peritoneal carcinomatosis. There is mild malignant ascites. 3.There is a 1.4 x 2.2 cm enhancing lesion in the right lobe of the liver suspicious for hepatic metastatic disease. 4.There may be a metastatic lesion in the spleen near the hilum. 5.Cholelithiasis. 6.Additional findings as noted above. Electronically Signed: Max Hawley MD  1/7/2025 7:47 PM EST  Workstation ID: [id number]   []  EKG/Telemetry   []  Cardiology/Vascular   []  Pathology  []  Old records  []  Other:    Assessment & Plan   Assessment / Plan     Assessment:  Suspected ovarian cancer  Cancer related pain  Constipation  Hypokalemia  Hypertension  Hyperlipidemia    Plan:  Patient remains admitted to the hospital for further care management  Oncology and IR consulted, appreciate assistance  IR able to complete CT-guided biopsy on the eighth, will await results   has come back elevated  Recommendations for MRI of abdomen by patient's gynecological oncologist, however given permanent pacemaker this cannot be obtained.  Rep for patient's pacer would not be able to come into the hospital given current weather conditions.  Patient will need scheduled outpatient MRI with advance notice to pacer rep  Imaging of patient's chest also shows concern for metastatic spread  Discussed with oncologist again today, no further inpatient workup needed  At this point, working towards controlling patient's symptoms before safely discharging.  Zofran ODT available.  Patient has oral pain medications available.  Continue to push bowel regiment.      Discussed with RN.  Discussed with oncologist    VTE Prophylaxis:  Pharmacologic VTE prophylaxis orders are present.        CODE STATUS:   Code Status (Patient has no pulse and is not breathing): CPR (Attempt to Resuscitate)  Medical Interventions (Patient has pulse or is breathing): Full Support      Electronically signed by Kavin Griffin MD, 1/9/2025, 17:00 EST.

## 2025-01-09 NOTE — PLAN OF CARE
Goal Outcome Evaluation:           Progress: no change  Outcome Evaluation: Pt pain difficult to control overnight. She received PRN morphine, oxycodone, and a one-time dose of dilaudid. She still reports having abdominal pain, but that it has improved after dilaudid. Pt complained of nausea at beginning of the shift and was given PRN compazine. Pt is currently resting in bed with call light and belongings within reach.

## 2025-01-10 LAB
ALBUMIN SERPL-MCNC: 3.5 G/DL (ref 3.5–5.2)
ALBUMIN/GLOB SERPL: 1 G/DL
ALP SERPL-CCNC: 76 U/L (ref 39–117)
ALT SERPL W P-5'-P-CCNC: 12 U/L (ref 1–33)
ANION GAP SERPL CALCULATED.3IONS-SCNC: 10 MMOL/L (ref 5–15)
AST SERPL-CCNC: 17 U/L (ref 1–32)
BASOPHILS # BLD AUTO: 0.04 10*3/MM3 (ref 0–0.2)
BASOPHILS NFR BLD AUTO: 0.4 % (ref 0–1.5)
BILIRUB SERPL-MCNC: 0.3 MG/DL (ref 0–1.2)
BUN SERPL-MCNC: 12 MG/DL (ref 6–20)
BUN/CREAT SERPL: 19.4 (ref 7–25)
CALCIUM SPEC-SCNC: 10.5 MG/DL (ref 8.6–10.5)
CHLORIDE SERPL-SCNC: 98 MMOL/L (ref 98–107)
CO2 SERPL-SCNC: 27 MMOL/L (ref 22–29)
CREAT SERPL-MCNC: 0.62 MG/DL (ref 0.57–1)
DEPRECATED RDW RBC AUTO: 37.2 FL (ref 37–54)
EGFRCR SERPLBLD CKD-EPI 2021: 103.4 ML/MIN/1.73
EOSINOPHIL # BLD AUTO: 0.08 10*3/MM3 (ref 0–0.4)
EOSINOPHIL NFR BLD AUTO: 0.7 % (ref 0.3–6.2)
ERYTHROCYTE [DISTWIDTH] IN BLOOD BY AUTOMATED COUNT: 12.2 % (ref 12.3–15.4)
GLOBULIN UR ELPH-MCNC: 3.4 GM/DL
GLUCOSE SERPL-MCNC: 116 MG/DL (ref 65–99)
HCT VFR BLD AUTO: 31.2 % (ref 34–46.6)
HGB BLD-MCNC: 9.7 G/DL (ref 12–15.9)
IMM GRANULOCYTES # BLD AUTO: 0.05 10*3/MM3 (ref 0–0.05)
IMM GRANULOCYTES NFR BLD AUTO: 0.5 % (ref 0–0.5)
LYMPHOCYTES # BLD AUTO: 1.39 10*3/MM3 (ref 0.7–3.1)
LYMPHOCYTES NFR BLD AUTO: 12.6 % (ref 19.6–45.3)
MAGNESIUM SERPL-MCNC: 1.9 MG/DL (ref 1.6–2.6)
MCH RBC QN AUTO: 26.2 PG (ref 26.6–33)
MCHC RBC AUTO-ENTMCNC: 31.1 G/DL (ref 31.5–35.7)
MCV RBC AUTO: 84.3 FL (ref 79–97)
MONOCYTES # BLD AUTO: 1.04 10*3/MM3 (ref 0.1–0.9)
MONOCYTES NFR BLD AUTO: 9.4 % (ref 5–12)
NEUTROPHILS NFR BLD AUTO: 76.4 % (ref 42.7–76)
NEUTROPHILS NFR BLD AUTO: 8.44 10*3/MM3 (ref 1.7–7)
NRBC BLD AUTO-RTO: 0 /100 WBC (ref 0–0.2)
PLATELET # BLD AUTO: 319 10*3/MM3 (ref 140–450)
PMV BLD AUTO: 10.4 FL (ref 6–12)
POTASSIUM SERPL-SCNC: 3.4 MMOL/L (ref 3.5–5.2)
PROT SERPL-MCNC: 6.9 G/DL (ref 6–8.5)
RBC # BLD AUTO: 3.7 10*6/MM3 (ref 3.77–5.28)
SODIUM SERPL-SCNC: 135 MMOL/L (ref 136–145)
WBC NRBC COR # BLD AUTO: 11.04 10*3/MM3 (ref 3.4–10.8)

## 2025-01-10 PROCEDURE — 80053 COMPREHEN METABOLIC PANEL: CPT | Performed by: INTERNAL MEDICINE

## 2025-01-10 PROCEDURE — 25010000002 PROCHLORPERAZINE 10 MG/2ML SOLUTION: Performed by: STUDENT IN AN ORGANIZED HEALTH CARE EDUCATION/TRAINING PROGRAM

## 2025-01-10 PROCEDURE — 63710000001 ONDANSETRON ODT 4 MG TABLET DISPERSIBLE: Performed by: INTERNAL MEDICINE

## 2025-01-10 PROCEDURE — 83735 ASSAY OF MAGNESIUM: CPT | Performed by: STUDENT IN AN ORGANIZED HEALTH CARE EDUCATION/TRAINING PROGRAM

## 2025-01-10 PROCEDURE — 99232 SBSQ HOSP IP/OBS MODERATE 35: CPT | Performed by: INTERNAL MEDICINE

## 2025-01-10 PROCEDURE — 25010000002 ENOXAPARIN PER 10 MG: Performed by: STUDENT IN AN ORGANIZED HEALTH CARE EDUCATION/TRAINING PROGRAM

## 2025-01-10 PROCEDURE — 85025 COMPLETE CBC W/AUTO DIFF WBC: CPT | Performed by: INTERNAL MEDICINE

## 2025-01-10 RX ORDER — PANTOPRAZOLE SODIUM 40 MG/10ML
40 INJECTION, POWDER, LYOPHILIZED, FOR SOLUTION INTRAVENOUS
Status: DISCONTINUED | OUTPATIENT
Start: 2025-01-10 | End: 2025-01-11

## 2025-01-10 RX ORDER — SODIUM PHOSPHATE,MONO-DIBASIC 19G-7G/118
1 ENEMA (ML) RECTAL ONCE
Status: COMPLETED | OUTPATIENT
Start: 2025-01-10 | End: 2025-01-10

## 2025-01-10 RX ORDER — SCOLOPAMINE TRANSDERMAL SYSTEM 1 MG/1
1 PATCH, EXTENDED RELEASE TRANSDERMAL
Status: DISCONTINUED | OUTPATIENT
Start: 2025-01-10 | End: 2025-01-11 | Stop reason: HOSPADM

## 2025-01-10 RX ADMIN — ENOXAPARIN SODIUM 40 MG: 100 INJECTION SUBCUTANEOUS at 08:26

## 2025-01-10 RX ADMIN — PANTOPRAZOLE SODIUM 40 MG: 40 INJECTION, POWDER, FOR SOLUTION INTRAVENOUS at 18:30

## 2025-01-10 RX ADMIN — SCOPOLAMINE 1 PATCH: 1.5 PATCH, EXTENDED RELEASE TRANSDERMAL at 12:48

## 2025-01-10 RX ADMIN — ONDANSETRON 4 MG: 4 TABLET, ORALLY DISINTEGRATING ORAL at 07:32

## 2025-01-10 RX ADMIN — SENNOSIDES AND DOCUSATE SODIUM 2 TABLET: 50; 8.6 TABLET ORAL at 08:27

## 2025-01-10 RX ADMIN — ONDANSETRON 4 MG: 4 TABLET, ORALLY DISINTEGRATING ORAL at 18:36

## 2025-01-10 RX ADMIN — Medication 10 ML: at 20:43

## 2025-01-10 RX ADMIN — OXYCODONE 10 MG: 5 TABLET ORAL at 00:54

## 2025-01-10 RX ADMIN — SENNOSIDES AND DOCUSATE SODIUM 2 TABLET: 50; 8.6 TABLET ORAL at 20:42

## 2025-01-10 RX ADMIN — PROCHLORPERAZINE EDISYLATE 5 MG: 5 INJECTION INTRAMUSCULAR; INTRAVENOUS at 06:07

## 2025-01-10 RX ADMIN — BISACODYL 5 MG: 5 TABLET, COATED ORAL at 08:27

## 2025-01-10 RX ADMIN — OXYCODONE 10 MG: 5 TABLET ORAL at 17:06

## 2025-01-10 RX ADMIN — SODIUM PHOSPHATE, DIBASIC AND SODIUM PHOSPHATE, MONOBASIC 1 ENEMA: 7; 19 ENEMA RECTAL at 12:48

## 2025-01-10 RX ADMIN — ACETAMINOPHEN 1000 MG: 500 TABLET ORAL at 09:33

## 2025-01-10 RX ADMIN — PANTOPRAZOLE SODIUM 40 MG: 40 INJECTION, POWDER, FOR SOLUTION INTRAVENOUS at 12:48

## 2025-01-10 RX ADMIN — ACETAMINOPHEN 1000 MG: 500 TABLET ORAL at 03:02

## 2025-01-10 RX ADMIN — Medication 10 ML: at 08:26

## 2025-01-10 NOTE — PROGRESS NOTES
[facility]   Hospitalist Progress Note  Date: 1/10/2025  Patient Name: Madina Reddy  : 1965  MRN: [id number]  Date of admission: 2025  Room/Bed: [facility]      Subjective   Subjective     Chief Complaint:   Abdominal pain    Summary:  Madina Reddy is a 59 y.o. female with medical history of hypertension, permanent pacemaker, hyperlipidemia and recent diagnosis of pelvic mass who comes into the ER due to worsening pelvic pain.  Patient has been having pelvic pain for a few weeks now and is even been evaluated in the outpatient setting by gynecological oncology who was planning to have biopsy performed, however her pain acutely worsened to the point she needed to be evaluated in the ER.      Upon arrival here she is tachycardic but otherwise stable.  Lab workup is unremarkable except for hypokalemia of 3.3 and bacteriuria.  CT abdomen and pelvis with contrast showed bilateral primary ovarian carcinoma until proven otherwise with extensive peritoneal carcinomatosis and mild bleeding ascites as well as a 1.4 x 2.2 cm enhancing lesion in the right lobe of the liver suspicious for metastatic disease as well as a possible metastatic lesion in the spleen.  Her oncologic surgeon at [facility] was notified and advised patient be admitted to our facility for definitive biopsy with oncology evaluation done here as well.  Hospitalist was then contacted for admission.  IR able to complete CT-guided biopsy, lesion from liver sampled.    Interval Followup:   Continued issues with nausea and vomiting.  Patient with minimal p.o. intake and having emesis intermittently.  Patient still with no bowel movement, all medications taken thus far orally for bowel movements have been thrown out.  Will provide enema today.  Adding scopolamine patch.    Objective   Objective     Vitals:   Temp:  [97 °F (36.1 °C)-98.4 °F (36.9 °C)] 98.4 °F (36.9 °C)  Heart Rate:  [] 81  Resp:  [18] 18  BP: (108-136)/(59-82)  127/69    Physical Exam               Constitutional: Awake, alert, comfortable on rounds              Eyes: Pupils equal, sclerae anicteric, no conjunctival injection              HENT: NCAT, mucous membranes moist              Neck: Supple, no thyromegaly, no lymphadenopathy, trachea midline              Respiratory: Clear to auscultation bilaterally, nonlabored respirations               Cardiovascular: RRR, no murmurs, rubs, or gallops, palpable pedal pulses bilaterally              Gastrointestinal: Positive bowel sounds, soft, tender diffusely without rebound or guarding, nondistended              Musculoskeletal: No bilateral ankle edema, no clubbing or cyanosis to extremities              Psychiatric: Appropriate affect, cooperative              Neurologic: Oriented x 3, strength symmetric in all extremities, Cranial Nerves grossly intact to confrontation, speech clear              Skin: No rashes        Result Review    Result Review:  I have personally reviewed these results:  [x]  Laboratory      Lab 01/10/25  0551 01/09/25  0533 01/08/25  1019 01/08/25  0555 01/07/25  1800   WBC 11.04* 14.79*  --  8.68 9.78   HEMOGLOBIN 9.7* 10.6*  --  10.4* 11.3*   HEMATOCRIT 31.2* 33.4*  --  32.5* 34.9   PLATELETS 319 294  --  329 388   NEUTROS ABS 8.44* 12.43*  --  5.97 6.79   IMMATURE GRANS (ABS) 0.05 0.09*  --  0.04 0.04   LYMPHS ABS 1.39 0.91  --  1.64 1.87   MONOS ABS 1.04* 1.31*  --  0.97* 0.98*   EOS ABS 0.08 0.01  --  0.01 0.04   MCV 84.3 84.8  --  84.4 83.7   LACTATE  --   --   --   --  1.6   PROTIME  --   --  16.3*  --   --    APTT  --   --  27.4  --   --          Lab 01/10/25  0551 01/09/25  0533 01/08/25  0555   SODIUM 135* 136 137   POTASSIUM 3.4* 3.3* 3.1*   CHLORIDE 98 100 100   CO2 27.0 24.6 27.0   ANION GAP 10.0 11.4 10.0   BUN 12 11 10   CREATININE 0.62 0.53* 0.58   EGFR 103.4 107.4 105.0   GLUCOSE 116* 154* 128*   CALCIUM 10.5 9.9 9.7   MAGNESIUM 1.9 1.7 1.8         Lab 01/10/25  0551 01/09/25  0533  01/07/25  1800   TOTAL PROTEIN 6.9 6.9 7.7   ALBUMIN 3.5 3.6 4.0   GLOBULIN 3.4 3.3 3.7   ALT (SGPT) 12 13 13   AST (SGOT) 17 19 19   BILIRUBIN 0.3 0.4 0.6   ALK PHOS 76 73 81   LIPASE  --   --  20         Lab 01/08/25  1019   PROTIME 16.3*   INR 1.28*                 Brief Urine Lab Results  (Last result in the past 365 days)        Color   Clarity   Blood   Leuk Est   Nitrite   Protein   CREAT   Urine HCG        01/07/25 1820 Yellow   Turbid   Negative   Small (1+)   Negative   30 mg/dL (1+)                 [x]  Microbiology   Microbiology Results (last 10 days)       ** No results found for the last 240 hours. **          [x]  Radiology  XR Chest 1 View    Result Date: 1/8/2025  Impression: No evidence of postprocedure pneumothorax, pleural effusion, or other active chest disease. Electronically Signed: Akira Padilla MD  1/8/2025 6:32 PM EST  Workstation ID: [id number]    CT Needle Biopsy Liver    Result Date: 1/8/2025  Impression: 1.Successful CT-guided percutaneous core needle biopsy of right lobe liver lesion with no immediate complication. Due to proximity to the diaphragm, chest x-ray will be obtained in 2 hours. Electronically Signed: Silvestre Tubbs MD  1/8/2025 5:03 PM EST  Workstation ID: [id number]    CT Chest Without Contrast Diagnostic    Result Date: 1/8/2025  Impression: 1.Small amount of ascites, right hepatic lesion, and peritoneal carcinomatosis in the visualized upper abdomen, as seen on the prior CT of the abdomen and pelvis. 2.Two ovoid nodular densities along the pleural surface in the posterior-medial left chest between the medial 7-8 and 9-10 left ribs. Given the findings in the abdomen, these findings are concerning for metastatic disease until proven otherwise. 3.Epicardial lymph nodes appear slightly more prominent than is typically seen, but are not definitely enlarged by CT size criteria. These could also indicate early metastatic disease considering the findings in the abdomen.  4.Right-sided aortic arch with mirror image branching of the aortic arch vessels. 5.Mild central bronchial wall thickening which could indicate acute or chronic bronchitis. 6.Small amount of ascites and evidence of peritoneal carcinomatosis, as before. 7.Cholelithiasis. Electronically Signed: Douglas Fabien  1/8/2025 8:45 AM EST  Workstation ID: [id number]    CT Abdomen Pelvis With Contrast    Result Date: 1/7/2025  Impression: 1.Bilateral primary ovarian carcinoma until proven otherwise. 2.Extensive peritoneal carcinomatosis. There is mild malignant ascites. 3.There is a 1.4 x 2.2 cm enhancing lesion in the right lobe of the liver suspicious for hepatic metastatic disease. 4.There may be a metastatic lesion in the spleen near the hilum. 5.Cholelithiasis. 6.Additional findings as noted above. Electronically Signed: Max Hawley MD  1/7/2025 7:47 PM EST  Workstation ID: [id number]   []  EKG/Telemetry   []  Cardiology/Vascular   []  Pathology  []  Old records  []  Other:    Assessment & Plan   Assessment / Plan     Assessment:  Suspected ovarian cancer  Cancer related pain  Constipation  Hypokalemia  Hypertension  Hyperlipidemia    Plan:  Patient remains admitted to the hospital for further care management  Oncology and IR consulted, appreciate assistance  IR able to complete CT-guided biopsy on the eighth, will await results   has come back elevated  Recommendations for MRI of abdomen by patient's gynecological oncologist, however given permanent pacemaker this cannot be obtained.  Rep for patient's pacer would not be able to come into the hospital given current weather conditions.  Patient will need scheduled outpatient MRI with advance notice to pacer rep  Imaging of patient's chest also shows concern for metastatic spread  Discussed with oncologist, no further inpatient workup needed  Continuing to work towards achieving improvement in nausea.  Patient continues to have as needed Zofran and Compazine available.   Adding scopolamine patch now.  Also feel full component of constipation, adding enema today.  Protonix has been transitioned to IV given intermittent vomiting.     Discussed with RN.  Discussed with oncologist    VTE Prophylaxis:  Pharmacologic VTE prophylaxis orders are present.        CODE STATUS:   Code Status (Patient has no pulse and is not breathing): CPR (Attempt to Resuscitate)  Medical Interventions (Patient has pulse or is breathing): Full Support      Electronically signed by Kavin Griffin MD, 1/10/2025, 17:08 EST.

## 2025-01-10 NOTE — PLAN OF CARE
Goal Outcome Evaluation:  Plan of Care Reviewed With: patient        Progress: no change  Outcome Evaluation: Pt remained A&Ox4 this shift. Also, pt remained on room air maintaining stable oxygen saturation. Pt was medicated with PRN Oxycodone to help relieve pain. Pt was medicated with PRN Dulcolax EC to help facilitate a BM as per bowel regimen. Pt was given an enema to help faciliate a BM. This led to a very small mucoid BM. Pt received a Scopolamine patch to help with nausea, was placed behind left ear.

## 2025-01-10 NOTE — CONSULTS
Discharge Planning Assessment   [facility]     Patient Name: Madina Reddy  MRN: [id number]  Today's Date: 1/10/2025    Admit Date: 1/7/2025  Plan: Pt admitted due to abdominal pain/pelvic mass. SW met with pt and daughter at bedside to assess needs. Pt lives at home with her  and reports fair independence in ADLs, however her  is able to assist with needs at home. Pt did mention interest in grab bars for the shower. SW notified her this is a private pay cost and encouraged her to look on [employer] for cheaper options. Pt v/u. Pt denies any discharge needs at this time. Pharmacy/PCP confirmed.   Discharge Needs Assessment       Row Name 01/10/25 0955       Living Environment    People in Home spouse    Name(s) of People in Home Pt lives in [place] with her spouse    Current Living Arrangements home    Potentially Unsafe Housing Conditions none    In the past 12 months has the electric, gas, oil, or water company threatened to shut off services in your home? No    Primary Care Provided by self    Provides Primary Care For no one    Family Caregiver if Needed spouse    Family Caregiver Names Fitz Reddy- Spouse    Quality of Family Relationships supportive;involved;helpful    Able to Return to Prior Arrangements yes       Resource/Environmental Concerns    Resource/Environmental Concerns none       Transportation Needs    In the past 12 months, has lack of transportation kept you from medical appointments or from getting medications? no    In the past 12 months, has lack of transportation kept you from meetings, work, or from getting things needed for daily living? No       Food Insecurity    Within the past 12 months, you worried that your food would run out before you got the money to buy more. Never true    Within the past 12 months, the food you bought just didn't last and you didn't have money to get more. Never true       Transition Planning    Patient/Family Anticipates Transition to home with  family    Patient/Family Anticipated Services at Transition none    Transportation Anticipated family or friend will provide       Discharge Needs Assessment    Readmission Within the Last 30 Days no previous admission in last 30 days    Concerns to be Addressed denies needs/concerns at this time    Anticipated Changes Related to Illness none    Equipment Needed After Discharge none    Provided Post Acute Provider List? N/A    Provided Post Acute Provider Quality & Resource List? N/A              Discharge Plan       Row Name 01/10/25 1000       Plan    Plan Pt admitted due to abdominal pain/pelvic mass. SW met with pt and daughter at bedside to assess needs. Pt lives at home with her  and reports fair independence in ADLs, however her  is able to assist with needs at home. Pt did mention interest in grab bars for the shower. SW notified her this is a private pay cost and encouraged her to look on [employer] for cheaper options. Pt v/u. Pt denies any discharge needs at this time. Pharmacy/PCP confirmed.    Patient/Family in Agreement with Plan yes              Demographic Summary       Row Name 01/10/25 0954       General Information    Admission Type observation    Arrived From emergency department    Referral Source admission list    Reason for Consult discharge planning    Preferred Language English       Contact Information    Permission Granted to Share Info With family/designee                   Functional Status       Row Name 01/10/25 0954       Functional Status    Usual Activity Tolerance good    Current Activity Tolerance good       Functional Status, IADL    Medications independent    Meal Preparation independent    Housekeeping independent    Laundry independent    Shopping independent       Mental Status    General Appearance WDL WDL       Mental Status Summary    Recent Changes in Mental Status/Cognitive Functioning no changes                   Psychosocial       Row Name 01/10/25 0987        Behavior WDL    Behavior WDL WDL       Emotion Mood WDL    Emotion/Mood/Affect WDL WDL       Speech WDL    Speech WDL WDL       Perceptual State WDL    Perceptual State WDL WDL       Thought Process WDL    Thought Process WDL WDL       Intellectual Performance WDL    Intellectual Performance WDL WDL       Coping/Stress    Major Change/Loss/Stressor none    Sources of Support spouse    Techniques to [place] with Loss/Stress/Change not applicable    Reaction to Health Status accepting    Understanding of Condition and Treatment adequate understanding of medical condition       Developmental Stage (Eriksson's Stages of Development)    Developmental Stage Stage 7 (35-65 years/Middle Adulthood) Generativity vs. Stagnation             GARFIELD Pastor

## 2025-01-10 NOTE — PLAN OF CARE
Goal Outcome Evaluation:  Plan of Care Reviewed With: patient        Progress: no change  Outcome Evaluation: pt aox4, vs stable on room air. prn compazine given for nausea since it was too early for PO zofran. prn oxycodone and scheduled tylenol given for stomach pain. pt standby with walker to bathroom. daughter at bedside throughout the night.

## 2025-01-11 VITALS
HEART RATE: 91 BPM | WEIGHT: 141.54 LBS | TEMPERATURE: 98.4 F | DIASTOLIC BLOOD PRESSURE: 85 MMHG | RESPIRATION RATE: 18 BRPM | SYSTOLIC BLOOD PRESSURE: 135 MMHG | OXYGEN SATURATION: 97 % | BODY MASS INDEX: 25.08 KG/M2 | HEIGHT: 63 IN

## 2025-01-11 LAB
ANION GAP SERPL CALCULATED.3IONS-SCNC: 12.3 MMOL/L (ref 5–15)
BASOPHILS # BLD AUTO: 0.05 10*3/MM3 (ref 0–0.2)
BASOPHILS NFR BLD AUTO: 0.5 % (ref 0–1.5)
BUN SERPL-MCNC: 11 MG/DL (ref 6–20)
BUN/CREAT SERPL: 18.6 (ref 7–25)
CALCIUM SPEC-SCNC: 10.6 MG/DL (ref 8.6–10.5)
CHLORIDE SERPL-SCNC: 97 MMOL/L (ref 98–107)
CO2 SERPL-SCNC: 27.7 MMOL/L (ref 22–29)
CREAT SERPL-MCNC: 0.59 MG/DL (ref 0.57–1)
DEPRECATED RDW RBC AUTO: 38.2 FL (ref 37–54)
EGFRCR SERPLBLD CKD-EPI 2021: 104.6 ML/MIN/1.73
EOSINOPHIL # BLD AUTO: 0.04 10*3/MM3 (ref 0–0.4)
EOSINOPHIL NFR BLD AUTO: 0.4 % (ref 0.3–6.2)
ERYTHROCYTE [DISTWIDTH] IN BLOOD BY AUTOMATED COUNT: 12.1 % (ref 12.3–15.4)
GLUCOSE SERPL-MCNC: 109 MG/DL (ref 65–99)
HCT VFR BLD AUTO: 34.1 % (ref 34–46.6)
HGB BLD-MCNC: 10.6 G/DL (ref 12–15.9)
IMM GRANULOCYTES # BLD AUTO: 0.06 10*3/MM3 (ref 0–0.05)
IMM GRANULOCYTES NFR BLD AUTO: 0.6 % (ref 0–0.5)
LYMPHOCYTES # BLD AUTO: 1.28 10*3/MM3 (ref 0.7–3.1)
LYMPHOCYTES NFR BLD AUTO: 12.5 % (ref 19.6–45.3)
MAGNESIUM SERPL-MCNC: 1.9 MG/DL (ref 1.6–2.6)
MCH RBC QN AUTO: 26.7 PG (ref 26.6–33)
MCHC RBC AUTO-ENTMCNC: 31.1 G/DL (ref 31.5–35.7)
MCV RBC AUTO: 85.9 FL (ref 79–97)
MONOCYTES # BLD AUTO: 0.84 10*3/MM3 (ref 0.1–0.9)
MONOCYTES NFR BLD AUTO: 8.2 % (ref 5–12)
NEUTROPHILS NFR BLD AUTO: 7.99 10*3/MM3 (ref 1.7–7)
NEUTROPHILS NFR BLD AUTO: 77.8 % (ref 42.7–76)
NRBC BLD AUTO-RTO: 0 /100 WBC (ref 0–0.2)
PHOSPHATE SERPL-MCNC: 3.3 MG/DL (ref 2.5–4.5)
PLATELET # BLD AUTO: 319 10*3/MM3 (ref 140–450)
PMV BLD AUTO: 10.9 FL (ref 6–12)
POTASSIUM SERPL-SCNC: 3.5 MMOL/L (ref 3.5–5.2)
RBC # BLD AUTO: 3.97 10*6/MM3 (ref 3.77–5.28)
SODIUM SERPL-SCNC: 137 MMOL/L (ref 136–145)
WBC NRBC COR # BLD AUTO: 10.26 10*3/MM3 (ref 3.4–10.8)

## 2025-01-11 PROCEDURE — 25010000002 PROCHLORPERAZINE 10 MG/2ML SOLUTION: Performed by: STUDENT IN AN ORGANIZED HEALTH CARE EDUCATION/TRAINING PROGRAM

## 2025-01-11 PROCEDURE — 99239 HOSP IP/OBS DSCHRG MGMT >30: CPT | Performed by: INTERNAL MEDICINE

## 2025-01-11 PROCEDURE — 80048 BASIC METABOLIC PNL TOTAL CA: CPT | Performed by: INTERNAL MEDICINE

## 2025-01-11 PROCEDURE — 63710000001 ONDANSETRON ODT 4 MG TABLET DISPERSIBLE: Performed by: INTERNAL MEDICINE

## 2025-01-11 PROCEDURE — 84100 ASSAY OF PHOSPHORUS: CPT | Performed by: INTERNAL MEDICINE

## 2025-01-11 PROCEDURE — 83735 ASSAY OF MAGNESIUM: CPT | Performed by: INTERNAL MEDICINE

## 2025-01-11 PROCEDURE — 25010000002 ENOXAPARIN PER 10 MG: Performed by: STUDENT IN AN ORGANIZED HEALTH CARE EDUCATION/TRAINING PROGRAM

## 2025-01-11 PROCEDURE — 85025 COMPLETE CBC W/AUTO DIFF WBC: CPT | Performed by: INTERNAL MEDICINE

## 2025-01-11 RX ORDER — PANTOPRAZOLE SODIUM 40 MG/1
40 TABLET, DELAYED RELEASE ORAL
Status: DISCONTINUED | OUTPATIENT
Start: 2025-01-11 | End: 2025-01-11 | Stop reason: HOSPADM

## 2025-01-11 RX ADMIN — ALUMINUM HYDROXIDE, MAGNESIUM HYDROXIDE, AND DIMETHICONE 15 ML: 400; 400; 40 SUSPENSION ORAL at 13:46

## 2025-01-11 RX ADMIN — PANTOPRAZOLE SODIUM 40 MG: 40 INJECTION, POWDER, FOR SOLUTION INTRAVENOUS at 05:55

## 2025-01-11 RX ADMIN — ONDANSETRON 4 MG: 4 TABLET, ORALLY DISINTEGRATING ORAL at 21:05

## 2025-01-11 RX ADMIN — PROCHLORPERAZINE EDISYLATE 5 MG: 5 INJECTION INTRAMUSCULAR; INTRAVENOUS at 05:55

## 2025-01-11 RX ADMIN — ONDANSETRON 4 MG: 4 TABLET, ORALLY DISINTEGRATING ORAL at 03:41

## 2025-01-11 RX ADMIN — Medication 10 ML: at 09:49

## 2025-01-11 RX ADMIN — Medication 10 ML: at 06:00

## 2025-01-11 RX ADMIN — ONDANSETRON 4 MG: 4 TABLET, ORALLY DISINTEGRATING ORAL at 09:49

## 2025-01-11 RX ADMIN — SENNOSIDES AND DOCUSATE SODIUM 2 TABLET: 50; 8.6 TABLET ORAL at 09:49

## 2025-01-11 RX ADMIN — PANTOPRAZOLE SODIUM 40 MG: 40 TABLET, DELAYED RELEASE ORAL at 17:46

## 2025-01-11 RX ADMIN — ENOXAPARIN SODIUM 40 MG: 100 INJECTION SUBCUTANEOUS at 09:49

## 2025-01-11 RX ADMIN — ONDANSETRON 4 MG: 4 TABLET, ORALLY DISINTEGRATING ORAL at 15:50

## 2025-01-11 NOTE — DISCHARGE SUMMARY
[facility]         HOSPITALIST  DISCHARGE SUMMARY    Patient Name: Madina Reddy  : 1965  MRN: [id number]    Date of Admission: 2025  Date of Discharge:  2025  Primary Care Physician: Kady Vernon APRN    Consults:  Hematology oncology  Interventional radiology    Active and Resolved Hospital Problems:  Suspected ovarian cancer  Cancer related pain  Nausea vomiting, intolerance of p.o.  Constipation  Hypokalemia  Hypertension  Hyperlipidemia       Hospital Course     Hospital Course:  Madina Reddy is a 59 y.o. female with medical history of hypertension, permanent pacemaker, hyperlipidemia and recent diagnosis of pelvic mass who comes into the ER due to worsening pelvic pain.  Patient has been having pelvic pain for a few weeks now and is even been evaluated in the outpatient setting by gynecological oncology who was planning to have biopsy performed, however her pain acutely worsened to the point she needed to be evaluated in the ER.      Upon arrival here she is tachycardic but otherwise stable.  Lab workup is unremarkable except for hypokalemia of 3.3 and bacteriuria.  CT abdomen and pelvis with contrast showed bilateral primary ovarian carcinoma until proven otherwise with extensive peritoneal carcinomatosis and mild bleeding ascites as well as a 1.4 x 2.2 cm enhancing lesion in the right lobe of the liver suspicious for metastatic disease as well as a possible metastatic lesion in the spleen.  Her oncologic surgeon at [facility] was notified and advised patient be admitted to our facility for definitive biopsy with oncology evaluation done here as well.  Hospitalist was then contacted for admission.  IR able to complete CT-guided biopsy on 2025, lesion from liver sampled.  Still pending biopsies at this time.  Given persistent nausea and vomiting, inability to consistently consume anything orally, case further discussed with oncologist and gynecological oncology.   Jo, patient's gynecological oncologist at [facility] able to discuss case.  Patient could be candidate for initiation of TPN and inpatient chemotherapy.  Therefore process was initiated for transfer to the [facility] for ongoing inpatient care.  Patient in stable condition for transfer    DISCHARGE Follow Up Recommendations for labs and diagnostics:   Of note, patient lost IV access prior to transferring.  Attempted and failed bedside placement of peripheral IV, patient asking to hold on further attempts at this time      Day of Discharge     Vital Signs:  Temp:  [97.5 °F (36.4 °C)-98.6 °F (37 °C)] 97.5 °F (36.4 °C)  Heart Rate:  [] 87  Resp:  [20-22] 20  BP: ()/(52-85) 130/76  Physical Exam:               Constitutional: Awake, alert, comfortable on rounds              Eyes: Pupils equal, sclerae anicteric, no conjunctival injection              HENT: NCAT, mucous membranes moist              Neck: Supple, no thyromegaly, no lymphadenopathy, trachea midline              Respiratory: Clear to auscultation bilaterally, nonlabored respirations               Cardiovascular: RRR, no murmurs, rubs, or gallops, palpable pedal pulses bilaterally              Gastrointestinal: Positive bowel sounds, soft, tender diffusely without rebound or guarding, nondistended              Musculoskeletal: No bilateral ankle edema, no clubbing or cyanosis to extremities              Psychiatric: Appropriate affect, cooperative              Neurologic: Oriented x 3, strength symmetric in all extremities, Cranial Nerves grossly intact to confrontation, speech clear              Skin: No rashes        Discharge Details   Current inpatient medications  Scheduled Meds:enoxaparin, 40 mg, Subcutaneous, Daily  pantoprazole, 40 mg, Oral, BID AC  Scopolamine, 1 patch, Transdermal, Q72H  senna-docusate sodium, 2 tablet, Oral, BID  sodium chloride, 10 mL, Intravenous, Q12H      Continuous Infusions:   PRN Meds:.   aluminum-magnesium hydroxide-simethicone    senna-docusate sodium **AND** polyethylene glycol **AND** bisacodyl **AND** bisacodyl    melatonin    ondansetron ODT    oxyCODONE    prochlorperazine    sodium chloride    sodium chloride    sodium chloride      Home medications     Discharge Medications        Continue These Medications        Instructions Start Date   amLODIPine 2.5 MG tablet  Commonly known as: NORVASC   2.5 mg, Oral, Daily      aspirin 81 MG chewable tablet   81 mg, Daily      cetirizine 10 MG tablet  Commonly known as: zyrTEC   10 mg, Oral, Daily      fluticasone 50 MCG/ACT nasal spray  Commonly known as: FLONASE   2 sprays, Nasal, Daily      metoprolol succinate XL 25 MG 24 hr tablet  Commonly known as: TOPROL-XL   1 tablet, Daily      montelukast 10 MG tablet  Commonly known as: SINGULAIR   10 mg, Oral, Nightly               No Known Allergies    Discharge Disposition:  Transfer to Another Facility    Diet:  Hospital:  Diet Order   Procedures    Diet: Regular/House; Texture: Regular (IDDSI 7); Fluid Consistency: Thin (IDDSI 0)       Discharge Activity:       CODE STATUS:  Code Status and Medical Interventions: CPR (Attempt to Resuscitate); Full Support   Ordered at: 01/07/25 3121     Code Status (Patient has no pulse and is not breathing):    CPR (Attempt to Resuscitate)     Medical Interventions (Patient has pulse or is breathing):    Full Support         No future appointments.        Pertinent  and/or Most Recent Results     PROCEDURES:   CT NEEDLE BIOPSY LIVER     Date of Exam: 1/8/2025 4:14 PM EST     Indication: Suspected ovarian carcinoma.     Comparison: None available.     Consent:  The risk benefits and alternatives of the procedure were explained to the patient. The chief risks discussed were bleeding, infection and injury to the liver or other adjacent structures. The patient indicated they understood what was discussed and   elected to proceed. The patient provided written consent.      Findings:    The patient was placed on the CT in a right posterior oblique position and a preliminary CT was performed. The skin overlying the right hepatic lobe was prepped and draped in normal sterile fashion. 1mg versed and 50 micrograms of fentanyl was given for   conscious sedation Total length of conscious sedation time was 25 minutes.  2% lidocaine was injected along the anticipated tract of the needle. A 17 gauge introducer needle was advanced into the right hepatic lobe under CT fluoroscopic guidance. 2 core   biopsies were then performed with an 18-gauge core needle device. Cystic type fluid was expressed. Adequate tissue was suggested per preliminary cytopathology. One additional 18-gauge core sample obtained.  Gelfoam slurry was used for hemostasis.  Post   procedure CT demonstrated no immediate complications. There is gas noted adjacent to the liver due to biopsy and Gelfoam.     IMPRESSION:  Impression:  1.Successful CT-guided percutaneous core needle biopsy of right lobe liver lesion with no immediate complication. Due to proximity to the diaphragm, chest x-ray will be obtained in 2 hours.        LAB RESULTS:      Lab 01/11/25  0527 01/10/25  0551 01/09/25  0533 01/08/25  1019 01/08/25  0555 01/07/25  1800   WBC 10.26 11.04* 14.79*  --  8.68 9.78   HEMOGLOBIN 10.6* 9.7* 10.6*  --  10.4* 11.3*   HEMATOCRIT 34.1 31.2* 33.4*  --  32.5* 34.9   PLATELETS 319 319 294  --  329 388   NEUTROS ABS 7.99* 8.44* 12.43*  --  5.97 6.79   IMMATURE GRANS (ABS) 0.06* 0.05 0.09*  --  0.04 0.04   LYMPHS ABS 1.28 1.39 0.91  --  1.64 1.87   MONOS ABS 0.84 1.04* 1.31*  --  0.97* 0.98*   EOS ABS 0.04 0.08 0.01  --  0.01 0.04   MCV 85.9 84.3 84.8  --  84.4 83.7   LACTATE  --   --   --   --   --  1.6   PROTIME  --   --   --  16.3*  --   --    APTT  --   --   --  27.4  --   --          Lab 01/11/25  0527 01/10/25  0551 01/09/25  0533 01/08/25  0555 01/07/25  1800   SODIUM 137 135* 136 137 138   POTASSIUM 3.5 3.4* 3.3* 3.1*  3.3*   CHLORIDE 97* 98 100 100 97*   CO2 27.7 27.0 24.6 27.0 25.3   ANION GAP 12.3 10.0 11.4 10.0 15.7*   BUN 11 12 11 10 13   CREATININE 0.59 0.62 0.53* 0.58 0.66   EGFR 104.6 103.4 107.4 105.0 101.8   GLUCOSE 109* 116* 154* 128* 115*   CALCIUM 10.6* 10.5 9.9 9.7 10.5   MAGNESIUM 1.9 1.9 1.7 1.8 1.8   PHOSPHORUS 3.3  --   --   --   --          Lab 01/10/25  0551 01/09/25  0533 01/07/25  1800   TOTAL PROTEIN 6.9 6.9 7.7   ALBUMIN 3.5 3.6 4.0   GLOBULIN 3.4 3.3 3.7   ALT (SGPT) 12 13 13   AST (SGOT) 17 19 19   BILIRUBIN 0.3 0.4 0.6   ALK PHOS 76 73 81   LIPASE  --   --  20         Lab 01/08/25  1019   PROTIME 16.3*   INR 1.28*                 Brief Urine Lab Results  (Last result in the past 365 days)        Color   Clarity   Blood   Leuk Est   Nitrite   Protein   CREAT   Urine HCG        01/07/25 1820 Yellow   Turbid   Negative   Small (1+)   Negative   30 mg/dL (1+)                 Microbiology Results (last 10 days)       ** No results found for the last 240 hours. **            XR Chest 1 View    Result Date: 1/8/2025  Impression: Impression: No evidence of postprocedure pneumothorax, pleural effusion, or other active chest disease. Electronically Signed: Akira Padilla MD  1/8/2025 6:32 PM EST  Workstation ID: [id number]    CT Needle Biopsy Liver    Result Date: 1/8/2025  Impression: Impression: 1.Successful CT-guided percutaneous core needle biopsy of right lobe liver lesion with no immediate complication. Due to proximity to the diaphragm, chest x-ray will be obtained in 2 hours. Electronically Signed: Silvestre Tubbs MD  1/8/2025 5:03 PM EST  Workstation ID: [id number]    CT Chest Without Contrast Diagnostic    Result Date: 1/8/2025  Impression: Impression: 1.Small amount of ascites, right hepatic lesion, and peritoneal carcinomatosis in the visualized upper abdomen, as seen on the prior CT of the abdomen and pelvis. 2.Two ovoid nodular densities along the pleural surface in the posterior-medial left chest between  the medial 7-8 and 9-10 left ribs. Given the findings in the abdomen, these findings are concerning for metastatic disease until proven otherwise. 3.Epicardial lymph nodes appear slightly more prominent than is typically seen, but are not definitely enlarged by CT size criteria. These could also indicate early metastatic disease considering the findings in the abdomen. 4.Right-sided aortic arch with mirror image branching of the aortic arch vessels. 5.Mild central bronchial wall thickening which could indicate acute or chronic bronchitis. 6.Small amount of ascites and evidence of peritoneal carcinomatosis, as before. 7.Cholelithiasis. Electronically Signed: Douglas Conn  1/8/2025 8:45 AM EST  Workstation ID: [id number]    CT Abdomen Pelvis With Contrast    Result Date: 1/7/2025  Impression: Impression: 1.Bilateral primary ovarian carcinoma until proven otherwise. 2.Extensive peritoneal carcinomatosis. There is mild malignant ascites. 3.There is a 1.4 x 2.2 cm enhancing lesion in the right lobe of the liver suspicious for hepatic metastatic disease. 4.There may be a metastatic lesion in the spleen near the hilum. 5.Cholelithiasis. 6.Additional findings as noted above. Electronically Signed: Max Hawley MD  1/7/2025 7:47 PM EST  Workstation ID: [id number]             Results for orders placed during the hospital encounter of 01/23/23    Adult Transthoracic Echo Complete w/ Color, Spectral and Contrast if necessary per protocol    Interpretation Summary    Left ventricular ejection fraction appears to be 61 - 65%.    There were no apparent intracardiac masses, vegetations or thrombi.      Labs Pending at Discharge:  Pending Labs       Order Current Status    Non-gynecologic Cytology In process    Tissue Pathology Exam In process              Time spent on Discharge including face to face service:  40 minutes    Electronically signed by Kavin Griffin MD, 01/11/25, 4:22 PM EST.

## 2025-01-11 NOTE — PAYOR COMM NOTE
"Madina Reddy (59 y.o. Female)       Date of Birth   1965    Social Security Number       Address   [address] [place], [place] [address]    Home Phone   [phone]    MRN   [id number]       Hoahaoism   Druze    Marital Status                               Admission Date   25    Admission Type   Emergency    Admitting Provider   Kavin Griffin MD    Attending Provider   Kavin Griffin MD    Department, Room/Bed   [address], 4028/1       Discharge Date       Discharge Disposition       Discharge Destination                                 Attending Provider: Kavin Griffin MD    Allergies: No Known Allergies    Isolation: None   Infection: None   Code Status: CPR    Ht: 160 cm (62.99\")   Wt: 64.2 kg (141 lb 8.6 oz)    Admission Cmt: None   Principal Problem: Pelvic mass [R19.00]                   Active Insurance as of 2025       Primary Coverage       Payor Plan Insurance Group Employer/Plan Group    [employer] HMO [id number]       Payor Plan Address Payor Plan Phone Number Payor Plan Fax Number Effective Dates    PO SALAZAR [id number] [phone]  2023 - None Entered    [place] [id number]         Subscriber Name Subscriber Birth Date Member ID       MADINA REDDY 1965 [id number]                     Emergency Contacts        (Rel.) Home Phone Work Phone Mobile Phone    JAVON REDDY (Spouse) -- -- [phone]    ERON DOWNEY (Son) -- -- [phone]                 History & Physical        Silvestre Tubbs MD at [address] [place] [address]   Interventional Radiology H&P    Patient Name: Madina Reddy  : 1965  MRN: [id number]  Primary Care Physician:  Kady Vernon APRN  Referring Physician: No ref. provider found  Date of admission: 2025    Subjective  Subjective     HPI:  Madina Reddy is a 59 y.o. female liver and omental lesions    Review of Systems:   Constitutional no fever,  no " weight loss       Otolaryngeal no difficulty swallowing   Cardiovascular no chest pain   Pulmonary no cough, no sputum production   Gastrointestinal no constipation, no diarrhea                         Personal History       Past Medical/Surgical History:   Past Medical History:   Diagnosis Date    Allergies     Dizziness Madina    Headache Madina    Hyperlipidemia March    Hypertension Madina    Pacemaker     Pneumonia Jan 2023    TMJ dysfunction Madina     Past Surgical History:   Procedure Laterality Date    BREAST BIOPSY      CARDIAC ELECTROPHYSIOLOGY PROCEDURE N/A 01/25/2023    Procedure: Device Implant;  Surgeon: VINCE Alejandre MD;  Location: [facility] INVASIVE LOCATION;  Service: Cardiology;  Laterality: N/A;    CARDIAC SURGERY      heart surgery as an infant    COLONOSCOPY  August 2017    PACEMAKER IMPLANTATION         Social History:  reports that she has never smoked. She has never been exposed to tobacco smoke. She has never used smokeless tobacco. She reports that she does not drink alcohol and does not use drugs.    Medications:  Medications Prior to Admission   Medication Sig Dispense Refill Last Dose/Taking    amLODIPine (NORVASC) 2.5 MG tablet Take 1 tablet by mouth once daily 30 tablet 0     aspirin 81 MG chewable tablet Chew 1 tablet Daily.       cetirizine (zyrTEC) 10 MG tablet Take 1 tablet by mouth Daily. 90 tablet 1     Diclofenac Sodium (Voltaren) 1 % gel gel Apply 4 g topically to the appropriate area as directed 4 (Four) Times a Day As Needed (hand pain). 100 g 1     fluticasone (FLONASE) 50 MCG/ACT nasal spray 2 sprays into the nostril(s) as directed by provider Daily. 11.1 mL 2     metoprolol succinate XL (TOPROL-XL) 25 MG 24 hr tablet Take 1 tablet by mouth Daily.       montelukast (SINGULAIR) 10 MG tablet TAKE 1 TABLET BY MOUTH ONCE DAILY AT NIGHT 90 tablet 0      Current medications:  acetaminophen, 1,000 mg, Oral, Q8H  [START ON 1/9/2025] enoxaparin, 40 mg, Subcutaneous,  "Daily  senna-docusate sodium, 2 tablet, Oral, BID  sodium chloride, 10 mL, Intravenous, Q12H      Current IV drips:       Allergies:  No Known Allergies    Objective   Objective     Vitals:   Temp:  [97.9 °F (36.6 °C)-98.1 °F (36.7 °C)] 97.9 °F (36.6 °C)  Heart Rate:  [] 86  Resp:  [12-18] 12  BP: (116-127)/(69-94) 119/78      Physical Exam:   Constitutional: Awake, alert, No acute distress    Respiratory: Clear to auscultation bilaterally, nonlabored respirations    Cardiovascular: RRR, no murmurs, rubs, or gallops, palpable pedal pulses bilaterally   Gastrointestinal: Positive bowel sounds, soft, nontender, nondistended        ASA SCALE ASSESSMENT:  3-Severe systemic disease that results in functional limitation    MALLAMPATI CLASSIFICATION:  3-Only the soft palate and base of uvula are visible       Result Review       Result Review:     Sodium   Date Value Ref Range Status   01/08/2025 137 136 - 145 mmol/L Final   01/07/2025 138 136 - 145 mmol/L Final       Potassium   Date Value Ref Range Status   01/08/2025 3.1 (L) 3.5 - 5.2 mmol/L Final   01/07/2025 3.3 (L) 3.5 - 5.2 mmol/L Final       Chloride   Date Value Ref Range Status   01/08/2025 100 98 - 107 mmol/L Final   01/07/2025 97 (L) 98 - 107 mmol/L Final       No results found for: \"PLASMABICARB\"    BUN   Date Value Ref Range Status   01/08/2025 10 6 - 20 mg/dL Final   01/07/2025 13 6 - 20 mg/dL Final       Creatinine   Date Value Ref Range Status   01/08/2025 0.58 0.57 - 1.00 mg/dL Final   01/07/2025 0.66 0.57 - 1.00 mg/dL Final       Calcium   Date Value Ref Range Status   01/08/2025 9.7 8.6 - 10.5 mg/dL Final   01/07/2025 10.5 8.6 - 10.5 mg/dL Final           No components found for: \"GLUCOSE.*\"  Results from last 7 days   Lab Units 01/08/25  0555   WBC 10*3/mm3 8.68   HEMOGLOBIN g/dL 10.4*   HEMATOCRIT % 32.5*   PLATELETS 10*3/mm3 329      Results from last 7 days   Lab Units 01/08/25  1019   INR  1.28*           Assessment / Plan     Assesment:   " Liver and omental lesions      Plan:   Ct guided biopsy of liver or omental lesion    The risks and benefits of the procedure were discussed with the patient.    Electronically signed by Silvestre Tubbs MD, 25, 3:45 PM EST.     Electronically signed by Silvestre Tubbs MD at 25 1546       Gus Lee MD at 25 [phone]           [facility]   HOSPITALIST HISTORY AND PHYSICAL  Date: 2025   Patient Name: Madina Reddy  : 1965  MRN: [id number]  Primary Care Physician:  Kady Vernon APRN  Date of admission: 2025    Subjective  Subjective     Chief Complaint: Abdominal pain    HPI:    Madina Reddy is a 59 y.o. female past medical history of hypertension, permanent pacemaker, hyperlipidemia and recent diagnosis of pelvic mass who comes into the ER due to worsening pelvic pain.  Patient has been having pelvic pain for a few weeks now and is even been evaluated in the outpatient setting by Vibha Irwin surgeon who is planning to have biopsy done earlier today however her pain in the abdomen it acutely worsened to the point that she needed to be evaluated in the ER.      Upon arrival here she is tachycardic but otherwise stable.  Lab workup is unremarkable except for hypokalemia of 3.3 and bacteriuria.  CT down pelvis with contrast was done here and showed bilateral primary ovarian carcinoma until proven otherwise with extensive peritoneal carcinomatosis and mild bleeding ascites as well as a 1.4 x 2.2 cm enhancing lesion in the right lobe of the liver suspicious for metastatic disease as well as a possible metastatic lesion in the spleen.  Her oncologic surgeon at [facility] was notified and advised that patient be admitted to our facility for definitive biopsy with oncology evaluation done here as well.  Hospitalist was then contacted for admission    Personal History     Past Medical History:  Past Medical History:   Diagnosis Date    Allergies     Dizziness Madina     Headache Madina    Hyperlipidemia March    Hypertension Madina    Pacemaker     Pneumonia Jan 2023    TMJ dysfunction Madina         Past Surgical History:  Past Surgical History:   Procedure Laterality Date    BREAST BIOPSY      CARDIAC ELECTROPHYSIOLOGY PROCEDURE N/A 01/25/2023    Procedure: Device Implant;  Surgeon: VINCE Alejandre MD;  Location: [facility] INVASIVE LOCATION;  Service: Cardiology;  Laterality: N/A;    CARDIAC SURGERY      heart surgery as an infant    COLONOSCOPY  August 2017    PACEMAKER IMPLANTATION           Family History:   Reviewed and noncontributory except as mentioned in HPI    Social History:   Social Drivers of Health     Tobacco Use: Low Risk  (1/7/2025)    Patient History     Smoking Tobacco Use: Never     Smokeless Tobacco Use: Never     Passive Exposure: Never   Alcohol Use: Not At Risk (1/23/2023)    AUDIT-C     Frequency of Alcohol Consumption: Never     Average Number of Drinks: Patient does not drink     Frequency of Binge Drinking: Never   Financial Resource Strain: Not on file   Food Insecurity: No Food Insecurity (1/24/2023)    Hunger Vital Sign     Worried About Running Out of Food in the Last Year: Never true     Ran Out of Food in the Last Year: Never true   Transportation Needs: Not on file   Physical Activity: Insufficiently Active (1/24/2023)    Exercise Vital Sign     Days of Exercise per Week: 4 days     Minutes of Exercise per Session: 30 min   Stress: Not on file   Social Connections: Not on file   Interpersonal Safety: Not At Risk (1/7/2025)    Abuse Screen     Unsafe at Home or Work/School: no     Feels Threatened by Someone?: no     Does Anyone Keep You from Contacting Others or Doint Things Outside the Home?: no     Physical Sign of Abuse Present: no   Depression: Not at risk (3/25/2024)    PHQ-2     PHQ-2 Score: 0   Housing Stability: Unknown (1/26/2023)    Housing Stability     Current Living Arrangements: home     Potentially Unsafe Housing  Conditions: Not on file   Utilities: Not on file   Health Literacy: Unknown (1/24/2023)    Education     Help with school or training?: Not on file     Preferred Language: English   Employment: Not on file   Disabilities: Not At Risk (1/23/2023)    Disabilities     Concentrating, Remembering, or Making Decisions Difficulty: no     Doing Errands Independently Difficulty: no         Home Medications:  Diclofenac Sodium, amLODIPine, aspirin, cetirizine, fluticasone, metoprolol succinate XL, and montelukast    Allergies:  No Known Allergies    Review of Systems   All systems were reviewed and negative except for: Pelvic pain    Objective  Objective     Vitals:   Temp:  [98 °F (36.7 °C)] 98 °F (36.7 °C)  Heart Rate:  [106] 106  Resp:  [18] 18  BP: (122)/(71) 122/71    Physical Exam    Constitutional: Awake, alert, no acute distress   Eyes: Pupils equal, sclerae anicteric, no conjunctival injection   HENT: NCAT, mucous membranes moist   Neck: Supple, no thyromegaly, no lymphadenopathy, trachea midline   Respiratory: Clear to auscultation bilaterally, nonlabored respirations    Cardiovascular: RRR, no murmurs, rubs, or gallops, palpable pedal pulses bilaterally   Gastrointestinal: Positive bowel sounds, soft, tender diffusely without rebound or guarding, nondistended   Musculoskeletal: No bilateral ankle edema, no clubbing or cyanosis to extremities   Psychiatric: Appropriate affect, cooperative   Neurologic: Oriented x 3, strength symmetric in all extremities, Cranial Nerves grossly intact to confrontation, speech clear   Skin: No rashes     Result Review   Result Review:  I have personally reviewed the results from the time of this admission to 1/7/2025 23:03 EST and agree with these findings:  []  Laboratory  []  Microbiology  []  Radiology  []  EKG/Telemetry   []  Cardiology/Vascular   []  Pathology  []  Old records  []  Other:      Assessment & Plan  Assessment / Plan     Assessment/Plan:   Suspected ovarian carcinoma  with peritoneal carcinomatosis  Suspected cancer related pain  Hepatic lesion, suspected metastatic  Hypokalemia  Hypertension  Hyperlipidemia    Plan  - Place in observation on hospitalist service  - I will get oncology input on whether patient would benefit from biopsy while inpatient, also will get CT chest to assess for further metastatic spread. Will defer tumor marker testing to oncology.  Continue pain control, limit IV narcotics as much as possible.  I have started scheduled Tylenol  - Replace potassium, check magnesium  - Continue home BP medicines  - Continue home statin      Discussed with ER Physician and Nurse    All labs/imaging studies were personally reviewed and findings are as noted above      DVT Prophylaxis: Lovenox    CODE STATUS:    Code Status (Patient has no pulse and is not breathing): CPR (Attempt to Resuscitate)  Medical Interventions (Patient has pulse or is breathing): Full Support      Admission Status:  I believe this patient meets observation status.    Electronically signed by Gus Lee MD, 01/07/25, 10:56 PM EST.               Electronically signed by Gus Lee MD at 01/08/25 0409          Emergency Department Notes        Lew Whittington,  at 01/07/25 1728          Time: 5:28 PM EST  Date of encounter:  1/7/2025  Independent Historian/Clinical History and Information was obtained by:   Patient    History is limited by: N/A    Chief Complaint   Patient presents with    Abdominal Pain    Nausea         History of Present Illness:  Patient is a 59 y.o. year old female who presents to the emergency department for evaluation of abdominal pain, nausea, vomiting.  Patient has had decreased appetite.  Per family, patient was diagnosed with a large mass in her lower abdomen, biopsy pending.  They feel like the mass is contributing to her symptoms.  She has also had weakness.  Family states that she almost fell today. (Triage Provider: Mike Lee PA-C).      Patient Care  Team  Primary Care Provider: Kady Vernon APRN    Past Medical History:     No Known Allergies  Past Medical History:   Diagnosis Date    Allergies     Dizziness Madina    Headache Madina    Hyperlipidemia March    Hypertension Madina    Pacemaker     Pneumonia Jan 2023    TMJ dysfunction Madina     Past Surgical History:   Procedure Laterality Date    BREAST BIOPSY      CARDIAC ELECTROPHYSIOLOGY PROCEDURE N/A 01/25/2023    Procedure: Device Implant;  Surgeon: VINCE Alejandre MD;  [facility]: [facility] INVAS[facility] LOCATION;  Service: Cardiology;  Laterality: N/A;    CARDIAC SURGERY      heart surgery as an infant    COLONOSCOPY  August 2017    PACEMAKER IMPLANTATION       Family History   Problem Relation Age of Onset    Cancer Mother     Thyroid disease Mother     Heart disease Father     Asthma Daughter        Home Medications:  Prior to Admission medications    Medication Sig Start Date End Date Taking? Authorizing Provider   amLODIPine (NORVASC) 2.5 MG tablet Take 1 tablet by mouth once daily 4/15/24   VINCE Alejandre MD   aspirin 81 MG chewable tablet Chew 1 tablet Daily.    Provider, MD Johnathon   cetirizine (zyrTEC) 10 MG tablet Take 1 tablet by mouth Daily. 8/12/24   Kady Vernon APRN   Diclofenac Sodium (Voltaren) 1 % gel gel Apply 4 g topically to the appropriate area as directed 4 (Four) Times a Day As Needed (hand pain). 6/6/23   Kady Vernon APRN   fluticasone (FLONASE) 50 MCG/ACT nasal spray 2 sprays into the nostril(s) as directed by provider Daily. 3/25/24   Kady Vernon APRN   metoprolol succinate XL (TOPROL-XL) 25 MG 24 hr tablet Take 1 tablet by mouth Daily. 3/6/24   ProviderJohnathon MD   montelukast (SINGULAIR) 10 MG tablet TAKE 1 TABLET BY MOUTH ONCE DAILY AT NIGHT 12/13/24   Kady Vernon APRN        Social History:   Social History     Tobacco Use    Smoking status: Never     Passive exposure: Never    Smokeless tobacco: Never   Vaping Use    Vaping status: Never  "Used   Substance Use Topics    Alcohol use: Never    Drug use: Never         Review of Systems:  Review of Systems   Constitutional:  Positive for activity change, appetite change and fatigue. Negative for chills and fever.   HENT:  Negative for congestion, ear pain and sore throat.    Eyes:  Negative for pain.   Respiratory:  Negative for cough, chest tightness and shortness of breath.    Cardiovascular:  Negative for chest pain.   Gastrointestinal:  Positive for abdominal pain and nausea. Negative for diarrhea and vomiting.   Genitourinary:  Negative for flank pain and hematuria.   Musculoskeletal:  Negative for joint swelling.   Skin:  Negative for pallor.   Neurological:  Negative for seizures and headaches.   All other systems reviewed and are negative.       Physical Exam:  /78 (BP Location: Left arm, Patient Position: Lying)   Pulse 98   Temp 98.6 °F (37 °C) (Oral)   Resp 18   Ht 160 cm (62.99\")   Wt 64.2 kg (141 lb 8.6 oz)   SpO2 98%   BMI 25.08 kg/m²         Physical Exam  Vitals and nursing note reviewed.   Constitutional:       General: She is not in acute distress.     Appearance: Normal appearance. She is not toxic-appearing.   HENT:      Head: Normocephalic and atraumatic.      Mouth/Throat:      Mouth: Mucous membranes are moist.   Eyes:      General: No scleral icterus.  Cardiovascular:      Rate and Rhythm: Normal rate and regular rhythm.      Pulses: Normal pulses.      Heart sounds: Normal heart sounds.   Pulmonary:      Effort: Pulmonary effort is normal. No respiratory distress.      Breath sounds: Normal breath sounds.   Abdominal:      General: Abdomen is flat.      Palpations: Abdomen is soft.      Tenderness: There is generalized abdominal tenderness.   Musculoskeletal:         General: Normal range of motion.      Cervical back: Normal range of motion and neck supple.   Skin:     General: Skin is warm and dry.   Neurological:      Mental Status: She is alert and oriented to " person, place, and time. Mental status is at baseline.                            Medical Decision Making:      Comorbidities that affect care:    Cancer    External Notes reviewed:    Previous Clinic Note: office visit with Gyn Onc      The following orders were placed and all results were independently analyzed by me:  Orders Placed This Encounter   Procedures    CT Abdomen Pelvis With Contrast    CT Chest Without Contrast Diagnostic    CT Needle Biopsy Liver    XR Chest 1 View    [facility] [place] Draw    Comprehensive Metabolic Panel    Lipase    Urinalysis With Microscopic If Indicated (No Culture) - Urine, Clean Catch    Lactic Acid, Plasma    CBC Auto Differential    Urinalysis, Microscopic Only - Urine, Clean Catch    Magnesium    Magnesium    CBC Auto Differential        Protime-INR    aPTT    Comprehensive Metabolic Panel    CBC Auto Differential    Diet: Regular/House; Texture: Regular (IDDSI 7); Fluid Consistency: Thin (IDDSI 0)    Undress & Gown    Vital Signs    Intake & Output    Weigh Patient    Oral Care    Remote Cardiac Monitor    Maintain IV Access    Obtain Informed Consent    Vital Signs (Specify Frequency)    Assess Puncture Site & Watch for Signs of Pain & Bleeding    Code Status and Medical Interventions: CPR (Attempt to Resuscitate); Full Support    IP General Consult (Use specialty-specific consult if known)    Hospitalist (on-call MD unless specified)    Hematology & Oncology Inpatient Consult    Inpatient Case Management  Consult    Inpatient Nutrition Consult    Dietary Nutrition Supplements Boost Plus (Ensure Plus)    PT Consult: Eval & Treat Functional Mobility Below Baseline    PT Plan of Care Cert / Re-Cert    Insert Peripheral IV    Insert Peripheral IV    Initiate Observation Status    CBC & Differential    Green Top (Gel)    Lavender Top    Gold Top - SST    Light Blue Top    CBC & Differential       Medications Given in the Emergency Department:  Medications   sodium  chloride 0.9 % flush 10 mL (has no administration in time range)   sodium chloride 0.9 % flush 10 mL (10 mL Intravenous Given 1/9/25 0953)   sodium chloride 0.9 % flush 10 mL (10 mL Intravenous Given 1/8/25 0717)   sodium chloride 0.9 % infusion 40 mL (has no administration in time range)   aluminum-magnesium hydroxide-simethicone (MAALOX MAX) 400-400-40 MG/5ML suspension 15 mL (has no administration in time range)   melatonin tablet 5 mg (has no administration in time range)   Enoxaparin Sodium (LOVENOX) syringe 40 mg (40 mg Subcutaneous Given 1/9/25 0953)   lactated ringers infusion (0 mL/hr Intravenous Stopped 1/9/25 0757)   acetaminophen (TYLENOL) tablet 1,000 mg (1,000 mg Oral Given 1/9/25 0953)   sennosides-docusate (PERICOLACE) 8.6-50 MG per tablet 2 tablet (2 tablets Oral Given 1/9/25 0953)     And   polyethylene glycol (MIRALAX) packet 17 g (has no administration in time range)     And   bisacodyl (DULCOLAX) EC tablet 5 mg (has no administration in time range)     And   bisacodyl (DULCOLAX) suppository 10 mg (has no administration in time range)   prochlorperazine (COMPAZINE) injection 5 mg (5 mg Intravenous Given 1/8/25 1921)   lidocaine (XYLOCAINE) 2% injection  - ADS Override Pull (has no administration in time range)   oxyCODONE (ROXICODONE) immediate release tablet 10 mg (has no administration in time range)   ondansetron ODT (ZOFRAN-ODT) disintegrating tablet 4 mg (has no administration in time range)   iopamidol (ISOVUE-370) 76 % injection 100 mL (100 mL Intravenous Given 1/7/25 1857)   lactated ringers bolus 1,000 mL (0 mL Intravenous Stopped 1/8/25 0034)   HYDROmorphone (DILAUDID) injection 1 mg (1 mg Intravenous Given 1/7/25 2205)   ondansetron (ZOFRAN) injection 4 mg (4 mg Intravenous Given 1/7/25 2206)   Midazolam HCl (PF) (VERSED) injection (1 mg Intravenous Given 1/8/25 1602)   fentaNYL citrate (PF) (SUBLIMAZE) injection (50 mcg Intravenous Given 1/8/25 1606)   HYDROmorphone (DILAUDID)  injection 0.5 mg (0.5 mg Intravenous Given 1/9/25 0424)        ED Course:    The patient was initially evaluated in the triage area where orders were placed. The patient was later dispositioned by Lew Whittington DO.      The patient was advised to stay for completion of workup which includes but is not limited to communication of labs and radiological results, reassessment and plan. The patient was advised that leaving prior to disposition by a provider could result in critical findings that are not communicated to the patient.          Labs:    Lab Results (last 24 hours)       Procedure Component Value Units Date/Time    Tissue Pathology Exam [[id number]] Collected: 01/08/25 1625    Specimen: Tissue from Liver Updated: 01/09/25 0740    Non-gynecologic Cytology [[id number]] Collected: 01/08/25 1625    Specimen: Aspirate from Liver Updated: 01/09/25 0949    CBC & Differential [[id number]]  (Abnormal) Collected: 01/09/25 0533    Specimen: Blood from Arm, Right Updated: 01/09/25 0618    Narrative:      The following orders were created for panel order CBC & Differential.  Procedure                               Abnormality         Status                     ---------                               -----------         ------                     CBC Auto Differential[[id number]]        Abnormal            Final result                 Please view results for these tests on the individual orders.    Magnesium [[id number]]  (Normal) Collected: 01/09/25 0533    Specimen: Blood from Arm, Right Updated: 01/09/25 0628     Magnesium 1.7 mg/dL     Comprehensive Metabolic Panel [[id number]]  (Abnormal) Collected: 01/09/25 0533    Specimen: Blood from Arm, Right Updated: 01/09/25 0628     Glucose 154 mg/dL      BUN 11 mg/dL      Creatinine 0.53 mg/dL      Sodium 136 mmol/L      Potassium 3.3 mmol/L      Chloride 100 mmol/L      CO2 24.6 mmol/L      Calcium 9.9 mg/dL      Total Protein 6.9 g/dL      Albumin 3.6 g/dL      ALT (SGPT) 13  U/L      AST (SGOT) 19 U/L      Alkaline Phosphatase 73 U/L      Total Bilirubin 0.4 mg/dL      Globulin 3.3 gm/dL      A/G Ratio 1.1 g/dL      BUN/Creatinine Ratio 20.8     Anion Gap 11.4 mmol/L      eGFR 107.4 mL/min/1.73     Narrative:      GFR Categories in Chronic Kidney Disease (CKD)      GFR Category          GFR (mL/min/1.73)    Interpretation  G1                     90 or greater         Normal or high (1)  G2                      60-89                Mild decrease (1)  G3a                   45-59                Mild to moderate decrease  G3b                   30-44                Moderate to severe decrease  G4                    15-29                Severe decrease  G5                    14 or less           Kidney failure          (1)In the absence of evidence of kidney disease, neither GFR category G1 or G2 fulfill the criteria for CKD.    eGFR calculation 2021 CKD-EPI creatinine equation, which does not include race as a factor    CBC Auto Differential [[id number]]  (Abnormal) Collected: 01/09/25 0533    Specimen: Blood from Arm, Right Updated: 01/09/25 0618     WBC 14.79 10*3/mm3      RBC 3.94 10*6/mm3      Hemoglobin 10.6 g/dL      Hematocrit 33.4 %      MCV 84.8 fL      MCH 26.9 pg      MCHC 31.7 g/dL      RDW 12.1 %      RDW-SD 37.1 fl      MPV 10.4 fL      Platelets 294 10*3/mm3      Neutrophil % 83.9 %      Lymphocyte % 6.2 %      Monocyte % 8.9 %      Eosinophil % 0.1 %      Basophil % 0.3 %      Immature Grans % 0.6 %      Neutrophils, Absolute 12.43 10*3/mm3      Lymphocytes, Absolute 0.91 10*3/mm3      Monocytes, Absolute 1.31 10*3/mm3      Eosinophils, Absolute 0.01 10*3/mm3      Basophils, Absolute 0.04 10*3/mm3      Immature Grans, Absolute 0.09 10*3/mm3      nRBC 0.0 /100 WBC              Imaging:    XR Chest 1 View    Result Date: 1/8/2025  XR CHEST 1 VW Date of Exam: 1/8/2025 5:58 PM EST Indication: s/p liver lesion biopsy , next to diaphragm Comparison: Chest radiograph 1/25/2023. Chest  CT scan and CT-guided liver biopsy images 1/8/2025 Findings: Left-sided dual-lead pacemaker is noted. The heart mediastinum and pulmonary vasculature appear within normal limits. Lungs appear normally inflated and clear. In particular no effusion or pneumothorax is seen. There is a very faint, thin linear lucency associated with the dome of the right hemidiaphragm that may represent a trace amount of postprocedural air, expected for recent biopsy. Mild levoconvex thoracolumbar scoliosis is incidentally noted.     Impression: No evidence of postprocedure pneumothorax, pleural effusion, or other active chest disease. Electronically Signed: Akira Padilla MD  1/8/2025 6:32 PM EST  Workstation ID: [id number]    CT Needle Biopsy Liver    Result Date: 1/8/2025  CT NEEDLE BIOPSY LIVER Date of Exam: 1/8/2025 4:14 PM EST Indication: Suspected ovarian carcinoma. Comparison: None available. Consent: The risk benefits and alternatives of the procedure were explained to the patient. The chief risks discussed were bleeding, infection and injury to the liver or other adjacent structures. The patient indicated they understood what was discussed and elected to proceed. The patient provided written consent. Findings:  The patient was placed on the CT in a right posterior oblique position and a preliminary CT was performed. The skin overlying the right hepatic lobe was prepped and draped in normal sterile fashion. 1mg versed and 50 micrograms of fentanyl was given for conscious sedation Total length of conscious sedation time was 25 minutes.  2% lidocaine was injected along the anticipated tract of the needle. A 17 gauge introducer needle was advanced into the right hepatic lobe under CT fluoroscopic guidance. 2 core biopsies were then performed with an 18-gauge core needle device. Cystic type fluid was expressed. Adequate tissue was suggested per preliminary cytopathology. One additional 18-gauge core sample obtained.  Gelfoam slurry was  used for hemostasis.  Post procedure CT demonstrated no immediate complications. There is gas noted adjacent to the liver due to biopsy and Gelfoam.     Impression: 1.Successful CT-guided percutaneous core needle biopsy of right lobe liver lesion with no immediate complication. Due to proximity to the diaphragm, chest x-ray will be obtained in 2 hours. Electronically Signed: Silvestre Tubbs MD  1/8/2025 5:03 PM EST  Workstation ID: [id number]       Differential Diagnosis and Discussion:      Abdominal Pain: Based on the patient's signs and symptoms, I considered abdominal aortic aneurysm, small bowel obstruction, pancreatitis, acute cholecystitis, acute appendecitis, peptic ulcer disease, gastritis, colitis, endocrine disorders, irritable bowel syndrome and other differential diagnosis an etiology of the patient's abdominal pain.    PROCEDURES:    Labs were collected in the emergency department and all labs were reviewed and interpreted by me.  X-ray were performed in the emergency department and all X-ray impressions were independently interpreted by me.    No orders to display        Procedures    MDM                   Patient Care Considerations:    MRI: I considered ordering an MRI however this can be performed as an inpatient      Consultants/Shared Management Plan:    Hospitalist: I have discussed the case with hospitalist who agrees to accept the patient for admission.    Social Determinants of Health:    Patient is unable to carry out activities of daily life. Escalation of care is necessary.       Disposition and Care Coordination:    Admit:   Through independent evaluation of the patient's history, physical, and imperical data, the patient meets criteria for inpatient admission to the hospital.        Final diagnoses:   Malignant neoplasm of ovary, unspecified laterality   Intractable pain        ED Disposition       ED Disposition   Decision to Admit    Condition   --    Comment   Level of Care: Remote  Telemetry [26]   Diagnosis: Pelvic mass [2029]   Admitting Physician: ANIRUDH LEE [[id number]]   Attending Physician: ANIRUDH LEE [[id number]]                 This medical record created using voice recognition software.             Lew Whittington DO  25 1453      Electronically signed by Lew Whittington DO at 25 1453       Facility-Administered Medications as of 2025   Medication Dose Route Frequency Provider Last Rate Last Admin    aluminum-magnesium hydroxide-simethicone (MAALOX MAX) 400-400-40 MG/5ML suspension 15 mL  15 mL Oral Q6H PRN Anirudh Lee MD        sennosides-docusate (PERICOLACE) 8.6-50 MG per tablet 2 tablet  2 tablet Oral BID Anirudh Lee MD   2 tablet at 25 0949    And    polyethylene glycol (MIRALAX) packet 17 g  17 g Oral Daily PRN Anirudh Lee MD        And    bisacodyl (DULCOLAX) EC tablet 5 mg  5 mg Oral Daily PRN Anirudh Lee MD   5 mg at 01/10/25 0827    And    bisacodyl (DULCOLAX) suppository 10 mg  10 mg Rectal Daily PRN Anirudh Lee MD        Enoxaparin Sodium (LOVENOX) syringe 40 mg  40 mg Subcutaneous Daily Anirudh Lee MD   40 mg at 25 0949    [COMPLETED] fentaNYL citrate (PF) (SUBLIMAZE) injection   Intravenous PRN Silvestre Tubbs MD   50 mcg at 25 1606    [COMPLETED] HYDROmorphone (DILAUDID) injection 0.5 mg  0.5 mg Intravenous Once Alexander Hanson MD   0.5 mg at 25 0424    [COMPLETED] HYDROmorphone (DILAUDID) injection 1 mg  1 mg Intravenous Once Lew Whittington DO   1 mg at 25 2205    [COMPLETED] iopamidol (ISOVUE-370) 76 % injection 100 mL  100 mL Intravenous Once in imaging Lew Whittington DO   100 mL at 25 1857    [COMPLETED] lactated ringers bolus 1,000 mL  1,000 mL Intravenous Once Lew Whittington DO   Stopped at 25 0034    [] lactated ringers infusion  100 mL/hr Intravenous Continuous Anirudh Lee MD   Stopped at 25 4977    [] lidocaine (XYLOCAINE) 2% injection  - ADS  Override Pull             melatonin tablet 5 mg  5 mg Oral Nightly PRN Gus Lee MD        [COMPLETED] Midazolam HCl (PF) (VERSED) injection   Intravenous PRN Silvestre Tubbs MD   1 mg at 01/08/25 1602    [COMPLETED] ondansetron (ZOFRAN) injection 4 mg  4 mg Intravenous Once Lew Whittington DO   4 mg at 01/07/25 2206    ondansetron ODT (ZOFRAN-ODT) disintegrating tablet 4 mg  4 mg Translingual Q6H PRN Kavin Griffin MD   4 mg at 01/11/25 0949    oxyCODONE (ROXICODONE) immediate release tablet 10 mg  10 mg Oral Q6H PRN Kavin Griffin MD   10 mg at 01/10/25 1706    pantoprazole (PROTONIX) injection 40 mg  40 mg Intravenous BID AC Kavin Griffin MD   40 mg at 01/11/25 0555    prochlorperazine (COMPAZINE) injection 5 mg  5 mg Intravenous Q6H PRN Gus Lee MD   5 mg at 01/11/25 0555    scopolamine patch 1 mg/72 hr  1 patch Transdermal Q72H Kavin Griffin MD   1 patch at 01/10/25 1248    sodium chloride 0.9 % flush 10 mL  10 mL Intravenous PRN Gus Lee MD        sodium chloride 0.9 % flush 10 mL  10 mL Intravenous Q12H Gus Lee MD   10 mL at 01/11/25 0949    sodium chloride 0.9 % flush 10 mL  10 mL Intravenous PRN Gus Lee MD   10 mL at 01/11/25 0600    sodium chloride 0.9 % infusion 40 mL  40 mL Intravenous PRN Gus Lee MD        [COMPLETED] sodium phosphate (FLEET) ADULT enema 1 enema  1 enema Rectal Once Kavin Griffin MD   1 enema at 01/10/25 1248     Orders (active)        Start     Ordered    01/10/25 1710  Cardiac Monitoring  Continuous        Comments: Follow Standing Orders As Outlined in Process Instructions (Open Order Report to View Full Instructions)    01/10/25 1709    01/10/25 1245  pantoprazole (PROTONIX) injection 40 mg  2 Times Daily Before Meals         01/10/25 1159    01/10/25 1245  scopolamine patch 1 mg/72 hr  Every 72 Hours         01/10/25 1159    01/10/25 1200  DIET MESSAGE Please send vanilla yogurt with all meals. Please and thank you.  Daily With Breakfast, Lunch  "& Dinner      Comments: Please send vanilla yogurt with all meals. Please and thank you.    01/10/25 1127    01/10/25 0600  CBC & Differential  Daily       01/09/25 1659    01/09/25 1800  Dietary Nutrition Supplements Boost Plus (Ensure Plus)  Daily With Breakfast, Lunch & Dinner       01/09/25 1317    01/09/25 1400  ondansetron ODT (ZOFRAN-ODT) disintegrating tablet 4 mg  Every 6 Hours PRN         01/09/25 1400    01/09/25 0900  Enoxaparin Sodium (LOVENOX) syringe 40 mg  Daily         01/08/25 0105    01/09/25 0840  oxyCODONE (ROXICODONE) immediate release tablet 10 mg  Every 6 Hours PRN         01/09/25 0840    01/08/25 1716  Vital Signs (Specify Frequency)  Per Order Details         01/08/25 1715    01/08/25 1716  Diet: Regular/House; Texture: Regular (IDDSI 7); Fluid Consistency: Thin (IDDSI 0)  Diet Effective Now         01/08/25 1715    01/08/25 1714  Assess Puncture Site & Watch for Signs of Pain & Bleeding  Per Order Details        Comments: Decreased Blood Pressure, Increased Heart Rate, Lightheadedness, Increased Swelling Around Puncture Site    01/08/25 1713    01/08/25 1558  lidocaine (XYLOCAINE) 2% injection  - ADS Override Pull        Note to Pharmacy: Created by cabinet override    01/08/25 1558    01/08/25 0950  Tissue Pathology Exam  Once         01/08/25 0949    01/08/25 0950  Non-gynecologic Cytology  Once         01/08/25 0949    01/08/25 0900  sennosides-docusate (PERICOLACE) 8.6-50 MG per tablet 2 tablet  2 Times Daily        Placed in \"And\" Linked Group    01/08/25 0105    01/08/25 0800  Oral Care  2 Times Daily       01/08/25 0105    01/08/25 0502  prochlorperazine (COMPAZINE) injection 5 mg  Every 6 Hours PRN         01/08/25 0502    01/08/25 0410  PT Consult: Eval & Treat Functional Mobility Below Baseline  Once        Comments: Reason Why PT Needed: decline in function since current condition over the last 2-3 weeks    01/08/25 0409    01/08/25 0400  Vital Signs  Every 4 Hours       " "25 01025 020  sodium chloride 0.9 % flush 10 mL  Every 12 Hours Scheduled         25 020  lactated ringers infusion  Continuous         25 010  Weigh Patient  Once         25  Maintain IV Access  Continuous         25 010  Intake & Output  Every Shift       25 010  sodium chloride 0.9 % flush 10 mL  As Needed         25  sodium chloride 0.9 % infusion 40 mL  As Needed         25  aluminum-magnesium hydroxide-simethicone (MAALOX MAX) 400-400-40 MG/5ML suspension 15 mL  Every 6 Hours PRN         25  melatonin tablet 5 mg  Nightly PRN         25  polyethylene glycol (MIRALAX) packet 17 g  Daily PRN        Placed in \"And\" Linked Group    25  bisacodyl (DULCOLAX) EC tablet 5 mg  Daily PRN        Placed in \"And\" Linked Group    25  bisacodyl (DULCOLAX) suppository 10 mg  Daily PRN        Placed in \"And\" Linked Group    25  Code Status and Medical Interventions: CPR (Attempt to Resuscitate); Full Support  Continuous         25  Hospitalist (on-call MD unless specified)  Once        Specialty:  Hospitalist  Provider:  Gus Lee MD    25  IP General Consult (Use specialty-specific consult if known)  Once        Provider:  (Not yet assigned)    25 172  Insert Peripheral IV  Once         25 1726    25 1726  sodium chloride 0.9 % flush 10 mL  As Needed         25 172                     Physician Progress Notes (last 5 days)        Kavin Griffin MD at 01/10/25 1708           [facility]  Date: 1/10/2025  Patient Name: Madina Bonilla Barry  : 1965  MRN: " [id number]  Date of admission: 1/7/2025  Room/Bed: [facility]      Subjective   Subjective     Chief Complaint:   Abdominal pain    Summary:  Madina Reddy is a 59 y.o. female with medical history of hypertension, permanent pacemaker, hyperlipidemia and recent diagnosis of pelvic mass who comes into the ER due to worsening pelvic pain.  Patient has been having pelvic pain for a few weeks now and is even been evaluated in the outpatient setting by gynecological oncology who was planning to have biopsy performed, however her pain acutely worsened to the point she needed to be evaluated in the ER.      Upon arrival here she is tachycardic but otherwise stable.  Lab workup is unremarkable except for hypokalemia of 3.3 and bacteriuria.  CT abdomen and pelvis with contrast showed bilateral primary ovarian carcinoma until proven otherwise with extensive peritoneal carcinomatosis and mild bleeding ascites as well as a 1.4 x 2.2 cm enhancing lesion in the right lobe of the liver suspicious for metastatic disease as well as a possible metastatic lesion in the spleen.  Her oncologic surgeon at [facility] was notified and advised patient be admitted to our facility for definitive biopsy with oncology evaluation done here as well.  Hospitalist was then contacted for admission.  IR able to complete CT-guided biopsy, lesion from liver sampled.    Interval Followup:   Continued issues with nausea and vomiting.  Patient with minimal p.o. intake and having emesis intermittently.  Patient still with no bowel movement, all medications taken thus far orally for bowel movements have been thrown out.  Will provide enema today.  Adding scopolamine patch.    Objective   Objective     Vitals:   Temp:  [97 °F (36.1 °C)-98.4 °F (36.9 °C)] 98.4 °F (36.9 °C)  Heart Rate:  [] 81  Resp:  [18] 18  BP: (108-136)/(59-82) 127/69    Physical Exam               Constitutional: Awake, alert, comfortable on rounds              Eyes: Pupils equal, sclerae  anicteric, no conjunctival injection              HENT: NCAT, mucous membranes moist              Neck: Supple, no thyromegaly, no lymphadenopathy, trachea midline              Respiratory: Clear to auscultation bilaterally, nonlabored respirations               Cardiovascular: RRR, no murmurs, rubs, or gallops, palpable pedal pulses bilaterally              Gastrointestinal: Positive bowel sounds, soft, tender diffusely without rebound or guarding, nondistended              Musculoskeletal: No bilateral ankle edema, no clubbing or cyanosis to extremities              Psychiatric: Appropriate affect, cooperative              Neurologic: Oriented x 3, strength symmetric in all extremities, Cranial Nerves grossly intact to confrontation, speech clear              Skin: No rashes        Result Review    Result Review:  I have personally reviewed these results:  [x]  Laboratory      Lab 01/10/25  0551 01/09/25  0533 01/08/25  1019 01/08/25  0555 01/07/25  1800   WBC 11.04* 14.79*  --  8.68 9.78   HEMOGLOBIN 9.7* 10.6*  --  10.4* 11.3*   HEMATOCRIT 31.2* 33.4*  --  32.5* 34.9   PLATELETS 319 294  --  329 388   NEUTROS ABS 8.44* 12.43*  --  5.97 6.79   IMMATURE GRANS (ABS) 0.05 0.09*  --  0.04 0.04   LYMPHS ABS 1.39 0.91  --  1.64 1.87   MONOS ABS 1.04* 1.31*  --  0.97* 0.98*   EOS ABS 0.08 0.01  --  0.01 0.04   MCV 84.3 84.8  --  84.4 83.7   LACTATE  --   --   --   --  1.6   PROTIME  --   --  16.3*  --   --    APTT  --   --  27.4  --   --          Lab 01/10/25  0551 01/09/25  0533 01/08/25  0555   SODIUM 135* 136 137   POTASSIUM 3.4* 3.3* 3.1*   CHLORIDE 98 100 100   CO2 27.0 24.6 27.0   ANION GAP 10.0 11.4 10.0   BUN 12 11 10   CREATININE 0.62 0.53* 0.58   EGFR 103.4 107.4 105.0   GLUCOSE 116* 154* 128*   CALCIUM 10.5 9.9 9.7   MAGNESIUM 1.9 1.7 1.8         Lab 01/10/25  0551 01/09/25  0533 01/07/25  1800   TOTAL PROTEIN 6.9 6.9 7.7   ALBUMIN 3.5 3.6 4.0   GLOBULIN 3.4 3.3 3.7   ALT (SGPT) 12 13 13   AST (SGOT) 17 19 19    BILIRUBIN 0.3 0.4 0.6   ALK PHOS 76 73 81   LIPASE  --   --  20         Lab 01/08/25  1019   PROTIME 16.3*   INR 1.28*                 Brief Urine Lab Results  (Last result in the past 365 days)        Color   Clarity   Blood   Leuk Est   Nitrite   Protein   CREAT   Urine HCG        01/07/25 1820 Yellow   Turbid   Negative   Small (1+)   Negative   30 mg/dL (1+)                 [x]  Microbiology   Microbiology Results (last 10 days)       ** No results found for the last 240 hours. **          [x]  Radiology  XR Chest 1 View    Result Date: 1/8/2025  Impression: No evidence of postprocedure pneumothorax, pleural effusion, or other active chest disease. Electronically Signed: Akira Padilla MD  1/8/2025 6:32 PM EST  Workstation ID: [id number]    CT Needle Biopsy Liver    Result Date: 1/8/2025  Impression: 1.Successful CT-guided percutaneous core needle biopsy of right lobe liver lesion with no immediate complication. Due to proximity to the diaphragm, chest x-ray will be obtained in 2 hours. Electronically Signed: Silvestre Tubbs MD  1/8/2025 5:03 PM EST  Workstation ID: [id number]    CT Chest Without Contrast Diagnostic    Result Date: 1/8/2025  Impression: 1.Small amount of ascites, right hepatic lesion, and peritoneal carcinomatosis in the visualized upper abdomen, as seen on the prior CT of the abdomen and pelvis. 2.Two ovoid nodular densities along the pleural surface in the posterior-medial left chest between the medial 7-8 and 9-10 left ribs. Given the findings in the abdomen, these findings are concerning for metastatic disease until proven otherwise. 3.Epicardial lymph nodes appear slightly more prominent than is typically seen, but are not definitely enlarged by CT size criteria. These could also indicate early metastatic disease considering the findings in the abdomen. 4.Right-sided aortic arch with mirror image branching of the aortic arch vessels. 5.Mild central bronchial wall thickening which could indicate  acute or chronic bronchitis. 6.Small amount of ascites and evidence of peritoneal carcinomatosis, as before. 7.Cholelithiasis. Electronically Signed: Douglas Conn  1/8/2025 8:45 AM EST  Workstation ID: [id number]    CT Abdomen Pelvis With Contrast    Result Date: 1/7/2025  Impression: 1.Bilateral primary ovarian carcinoma until proven otherwise. 2.Extensive peritoneal carcinomatosis. There is mild malignant ascites. 3.There is a 1.4 x 2.2 cm enhancing lesion in the right lobe of the liver suspicious for hepatic metastatic disease. 4.There may be a metastatic lesion in the spleen near the hilum. 5.Cholelithiasis. 6.Additional findings as noted above. Electronically Signed: Max Hawley MD  1/7/2025 7:47 PM EST  Workstation ID: [id number]   []  EKG/Telemetry   []  Cardiology/Vascular   []  Pathology  []  Old records  []  Other:    Assessment & Plan   Assessment / Plan     Assessment:  Suspected ovarian cancer  Cancer related pain  Constipation  Hypokalemia  Hypertension  Hyperlipidemia    Plan:  Patient remains admitted to the hospital for further care management  Oncology and IR consulted, appreciate assistance  IR able to complete CT-guided biopsy on the eighth, will await results   has come back elevated  Recommendations for MRI of abdomen by patient's gynecological oncologist, however given permanent pacemaker this cannot be obtained.  Rep for patient's pacer would not be able to come into the hospital given current weather conditions.  Patient will need scheduled outpatient MRI with advance notice to pacer rep  Imaging of patient's chest also shows concern for metastatic spread  Discussed with oncologist, no further inpatient workup needed  Continuing to work towards achieving improvement in nausea.  Patient continues to have as needed Zofran and Compazine available.  Adding scopolamine patch now.  Also feel full component of constipation, adding enema today.  Protonix has been transitioned to IV given  intermittent vomiting.     Discussed with RN.  Discussed with oncologist    VTE Prophylaxis:  Pharmacologic VTE prophylaxis orders are present.        CODE STATUS:   Code Status (Patient has no pulse and is not breathing): CPR (Attempt to Resuscitate)  Medical Interventions (Patient has pulse or is breathing): Full Support      Electronically signed by Kavin Griffin MD, 1/10/2025, 17:08 EST.                        Electronically signed by Kavin Griffin MD at 01/10/25 1710       Kavin Griffin MD at 25 1700           [facility]   Hospitalist Progress Note  Date: 2025  Patient Name: Madina Reddy  : 1965  MRN: [id number]  Date of admission: 2025  Room/Bed: [facility]      Subjective   Subjective     Chief Complaint:   Abdominal pain    Summary:  Madina Reddy is a 59 y.o. female with medical history of hypertension, permanent pacemaker, hyperlipidemia and recent diagnosis of pelvic mass who comes into the ER due to worsening pelvic pain.  Patient has been having pelvic pain for a few weeks now and is even been evaluated in the outpatient setting by gynecological oncology who was planning to have biopsy performed, however her pain acutely worsened to the point she needed to be evaluated in the ER.      Upon arrival here she is tachycardic but otherwise stable.  Lab workup is unremarkable except for hypokalemia of 3.3 and bacteriuria.  CT abdomen and pelvis with contrast showed bilateral primary ovarian carcinoma until proven otherwise with extensive peritoneal carcinomatosis and mild bleeding ascites as well as a 1.4 x 2.2 cm enhancing lesion in the right lobe of the liver suspicious for metastatic disease as well as a possible metastatic lesion in the spleen.  Her oncologic surgeon at [facility] was notified and advised patient be admitted to our facility for definitive biopsy with oncology evaluation done here as well.  Hospitalist was then contacted for admission    Interval Followup:   Biopsy  performed yesterday, awaiting results at this time.  Discussed with family at bedside, at this time we wish to better control her symptoms including nausea pain before discharging home.  Still without a bowel movement.  Continues on scheduled bowel regiment.  Patient having trouble consuming large quantities, adding boost to diet. Confirmed with oncology service, no further inpatient workup needed at this time.     Objective   Objective     Vitals:   Temp:  [97.8 °F (36.6 °C)-98.6 °F (37 °C)] 98.1 °F (36.7 °C)  Heart Rate:  [] 80  Resp:  [14-18] 16  BP: (104-133)/(59-81) 119/68    Physical Exam               Constitutional: Awake, alert, comfortable on rounds              Eyes: Pupils equal, sclerae anicteric, no conjunctival injection              HENT: NCAT, mucous membranes moist              Neck: Supple, no thyromegaly, no lymphadenopathy, trachea midline              Respiratory: Clear to auscultation bilaterally, nonlabored respirations               Cardiovascular: RRR, no murmurs, rubs, or gallops, palpable pedal pulses bilaterally              Gastrointestinal: Positive bowel sounds, soft, tender diffusely without rebound or guarding, nondistended              Musculoskeletal: No bilateral ankle edema, no clubbing or cyanosis to extremities              Psychiatric: Appropriate affect, cooperative              Neurologic: Oriented x 3, strength symmetric in all extremities, Cranial Nerves grossly intact to confrontation, speech clear              Skin: No rashes        Result Review    Result Review:  I have personally reviewed these results:  [x]  Laboratory      Lab 01/09/25  0533 01/08/25  1019 01/08/25  0555 01/07/25  1800   WBC 14.79*  --  8.68 9.78   HEMOGLOBIN 10.6*  --  10.4* 11.3*   HEMATOCRIT 33.4*  --  32.5* 34.9   PLATELETS 294  --  329 388   NEUTROS ABS 12.43*  --  5.97 6.79   IMMATURE GRANS (ABS) 0.09*  --  0.04 0.04   LYMPHS ABS 0.91  --  1.64 1.87   MONOS ABS 1.31*  --  0.97* 0.98*    EOS ABS 0.01  --  0.01 0.04   MCV 84.8  --  84.4 83.7   LACTATE  --   --   --  1.6   PROTIME  --  16.3*  --   --    APTT  --  27.4  --   --          Lab 01/09/25  0533 01/08/25  0555 01/07/25  1800   SODIUM 136 137 138   POTASSIUM 3.3* 3.1* 3.3*   CHLORIDE 100 100 97*   CO2 24.6 27.0 25.3   ANION GAP 11.4 10.0 15.7*   BUN 11 10 13   CREATININE 0.53* 0.58 0.66   EGFR 107.4 105.0 101.8   GLUCOSE 154* 128* 115*   CALCIUM 9.9 9.7 10.5   MAGNESIUM 1.7 1.8 1.8         Lab 01/09/25  0533 01/07/25  1800   TOTAL PROTEIN 6.9 7.7   ALBUMIN 3.6 4.0   GLOBULIN 3.3 3.7   ALT (SGPT) 13 13   AST (SGOT) 19 19   BILIRUBIN 0.4 0.6   ALK PHOS 73 81   LIPASE  --  20         Lab 01/08/25  1019   PROTIME 16.3*   INR 1.28*                 Brief Urine Lab Results  (Last result in the past 365 days)        Color   Clarity   Blood   Leuk Est   Nitrite   Protein   CREAT   Urine HCG        01/07/25 1820 Yellow   Turbid   Negative   Small (1+)   Negative   30 mg/dL (1+)                 [x]  Microbiology   Microbiology Results (last 10 days)       ** No results found for the last 240 hours. **          [x]  Radiology  XR Chest 1 View    Result Date: 1/8/2025  Impression: No evidence of postprocedure pneumothorax, pleural effusion, or other active chest disease. Electronically Signed: Akira Padilla MD  1/8/2025 6:32 PM EST  Workstation ID: [id number]    CT Needle Biopsy Liver    Result Date: 1/8/2025  Impression: 1.Successful CT-guided percutaneous core needle biopsy of right lobe liver lesion with no immediate complication. Due to proximity to the diaphragm, chest x-ray will be obtained in 2 hours. Electronically Signed: Silvestre Tubbs MD  1/8/2025 5:03 PM EST  Workstation ID: [id number]    CT Chest Without Contrast Diagnostic    Result Date: 1/8/2025  Impression: 1.Small amount of ascites, right hepatic lesion, and peritoneal carcinomatosis in the visualized upper abdomen, as seen on the prior CT of the abdomen and pelvis. 2.Two ovoid nodular  densities along the pleural surface in the posterior-medial left chest between the medial 7-8 and 9-10 left ribs. Given the findings in the abdomen, these findings are concerning for metastatic disease until proven otherwise. 3.Epicardial lymph nodes appear slightly more prominent than is typically seen, but are not definitely enlarged by CT size criteria. These could also indicate early metastatic disease considering the findings in the abdomen. 4.Right-sided aortic arch with mirror image branching of the aortic arch vessels. 5.Mild central bronchial wall thickening which could indicate acute or chronic bronchitis. 6.Small amount of ascites and evidence of peritoneal carcinomatosis, as before. 7.Cholelithiasis. Electronically Signed: Douglas Conn  1/8/2025 8:45 AM EST  Workstation ID: [id number]    CT Abdomen Pelvis With Contrast    Result Date: 1/7/2025  Impression: 1.Bilateral primary ovarian carcinoma until proven otherwise. 2.Extensive peritoneal carcinomatosis. There is mild malignant ascites. 3.There is a 1.4 x 2.2 cm enhancing lesion in the right lobe of the liver suspicious for hepatic metastatic disease. 4.There may be a metastatic lesion in the spleen near the hilum. 5.Cholelithiasis. 6.Additional findings as noted above. Electronically Signed: Max Hawley MD  1/7/2025 7:47 PM EST  Workstation ID: [id number]   []  EKG/Telemetry   []  Cardiology/Vascular   []  Pathology  []  Old records  []  Other:    Assessment & Plan   Assessment / Plan     Assessment:  Suspected ovarian cancer  Cancer related pain  Constipation  Hypokalemia  Hypertension  Hyperlipidemia    Plan:  Patient remains admitted to the hospital for further care management  Oncology and IR consulted, appreciate assistance  IR able to complete CT-guided biopsy on the eighth, will await results   has come back elevated  Recommendations for MRI of abdomen by patient's gynecological oncologist, however given permanent pacemaker this cannot be  obtained.  Rep for patient's pacer would not be able to come into the hospital given current weather conditions.  Patient will need scheduled outpatient MRI with advance notice to pacer rep  Imaging of patient's chest also shows concern for metastatic spread  Discussed with oncologist again today, no further inpatient workup needed  At this point, working towards controlling patient's symptoms before safely discharging.  Zofran ODT available.  Patient has oral pain medications available.  Continue to push bowel regiment.     Discussed with RN.  Discussed with oncologist    VTE Prophylaxis:  Pharmacologic VTE prophylaxis orders are present.        CODE STATUS:   Code Status (Patient has no pulse and is not breathing): CPR (Attempt to Resuscitate)  Medical Interventions (Patient has pulse or is breathing): Full Support      Electronically signed by Kavin Griffin MD, 2025, 17:00 EST.                        Electronically signed by Kavin Griffin MD at 25 1826       Kavin Griffin MD at 25 1716           [facility] Hospitalist Progress Note  Date: 2025  Patient Name: Madina Reddy  : 1965  MRN: [id number]  Date of admission: 2025  Room/Bed: [facility]      Subjective   Subjective     Chief Complaint:   Abdominal pain    Summary:  Madina Reddy is a 59 y.o. female with medical history of hypertension, permanent pacemaker, hyperlipidemia and recent diagnosis of pelvic mass who comes into the ER due to worsening pelvic pain.  Patient has been having pelvic pain for a few weeks now and is even been evaluated in the outpatient setting by gynecological oncology who was planning to have biopsy performed, however her pain acutely worsened to the point she needed to be evaluated in the ER.      Upon arrival here she is tachycardic but otherwise stable.  Lab workup is unremarkable except for hypokalemia of 3.3 and bacteriuria.  CT abdomen and pelvis with contrast showed bilateral primary ovarian  carcinoma until proven otherwise with extensive peritoneal carcinomatosis and mild bleeding ascites as well as a 1.4 x 2.2 cm enhancing lesion in the right lobe of the liver suspicious for metastatic disease as well as a possible metastatic lesion in the spleen.  Her oncologic surgeon at [facility] was notified and advised patient be admitted to our facility for definitive biopsy with oncology evaluation done here as well.  Hospitalist was then contacted for admission    Interval Followup:   Patient continues to have abdominal pain this morning.  Able to coordinate with oncology and interventional radiology.  Patient will have IR perform CT-guided biopsy of liver today.  Patient has complaints of constipation, starting bowel regiment.  Will have diet available once patient is back from her procedure.  Patient able to establish with oncology as well.  Recommendations for MRI of abdomen and pelvis by gynecological oncologist, however given patient's permanent pacemaker, this cannot be obtained in a reasonable amount of time with current weather conditions.      Objective   Objective     Vitals:   Temp:  [97.9 °F (36.6 °C)-98.1 °F (36.7 °C)] 97.9 °F (36.6 °C)  Heart Rate:  [] 84  Resp:  [12-18] 14  BP: (108-134)/(59-97) 108/59    Physical Exam               Constitutional: Awake, alert, uncomfortable appearing              Eyes: Pupils equal, sclerae anicteric, no conjunctival injection              HENT: NCAT, mucous membranes moist              Neck: Supple, no thyromegaly, no lymphadenopathy, trachea midline              Respiratory: Clear to auscultation bilaterally, nonlabored respirations               Cardiovascular: RRR, no murmurs, rubs, or gallops, palpable pedal pulses bilaterally              Gastrointestinal: Positive bowel sounds, soft, tender diffusely without rebound or guarding, nondistended              Musculoskeletal: No bilateral ankle edema, no clubbing or cyanosis to extremities               Psychiatric: Appropriate affect, cooperative              Neurologic: Oriented x 3, strength symmetric in all extremities, Cranial Nerves grossly intact to confrontation, speech clear              Skin: No rashes        Result Review    Result Review:  I have personally reviewed these results:  [x]  Laboratory      Lab 01/08/25  1019 01/08/25  0555 01/07/25  1800   WBC  --  8.68 9.78   HEMOGLOBIN  --  10.4* 11.3*   HEMATOCRIT  --  32.5* 34.9   PLATELETS  --  329 388   NEUTROS ABS  --  5.97 6.79   IMMATURE GRANS (ABS)  --  0.04 0.04   LYMPHS ABS  --  1.64 1.87   MONOS ABS  --  0.97* 0.98*   EOS ABS  --  0.01 0.04   MCV  --  84.4 83.7   LACTATE  --   --  1.6   PROTIME 16.3*  --   --    APTT 27.4  --   --          Lab 01/08/25  0555 01/07/25  1800   SODIUM 137 138   POTASSIUM 3.1* 3.3*   CHLORIDE 100 97*   CO2 27.0 25.3   ANION GAP 10.0 15.7*   BUN 10 13   CREATININE 0.58 0.66   EGFR 105.0 101.8   GLUCOSE 128* 115*   CALCIUM 9.7 10.5   MAGNESIUM 1.8 1.8         Lab 01/07/25  1800   TOTAL PROTEIN 7.7   ALBUMIN 4.0   GLOBULIN 3.7   ALT (SGPT) 13   AST (SGOT) 19   BILIRUBIN 0.6   ALK PHOS 81   LIPASE 20         Lab 01/08/25  1019   PROTIME 16.3*   INR 1.28*                 Brief Urine Lab Results  (Last result in the past 365 days)        Color   Clarity   Blood   Leuk Est   Nitrite   Protein   CREAT   Urine HCG        01/07/25 1820 Yellow   Turbid   Negative   Small (1+)   Negative   30 mg/dL (1+)                 [x]  Microbiology   Microbiology Results (last 10 days)       ** No results found for the last 240 hours. **          [x]  Radiology  CT Needle Biopsy Liver    Result Date: 1/8/2025  Impression: 1.Successful CT-guided percutaneous core needle biopsy of right lobe liver lesion with no immediate complication. Due to proximity to the diaphragm, chest x-ray will be obtained in 2 hours. Electronically Signed: Silvestre Tubbs MD  1/8/2025 5:03 PM EST  Workstation ID: [id number]    CT Chest Without Contrast  Diagnostic    Result Date: 1/8/2025  Impression: 1.Small amount of ascites, right hepatic lesion, and peritoneal carcinomatosis in the visualized upper abdomen, as seen on the prior CT of the abdomen and pelvis. 2.Two ovoid nodular densities along the pleural surface in the posterior-medial left chest between the medial 7-8 and 9-10 left ribs. Given the findings in the abdomen, these findings are concerning for metastatic disease until proven otherwise. 3.Epicardial lymph nodes appear slightly more prominent than is typically seen, but are not definitely enlarged by CT size criteria. These could also indicate early metastatic disease considering the findings in the abdomen. 4.Right-sided aortic arch with mirror image branching of the aortic arch vessels. 5.Mild central bronchial wall thickening which could indicate acute or chronic bronchitis. 6.Small amount of ascites and evidence of peritoneal carcinomatosis, as before. 7.Cholelithiasis. Electronically Signed: Douglas Conn  1/8/2025 8:45 AM EST  Workstation ID: [id number]    CT Abdomen Pelvis With Contrast    Result Date: 1/7/2025  Impression: 1.Bilateral primary ovarian carcinoma until proven otherwise. 2.Extensive peritoneal carcinomatosis. There is mild malignant ascites. 3.There is a 1.4 x 2.2 cm enhancing lesion in the right lobe of the liver suspicious for hepatic metastatic disease. 4.There may be a metastatic lesion in the spleen near the hilum. 5.Cholelithiasis. 6.Additional findings as noted above. Electronically Signed: Max Hawley MD  1/7/2025 7:47 PM EST  Workstation ID: [id number]   []  EKG/Telemetry   []  Cardiology/Vascular   []  Pathology  []  Old records  []  Other:    Assessment & Plan   Assessment / Plan     Assessment:  Suspected ovarian cancer  Cancer related pain  Constipation  Hypokalemia  Hypertension  Hyperlipidemia    Plan:  Patient remains admitted to the hospital for further care management  Oncology and IR consulted, appreciate  assistance  IR to complete CT-guided biopsy, going for liver lesion  Additional order of  has been placed.  Recommendations for MRI of abdomen by patient's gynecological oncologist, however given permanent pacemaker this cannot be obtained.  Rep for patient's pacer would not be able to come into the hospital given current weather conditions.  Imaging of patient's chest also shows concern for metastatic spread  Will discuss with oncology again tomorrow disposition plans, will try and also reach out to patient's gynecological oncologist to ensure information is available     Discussed with RN.  Discussed with IR, discussed with oncology    VTE Prophylaxis:  Pharmacologic VTE prophylaxis orders are present.        CODE STATUS:   Code Status (Patient has no pulse and is not breathing): CPR (Attempt to Resuscitate)  Medical Interventions (Patient has pulse or is breathing): Full Support      Electronically signed by Kavin Griffin MD, 2025, 17:16 EST.                        Electronically signed by Kavin Griffin MD at 25 1725          Consult Notes (last 5 days)        Waylon Arndt MD at 25 1205        Consult Orders    1. Hematology & Oncology Inpatient Consult [[id number]] ordered by Gus Lee MD at 25 2248                   [facility]   Consult Note    Patient Name: Madina Reddy  : 1965  MRN: [id number]  Primary Care Physician:  Kady Vernon APRN  Referring Physician: No ref. provider found  Date of admission: 2025    Subjective   Subjective     Reason for Consult/ Chief Complaint: Abdominal pain    HPI:  Madina Reddy is a 59 y.o. female admitted to the hospital for abdominal pain.  Patient notes that she was recently found to have ovarian and abdominal masses.  She was seen by Dr. Watson with GYN oncology [facility]. consult note reviewed.  Biopsy was planned but she has not yet undergone that procedure.  She had increasing pain yesterday and  presented to the emergency room for that reason.  Repeat CT abdomen pelvis again demonstrates bilateral ovarian masses, peritoneal implants, ascites, 2.2 cm enhancing lesion in the right lobe liver.      Personal History     Past Medical History:   Diagnosis Date    Allergies     Dizziness Madina    Headache Madina    Hyperlipidemia March    Hypertension Madina    Pacemaker     Pneumonia Jan 2023    TMJ dysfunction Madina       Past Surgical History:   Procedure Laterality Date    BREAST BIOPSY      CARDIAC ELECTROPHYSIOLOGY PROCEDURE N/A 01/25/2023    Procedure: Device Implant;  Surgeon: VINCE Alejandre MD;  Location: [facility] INVASIVE LOCATION;  Service: Cardiology;  Laterality: N/A;    CARDIAC SURGERY      heart surgery as an infant    COLONOSCOPY  August 2017    PACEMAKER IMPLANTATION         Family History: family history includes Asthma in her daughter; Cancer in her mother; Heart disease in her father; Thyroid disease in her mother. Otherwise pertinent FHx was reviewed and not pertinent to current issue.    Social History:  reports that she has never smoked. She has never been exposed to tobacco smoke. She has never used smokeless tobacco. She reports that she does not drink alcohol and does not use drugs.    Home Medications:  Diclofenac Sodium, amLODIPine, aspirin, cetirizine, fluticasone, metoprolol succinate XL, and montelukast    Allergies:  No Known Allergies    Objective    Objective     Vitals:   Temp:  [98 °F (36.7 °C)-98.1 °F (36.7 °C)] 98.1 °F (36.7 °C)  Heart Rate:  [] 92  Resp:  [18] 18  BP: (116-124)/(71-94) 116/72    Physical Exam:   Constitutional: Awake, alert   Eyes: PERRLA, sclerae anicteric, no conjunctival injection   HENT: NCAT, mucous membranes moist   Neck: Supple, no thyromegaly, no lymphadenopathy, trachea midline   Respiratory: Clear to auscultation bilaterally, nonlabored respirations    Cardiovascular: RRR, no murmurs, rubs, or gallops, palpable pedal pulses  bilaterally   Gastrointestinal: Positive bowel sounds, soft, nontender, nondistended   Musculoskeletal: No bilateral ankle edema, no clubbing or cyanosis to extremities   Psychiatric: Appropriate affect, cooperative   Neurologic: Oriented x 3, strength symmetric in all extremities, Cranial Nerves grossly intact to confrontation, speech clear   Skin: No rashes  Lymph Node:     Result Review    Result Review:  I have personally reviewed the results from the time of this admission to 1/8/2025 12:05 EST and agree with these findings:  [x]  Laboratory  []  Microbiology  [x]  Radiology CT abdomen pelvis, CT chest reviewed  []  EKG/Telemetry   []  Cardiology/Vascular   []  Pathology  [x]  Old records Dr. Osorio, [facility] GYN oncology  []  Other:  Most notable findings include: Bilateral ovarian lesions, peritoneal carcinomatosis, mild ascites, pleural-based nodules    Assessment & Plan   Assessment / Plan     Brief Patient Summary:  Madina Reddy is a 59 y.o. female who is admitted to the hospital for abdominal pain.  Currently being evaluated by GYN oncology.    Active Hospital Problems:  Active Hospital Problems    Diagnosis     **Pelvic mass      Plan:   Ovarian mass-bilateral  Omental nodules  Liver lesion  Pleural nodules    The constellation of findings is most concerning for GYN malignancy, specifically ovarian cancer.  She has been evaluated by GYN oncology at [facility].  She will need biopsy to confirm the histology.  I discussed the case with Dr. Tubbs, interventional radiology.  The liver lesion appears to be the most amenable which would provide histologic subtype as well as confirm stage.  I will order  in addition.  She will need to follow-up with GYN oncology after biopsy and we can coordinate with [facility] for systemic treatments.  She is okay to be discharged from the oncology standpoint once biopsy is complete as long as her other issues are  stable.      Electronically signed by Waylon Arndt MD, 01/08/25, 12:05 PM EST.     Electronically signed by Waylon Arndt MD at 01/08/25 [id number]

## 2025-01-11 NOTE — PROGRESS NOTES
[facility]   Hospitalist Progress Note  Date: 2025  Patient Name: Madina Reddy  : 1965  MRN: [id number]  Date of admission: 2025  Room/Bed: [facility]      Subjective   Subjective     Chief Complaint:   Abdominal pain    Summary:  Madina Reddy is a 59 y.o. female with medical history of hypertension, permanent pacemaker, hyperlipidemia and recent diagnosis of pelvic mass who comes into the ER due to worsening pelvic pain.  Patient has been having pelvic pain for a few weeks now and is even been evaluated in the outpatient setting by gynecological oncology who was planning to have biopsy performed, however her pain acutely worsened to the point she needed to be evaluated in the ER.      Upon arrival here she is tachycardic but otherwise stable.  Lab workup is unremarkable except for hypokalemia of 3.3 and bacteriuria.  CT abdomen and pelvis with contrast showed bilateral primary ovarian carcinoma until proven otherwise with extensive peritoneal carcinomatosis and mild bleeding ascites as well as a 1.4 x 2.2 cm enhancing lesion in the right lobe of the liver suspicious for metastatic disease as well as a possible metastatic lesion in the spleen.  Her oncologic surgeon at [facility] was notified and advised patient be admitted to our facility for definitive biopsy with oncology evaluation done here as well.  Hospitalist was then contacted for admission.  IR able to complete CT-guided biopsy, lesion from liver sampled.    Interval Followup:   Still pending biopsy results at this time.  Given patient's persistent nausea and vomiting inability to consistently consume calories, cussed with this case with oncologist and gynecological oncologist.  Dr. Osorio, patient's gynecological oncologist at [facility] able to discuss case today.  Patient could be candidate for initiation of TPN and inpatient chemotherapy.  Therefore we will work towards transferring patient up [place] to [facility], discussed with patient  and  at bedside, both agreeable.    Objective   Objective     Vitals:   Temp:  [97.5 °F (36.4 °C)-98.6 °F (37 °C)] 97.5 °F (36.4 °C)  Heart Rate:  [] 87  Resp:  [20-22] 20  BP: ()/(52-85) 130/76    Physical Exam               Constitutional: Awake, alert, comfortable on rounds              Eyes: Pupils equal, sclerae anicteric, no conjunctival injection              HENT: NCAT, mucous membranes moist              Neck: Supple, no thyromegaly, no lymphadenopathy, trachea midline              Respiratory: Clear to auscultation bilaterally, nonlabored respirations               Cardiovascular: RRR, no murmurs, rubs, or gallops, palpable pedal pulses bilaterally              Gastrointestinal: Positive bowel sounds, soft, tender diffusely without rebound or guarding, nondistended              Musculoskeletal: No bilateral ankle edema, no clubbing or cyanosis to extremities              Psychiatric: Appropriate affect, cooperative              Neurologic: Oriented x 3, strength symmetric in all extremities, Cranial Nerves grossly intact to confrontation, speech clear              Skin: No rashes        Result Review    Result Review:  I have personally reviewed these results:  [x]  Laboratory      Lab 01/11/25  0527 01/10/25  0551 01/09/25  0533 01/08/25  1019 01/08/25  0555 01/07/25  1800   WBC 10.26 11.04* 14.79*  --    < > 9.78   HEMOGLOBIN 10.6* 9.7* 10.6*  --    < > 11.3*   HEMATOCRIT 34.1 31.2* 33.4*  --    < > 34.9   PLATELETS 319 319 294  --    < > 388   NEUTROS ABS 7.99* 8.44* 12.43*  --    < > 6.79   IMMATURE GRANS (ABS) 0.06* 0.05 0.09*  --    < > 0.04   LYMPHS ABS 1.28 1.39 0.91  --    < > 1.87   MONOS ABS 0.84 1.04* 1.31*  --    < > 0.98*   EOS ABS 0.04 0.08 0.01  --    < > 0.04   MCV 85.9 84.3 84.8  --    < > 83.7   LACTATE  --   --   --   --   --  1.6   PROTIME  --   --   --  16.3*  --   --    APTT  --   --   --  27.4  --   --     < > = values in this interval not displayed.         Lab  01/11/25  0527 01/10/25  0551 01/09/25  0533   SODIUM 137 135* 136   POTASSIUM 3.5 3.4* 3.3*   CHLORIDE 97* 98 100   CO2 27.7 27.0 24.6   ANION GAP 12.3 10.0 11.4   BUN 11 12 11   CREATININE 0.59 0.62 0.53*   EGFR 104.6 103.4 107.4   GLUCOSE 109* 116* 154*   CALCIUM 10.6* 10.5 9.9   MAGNESIUM 1.9 1.9 1.7   PHOSPHORUS 3.3  --   --          Lab 01/10/25  0551 01/09/25  0533 01/07/25  1800   TOTAL PROTEIN 6.9 6.9 7.7   ALBUMIN 3.5 3.6 4.0   GLOBULIN 3.4 3.3 3.7   ALT (SGPT) 12 13 13   AST (SGOT) 17 19 19   BILIRUBIN 0.3 0.4 0.6   ALK PHOS 76 73 81   LIPASE  --   --  20         Lab 01/08/25  1019   PROTIME 16.3*   INR 1.28*                 Brief Urine Lab Results  (Last result in the past 365 days)        Color   Clarity   Blood   Leuk Est   Nitrite   Protein   CREAT   Urine HCG        01/07/25 1820 Yellow   Turbid   Negative   Small (1+)   Negative   30 mg/dL (1+)                 [x]  Microbiology   Microbiology Results (last 10 days)       ** No results found for the last 240 hours. **          [x]  Radiology  XR Chest 1 View    Result Date: 1/8/2025  Impression: No evidence of postprocedure pneumothorax, pleural effusion, or other active chest disease. Electronically Signed: Akira Padilla MD  1/8/2025 6:32 PM EST  Workstation ID: [id number]    CT Needle Biopsy Liver    Result Date: 1/8/2025  Impression: 1.Successful CT-guided percutaneous core needle biopsy of right lobe liver lesion with no immediate complication. Due to proximity to the diaphragm, chest x-ray will be obtained in 2 hours. Electronically Signed: Silvestre Tubbs MD  1/8/2025 5:03 PM EST  Workstation ID: [id number]    CT Chest Without Contrast Diagnostic    Result Date: 1/8/2025  Impression: 1.Small amount of ascites, right hepatic lesion, and peritoneal carcinomatosis in the visualized upper abdomen, as seen on the prior CT of the abdomen and pelvis. 2.Two ovoid nodular densities along the pleural surface in the posterior-medial left chest between the  medial 7-8 and 9-10 left ribs. Given the findings in the abdomen, these findings are concerning for metastatic disease until proven otherwise. 3.Epicardial lymph nodes appear slightly more prominent than is typically seen, but are not definitely enlarged by CT size criteria. These could also indicate early metastatic disease considering the findings in the abdomen. 4.Right-sided aortic arch with mirror image branching of the aortic arch vessels. 5.Mild central bronchial wall thickening which could indicate acute or chronic bronchitis. 6.Small amount of ascites and evidence of peritoneal carcinomatosis, as before. 7.Cholelithiasis. Electronically Signed: Douglas Conn  1/8/2025 8:45 AM EST  Workstation ID: [id number]    CT Abdomen Pelvis With Contrast    Result Date: 1/7/2025  Impression: 1.Bilateral primary ovarian carcinoma until proven otherwise. 2.Extensive peritoneal carcinomatosis. There is mild malignant ascites. 3.There is a 1.4 x 2.2 cm enhancing lesion in the right lobe of the liver suspicious for hepatic metastatic disease. 4.There may be a metastatic lesion in the spleen near the hilum. 5.Cholelithiasis. 6.Additional findings as noted above. Electronically Signed: Max Hawley MD  1/7/2025 7:47 PM EST  Workstation ID: [id number]   []  EKG/Telemetry   []  Cardiology/Vascular   []  Pathology  []  Old records  []  Other:    Assessment & Plan   Assessment / Plan     Assessment:  Suspected ovarian cancer  Cancer related pain  Constipation  Hypokalemia  Hypertension  Hyperlipidemia    Plan:  Patient remains admitted to the hospital for further care management  Oncology and IR consulted, appreciate assistance  IR able to complete CT-guided biopsy on the eighth, will await results   has come back elevated  Recommendations for MRI of abdomen by patient's gynecological oncologist, however given permanent pacemaker this cannot be obtained.  Rep for patient's pacer would not be able to come into the hospital given  current weather conditions.  Patient will need scheduled outpatient MRI with advance notice to pacer rep  Imaging of patient's chest also shows concern for metastatic spread  Discussed with oncologist, no further inpatient workup needed  Given patient's persistent issues with nausea vomiting, constipation discussed case again with gynecological oncologist at [facility], Dr. Osorio.  Ultimate decision was made to transfer patient to [facility] for possible initiation of chemotherapy and TPN inpatient.  Transfer process has been initiated, now awaiting bed     Discussed with RN.  Discussed with oncologist, discussed with gynecological oncology    VTE Prophylaxis:  Pharmacologic VTE prophylaxis orders are present.        CODE STATUS:   Code Status (Patient has no pulse and is not breathing): CPR (Attempt to Resuscitate)  Medical Interventions (Patient has pulse or is breathing): Full Support      Electronically signed by Kavin Griffin MD, 1/11/2025, 16:02 EST.

## 2025-01-11 NOTE — PLAN OF CARE
Goal Outcome Evaluation:  Plan of Care Reviewed With: patient, child        Progress: no change  Outcome Evaluation: pt aox4, vs stable on room air. prn zofran and compazine and given for nausea. Pt denied pain this shift.  Daughter at bedside throughout the night. Pt has been tachycardic at times this shift.

## 2025-01-12 NOTE — PLAN OF CARE
Goal Outcome Evaluation:  Plan of Care Reviewed With: patient, spouse        Progress: no change  Outcome Evaluation: Patient discharging to [facility]. EMTLA and EMS form filled out as appropriate. Patients IV has gone bad in right A/C vein. New IV attempts where made by multiple nurses, patient unable to tolerate. Refusing new IV, MD aware. This was also told in report to the nurse at [facility] Serena Balderas RN. Patient medicated for nausea this shift and given medications to help with reflux. Heart rate can elevate high when patient is severely nauseated. Patient has not complained of pain for this shift. Continue to monitor. VSS

## 2025-01-13 LAB
CYTO UR: NORMAL
CYTO UR: NORMAL
LAB AP CASE REPORT: NORMAL
LAB AP CASE REPORT: NORMAL
LAB AP CLINICAL INFORMATION: NORMAL
LAB AP CLINICAL INFORMATION: NORMAL
LAB AP DIAGNOSIS COMMENT: NORMAL
LAB AP DIAGNOSIS COMMENT: NORMAL
LAB AP SPECIAL STAINS: NORMAL
PATH REPORT.FINAL DX SPEC: NORMAL
PATH REPORT.FINAL DX SPEC: NORMAL
PATH REPORT.GROSS SPEC: NORMAL
PATH REPORT.GROSS SPEC: NORMAL

## 2025-01-21 ENCOUNTER — TELEPHONE (OUTPATIENT)
Dept: INTERNAL MEDICINE | Facility: CLINIC | Age: 60
End: 2025-01-21

## 2025-01-21 NOTE — TELEPHONE ENCOUNTER
Caller: JAVON LINCOLN    NO UPDATED  VERBAL ON FILE     Relationship: Emergency Contact    Best call back number: [phone]    What was the call regarding: PATIENT'S , JAVON, IS CALLING TO TRY TO FIND OUT INSURANCE NUMBER AND ID NUMBER DUE TO NEEDING TO GET ANOTHER ONE AS SHE HAS LOST HER WALLET SOMETIME WHEN BEING TRANSPORTED FROM [facility] TO [facility]. HE BELIEVES IT COULD HAVE BEEN LEFT ON THE EMS TRUCK OR SOMEWHERE AT EITHER HOSPITAL. HE HAS SPOKEN WITH [facility] AND SECURITY IS TRYING TO FIND IT CURRENTLY, BUT  WOULD LIKE TO GO AHEAD AND TRY TO GET INFORMATION ON INSURANCE NUMBER AND ID NUMBER SO HE CAN GET NEW CARDS SENT TO PATIENT. HE WOULD LIKE CALL AS TO WHETHER OR NOT WE CAN PROVIDE THE INFORMATION.

## 2025-01-23 RX ORDER — CETIRIZINE HYDROCHLORIDE 10 MG/1
10 TABLET, FILM COATED ORAL DAILY
Qty: 90 TABLET | Refills: 0 | Status: SHIPPED | OUTPATIENT
Start: 2025-01-23

## 2025-01-24 ENCOUNTER — LAB (OUTPATIENT)
Facility: HOSPITAL | Age: 60
End: 2025-01-24
Payer: COMMERCIAL

## 2025-01-24 ENCOUNTER — TRANSCRIBE ORDERS (OUTPATIENT)
Dept: ADMINISTRATIVE | Facility: HOSPITAL | Age: 60
End: 2025-01-24
Payer: COMMERCIAL

## 2025-01-24 DIAGNOSIS — R19.00 ABDOMINAL PULSATILE MASS: ICD-10-CM

## 2025-01-24 DIAGNOSIS — R19.00 PELVIC MASS: Primary | ICD-10-CM

## 2025-01-24 DIAGNOSIS — R19.00 PELVIC MASS: ICD-10-CM

## 2025-01-24 LAB
ALBUMIN SERPL-MCNC: 3.6 G/DL (ref 3.5–5.2)
ALBUMIN/GLOB SERPL: 1.1 G/DL
ALP SERPL-CCNC: 84 U/L (ref 39–117)
ALT SERPL W P-5'-P-CCNC: 23 U/L (ref 1–33)
ANION GAP SERPL CALCULATED.3IONS-SCNC: 17 MMOL/L (ref 5–15)
AST SERPL-CCNC: 36 U/L (ref 1–32)
BASOPHILS # BLD AUTO: 0.03 10*3/MM3 (ref 0–0.2)
BASOPHILS NFR BLD AUTO: 0.5 % (ref 0–1.5)
BILIRUB SERPL-MCNC: 0.3 MG/DL (ref 0–1.2)
BUN SERPL-MCNC: 12 MG/DL (ref 6–20)
BUN/CREAT SERPL: 25.5 (ref 7–25)
CALCIUM SPEC-SCNC: 9.2 MG/DL (ref 8.6–10.5)
CHLORIDE SERPL-SCNC: 94 MMOL/L (ref 98–107)
CO2 SERPL-SCNC: 21 MMOL/L (ref 22–29)
CREAT SERPL-MCNC: 0.47 MG/DL (ref 0.57–1)
DEPRECATED RDW RBC AUTO: 37.7 FL (ref 37–54)
EGFRCR SERPLBLD CKD-EPI 2021: 110.5 ML/MIN/1.73
EOSINOPHIL # BLD AUTO: 0.09 10*3/MM3 (ref 0–0.4)
EOSINOPHIL NFR BLD AUTO: 1.6 % (ref 0.3–6.2)
ERYTHROCYTE [DISTWIDTH] IN BLOOD BY AUTOMATED COUNT: 12.7 % (ref 12.3–15.4)
GLOBULIN UR ELPH-MCNC: 3.3 GM/DL
GLUCOSE SERPL-MCNC: 105 MG/DL (ref 65–99)
HCT VFR BLD AUTO: 28.7 % (ref 34–46.6)
HGB BLD-MCNC: 9.7 G/DL (ref 12–15.9)
IMM GRANULOCYTES # BLD AUTO: 0.05 10*3/MM3 (ref 0–0.05)
IMM GRANULOCYTES NFR BLD AUTO: 0.9 % (ref 0–0.5)
LYMPHOCYTES # BLD AUTO: 1.59 10*3/MM3 (ref 0.7–3.1)
LYMPHOCYTES NFR BLD AUTO: 27.9 % (ref 19.6–45.3)
MAGNESIUM SERPL-MCNC: 1.8 MG/DL (ref 1.6–2.6)
MCH RBC QN AUTO: 27.9 PG (ref 26.6–33)
MCHC RBC AUTO-ENTMCNC: 33.8 G/DL (ref 31.5–35.7)
MCV RBC AUTO: 82.5 FL (ref 79–97)
MONOCYTES # BLD AUTO: 0.61 10*3/MM3 (ref 0.1–0.9)
MONOCYTES NFR BLD AUTO: 10.7 % (ref 5–12)
NEUTROPHILS NFR BLD AUTO: 3.33 10*3/MM3 (ref 1.7–7)
NEUTROPHILS NFR BLD AUTO: 58.4 % (ref 42.7–76)
NRBC BLD AUTO-RTO: 0 /100 WBC (ref 0–0.2)
PLATELET # BLD AUTO: 366 10*3/MM3 (ref 140–450)
PMV BLD AUTO: 10.6 FL (ref 6–12)
POTASSIUM SERPL-SCNC: 3.6 MMOL/L (ref 3.5–5.2)
PROT SERPL-MCNC: 6.9 G/DL (ref 6–8.5)
RBC # BLD AUTO: 3.48 10*6/MM3 (ref 3.77–5.28)
SODIUM SERPL-SCNC: 132 MMOL/L (ref 136–145)
WBC NRBC COR # BLD AUTO: 5.7 10*3/MM3 (ref 3.4–10.8)

## 2025-01-24 PROCEDURE — 80053 COMPREHEN METABOLIC PANEL: CPT

## 2025-01-24 PROCEDURE — 85025 COMPLETE CBC W/AUTO DIFF WBC: CPT

## 2025-01-24 PROCEDURE — 83735 ASSAY OF MAGNESIUM: CPT

## 2025-01-28 ENCOUNTER — OFFICE VISIT (OUTPATIENT)
Dept: ONCOLOGY | Facility: HOSPITAL | Age: 60
End: 2025-01-28
Payer: COMMERCIAL

## 2025-01-28 VITALS
DIASTOLIC BLOOD PRESSURE: 63 MMHG | HEART RATE: 76 BPM | SYSTOLIC BLOOD PRESSURE: 98 MMHG | WEIGHT: 136.02 LBS | BODY MASS INDEX: 24.1 KG/M2 | TEMPERATURE: 99.2 F | OXYGEN SATURATION: 97 % | HEIGHT: 63 IN | RESPIRATION RATE: 20 BRPM

## 2025-01-28 DIAGNOSIS — R11.0 NAUSEA: ICD-10-CM

## 2025-01-28 DIAGNOSIS — R68.2 DRY MOUTH: ICD-10-CM

## 2025-01-28 DIAGNOSIS — C56.9 MALIGNANT NEOPLASM OF OVARY, UNSPECIFIED LATERALITY: Primary | ICD-10-CM

## 2025-01-28 PROCEDURE — G0463 HOSPITAL OUTPT CLINIC VISIT: HCPCS | Performed by: INTERNAL MEDICINE

## 2025-01-28 PROCEDURE — 99214 OFFICE O/P EST MOD 30 MIN: CPT | Performed by: INTERNAL MEDICINE

## 2025-01-28 RX ORDER — OLANZAPINE 5 MG/1
5 TABLET ORAL NIGHTLY
Qty: 30 TABLET | Refills: 1 | Status: SHIPPED | OUTPATIENT
Start: 2025-01-28

## 2025-01-28 RX ORDER — LIDOCAINE/PRILOCAINE 2.5 %-2.5%
1 CREAM (GRAM) TOPICAL TAKE AS DIRECTED
Qty: 30 G | Refills: 1 | Status: SHIPPED | OUTPATIENT
Start: 2025-01-28

## 2025-01-28 NOTE — ASSESSMENT & PLAN NOTE
Biopsy-proven involving the liver.  She initiated chemotherapy with GYN oncology [facility].  She was started on carboplatin and paclitaxel with plan for 3 cycles, then cytoreductive surgery and then an additional 3 cycles based on response and tolerance.  She has had a lot of nausea despite antiemetics.  She also notes decreased appetite and has lost several pounds.  I will begin Zyprexa 5 mg at bedtime to help with nausea and appetite.  I offered dietitian referral but she declines at this time.  Prescription for Emla cream provided for Port-A-Cath access.  She will be due for cycle 2 on 2/5/2025.  I will plan to see her back for cycle 3-day 1 with lab work prior to monitor for toxicities.

## 2025-01-28 NOTE — PROGRESS NOTES
Chief Complaint  liver lesions    Mike Osorio*  Kady Vernon APRN    Subjective          Madina Reddy presents to [facility] for follow-up from recent hospitalization.  [facility] demonstrating [facility] cancer.  She was separately transferred to [facility] for GYN oncology.  They initiated treatment with carboplatin and paclitaxel.  She reports a lot of nausea and poor appetite.  She is trying to eat.  She also notes mouth irritation but not ulcers.  Her energy level is low but she can perform her ADLs.  She had Port-A-Cath placed.    Oncology/Hematology History   Malignant neoplasm of ovary   1/15/2025 -  Chemotherapy    OP OVARIAN PACLitaxel / CARBOplatin AUC=6 (Q21D)     1/28/2025 Initial Diagnosis    Malignant neoplasm of ovary           Current Outpatient Medications on File Prior to Visit   Medication Sig Dispense Refill    amLODIPine (NORVASC) 2.5 MG tablet Take 1 tablet by mouth once daily 30 tablet 0    EQ Allergy Relief, Cetirizine, 10 MG tablet Take 1 tablet by mouth once daily 90 tablet 0    fluticasone (FLONASE) 50 MCG/ACT nasal spray 2 sprays into the nostril(s) as directed by provider Daily. 11.1 mL 2    metoprolol succinate XL (TOPROL-XL) 25 MG 24 hr tablet Take 1 tablet by mouth Daily.      montelukast (SINGULAIR) 10 MG tablet TAKE 1 TABLET BY MOUTH ONCE DAILY AT NIGHT 90 tablet 0    aspirin 81 MG chewable tablet Chew 1 tablet Daily. (Patient not taking: Reported on 1/28/2025)       No current facility-administered medications on file prior to visit.       No Known Allergies  Past Medical History:   Diagnosis Date    Allergies     Dizziness Madina    Headache Madina    Hyperlipidemia March    Hypertension Madina    Malignant neoplasm of ovary 01/28/2025    Pacemaker     Pneumonia Jan 2023    TMJ dysfunction Madina     Past Surgical History:   Procedure  "Laterality Date    BREAST BIOPSY      CARDIAC ELECTROPHYSIOLOGY PROCEDURE N/A 01/25/2023    Procedure: Device Implant;  Surgeon: VINCE Alejandre MD;  Location: [facility] INVASIVE LOCATION;  Service: Cardiology;  Laterality: N/A;    CARDIAC SURGERY      heart surgery as an infant    COLONOSCOPY  August 2017    PACEMAKER IMPLANTATION       Social History     Socioeconomic History    Marital status:    Tobacco Use    Smoking status: Never     Passive exposure: Never    Smokeless tobacco: Never   Vaping Use    Vaping status: Never Used   Substance and Sexual Activity    Alcohol use: Never    Drug use: Never    Sexual activity: Yes     Partners: Male     Birth control/protection: None     Family History   Problem Relation Age of Onset    Cancer Mother     Thyroid disease Mother     Heart disease Father     Asthma Daughter        Objective   Physical Exam  Vitals reviewed. Exam conducted with a chaperone present.   Cardiovascular:      Rate and Rhythm: Normal rate and regular rhythm.      Heart sounds: Normal heart sounds. No murmur heard.     No gallop.   Pulmonary:      Effort: Pulmonary effort is normal.      Breath sounds: Normal breath sounds.      Comments: Port-A-Cath  Abdominal:      General: Bowel sounds are normal.   Musculoskeletal:      Right lower leg: No edema.      Left lower leg: No edema.   Lymphadenopathy:      Cervical: No cervical adenopathy.   Psychiatric:         Mood and Affect: Mood normal.         Behavior: Behavior normal.         Vitals:    01/28/25 1445   BP: 98/63   Pulse: 76   Resp: 20   Temp: 99.2 °F (37.3 °C)   TempSrc: Temporal   SpO2: 97%   Weight: 61.7 kg (136 lb 0.4 oz)   Height: 160 cm (63\")   PainSc:   3   PainLoc: Abdomen     ECOG score: 2         PHQ-9 Total Score:                    Result Review :   The following data was reviewed by: Waylon Arndt MD on 01/28/2025:  Lab Results   Component Value Date    HGB 9.7 (L) 01/24/2025    HCT 28.7 (L) 01/24/2025    MCV 82.5 " "01/24/2025     01/24/2025    WBC 5.70 01/24/2025    NEUTROABS 3.33 01/24/2025    LYMPHSABS 1.59 01/24/2025    MONOSABS 0.61 01/24/2025    EOSABS 0.09 01/24/2025    BASOSABS 0.03 01/24/2025     Lab Results   Component Value Date    GLUCOSE 105 (H) 01/24/2025    BUN 12 01/24/2025    CREATININE 0.47 (L) 01/24/2025     (L) 01/24/2025    K 3.6 01/24/2025    CL 94 (L) 01/24/2025    CO2 21.0 (L) 01/24/2025    CALCIUM 9.2 01/24/2025    PROTEINTOT 6.9 01/24/2025    ALBUMIN 3.6 01/24/2025    BILITOT 0.3 01/24/2025    ALKPHOS 84 01/24/2025    AST 36 (H) 01/24/2025    ALT 23 01/24/2025     Lab Results   Component Value Date    MG 1.8 01/24/2025    PHOS 3.3 01/11/2025    FREET4 1.01 01/23/2023    TSH 0.850 03/25/2024     No results found for: \"IRON\", \"LABIRON\", \"TRANSFERRIN\", \"TIBC\"  No results found for: \"LDH\", \"FERRITIN\", \"VYXJALJR51\", \"FOLATE\"  Lab Results   Component Value Date     1,491.0 (H) 01/08/2025       Data reviewed : Hospital records from [facility] reviewed including H&P, GYN oncology and discharge summary     Assessment and Plan    Diagnoses and all orders for this visit:    1. Malignant neoplasm of ovary, unspecified laterality (Primary)  Assessment & Plan:  Biopsy-proven involving the liver.  She initiated chemotherapy with GYN oncology [facility].  She was started on carboplatin and paclitaxel with plan for 3 cycles, then cytoreductive surgery and then an additional 3 cycles based on response and tolerance.  She has had a lot of nausea despite antiemetics.  She also notes decreased appetite and has lost several pounds.  I will begin Zyprexa 5 mg at bedtime to help with nausea and appetite.  I offered dietitian referral but she declines at this time.  Prescription for Emla cream provided for Port-A-Cath access.  She will be due for cycle 2 on 2/5/2025.  I will plan to see her back for cycle 3-day 1 with lab work prior to monitor for toxicities.    Orders:  -     " lidocaine-prilocaine (EMLA) 2.5-2.5 % cream; Apply 1 Application topically to the appropriate area as directed Take As Directed. Apply to port site 30 minutes prior to use  Dispense: 30 g; Refill: 1    2. Nausea  -     OLANZapine (zyPREXA) 5 MG tablet; Take 1 tablet by mouth Every Night.  Dispense: 30 tablet; Refill: 1    3. Dry mouth  Assessment & Plan:  Discussed salt and soda gargles routinely.  Increase fluid intake to maintain adequate hydration.              Patient Follow Up: Cycle 3-day 1    Patient was given instructions and counseling regarding her condition or for health maintenance advice. Please see specific information pulled into the AVS if appropriate.     Waylon Arndt MD    1/28/2025

## 2025-01-30 ENCOUNTER — DOCUMENTATION (OUTPATIENT)
Dept: PHARMACY | Facility: HOSPITAL | Age: 60
End: 2025-01-30
Payer: COMMERCIAL

## 2025-01-30 ENCOUNTER — TELEPHONE (OUTPATIENT)
Dept: ONCOLOGY | Facility: HOSPITAL | Age: 60
End: 2025-01-30
Payer: COMMERCIAL

## 2025-01-30 NOTE — TELEPHONE ENCOUNTER
SPOKE TO PATIENT SPOUSE, WE ARE WAITING ON AUTHORIZATIONS AND ONCE WE RECEIVE WE WILL CALL TO GET PATIENT SCHEDULED.

## 2025-01-30 NOTE — TELEPHONE ENCOUNTER
Caller: JAVON LINCOLN  (ON  VERBAL)    Relationship: Emergency Contact    Best call back number: [phone]      What was the call regarding: SPOUSE CALLING TO CHECK STATUS OF TREATMENTS BEING SCHEDULED

## 2025-01-30 NOTE — PROGRESS NOTES
"PHARMACY C1D1 PRE VISIT ASSESSMENT    Patient will present for C2D1 of paclitaxel 175 mg/m2 + carboplatin AUC 6 Q21D x 6 cycles    59 y.o. female  Estimated body surface area is 1.64 meters squared as calculated from the following:    Height as of 1/28/25: 160 cm (63\").    Weight as of 1/28/25: 61.7 kg (136 lb 0.4 oz).    Past Medical History:   Diagnosis Date    Allergies     Dizziness Madina    Headache Madina    Hyperlipidemia March    Hypertension Madina    Malignant neoplasm of ovary 01/28/2025    Pacemaker     Pneumonia Jan 2023    TMJ dysfunction [facility]       Oncology/Hematology History   Malignant neoplasm of ovary   1/15/2025 -  Chemotherapy    OP OVARIAN PACLitaxel / CARBOplatin AUC=6 (Q21D)     1/28/2025 Initial Diagnosis    Malignant neoplasm of ovary         Relevant Pathology:   Final Diagnosis   Liver lesion, CT-guided core biopsy:               - Metastatic carcinoma, consistent with mullerian origin     The above positive (malignant) diagnosis was called to Dr. Griffin  at 10:22 AM on 1/13/25 by Dr. [facility].      Electronically signed by Dennis [facility] MD on 1/13/2025 at 1022     Final Diagnosis   Liver lesion, FNA:               - Metastatic carcinoma, consistent with mullerian origin      The above positive (malignant) diagnosis was called to Dr. Griffin  at 10:22 AM on 1/13/25 by Dr. [facility].    Electronically signed by Dennis [facility] MD on 1/13/2025 at 1023     Potentially Actionable Mutation Present: NO     Relevant Imaging:    XR Chest 1 View    Result Date: 1/8/2025  Impression: No evidence of postprocedure pneumothorax, pleural effusion, or other active chest disease. Electronically Signed: Akira Padilla MD  1/8/2025 6:32 PM EST  Workstation ID: [id number]    CT Needle Biopsy Liver    Result Date: 1/8/2025  Impression: 1.Successful CT-guided percutaneous core needle biopsy of right lobe liver lesion with no immediate complication. Due to proximity to the diaphragm, chest x-ray will be " obtained in 2 hours. Electronically Signed: Silvestre Tubbs MD  1/8/2025 5:03 PM EST  Workstation ID: [id number]    CT Chest Without Contrast Diagnostic    Result Date: 1/8/2025  Impression: 1.Small amount of ascites, right hepatic lesion, and peritoneal carcinomatosis in the visualized upper abdomen, as seen on the prior CT of the abdomen and pelvis. 2.Two ovoid nodular densities along the pleural surface in the posterior-medial left chest between the medial 7-8 and 9-10 left ribs. Given the findings in the abdomen, these findings are concerning for metastatic disease until proven otherwise. 3.Epicardial lymph nodes appear slightly more prominent than is typically seen, but are not definitely enlarged by CT size criteria. These could also indicate early metastatic disease considering the findings in the abdomen. 4.Right-sided aortic arch with mirror image branching of the aortic arch vessels. 5.Mild central bronchial wall thickening which could indicate acute or chronic bronchitis. 6.Small amount of ascites and evidence of peritoneal carcinomatosis, as before. 7.Cholelithiasis. Electronically Signed: Douglas Conn  1/8/2025 8:45 AM EST  Workstation ID: [id number]    CT Abdomen Pelvis With Contrast    Result Date: 1/7/2025  Impression: 1.Bilateral primary ovarian carcinoma until proven otherwise. 2.Extensive peritoneal carcinomatosis. There is mild malignant ascites. 3.There is a 1.4 x 2.2 cm enhancing lesion in the right lobe of the liver suspicious for hepatic metastatic disease. 4.There may be a metastatic lesion in the spleen near the hilum. 5.Cholelithiasis. 6.Additional findings as noted above. Electronically Signed: Max Hawley MD  1/7/2025 7:47 PM EST  Workstation ID: [id number]      Current treatment regimen has insurance authorization approval? NO Pending     Medication treatment plan entered is appropriate per NCCN Guidelines    Pharmacy Follow-up Considerations: Patient with metastatic ovarian cancer,  initiated treatment at [facility] on 1/15/25. Plan to continue paclitaxel and carboplatin. Pharmacy will continue to monitor labs throughout treatment and will  patient prior to infusion on C2D1.   Prasanna Barber PharmJUANJOSE   01/30/25

## 2025-01-31 ENCOUNTER — TELEPHONE (OUTPATIENT)
Dept: ONCOLOGY | Facility: HOSPITAL | Age: 60
End: 2025-01-31
Payer: COMMERCIAL

## 2025-01-31 NOTE — TELEPHONE ENCOUNTER
I do not see in the pt's chart that needs any meds that have not been prescribed. Does the pt need any meds that have not been ordered yet?

## 2025-01-31 NOTE — TELEPHONE ENCOUNTER
Caller: JAVON LINCOLN -      Relationship: Emergency Contact    Best call back number: [phone]    Which medication are you concerned about: INQUIRING IF THERE IS ANY MEDICATIONS PT SHOULD TAKE BEFORE, DURING & AFTER HER INFUSION ON 2/5/25    When did you start taking this medication: PT HAD LAST INFUSION AT [facility] [place] & HAD MEDICATIONS TO TAKE, THEREFORE INQUIRING

## 2025-01-31 NOTE — TELEPHONE ENCOUNTER
This nurse called Fitz back and informed him that the pt does not need any at home pre-meds for her tx. Fitz voiced understanding.

## 2025-02-04 PROBLEM — Z45.2 ENCOUNTER FOR ADJUSTMENT OR MANAGEMENT OF VASCULAR ACCESS DEVICE: Status: ACTIVE | Noted: 2025-02-04

## 2025-02-04 RX ORDER — HEPARIN SODIUM (PORCINE) LOCK FLUSH IV SOLN 100 UNIT/ML 100 UNIT/ML
500 SOLUTION INTRAVENOUS AS NEEDED
Status: CANCELLED | OUTPATIENT
Start: 2025-02-05

## 2025-02-04 RX ORDER — SODIUM CHLORIDE 0.9 % (FLUSH) 0.9 %
20 SYRINGE (ML) INJECTION AS NEEDED
Status: CANCELLED | OUTPATIENT
Start: 2025-02-05

## 2025-02-05 ENCOUNTER — HOSPITAL ENCOUNTER (OUTPATIENT)
Dept: ONCOLOGY | Facility: HOSPITAL | Age: 60
Discharge: HOME | End: 2025-02-05
Admitting: INTERNAL MEDICINE
Payer: COMMERCIAL

## 2025-02-05 VITALS — HEART RATE: 86 BPM | SYSTOLIC BLOOD PRESSURE: 108 MMHG | DIASTOLIC BLOOD PRESSURE: 66 MMHG | RESPIRATION RATE: 18 BRPM

## 2025-02-05 DIAGNOSIS — Z45.2 ENCOUNTER FOR ADJUSTMENT OR MANAGEMENT OF VASCULAR ACCESS DEVICE: ICD-10-CM

## 2025-02-05 DIAGNOSIS — C56.9 MALIGNANT NEOPLASM OF OVARY, UNSPECIFIED LATERALITY: Primary | ICD-10-CM

## 2025-02-05 LAB
ALBUMIN SERPL-MCNC: 3.7 G/DL (ref 3.5–5.2)
ALBUMIN/GLOB SERPL: 1.1 G/DL
ALP SERPL-CCNC: 92 U/L (ref 39–117)
ALT SERPL W P-5'-P-CCNC: 9 U/L (ref 1–33)
ANION GAP SERPL CALCULATED.3IONS-SCNC: 13 MMOL/L (ref 5–15)
AST SERPL-CCNC: 17 U/L (ref 1–32)
BASOPHILS # BLD AUTO: 0.04 10*3/MM3 (ref 0–0.2)
BASOPHILS NFR BLD AUTO: 0.6 % (ref 0–1.5)
BILIRUB SERPL-MCNC: 0.3 MG/DL (ref 0–1.2)
BUN SERPL-MCNC: 10 MG/DL (ref 6–20)
BUN/CREAT SERPL: 20.8 (ref 7–25)
CALCIUM SPEC-SCNC: 9.4 MG/DL (ref 8.6–10.5)
CANCER AG125 SERPL QL: 1282 U/ML (ref 0–38.1)
CHLORIDE SERPL-SCNC: 101 MMOL/L (ref 98–107)
CO2 SERPL-SCNC: 22 MMOL/L (ref 22–29)
CREAT SERPL-MCNC: 0.48 MG/DL (ref 0.57–1)
DEPRECATED RDW RBC AUTO: 43.4 FL (ref 37–54)
EGFRCR SERPLBLD CKD-EPI 2021: 109.9 ML/MIN/1.73
EOSINOPHIL # BLD AUTO: 0.02 10*3/MM3 (ref 0–0.4)
EOSINOPHIL NFR BLD AUTO: 0.3 % (ref 0.3–6.2)
ERYTHROCYTE [DISTWIDTH] IN BLOOD BY AUTOMATED COUNT: 14.6 % (ref 12.3–15.4)
GLOBULIN UR ELPH-MCNC: 3.5 GM/DL
GLUCOSE SERPL-MCNC: 112 MG/DL (ref 65–99)
HCT VFR BLD AUTO: 29.1 % (ref 34–46.6)
HGB BLD-MCNC: 9.1 G/DL (ref 12–15.9)
IMM GRANULOCYTES # BLD AUTO: 0.07 10*3/MM3 (ref 0–0.05)
IMM GRANULOCYTES NFR BLD AUTO: 1.1 % (ref 0–0.5)
LYMPHOCYTES # BLD AUTO: 0.84 10*3/MM3 (ref 0.7–3.1)
LYMPHOCYTES NFR BLD AUTO: 13.2 % (ref 19.6–45.3)
MCH RBC QN AUTO: 26.6 PG (ref 26.6–33)
MCHC RBC AUTO-ENTMCNC: 31.3 G/DL (ref 31.5–35.7)
MCV RBC AUTO: 85.1 FL (ref 79–97)
MONOCYTES # BLD AUTO: 0.57 10*3/MM3 (ref 0.1–0.9)
MONOCYTES NFR BLD AUTO: 9 % (ref 5–12)
NEUTROPHILS NFR BLD AUTO: 4.8 10*3/MM3 (ref 1.7–7)
NEUTROPHILS NFR BLD AUTO: 75.8 % (ref 42.7–76)
NRBC BLD AUTO-RTO: 0 /100 WBC (ref 0–0.2)
PLATELET # BLD AUTO: 224 10*3/MM3 (ref 140–450)
PMV BLD AUTO: 9.2 FL (ref 6–12)
POTASSIUM SERPL-SCNC: 4 MMOL/L (ref 3.5–5.2)
PROT SERPL-MCNC: 7.2 G/DL (ref 6–8.5)
RBC # BLD AUTO: 3.42 10*6/MM3 (ref 3.77–5.28)
SODIUM SERPL-SCNC: 136 MMOL/L (ref 136–145)
WBC NRBC COR # BLD AUTO: 6.34 10*3/MM3 (ref 3.4–10.8)

## 2025-02-05 PROCEDURE — 96367 TX/PROPH/DG ADDL SEQ IV INF: CPT

## 2025-02-05 PROCEDURE — 96415 CHEMO IV INFUSION ADDL HR: CPT

## 2025-02-05 PROCEDURE — 85025 COMPLETE CBC W/AUTO DIFF WBC: CPT | Performed by: INTERNAL MEDICINE

## 2025-02-05 PROCEDURE — 25010000002 CARBOPLATIN PER 50 MG: Performed by: INTERNAL MEDICINE

## 2025-02-05 PROCEDURE — 25010000002 PACLITAXEL PER 1 MG: Performed by: INTERNAL MEDICINE

## 2025-02-05 PROCEDURE — 25010000002 FOSAPREPITANT PER 1 MG: Performed by: INTERNAL MEDICINE

## 2025-02-05 PROCEDURE — 25810000003 SODIUM CHLORIDE 0.9 % SOLUTION 500 ML FLEX CONT: Performed by: INTERNAL MEDICINE

## 2025-02-05 PROCEDURE — 25810000003 SODIUM CHLORIDE 0.9 % SOLUTION 250 ML FLEX CONT: Performed by: INTERNAL MEDICINE

## 2025-02-05 PROCEDURE — 86304 IMMUNOASSAY TUMOR CA 125: CPT | Performed by: INTERNAL MEDICINE

## 2025-02-05 PROCEDURE — 25810000003 SODIUM CHLORIDE 0.9 % SOLUTION: Performed by: INTERNAL MEDICINE

## 2025-02-05 PROCEDURE — 96375 TX/PRO/DX INJ NEW DRUG ADDON: CPT

## 2025-02-05 PROCEDURE — 25010000002 HEPARIN LOCK FLUSH PER 10 UNITS: Performed by: INTERNAL MEDICINE

## 2025-02-05 PROCEDURE — 25010000002 DEXAMETHASONE SODIUM PHOSPHATE 120 MG/30ML SOLUTION: Performed by: INTERNAL MEDICINE

## 2025-02-05 PROCEDURE — 80053 COMPREHEN METABOLIC PANEL: CPT | Performed by: INTERNAL MEDICINE

## 2025-02-05 PROCEDURE — 96413 CHEMO IV INFUSION 1 HR: CPT

## 2025-02-05 PROCEDURE — 25010000002 DIPHENHYDRAMINE PER 50 MG: Performed by: INTERNAL MEDICINE

## 2025-02-05 PROCEDURE — 96417 CHEMO IV INFUS EACH ADDL SEQ: CPT

## 2025-02-05 PROCEDURE — 25010000002 PALONOSETRON PER 25 MCG: Performed by: INTERNAL MEDICINE

## 2025-02-05 RX ORDER — DIPHENHYDRAMINE HYDROCHLORIDE 50 MG/ML
50 INJECTION INTRAMUSCULAR; INTRAVENOUS AS NEEDED
Status: CANCELLED | OUTPATIENT
Start: 2025-02-05

## 2025-02-05 RX ORDER — PALONOSETRON 0.05 MG/ML
0.25 INJECTION, SOLUTION INTRAVENOUS ONCE
Status: COMPLETED | OUTPATIENT
Start: 2025-02-05 | End: 2025-02-05

## 2025-02-05 RX ORDER — DIPHENHYDRAMINE HYDROCHLORIDE 50 MG/ML
50 INJECTION INTRAMUSCULAR; INTRAVENOUS AS NEEDED
Status: DISCONTINUED | OUTPATIENT
Start: 2025-02-05 | End: 2025-02-06 | Stop reason: HOSPADM

## 2025-02-05 RX ORDER — HYDROCORTISONE SODIUM SUCCINATE 100 MG/2ML
100 INJECTION INTRAMUSCULAR; INTRAVENOUS AS NEEDED
Status: DISCONTINUED | OUTPATIENT
Start: 2025-02-05 | End: 2025-02-06 | Stop reason: HOSPADM

## 2025-02-05 RX ORDER — FAMOTIDINE 10 MG/ML
20 INJECTION, SOLUTION INTRAVENOUS ONCE
Status: CANCELLED | OUTPATIENT
Start: 2025-02-05

## 2025-02-05 RX ORDER — FAMOTIDINE 10 MG/ML
20 INJECTION, SOLUTION INTRAVENOUS AS NEEDED
Status: CANCELLED | OUTPATIENT
Start: 2025-02-05

## 2025-02-05 RX ORDER — SODIUM CHLORIDE 0.9 % (FLUSH) 0.9 %
20 SYRINGE (ML) INJECTION AS NEEDED
OUTPATIENT
Start: 2025-02-05

## 2025-02-05 RX ORDER — SODIUM CHLORIDE 9 MG/ML
20 INJECTION, SOLUTION INTRAVENOUS ONCE
Status: COMPLETED | OUTPATIENT
Start: 2025-02-05 | End: 2025-02-05

## 2025-02-05 RX ORDER — FAMOTIDINE 10 MG/ML
20 INJECTION, SOLUTION INTRAVENOUS AS NEEDED
Status: DISCONTINUED | OUTPATIENT
Start: 2025-02-05 | End: 2025-02-06 | Stop reason: HOSPADM

## 2025-02-05 RX ORDER — SODIUM CHLORIDE 9 MG/ML
20 INJECTION, SOLUTION INTRAVENOUS ONCE
Status: CANCELLED | OUTPATIENT
Start: 2025-02-05

## 2025-02-05 RX ORDER — HEPARIN SODIUM (PORCINE) LOCK FLUSH IV SOLN 100 UNIT/ML 100 UNIT/ML
500 SOLUTION INTRAVENOUS AS NEEDED
OUTPATIENT
Start: 2025-02-05

## 2025-02-05 RX ORDER — HYDROCORTISONE SODIUM SUCCINATE 100 MG/2ML
100 INJECTION INTRAMUSCULAR; INTRAVENOUS AS NEEDED
Status: CANCELLED | OUTPATIENT
Start: 2025-02-05

## 2025-02-05 RX ORDER — HEPARIN SODIUM (PORCINE) LOCK FLUSH IV SOLN 100 UNIT/ML 100 UNIT/ML
500 SOLUTION INTRAVENOUS AS NEEDED
Status: DISCONTINUED | OUTPATIENT
Start: 2025-02-05 | End: 2025-02-06 | Stop reason: HOSPADM

## 2025-02-05 RX ORDER — FAMOTIDINE 10 MG/ML
20 INJECTION, SOLUTION INTRAVENOUS ONCE
Status: COMPLETED | OUTPATIENT
Start: 2025-02-05 | End: 2025-02-05

## 2025-02-05 RX ORDER — SODIUM CHLORIDE 0.9 % (FLUSH) 0.9 %
20 SYRINGE (ML) INJECTION AS NEEDED
Status: DISCONTINUED | OUTPATIENT
Start: 2025-02-05 | End: 2025-02-06 | Stop reason: HOSPADM

## 2025-02-05 RX ORDER — PALONOSETRON 0.05 MG/ML
0.25 INJECTION, SOLUTION INTRAVENOUS ONCE
Status: CANCELLED | OUTPATIENT
Start: 2025-02-05

## 2025-02-05 RX ADMIN — Medication 20 ML: at 15:07

## 2025-02-05 RX ADMIN — CARBOPLATIN 660 MG: 600 INJECTION, SOLUTION INTRAVENOUS at 14:11

## 2025-02-05 RX ADMIN — FAMOTIDINE 20 MG: 10 INJECTION INTRAVENOUS at 10:15

## 2025-02-05 RX ADMIN — DEXAMETHASONE SODIUM PHOSPHATE 20 MG: 4 INJECTION, SOLUTION INTRA-ARTICULAR; INTRALESIONAL; INTRAMUSCULAR; INTRAVENOUS; SOFT TISSUE at 09:34

## 2025-02-05 RX ADMIN — FOSAPREPITANT 150 MG: 150 INJECTION, POWDER, LYOPHILIZED, FOR SOLUTION INTRAVENOUS at 10:09

## 2025-02-05 RX ADMIN — HEPARIN 500 UNITS: 100 SYRINGE at 15:08

## 2025-02-05 RX ADMIN — DIPHENHYDRAMINE HYDROCHLORIDE 50 MG: 50 INJECTION, SOLUTION INTRAMUSCULAR; INTRAVENOUS at 09:50

## 2025-02-05 RX ADMIN — SODIUM CHLORIDE 20 ML/HR: 9 INJECTION, SOLUTION INTRAVENOUS at 09:28

## 2025-02-05 RX ADMIN — PACLITAXEL 285 MG: 6 INJECTION, SOLUTION INTRAVENOUS at 10:48

## 2025-02-05 RX ADMIN — PALONOSETRON HYDROCHLORIDE 0.25 MG: 0.25 INJECTION INTRAVENOUS at 10:13

## 2025-02-07 ENCOUNTER — TELEPHONE (OUTPATIENT)
Dept: ONCOLOGY | Facility: HOSPITAL | Age: 60
End: 2025-02-07
Payer: COMMERCIAL

## 2025-02-07 NOTE — TELEPHONE ENCOUNTER
Madina Bonilla Box 1965       Symptoms    Does patient have nausea and vomiting? N    Does patient have medications prescribed for N/V? Y    Does patient have diarrhea? N    Does the patient have diarrhea medications? N    Does the patient have pain? N    Is pain medications effective?    Discharge  Do you have any questions about your discharge instructions? N    [facility] with [facility]    Where you satisfied with the care you received? Y    Pt suggestions for the [facility]    Do you have any suggestions to improve your care? N    Comments    Follow up phone call comments

## 2025-02-07 NOTE — TELEPHONE ENCOUNTER
The pt c/o constipation for several days. This nurse reviewed stool softener, Miralax, increasing PO fluid intake, and increasing dietary fiber. This nurse informed the pt that if her constipation becomes unbearable that she could also do an OTC enema at home but only when needed. The pt voiced understanding.

## 2025-02-10 ENCOUNTER — TELEPHONE (OUTPATIENT)
Dept: ONCOLOGY | Facility: HOSPITAL | Age: 60
End: 2025-02-10
Payer: COMMERCIAL

## 2025-02-10 ENCOUNTER — TELEPHONE (OUTPATIENT)
Dept: ONCOLOGY | Facility: HOSPITAL | Age: 60
End: 2025-02-10

## 2025-02-10 ENCOUNTER — HOSPITAL ENCOUNTER (EMERGENCY)
Facility: HOSPITAL | Age: 60
Discharge: HOME | End: 2025-02-10
Attending: EMERGENCY MEDICINE | Admitting: EMERGENCY MEDICINE
Payer: COMMERCIAL

## 2025-02-10 VITALS
BODY MASS INDEX: 23.55 KG/M2 | RESPIRATION RATE: 19 BRPM | TEMPERATURE: 97.9 F | OXYGEN SATURATION: 97 % | DIASTOLIC BLOOD PRESSURE: 83 MMHG | SYSTOLIC BLOOD PRESSURE: 115 MMHG | HEART RATE: 98 BPM | WEIGHT: 132.94 LBS | HEIGHT: 63 IN

## 2025-02-10 DIAGNOSIS — K59.00 CONSTIPATION, UNSPECIFIED CONSTIPATION TYPE: Primary | ICD-10-CM

## 2025-02-10 PROCEDURE — 99284 EMERGENCY DEPT VISIT MOD MDM: CPT

## 2025-02-10 NOTE — TELEPHONE ENCOUNTER
The pt called and states that she is still constipated. The pt states that she has been taking stool softeners and Miralax as directed. The pt states that she has been eating prunes and has increased her PO fluids. The pt states that she also did the at home enema. When questioned the pt states that she only passed water after doing her enema. The pt reports a syncopal episode over the weekend while trying to force a BM on the commode. The pt reports no BM for over a week now. This nurse instructed the pt to go to the ER for a possible impaction or blockage. The pt voiced understanding.

## 2025-02-10 NOTE — DISCHARGE INSTRUCTIONS
Increase your daily fluid intake.  Eat a high-fiber diet.  Continue on MiraLAX daily.  Adjust the daily dose based on your bowel pattern.  Follow-up with your primary care provider as needed.  Follow-up with your oncology team as scheduled.  Return to the ER for recurrent abdominal pain, vomiting, rectal bleeding, or any other concerns issues that may arise.

## 2025-02-24 DIAGNOSIS — C56.9 MALIGNANT NEOPLASM OF OVARY, UNSPECIFIED LATERALITY: ICD-10-CM

## 2025-02-25 ENCOUNTER — HOSPITAL ENCOUNTER (OUTPATIENT)
Dept: ONCOLOGY | Facility: HOSPITAL | Age: 60
Discharge: HOME | End: 2025-02-25
Payer: COMMERCIAL

## 2025-02-25 ENCOUNTER — LAB (OUTPATIENT)
Dept: ONCOLOGY | Facility: HOSPITAL | Age: 60
End: 2025-02-25
Payer: COMMERCIAL

## 2025-02-25 ENCOUNTER — OFFICE VISIT (OUTPATIENT)
Dept: ONCOLOGY | Facility: HOSPITAL | Age: 60
End: 2025-02-25
Payer: COMMERCIAL

## 2025-02-25 VITALS
SYSTOLIC BLOOD PRESSURE: 125 MMHG | WEIGHT: 134.48 LBS | TEMPERATURE: 97.5 F | HEART RATE: 86 BPM | OXYGEN SATURATION: 98 % | RESPIRATION RATE: 20 BRPM | DIASTOLIC BLOOD PRESSURE: 70 MMHG | BODY MASS INDEX: 23.83 KG/M2

## 2025-02-25 DIAGNOSIS — C56.9 MALIGNANT NEOPLASM OF OVARY, UNSPECIFIED LATERALITY: Primary | ICD-10-CM

## 2025-02-25 DIAGNOSIS — Z45.2 ENCOUNTER FOR ADJUSTMENT OR MANAGEMENT OF VASCULAR ACCESS DEVICE: Primary | ICD-10-CM

## 2025-02-25 DIAGNOSIS — C56.9 MALIGNANT NEOPLASM OF OVARY, UNSPECIFIED LATERALITY: ICD-10-CM

## 2025-02-25 LAB
ALBUMIN SERPL-MCNC: 3.9 G/DL (ref 3.5–5.2)
ALBUMIN/GLOB SERPL: 1.2 G/DL
ALP SERPL-CCNC: 102 U/L (ref 39–117)
ALT SERPL W P-5'-P-CCNC: 8 U/L (ref 1–33)
ANION GAP SERPL CALCULATED.3IONS-SCNC: 9.9 MMOL/L (ref 5–15)
AST SERPL-CCNC: 15 U/L (ref 1–32)
BASOPHILS # BLD AUTO: 0.05 10*3/MM3 (ref 0–0.2)
BASOPHILS NFR BLD AUTO: 0.9 % (ref 0–1.5)
BILIRUB SERPL-MCNC: 0.2 MG/DL (ref 0–1.2)
BUN SERPL-MCNC: 7 MG/DL (ref 6–20)
BUN/CREAT SERPL: 15.2 (ref 7–25)
CALCIUM SPEC-SCNC: 8.9 MG/DL (ref 8.6–10.5)
CANCER AG125 SERPL QL: 926 U/ML (ref 0–38.1)
CHLORIDE SERPL-SCNC: 102 MMOL/L (ref 98–107)
CO2 SERPL-SCNC: 25.1 MMOL/L (ref 22–29)
CREAT SERPL-MCNC: 0.46 MG/DL (ref 0.57–1)
DEPRECATED RDW RBC AUTO: 56.6 FL (ref 37–54)
EGFRCR SERPLBLD CKD-EPI 2021: 111.1 ML/MIN/1.73
EOSINOPHIL # BLD AUTO: 0.02 10*3/MM3 (ref 0–0.4)
EOSINOPHIL NFR BLD AUTO: 0.3 % (ref 0.3–6.2)
ERYTHROCYTE [DISTWIDTH] IN BLOOD BY AUTOMATED COUNT: 18.6 % (ref 12.3–15.4)
GLOBULIN UR ELPH-MCNC: 3.3 GM/DL
GLUCOSE SERPL-MCNC: 110 MG/DL (ref 65–99)
HCT VFR BLD AUTO: 27.4 % (ref 34–46.6)
HGB BLD-MCNC: 8.6 G/DL (ref 12–15.9)
IMM GRANULOCYTES # BLD AUTO: 0.05 10*3/MM3 (ref 0–0.05)
IMM GRANULOCYTES NFR BLD AUTO: 0.9 % (ref 0–0.5)
LYMPHOCYTES # BLD AUTO: 1.82 10*3/MM3 (ref 0.7–3.1)
LYMPHOCYTES NFR BLD AUTO: 31 % (ref 19.6–45.3)
MCH RBC QN AUTO: 27.3 PG (ref 26.6–33)
MCHC RBC AUTO-ENTMCNC: 31.4 G/DL (ref 31.5–35.7)
MCV RBC AUTO: 87 FL (ref 79–97)
MONOCYTES # BLD AUTO: 0.74 10*3/MM3 (ref 0.1–0.9)
MONOCYTES NFR BLD AUTO: 12.6 % (ref 5–12)
NEUTROPHILS NFR BLD AUTO: 3.2 10*3/MM3 (ref 1.7–7)
NEUTROPHILS NFR BLD AUTO: 54.3 % (ref 42.7–76)
NRBC BLD AUTO-RTO: 0 /100 WBC (ref 0–0.2)
PLATELET # BLD AUTO: 284 10*3/MM3 (ref 140–450)
PMV BLD AUTO: 9 FL (ref 6–12)
POTASSIUM SERPL-SCNC: 4 MMOL/L (ref 3.5–5.2)
PROT SERPL-MCNC: 7.2 G/DL (ref 6–8.5)
RBC # BLD AUTO: 3.15 10*6/MM3 (ref 3.77–5.28)
SODIUM SERPL-SCNC: 137 MMOL/L (ref 136–145)
WBC NRBC COR # BLD AUTO: 5.88 10*3/MM3 (ref 3.4–10.8)

## 2025-02-25 PROCEDURE — 80053 COMPREHEN METABOLIC PANEL: CPT | Performed by: INTERNAL MEDICINE

## 2025-02-25 PROCEDURE — 36591 DRAW BLOOD OFF VENOUS DEVICE: CPT

## 2025-02-25 PROCEDURE — 86304 IMMUNOASSAY TUMOR CA 125: CPT | Performed by: INTERNAL MEDICINE

## 2025-02-25 PROCEDURE — 85025 COMPLETE CBC W/AUTO DIFF WBC: CPT | Performed by: INTERNAL MEDICINE

## 2025-02-25 PROCEDURE — 25010000002 HEPARIN LOCK FLUSH PER 10 UNITS: Performed by: INTERNAL MEDICINE

## 2025-02-25 RX ORDER — DIPHENHYDRAMINE HYDROCHLORIDE 50 MG/ML
50 INJECTION INTRAMUSCULAR; INTRAVENOUS AS NEEDED
Status: CANCELLED | OUTPATIENT
Start: 2025-02-26

## 2025-02-25 RX ORDER — HEPARIN SODIUM (PORCINE) LOCK FLUSH IV SOLN 100 UNIT/ML 100 UNIT/ML
500 SOLUTION INTRAVENOUS AS NEEDED
Status: CANCELLED | OUTPATIENT
Start: 2025-02-26

## 2025-02-25 RX ORDER — LIDOCAINE AND PRILOCAINE 25; 25 MG/G; MG/G
CREAM TOPICAL
Qty: 30 G | Refills: 0 | Status: SHIPPED | OUTPATIENT
Start: 2025-02-25

## 2025-02-25 RX ORDER — SODIUM CHLORIDE 0.9 % (FLUSH) 0.9 %
20 SYRINGE (ML) INJECTION AS NEEDED
Status: DISCONTINUED | OUTPATIENT
Start: 2025-02-25 | End: 2025-02-26 | Stop reason: HOSPADM

## 2025-02-25 RX ORDER — SODIUM CHLORIDE 0.9 % (FLUSH) 0.9 %
20 SYRINGE (ML) INJECTION AS NEEDED
Status: CANCELLED | OUTPATIENT
Start: 2025-02-25

## 2025-02-25 RX ORDER — HYDROCORTISONE SODIUM SUCCINATE 100 MG/2ML
100 INJECTION INTRAMUSCULAR; INTRAVENOUS AS NEEDED
Status: CANCELLED | OUTPATIENT
Start: 2025-02-26

## 2025-02-25 RX ORDER — HEPARIN SODIUM (PORCINE) LOCK FLUSH IV SOLN 100 UNIT/ML 100 UNIT/ML
500 SOLUTION INTRAVENOUS AS NEEDED
Status: DISCONTINUED | OUTPATIENT
Start: 2025-02-25 | End: 2025-02-26 | Stop reason: HOSPADM

## 2025-02-25 RX ORDER — FAMOTIDINE 10 MG/ML
20 INJECTION, SOLUTION INTRAVENOUS AS NEEDED
Status: CANCELLED | OUTPATIENT
Start: 2025-02-26

## 2025-02-25 RX ORDER — PALONOSETRON 0.05 MG/ML
0.25 INJECTION, SOLUTION INTRAVENOUS ONCE
Status: CANCELLED | OUTPATIENT
Start: 2025-02-26

## 2025-02-25 RX ORDER — FAMOTIDINE 10 MG/ML
20 INJECTION, SOLUTION INTRAVENOUS ONCE
Status: CANCELLED | OUTPATIENT
Start: 2025-02-26

## 2025-02-25 RX ORDER — SODIUM CHLORIDE 9 MG/ML
20 INJECTION, SOLUTION INTRAVENOUS ONCE
Status: CANCELLED | OUTPATIENT
Start: 2025-02-26

## 2025-02-25 RX ADMIN — Medication 20 ML: at 12:46

## 2025-02-25 RX ADMIN — HEPARIN 500 UNITS: 100 SYRINGE at 12:46

## 2025-02-25 NOTE — PROGRESS NOTES
Chief Complaint  Malignant neoplasm of ovary, unspecified laterality    Mike Osorio*  Kady Vernon APRN    Subjective          Madina Reddy presents to [facility] for ongoing treatment of her ovarian cancer.  She is on carboplatin and paclitaxel.  She states she had alopecia with her regimen.  She denies nausea or vomiting.  She is eating adequately.  She had some jitteriness and excitability with steroids with the last cycle but otherwise tolerated well.    Oncology/Hematology History   Malignant neoplasm of ovary   1/15/2025 -  Chemotherapy    OP OVARIAN PACLitaxel / CARBOplatin AUC=6 (Q21D)     1/28/2025 Initial Diagnosis    Malignant neoplasm of ovary     2/25/2025 Cancer Staged    Staging form: Ovary, Fallopian Tube, And Primary Peritoneal Carcinoma, AJCC 8th Edition  - Clinical: FIGO Stage IVB (cT3c, cN0, pM1b) - Signed by Waylon Arndt MD on 2/25/2025           Current Outpatient Medications on File Prior to Visit   Medication Sig Dispense Refill    amLODIPine (NORVASC) 2.5 MG tablet Take 1 tablet by mouth once daily 30 tablet 0    aspirin 81 MG chewable tablet Chew 1 tablet Daily.      EQ Allergy Relief, Cetirizine, 10 MG tablet Take 1 tablet by mouth once daily 90 tablet 0    fluticasone (FLONASE) 50 MCG/ACT nasal spray 2 sprays into the nostril(s) as directed by provider Daily. 11.1 mL 2    lidocaine-prilocaine (EMLA) 2.5-2.5 % cream APPLY  CREAM TOPICALLY TO AFFECTED AREA AS DIRECTED APPLY  TO  PORT  SITE  30  MINUTES  PRIOR  TO  USE 30 g 0    metoprolol succinate XL (TOPROL-XL) 25 MG 24 hr tablet Take 1 tablet by mouth Daily.      montelukast (SINGULAIR) 10 MG tablet TAKE 1 TABLET BY MOUTH ONCE DAILY AT NIGHT 90 tablet 0    OLANZapine (zyPREXA) 5 MG tablet Take 1 tablet by mouth Every Night. 30 tablet 1    [DISCONTINUED] lidocaine-prilocaine (EMLA) 2.5-2.5 % cream Apply 1 Application topically to the appropriate area as directed Take As  Directed. Apply to port site 30 minutes prior to use 30 g 1     No current facility-administered medications on file prior to visit.       No Known Allergies  Past Medical History:   Diagnosis Date    Allergies     Dizziness Madina    Headache Madina    Hyperlipidemia March    Hypertension Madina    Malignant neoplasm of ovary 01/28/2025    Pacemaker     Pneumonia Jan 2023    TMJ dysfunction Madina     Past Surgical History:   Procedure Laterality Date    BREAST BIOPSY      CARDIAC ELECTROPHYSIOLOGY PROCEDURE N/A 01/25/2023    Procedure: Device Implant;  Surgeon: VINCE Alejandre MD;  Location: [facility] beds at the [facility] INVASIVE LOC[facility];  Service: Cardiology;  Laterality: N/A;    CARDIAC SURGERY      heart surgery as an infant    COLONOSCOPY  August 2017    PACEMAKER IMPLANTATION       Social History     Socioeconomic History    Marital status:    Tobacco Use    Smoking status: Never     Passive exposure: Never    Smokeless tobacco: Never   Vaping Use    Vaping status: Never Used   Substance and Sexual Activity    Alcohol use: Never    Drug use: Never    Sexual activity: Yes     Partners: Male     Birth control/protection: None     Family History   Problem Relation Age of Onset    Cancer Mother     Thyroid disease Mother     Heart disease Father     Asthma Daughter        Objective   Physical Exam  Vitals reviewed. Exam conducted with a chaperone present.   Cardiovascular:      Rate and Rhythm: Normal rate and regular rhythm.      Heart sounds: Normal heart sounds. No murmur heard.     No gallop.   Pulmonary:      Effort: Pulmonary effort is normal.      Breath sounds: Normal breath sounds.      Comments: Port-A-Cath  Abdominal:      General: Bowel sounds are normal.   Musculoskeletal:      Right lower leg: No edema.      Left lower leg: No edema.   Lymphadenopathy:      Cervical: No cervical adenopathy.   Psychiatric:         Mood and Affect: Mood normal.         Behavior: Behavior normal.         Vitals:     "02/25/25 1323   BP: 125/70   Pulse: 86   Resp: 20   Temp: 97.5 °F (36.4 °C)   TempSrc: Temporal   SpO2: 98%   Weight: 61 kg (134 lb 7.7 oz)   PainSc: 0-No pain     ECOG score: 1         PHQ-9 Total Score:                    Result Review :   The following data was reviewed by: Waylon Arndt MD on 02/25/2025:  Lab Results   Component Value Date    HGB 8.6 (L) 02/25/2025    HCT 27.4 (L) 02/25/2025    MCV 87.0 02/25/2025     02/25/2025    WBC 5.88 02/25/2025    NEUTROABS 3.20 02/25/2025    LYMPHSABS 1.82 02/25/2025    MONOSABS 0.74 02/25/2025    EOSABS 0.02 02/25/2025    BASOSABS 0.05 02/25/2025     Lab Results   Component Value Date    GLUCOSE 110 (H) 02/25/2025    BUN 7 02/25/2025    CREATININE 0.46 (L) 02/25/2025     02/25/2025    K 4.0 02/25/2025     02/25/2025    CO2 25.1 02/25/2025    CALCIUM 8.9 02/25/2025    PROTEINTOT 7.2 02/25/2025    ALBUMIN 3.9 02/25/2025    BILITOT 0.2 02/25/2025    ALKPHOS 102 02/25/2025    AST 15 02/25/2025    ALT 8 02/25/2025     Lab Results   Component Value Date    MG 1.8 01/24/2025    PHOS 3.3 01/11/2025    FREET4 1.01 01/23/2023    TSH 0.850 03/25/2024     No results found for: \"IRON\", \"LABIRON\", \"TRANSFERRIN\", \"TIBC\"  No results found for: \"LDH\", \"FERRITIN\", \"[id number]\", \"FOLATE\"  Lab Results   Component Value Date     1,282.0 (H) 02/05/2025             Assessment and Plan    Diagnoses and all orders for this visit:    1. Malignant neoplasm of ovary, unspecified laterality (Primary)  Assessment & Plan:  Metastatic with biopsy-proven disease involving the liver.  Tempus will be sent looking for actionable mutations which may be of utility in the future.  She is currently receiving chemotherapy with carboplatin and paclitaxel.  She is due for cycle 3.  She saw Dr. Oliver with GYN oncology via telehealth recently.  Plan is to complete 3 cycles of chemotherapy and then consider debulking surgery followed by additional chemotherapy.  Her lab work today " is adequate for treatment.  Proceed with cycle 3 as planned.  She should follow-up with Dr. Oliver in the near future to discuss resection as a next portion of her treatment.  She will be referred to genetic counseling given her history of metastatic GYN malignancy.  I will see her back in 1 month with lab work prior.    Orders:  -     Ambulatory Referral to Genetic Counseling/Testing  -     CBC & Differential; Future  -     Comprehensive Metabolic Panel; Future  -     ; Future  -     Tempus xT DNA And RNA - Tissue,; Future  -     Tempus xF: xF+ Liquid Biopsy - 523 Genes; Future  -     Tempus xT Normal Blood - Blood,; Future  -     Tempus xT DNA And RNA - Tissue,    Other orders  -     sodium chloride 0.9 % infusion  -     dexAMETHasone (DECADRON) 20 mg in sodium chloride 0.9 % IVPB  -     famotidine (PEPCID) injection 20 mg  -     diphenhydrAMINE (BENADRYL) IVPB 50 mg  -     palonosetron (ALOXI) injection 0.25 mg  -     fosaprepitant (EMEND) 150 mg in sodium chloride 0.9 % 100 mL IVPB  -     PACLitaxel (TAXOL) 285 mg in sodium chloride 0.9 % 547.5 mL chemo IVPB  -     CARBOplatin (PARAPLATIN) 660 mg in sodium chloride 0.9 % 316 mL chemo IVPB  -     Hydrocortisone Sod Suc (PF) (Solu-CORTEF) injection 100 mg  -     diphenhydrAMINE (BENADRYL) injection 50 mg  -     famotidine (PEPCID) injection 20 mg            Patient Follow Up: 1 month    Patient was given instructions and counseling regarding her condition or for health maintenance advice. Please see specific information pulled into the AVS if appropriate.     Waylon Arndt MD    2/25/2025

## 2025-02-25 NOTE — ASSESSMENT & PLAN NOTE
Metastatic with biopsy-proven disease involving the liver.  Tempus will be sent looking for actionable mutations which may be of utility in the future.  She is currently receiving chemotherapy with carboplatin and paclitaxel.  She is due for cycle 3.  She saw Dr. Oliver with GYN oncology via telehealth recently.  Plan is to complete 3 cycles of chemotherapy and then consider debulking surgery followed by additional chemotherapy.  Her lab work today is adequate for treatment.  Proceed with cycle 3 as planned.  She should follow-up with Dr. Oliver in the near future to discuss resection as a next portion of her treatment.  She will be referred to genetic counseling given her history of metastatic GYN malignancy.  I will see her back in 1 month with lab work prior.

## 2025-02-26 ENCOUNTER — HOSPITAL ENCOUNTER (OUTPATIENT)
Dept: ONCOLOGY | Facility: HOSPITAL | Age: 60
Discharge: HOME | End: 2025-02-26
Admitting: INTERNAL MEDICINE
Payer: COMMERCIAL

## 2025-02-26 VITALS
DIASTOLIC BLOOD PRESSURE: 77 MMHG | HEART RATE: 86 BPM | RESPIRATION RATE: 16 BRPM | HEIGHT: 63 IN | OXYGEN SATURATION: 100 % | WEIGHT: 133.5 LBS | BODY MASS INDEX: 23.65 KG/M2 | TEMPERATURE: 97.8 F | SYSTOLIC BLOOD PRESSURE: 121 MMHG

## 2025-02-26 DIAGNOSIS — Z45.2 ENCOUNTER FOR ADJUSTMENT OR MANAGEMENT OF VASCULAR ACCESS DEVICE: ICD-10-CM

## 2025-02-26 DIAGNOSIS — C56.9 MALIGNANT NEOPLASM OF OVARY, UNSPECIFIED LATERALITY: Primary | ICD-10-CM

## 2025-02-26 PROCEDURE — 25010000002 PACLITAXEL PER 1 MG: Performed by: INTERNAL MEDICINE

## 2025-02-26 PROCEDURE — 25010000002 PALONOSETRON PER 25 MCG: Performed by: INTERNAL MEDICINE

## 2025-02-26 PROCEDURE — 96413 CHEMO IV INFUSION 1 HR: CPT

## 2025-02-26 PROCEDURE — 25010000002 DIPHENHYDRAMINE PER 50 MG: Performed by: INTERNAL MEDICINE

## 2025-02-26 PROCEDURE — 96375 TX/PRO/DX INJ NEW DRUG ADDON: CPT

## 2025-02-26 PROCEDURE — 25810000003 SODIUM CHLORIDE 0.9 % SOLUTION 500 ML FLEX CONT: Performed by: INTERNAL MEDICINE

## 2025-02-26 PROCEDURE — 25010000002 HEPARIN LOCK FLUSH PER 10 UNITS: Performed by: INTERNAL MEDICINE

## 2025-02-26 PROCEDURE — 96415 CHEMO IV INFUSION ADDL HR: CPT

## 2025-02-26 PROCEDURE — 25010000002 DEXAMETHASONE SODIUM PHOSPHATE 120 MG/30ML SOLUTION: Performed by: INTERNAL MEDICINE

## 2025-02-26 PROCEDURE — 96417 CHEMO IV INFUS EACH ADDL SEQ: CPT

## 2025-02-26 PROCEDURE — 25810000003 SODIUM CHLORIDE 0.9 % SOLUTION: Performed by: INTERNAL MEDICINE

## 2025-02-26 PROCEDURE — 96367 TX/PROPH/DG ADDL SEQ IV INF: CPT

## 2025-02-26 PROCEDURE — 25810000003 SODIUM CHLORIDE 0.9 % SOLUTION 250 ML FLEX CONT: Performed by: INTERNAL MEDICINE

## 2025-02-26 PROCEDURE — 25010000002 CARBOPLATIN PER 50 MG: Performed by: INTERNAL MEDICINE

## 2025-02-26 PROCEDURE — 25010000002 FOSAPREPITANT PER 1 MG: Performed by: INTERNAL MEDICINE

## 2025-02-26 RX ORDER — DIPHENHYDRAMINE HYDROCHLORIDE 50 MG/ML
50 INJECTION INTRAMUSCULAR; INTRAVENOUS AS NEEDED
Status: DISCONTINUED | OUTPATIENT
Start: 2025-02-26 | End: 2025-02-27 | Stop reason: HOSPADM

## 2025-02-26 RX ORDER — HEPARIN SODIUM (PORCINE) LOCK FLUSH IV SOLN 100 UNIT/ML 100 UNIT/ML
500 SOLUTION INTRAVENOUS AS NEEDED
Status: DISCONTINUED | OUTPATIENT
Start: 2025-02-26 | End: 2025-02-27 | Stop reason: HOSPADM

## 2025-02-26 RX ORDER — FAMOTIDINE 10 MG/ML
20 INJECTION, SOLUTION INTRAVENOUS ONCE
Status: COMPLETED | OUTPATIENT
Start: 2025-02-26 | End: 2025-02-26

## 2025-02-26 RX ORDER — HYDROCORTISONE SODIUM SUCCINATE 100 MG/2ML
100 INJECTION INTRAMUSCULAR; INTRAVENOUS AS NEEDED
Status: DISCONTINUED | OUTPATIENT
Start: 2025-02-26 | End: 2025-02-27 | Stop reason: HOSPADM

## 2025-02-26 RX ORDER — PALONOSETRON 0.05 MG/ML
0.25 INJECTION, SOLUTION INTRAVENOUS ONCE
Status: COMPLETED | OUTPATIENT
Start: 2025-02-26 | End: 2025-02-26

## 2025-02-26 RX ORDER — SODIUM CHLORIDE 9 MG/ML
20 INJECTION, SOLUTION INTRAVENOUS ONCE
Status: COMPLETED | OUTPATIENT
Start: 2025-02-26 | End: 2025-02-26

## 2025-02-26 RX ORDER — HEPARIN SODIUM (PORCINE) LOCK FLUSH IV SOLN 100 UNIT/ML 100 UNIT/ML
500 SOLUTION INTRAVENOUS AS NEEDED
OUTPATIENT
Start: 2025-02-26

## 2025-02-26 RX ORDER — SODIUM CHLORIDE 0.9 % (FLUSH) 0.9 %
20 SYRINGE (ML) INJECTION AS NEEDED
Status: DISCONTINUED | OUTPATIENT
Start: 2025-02-26 | End: 2025-02-27 | Stop reason: HOSPADM

## 2025-02-26 RX ORDER — FAMOTIDINE 10 MG/ML
20 INJECTION, SOLUTION INTRAVENOUS AS NEEDED
Status: DISCONTINUED | OUTPATIENT
Start: 2025-02-26 | End: 2025-02-27 | Stop reason: HOSPADM

## 2025-02-26 RX ORDER — SODIUM CHLORIDE 0.9 % (FLUSH) 0.9 %
20 SYRINGE (ML) INJECTION AS NEEDED
OUTPATIENT
Start: 2025-02-26

## 2025-02-26 RX ADMIN — FOSAPREPITANT 150 MG: 150 INJECTION, POWDER, LYOPHILIZED, FOR SOLUTION INTRAVENOUS at 09:33

## 2025-02-26 RX ADMIN — HEPARIN 500 UNITS: 100 SYRINGE at 14:07

## 2025-02-26 RX ADMIN — DEXAMETHASONE SODIUM PHOSPHATE 20 MG: 4 INJECTION, SOLUTION INTRA-ARTICULAR; INTRALESIONAL; INTRAMUSCULAR; INTRAVENOUS; SOFT TISSUE at 08:42

## 2025-02-26 RX ADMIN — FAMOTIDINE 20 MG: 10 INJECTION INTRAVENOUS at 08:39

## 2025-02-26 RX ADMIN — SODIUM CHLORIDE 285 MG: 9 INJECTION, SOLUTION INTRAVENOUS at 10:12

## 2025-02-26 RX ADMIN — SODIUM CHLORIDE 20 ML/HR: 9 INJECTION, SOLUTION INTRAVENOUS at 08:31

## 2025-02-26 RX ADMIN — Medication 20 ML: at 14:07

## 2025-02-26 RX ADMIN — PALONOSETRON HYDROCHLORIDE 0.25 MG: 0.25 INJECTION INTRAVENOUS at 08:37

## 2025-02-26 RX ADMIN — CARBOPLATIN 660 MG: 600 INJECTION, SOLUTION INTRAVENOUS at 13:28

## 2025-02-26 RX ADMIN — DIPHENHYDRAMINE HYDROCHLORIDE 50 MG: 50 INJECTION INTRAMUSCULAR; INTRAVENOUS at 09:04

## 2025-02-27 LAB — TEMPUS PORTAL: NORMAL

## 2025-03-03 ENCOUNTER — TELEPHONE (OUTPATIENT)
Dept: ONCOLOGY | Facility: HOSPITAL | Age: 60
End: 2025-03-03
Payer: COMMERCIAL

## 2025-03-03 NOTE — TELEPHONE ENCOUNTER
The pt called and states that her port had pain shot through it when getting out of bed yesterday morning. The pt states that the port does not hurt as bad this am. The pt states that her port looks like it has flipped over. This nurse instructed the pt to call the sx who placed her port and have them assess her port.The pt voiced understanding.     Note: the pt's sx is in [place].

## 2025-03-05 ENCOUNTER — TRANSCRIBE ORDERS (OUTPATIENT)
Dept: ADMINISTRATIVE | Facility: HOSPITAL | Age: 60
End: 2025-03-05
Payer: COMMERCIAL

## 2025-03-05 DIAGNOSIS — C56.9 MALIGNANT NEOPLASM OF OVARY, UNSPECIFIED LATERALITY: Primary | ICD-10-CM

## 2025-03-06 ENCOUNTER — LAB (OUTPATIENT)
Facility: HOSPITAL | Age: 60
End: 2025-03-06
Payer: COMMERCIAL

## 2025-03-06 ENCOUNTER — TRANSCRIBE ORDERS (OUTPATIENT)
Dept: ADMINISTRATIVE | Facility: HOSPITAL | Age: 60
End: 2025-03-06
Payer: COMMERCIAL

## 2025-03-06 DIAGNOSIS — C56.9 MALIGNANT NEOPLASM OF OVARY, UNSPECIFIED LATERALITY: ICD-10-CM

## 2025-03-06 DIAGNOSIS — C56.9 MALIGNANT NEOPLASM OF OVARY, UNSPECIFIED LATERALITY: Primary | ICD-10-CM

## 2025-03-06 LAB
ALBUMIN SERPL-MCNC: 4 G/DL (ref 3.5–5.2)
ALBUMIN/GLOB SERPL: 1.1 G/DL
ALP SERPL-CCNC: 100 U/L (ref 39–117)
ALT SERPL W P-5'-P-CCNC: 23 U/L (ref 1–33)
ANION GAP SERPL CALCULATED.3IONS-SCNC: 13.8 MMOL/L (ref 5–15)
AST SERPL-CCNC: 22 U/L (ref 1–32)
BASOPHILS # BLD AUTO: 0.03 10*3/MM3 (ref 0–0.2)
BASOPHILS NFR BLD AUTO: 0.7 % (ref 0–1.5)
BILIRUB SERPL-MCNC: 0.2 MG/DL (ref 0–1.2)
BUN SERPL-MCNC: 10 MG/DL (ref 6–20)
BUN/CREAT SERPL: 19.6 (ref 7–25)
CALCIUM SPEC-SCNC: 9.6 MG/DL (ref 8.6–10.5)
CANCER AG125 SERPL QL: 475 U/ML (ref 0–38.1)
CHLORIDE SERPL-SCNC: 99 MMOL/L (ref 98–107)
CO2 SERPL-SCNC: 25.2 MMOL/L (ref 22–29)
CREAT SERPL-MCNC: 0.51 MG/DL (ref 0.57–1)
DEPRECATED RDW RBC AUTO: 55.9 FL (ref 37–54)
DNA RANGE(S) EXAMINED NAR: NORMAL
EGFRCR SERPLBLD CKD-EPI 2021: 108.4 ML/MIN/1.73
EOSINOPHIL # BLD AUTO: 0.05 10*3/MM3 (ref 0–0.4)
EOSINOPHIL NFR BLD AUTO: 1.2 % (ref 0.3–6.2)
ERYTHROCYTE [DISTWIDTH] IN BLOOD BY AUTOMATED COUNT: 17.8 % (ref 12.3–15.4)
GENE DIS ANL INTERP-IMP: POSITIVE
GENE DIS ASSESSED: NORMAL
GLOBULIN UR ELPH-MCNC: 3.6 GM/DL
GLUCOSE SERPL-MCNC: 107 MG/DL (ref 65–99)
HCT VFR BLD AUTO: 26.2 % (ref 34–46.6)
HGB BLD-MCNC: 8.3 G/DL (ref 12–15.9)
IMM GRANULOCYTES # BLD AUTO: 0.02 10*3/MM3 (ref 0–0.05)
IMM GRANULOCYTES NFR BLD AUTO: 0.5 % (ref 0–0.5)
LYMPHOCYTES # BLD AUTO: 1.66 10*3/MM3 (ref 0.7–3.1)
LYMPHOCYTES NFR BLD AUTO: 40.9 % (ref 19.6–45.3)
MCH RBC QN AUTO: 27.6 PG (ref 26.6–33)
MCHC RBC AUTO-ENTMCNC: 31.7 G/DL (ref 31.5–35.7)
MCV RBC AUTO: 87 FL (ref 79–97)
MONOCYTES # BLD AUTO: 0.29 10*3/MM3 (ref 0.1–0.9)
MONOCYTES NFR BLD AUTO: 7.1 % (ref 5–12)
NEUTROPHILS NFR BLD AUTO: 2.01 10*3/MM3 (ref 1.7–7)
NEUTROPHILS NFR BLD AUTO: 49.6 % (ref 42.7–76)
NRBC BLD AUTO-RTO: 0 /100 WBC (ref 0–0.2)
PLATELET # BLD AUTO: 308 10*3/MM3 (ref 140–450)
PMV BLD AUTO: 9.9 FL (ref 6–12)
POTASSIUM SERPL-SCNC: 4 MMOL/L (ref 3.5–5.2)
PROT SERPL-MCNC: 7.6 G/DL (ref 6–8.5)
RBC # BLD AUTO: 3.01 10*6/MM3 (ref 3.77–5.28)
REASON FOR STUDY: NORMAL
SODIUM SERPL-SCNC: 138 MMOL/L (ref 136–145)
TEMPUS BLOOD TUMOR MUTATIONAL BURDEN: 3.3 M/MB
TEMPUS LCA: NORMAL
TEMPUS MSI NOTE: NORMAL
TEMPUS PORTAL: NORMAL
TEMPUS TREATMENT IMPLICATIONS NOTE: NORMAL
WBC NRBC COR # BLD AUTO: 4.06 10*3/MM3 (ref 3.4–10.8)

## 2025-03-06 PROCEDURE — 36415 COLL VENOUS BLD VENIPUNCTURE: CPT

## 2025-03-06 PROCEDURE — 80053 COMPREHEN METABOLIC PANEL: CPT

## 2025-03-06 PROCEDURE — 86304 IMMUNOASSAY TUMOR CA 125: CPT

## 2025-03-06 PROCEDURE — 85025 COMPLETE CBC W/AUTO DIFF WBC: CPT

## 2025-03-09 LAB
DNA RANGE(S) EXAMINED NAR: NORMAL
GENE DIS ANL INTERP-IMP: POSITIVE
GENE DIS ASSESSED: NORMAL
GENE MUT TESTED BLD/T: 1.6 M/MB
MSI CA SPEC-IMP: NORMAL
REASON FOR STUDY: NORMAL
TEMPUS GERMLINE NOTE: NORMAL
TEMPUS LCA: NORMAL
TEMPUS PD-L1 (22C3) COMBINED POSITIVE SCORE: 2
TEMPUS PD-L1 (22C3) TUMOR PROPORTION SCORE: 1 %
TEMPUS PORTAL: NORMAL
TEMPUS TREATMENT IMPLICATIONS NOTE: NORMAL
TEMPUS TRIALCOUNT: 3
TEMPUS TRIALMATCHES1: NORMAL
TEMPUS TRIALMATCHES2: NORMAL
TEMPUS TRIALMATCHES3: NORMAL
TEMPUS XR RESULT 1: NORMAL

## 2025-03-10 ENCOUNTER — HOSPITAL ENCOUNTER (OUTPATIENT)
Dept: CT IMAGING | Facility: HOSPITAL | Age: 60
Discharge: HOME | End: 2025-03-10
Admitting: OBSTETRICS & GYNECOLOGY
Payer: COMMERCIAL

## 2025-03-10 DIAGNOSIS — C56.9 MALIGNANT NEOPLASM OF OVARY, UNSPECIFIED LATERALITY: ICD-10-CM

## 2025-03-10 PROCEDURE — 74177 CT ABD & PELVIS W/CONTRAST: CPT

## 2025-03-10 PROCEDURE — 71260 CT THORAX DX C+: CPT

## 2025-03-10 PROCEDURE — 25510000001 IOPAMIDOL PER 1 ML: Performed by: OBSTETRICS & GYNECOLOGY

## 2025-03-10 RX ORDER — IOPAMIDOL 755 MG/ML
100 INJECTION, SOLUTION INTRAVASCULAR
Status: COMPLETED | OUTPATIENT
Start: 2025-03-10 | End: 2025-03-10

## 2025-03-10 RX ORDER — MONTELUKAST SODIUM 10 MG/1
10 TABLET ORAL NIGHTLY
Qty: 90 TABLET | Refills: 0 | Status: SHIPPED | OUTPATIENT
Start: 2025-03-10

## 2025-03-10 RX ADMIN — IOPAMIDOL 100 ML: 755 INJECTION, SOLUTION INTRAVENOUS at 16:54

## 2025-03-17 ENCOUNTER — TELEPHONE (OUTPATIENT)
Dept: ONCOLOGY | Facility: HOSPITAL | Age: 60
End: 2025-03-17
Payer: COMMERCIAL

## 2025-03-17 NOTE — TELEPHONE ENCOUNTER
The pt called asking about apt's. The pt is only scheduled for a f/u with dr Arndt and labs for 3/25/25. The pt is asking if she needs to have an infusion in the near future. The pt is also asking if her apt's were canceled due to her upcoming sx on 4/22/25.

## 2025-03-17 NOTE — TELEPHONE ENCOUNTER
Looks like she has only had 2 rounds. She is not scheduled for a third round. It would be due on 3/19. Do you want scheduling to get her on the schedule?\

## 2025-03-18 NOTE — TELEPHONE ENCOUNTER
Caller: Madina Reddy    Relationship: Self    Best call back number: [phone]    Requested Prescriptions:   Requested Prescriptions     Pending Prescriptions Disp Refills    fluticasone (FLONASE) 50 MCG/ACT nasal spray       Sig: Administer 2 sprays into the nostril(s) as directed by provider Daily.        Pharmacy where request should be sent: [address] - [phone]  - [phone]      Last office visit with prescribing clinician: 11/25/2024   Last telemedicine visit with prescribing clinician: Visit date not found   Next office visit with prescribing clinician: Visit date not found     Additional details provided by patient:     Does the patient have less than a 3 day supply:  [x] Yes  [] No    Would you like a call back once the refill request has been completed: [] Yes [] No    If the office needs to give you a call back, can they leave a voicemail: [] Yes [] No    Sujatha Cheng Rep   03/18/25 08:34 EDT

## 2025-03-20 RX ORDER — FLUTICASONE PROPIONATE 50 MCG
2 SPRAY, SUSPENSION (ML) NASAL DAILY
Qty: 11.1 G | Refills: 2 | Status: SHIPPED | OUTPATIENT
Start: 2025-03-20

## 2025-03-24 DIAGNOSIS — C56.9 MALIGNANT NEOPLASM OF OVARY, UNSPECIFIED LATERALITY: Primary | ICD-10-CM

## 2025-03-25 ENCOUNTER — OFFICE VISIT (OUTPATIENT)
Dept: ONCOLOGY | Facility: HOSPITAL | Age: 60
End: 2025-03-25
Payer: COMMERCIAL

## 2025-03-25 ENCOUNTER — HOSPITAL ENCOUNTER (OUTPATIENT)
Dept: ONCOLOGY | Facility: HOSPITAL | Age: 60
Discharge: HOME | End: 2025-03-25
Payer: COMMERCIAL

## 2025-03-25 VITALS
SYSTOLIC BLOOD PRESSURE: 117 MMHG | BODY MASS INDEX: 23.71 KG/M2 | TEMPERATURE: 97.9 F | HEIGHT: 63 IN | OXYGEN SATURATION: 98 % | WEIGHT: 133.82 LBS | RESPIRATION RATE: 16 BRPM | HEART RATE: 96 BPM | DIASTOLIC BLOOD PRESSURE: 73 MMHG

## 2025-03-25 DIAGNOSIS — C56.9 MALIGNANT NEOPLASM OF OVARY, UNSPECIFIED LATERALITY: ICD-10-CM

## 2025-03-25 DIAGNOSIS — Z45.2 ENCOUNTER FOR ADJUSTMENT OR MANAGEMENT OF VASCULAR ACCESS DEVICE: Primary | ICD-10-CM

## 2025-03-25 DIAGNOSIS — C56.9 MALIGNANT NEOPLASM OF OVARY, UNSPECIFIED LATERALITY: Primary | ICD-10-CM

## 2025-03-25 LAB
ALBUMIN SERPL-MCNC: 3.8 G/DL (ref 3.5–5.2)
ALBUMIN/GLOB SERPL: 1 G/DL
ALP SERPL-CCNC: 85 U/L (ref 39–117)
ALT SERPL W P-5'-P-CCNC: 7 U/L (ref 1–33)
ANION GAP SERPL CALCULATED.3IONS-SCNC: 14.2 MMOL/L (ref 5–15)
AST SERPL-CCNC: 13 U/L (ref 1–32)
BASOPHILS # BLD AUTO: 0.05 10*3/MM3 (ref 0–0.2)
BASOPHILS NFR BLD AUTO: 0.7 % (ref 0–1.5)
BILIRUB SERPL-MCNC: 0.2 MG/DL (ref 0–1.2)
BUN SERPL-MCNC: 10 MG/DL (ref 6–20)
BUN/CREAT SERPL: 20.8 (ref 7–25)
CALCIUM SPEC-SCNC: 9.1 MG/DL (ref 8.6–10.5)
CANCER AG125 SERPL QL: 297 U/ML (ref 0–38.1)
CHLORIDE SERPL-SCNC: 98 MMOL/L (ref 98–107)
CO2 SERPL-SCNC: 22.8 MMOL/L (ref 22–29)
CREAT SERPL-MCNC: 0.48 MG/DL (ref 0.57–1)
DEPRECATED RDW RBC AUTO: 56.3 FL (ref 37–54)
EGFRCR SERPLBLD CKD-EPI 2021: 109.3 ML/MIN/1.73
EOSINOPHIL # BLD AUTO: 0.06 10*3/MM3 (ref 0–0.4)
EOSINOPHIL NFR BLD AUTO: 0.8 % (ref 0.3–6.2)
ERYTHROCYTE [DISTWIDTH] IN BLOOD BY AUTOMATED COUNT: 17.2 % (ref 12.3–15.4)
GLOBULIN UR ELPH-MCNC: 3.8 GM/DL
GLUCOSE SERPL-MCNC: 112 MG/DL (ref 65–99)
HCT VFR BLD AUTO: 28 % (ref 34–46.6)
HGB BLD-MCNC: 8.9 G/DL (ref 12–15.9)
IMM GRANULOCYTES # BLD AUTO: 0.04 10*3/MM3 (ref 0–0.05)
IMM GRANULOCYTES NFR BLD AUTO: 0.5 % (ref 0–0.5)
LYMPHOCYTES # BLD AUTO: 1.65 10*3/MM3 (ref 0.7–3.1)
LYMPHOCYTES NFR BLD AUTO: 21.9 % (ref 19.6–45.3)
MCH RBC QN AUTO: 28.2 PG (ref 26.6–33)
MCHC RBC AUTO-ENTMCNC: 31.8 G/DL (ref 31.5–35.7)
MCV RBC AUTO: 88.6 FL (ref 79–97)
MONOCYTES # BLD AUTO: 0.62 10*3/MM3 (ref 0.1–0.9)
MONOCYTES NFR BLD AUTO: 8.2 % (ref 5–12)
NEUTROPHILS NFR BLD AUTO: 5.1 10*3/MM3 (ref 1.7–7)
NEUTROPHILS NFR BLD AUTO: 67.9 % (ref 42.7–76)
NRBC BLD AUTO-RTO: 0 /100 WBC (ref 0–0.2)
PLATELET # BLD AUTO: 295 10*3/MM3 (ref 140–450)
PMV BLD AUTO: 9.2 FL (ref 6–12)
POTASSIUM SERPL-SCNC: 3.7 MMOL/L (ref 3.5–5.2)
PROT SERPL-MCNC: 7.6 G/DL (ref 6–8.5)
RBC # BLD AUTO: 3.16 10*6/MM3 (ref 3.77–5.28)
SODIUM SERPL-SCNC: 135 MMOL/L (ref 136–145)
WBC NRBC COR # BLD AUTO: 7.52 10*3/MM3 (ref 3.4–10.8)

## 2025-03-25 PROCEDURE — 85025 COMPLETE CBC W/AUTO DIFF WBC: CPT | Performed by: INTERNAL MEDICINE

## 2025-03-25 PROCEDURE — 99214 OFFICE O/P EST MOD 30 MIN: CPT | Performed by: INTERNAL MEDICINE

## 2025-03-25 PROCEDURE — 25010000002 HEPARIN LOCK FLUSH PER 10 UNITS: Performed by: INTERNAL MEDICINE

## 2025-03-25 PROCEDURE — 36591 DRAW BLOOD OFF VENOUS DEVICE: CPT

## 2025-03-25 PROCEDURE — 86304 IMMUNOASSAY TUMOR CA 125: CPT | Performed by: INTERNAL MEDICINE

## 2025-03-25 PROCEDURE — 80053 COMPREHEN METABOLIC PANEL: CPT | Performed by: INTERNAL MEDICINE

## 2025-03-25 RX ORDER — HEPARIN SODIUM (PORCINE) LOCK FLUSH IV SOLN 100 UNIT/ML 100 UNIT/ML
500 SOLUTION INTRAVENOUS AS NEEDED
Status: DISCONTINUED | OUTPATIENT
Start: 2025-03-25 | End: 2025-03-26 | Stop reason: HOSPADM

## 2025-03-25 RX ORDER — HEPARIN SODIUM (PORCINE) LOCK FLUSH IV SOLN 100 UNIT/ML 100 UNIT/ML
500 SOLUTION INTRAVENOUS AS NEEDED
OUTPATIENT
Start: 2025-03-25

## 2025-03-25 RX ORDER — SODIUM CHLORIDE 0.9 % (FLUSH) 0.9 %
20 SYRINGE (ML) INJECTION AS NEEDED
Status: DISCONTINUED | OUTPATIENT
Start: 2025-03-25 | End: 2025-03-26 | Stop reason: HOSPADM

## 2025-03-25 RX ORDER — SODIUM CHLORIDE 0.9 % (FLUSH) 0.9 %
20 SYRINGE (ML) INJECTION AS NEEDED
OUTPATIENT
Start: 2025-03-25

## 2025-03-25 RX ADMIN — HEPARIN 500 UNITS: 100 SYRINGE at 08:05

## 2025-03-25 RX ADMIN — Medication 20 ML: at 08:05

## 2025-03-25 NOTE — ASSESSMENT & PLAN NOTE
Patient has completed 3 cycles of carboplatin paclitaxel with good improvement in symptoms and decrease in her tumor marker.  She has been seen by Dr. Watson with GYN oncology-those records reviewed.  He plans for debulking surgery which is scheduled for April.  She still has anemia due to chemotherapy but the hemoglobin continues to improve.  I will see her back in the postoperative setting to review her pathology and discuss further chemotherapy.

## 2025-03-25 NOTE — PROGRESS NOTES
Chief Complaint  FOLLOW UP 1    Mike Osorio*  Kady Vernon APRN    Subjective          Madina Reddy presents to [facility] for follow-up of her ovarian cancer.  She has completed 3 cycles of carboplatin and paclitaxel.  She has been seen by GYN oncology-Dr. Watson's records reviewed.  He plans for debulking surgery.  Patient reports that scheduled for April.  She has some fatigue but overall feeling better.  She has new masses, adenopathy, usual aches or pains.  She reports better appetite with olanzapine and her weight is stable.    Oncology/Hematology History   Malignant neoplasm of ovary   1/15/2025 -  Chemotherapy    OP OVARIAN PACLitaxel / CARBOplatin AUC=6 (Q21D)     1/28/2025 Initial Diagnosis    Malignant neoplasm of ovary     2/25/2025 Cancer Staged    Staging form: Ovary, Fallopian Tube, And Primary Peritoneal Carcinoma, AJCC 8th Edition  - Clinical: FIGO Stage IVB (cT3c, cN0, pM1b) - Signed by Waylon Arndt MD on 2/25/2025           Current Outpatient Medications on File Prior to Visit   Medication Sig Dispense Refill    amLODIPine (NORVASC) 2.5 MG tablet Take 1 tablet by mouth once daily 30 tablet 0    aspirin 81 MG chewable tablet Chew 1 tablet Daily.      EQ Allergy Relief, Cetirizine, 10 MG tablet Take 1 tablet by mouth once daily 90 tablet 0    fluticasone (FLONASE) 50 MCG/ACT nasal spray Administer 2 sprays into the nostril(s) as directed by provider Daily. 11.1 g 2    lidocaine-prilocaine (EMLA) 2.5-2.5 % cream APPLY  CREAM TOPICALLY TO AFFECTED AREA AS DIRECTED APPLY  TO  PORT  SITE  30  MINUTES  PRIOR  TO  USE 30 g 0    metoprolol succinate XL (TOPROL-XL) 25 MG 24 hr tablet Take 1 tablet by mouth Daily.      montelukast (SINGULAIR) 10 MG tablet TAKE 1 TABLET BY MOUTH ONCE DAILY AT NIGHT 90 tablet 0    OLANZapine (zyPREXA) 5 MG tablet Take 1 tablet by mouth Every Night. 30 tablet 1     Current Facility-Administered Medications on  File Prior to Visit   Medication Dose Route Frequency Provider Last Rate Last Admin    heparin injection 500 Units  500 Units Intravenous PRN Waylon Arndt MD   500 Units at 03/25/25 0805    sodium chloride 0.9 % flush 20 mL  20 mL Intravenous PRN Waylon Arndt MD   20 mL at 03/25/25 0805       No Known Allergies  Past Medical History:   Diagnosis Date    Allergies     Dizziness Madina    Headache Madina    Hyperlipidemia March    Hypertension Madina    Malignant neoplasm of ovary 01/28/2025    Pacemaker     Pneumonia Jan 2023    TMJ dysfunction Madina     Past Surgical History:   Procedure Laterality Date    BREAST BIOPSY      CARDIAC ELECTROPHYSIOLOGY PROCEDURE N/A 01/25/2023    Procedure: Device Implant;  Surgeon: VINCE Alejandre MD;  Location: [facility] INVASIVE LOCATION;  Service: Cardiology;  Laterality: N/A;    CARDIAC SURGERY      heart surgery as an infant    COLONOSCOPY  August 2017    PACEMAKER IMPLANTATION       Social History     Socioeconomic History    Marital status:    Tobacco Use    Smoking status: Never     Passive exposure: Never    Smokeless tobacco: Never   Vaping Use    Vaping status: Never Used   Substance and Sexual Activity    Alcohol use: Never    Drug use: Never    Sexual activity: Yes     Partners: Male     Birth control/protection: None     Family History   Problem Relation Age of Onset    Cancer Mother     Thyroid disease Mother     Heart disease Father     Asthma Daughter        Objective   Physical Exam  Vitals reviewed. Exam conducted with a chaperone present.   Cardiovascular:      Rate and Rhythm: Normal rate and regular rhythm.      Heart sounds: Normal heart sounds. No murmur heard.     No gallop.   Pulmonary:      Breath sounds: Normal breath sounds.      Comments: Port-A-Cath  Abdominal:      General: Bowel sounds are normal.   Lymphadenopathy:      Cervical: No cervical adenopathy.   Psychiatric:         Mood and Affect: Mood normal.         Behavior:  "Behavior normal.         Vitals:    03/25/25 0846   BP: 117/73   Pulse: 96   Resp: 16   Temp: 97.9 °F (36.6 °C)   TempSrc: Temporal   SpO2: 98%   Weight: 60.7 kg (133 lb 13.1 oz)   Height: 160 cm (62.99\")   PainSc: 0-No pain     ECOG score: 0         PHQ-9 Total Score:                    Result Review :   The following data was reviewed by: Waylon Arndt MD on 03/25/2025:  Lab Results   Component Value Date    HGB 8.9 (L) 03/25/2025    HCT 28.0 (L) 03/25/2025    MCV 88.6 03/25/2025     03/25/2025    WBC 7.52 03/25/2025    NEUTROABS 5.10 03/25/2025    LYMPHSABS 1.65 03/25/2025    MONOSABS 0.62 03/25/2025    EOSABS 0.06 03/25/2025    BASOSABS 0.05 03/25/2025     Lab Results   Component Value Date    GLUCOSE 112 (H) 03/25/2025    BUN 10 03/25/2025    CREATININE 0.48 (L) 03/25/2025     (L) 03/25/2025    K 3.7 03/25/2025    CL 98 03/25/2025    CO2 22.8 03/25/2025    CALCIUM 9.1 03/25/2025    PROTEINTOT 7.6 03/25/2025    ALBUMIN 3.8 03/25/2025    BILITOT 0.2 03/25/2025    ALKPHOS 85 03/25/2025    AST 13 03/25/2025    ALT 7 03/25/2025     Lab Results   Component Value Date    MG 1.8 01/24/2025    PHOS 3.3 01/11/2025    FREET4 1.01 01/23/2023    TSH 0.850 03/25/2024     No results found for: \"IRON\", \"LABIRON\", \"TRANSFERRIN\", \"TIBC\"  No results found for: \"LDH\", \"FERRITIN\", \"[id number]\", \"FOLATE\"  Lab Results   Component Value Date     475.0 (H) 03/06/2025             Assessment and Plan    Diagnoses and all orders for this visit:    1. Malignant neoplasm of ovary, unspecified laterality (Primary)  Assessment & Plan:  Patient has completed 3 cycles of carboplatin paclitaxel with good improvement in symptoms and decrease in her tumor marker.  She has been seen by Dr. Watson with GYN oncology-those records reviewed.  He plans for debulking surgery which is scheduled for April.  She still has anemia due to chemotherapy but the hemoglobin continues to improve.  I will see her back in the postoperative " setting to review her pathology and discuss further chemotherapy.    Orders:  -     CBC & Differential; Future  -     Comprehensive Metabolic Panel; Future  -     ; Future            Patient Follow Up: 2 months    Patient was given instructions and counseling regarding her condition or for health maintenance advice. Please see specific information pulled into the AVS if appropriate.     Waylon Arndt MD    3/25/2025

## 2025-03-26 DIAGNOSIS — R11.0 NAUSEA: ICD-10-CM

## 2025-03-26 RX ORDER — OLANZAPINE 5 MG/1
5 TABLET ORAL NIGHTLY
Qty: 30 TABLET | Refills: 1 | Status: SHIPPED | OUTPATIENT
Start: 2025-03-26

## 2025-04-07 ENCOUNTER — TELEPHONE (OUTPATIENT)
Dept: INTERNAL MEDICINE | Facility: CLINIC | Age: 60
End: 2025-04-07
Payer: COMMERCIAL

## 2025-04-07 NOTE — TELEPHONE ENCOUNTER
Caller: Madina Reddy    Relationship: Self    Best call back number: [phone]    What was the call regarding: PATIENT IS ASKING FOR NURSE/ASSISTANT TO CALL BACK REGARDING THIS.

## 2025-04-07 NOTE — TELEPHONE ENCOUNTER
The pt called and states that her  found her sleep walking x3. When questioned the pt states that she was found sleep walking around 8pm on Friday and Saturday nights. The pt states that she was found sleep walking at 3 am this morning too. The pt states that her  has been unable to wake her when she is sleep walking. The pt states that she has walked into walls face first and that wakes her up. This nurse informed the pt that sleep walking is not listed as a side effect of her chemo infusion meds. This nurse instructed the pt to contact her PCP and if they can not help to request a Neuro consult from her PCP. The pt voiced understanding.

## 2025-04-07 NOTE — TELEPHONE ENCOUNTER
Called and spoke with pt, did explain to pt that with her symptoms she would need to see a provider, explained to pt ken PCP was out of office, pt requested to be scheduled with Olinda, explained to pt the soonest I have with Olinda was going to be next week, pt was agreeable to wait that long for provider, apt has been scheduled, I did explain to pt if her symptoms became worse she should report to the ER, pt verbalized understanding and had no further questions at this time.

## 2025-04-07 NOTE — TELEPHONE ENCOUNTER
Hub staff attempted to follow warm transfer process and was unsuccessful     Caller: Madina Reddy    Relationship to patient: Self    Best call back number: [phone]     Patient is needing: PATIENT CALLING TO EXPRESS CONCERNS OF GOING TO BED EARLY ALL THE TIME AND BEING TIRED. PATIENT ALSO STATES THAT SHE HAS BEEN EXPERIENCING SLEEP WALKING AND GOING TO THE BATHROOM WHILE SHE IS SLEEP WALKING. PATIENT IS UNSURE WHY THIS IS HAPPENING. PATIENT WANTING TO SPEAK TO CLINICAL STAFF.

## 2025-04-09 ENCOUNTER — APPOINTMENT (OUTPATIENT)
Dept: GENERAL RADIOLOGY | Facility: HOSPITAL | Age: 60
End: 2025-04-09
Payer: COMMERCIAL

## 2025-04-09 ENCOUNTER — HOSPITAL ENCOUNTER (EMERGENCY)
Facility: HOSPITAL | Age: 60
Discharge: HOME | End: 2025-04-10
Attending: EMERGENCY MEDICINE
Payer: COMMERCIAL

## 2025-04-09 ENCOUNTER — APPOINTMENT (OUTPATIENT)
Dept: CT IMAGING | Facility: HOSPITAL | Age: 60
End: 2025-04-09
Payer: COMMERCIAL

## 2025-04-09 DIAGNOSIS — N39.0 ACUTE UTI: Primary | ICD-10-CM

## 2025-04-09 LAB
ALBUMIN SERPL-MCNC: 3.5 G/DL (ref 3.5–5.2)
ALBUMIN/GLOB SERPL: 0.7 G/DL
ALP SERPL-CCNC: 77 U/L (ref 39–117)
ALT SERPL W P-5'-P-CCNC: 8 U/L (ref 1–33)
ANION GAP SERPL CALCULATED.3IONS-SCNC: 13.6 MMOL/L (ref 5–15)
AST SERPL-CCNC: 21 U/L (ref 1–32)
BACTERIA UR QL AUTO: ABNORMAL /HPF
BASOPHILS # BLD AUTO: 0.05 10*3/MM3 (ref 0–0.2)
BASOPHILS NFR BLD AUTO: 0.5 % (ref 0–1.5)
BILIRUB SERPL-MCNC: 0.3 MG/DL (ref 0–1.2)
BILIRUB UR QL STRIP: NEGATIVE
BUN SERPL-MCNC: 14 MG/DL (ref 6–20)
BUN/CREAT SERPL: 24.1 (ref 7–25)
CALCIUM SPEC-SCNC: 12.6 MG/DL (ref 8.6–10.5)
CHLORIDE SERPL-SCNC: 98 MMOL/L (ref 98–107)
CK SERPL-CCNC: 9 U/L (ref 20–180)
CLARITY UR: ABNORMAL
CO2 SERPL-SCNC: 28.4 MMOL/L (ref 22–29)
COLOR UR: YELLOW
CREAT SERPL-MCNC: 0.58 MG/DL (ref 0.57–1)
DEPRECATED RDW RBC AUTO: 53.6 FL (ref 37–54)
EGFRCR SERPLBLD CKD-EPI 2021: 104.4 ML/MIN/1.73
EOSINOPHIL # BLD AUTO: 0.03 10*3/MM3 (ref 0–0.4)
EOSINOPHIL NFR BLD AUTO: 0.3 % (ref 0.3–6.2)
ERYTHROCYTE [DISTWIDTH] IN BLOOD BY AUTOMATED COUNT: 16.3 % (ref 12.3–15.4)
FLUAV RNA RESP QL NAA+PROBE: NOT DETECTED
FLUBV RNA RESP QL NAA+PROBE: NOT DETECTED
GEN 5 1HR TROPONIN T REFLEX: 9 NG/L
GLOBULIN UR ELPH-MCNC: 4.7 GM/DL
GLUCOSE SERPL-MCNC: 142 MG/DL (ref 65–99)
GLUCOSE UR STRIP-MCNC: NEGATIVE MG/DL
HCT VFR BLD AUTO: 28.2 % (ref 34–46.6)
HGB BLD-MCNC: 8.9 G/DL (ref 12–15.9)
HGB UR QL STRIP.AUTO: NEGATIVE
HOLD SPECIMEN: NORMAL
HYALINE CASTS UR QL AUTO: ABNORMAL /LPF
IMM GRANULOCYTES # BLD AUTO: 0.04 10*3/MM3 (ref 0–0.05)
IMM GRANULOCYTES NFR BLD AUTO: 0.4 % (ref 0–0.5)
KETONES UR QL STRIP: NEGATIVE
LEUKOCYTE ESTERASE UR QL STRIP.AUTO: ABNORMAL
LYMPHOCYTES # BLD AUTO: 1.43 10*3/MM3 (ref 0.7–3.1)
LYMPHOCYTES NFR BLD AUTO: 13.7 % (ref 19.6–45.3)
MAGNESIUM SERPL-MCNC: 1.8 MG/DL (ref 1.6–2.6)
MCH RBC QN AUTO: 28.5 PG (ref 26.6–33)
MCHC RBC AUTO-ENTMCNC: 31.6 G/DL (ref 31.5–35.7)
MCV RBC AUTO: 90.4 FL (ref 79–97)
MONOCYTES # BLD AUTO: 1.16 10*3/MM3 (ref 0.1–0.9)
MONOCYTES NFR BLD AUTO: 11.1 % (ref 5–12)
NEUTROPHILS NFR BLD AUTO: 7.73 10*3/MM3 (ref 1.7–7)
NEUTROPHILS NFR BLD AUTO: 74 % (ref 42.7–76)
NITRITE UR QL STRIP: POSITIVE
NRBC BLD AUTO-RTO: 0 /100 WBC (ref 0–0.2)
PH UR STRIP.AUTO: 6 [PH] (ref 5–8)
PLATELET # BLD AUTO: 391 10*3/MM3 (ref 140–450)
PMV BLD AUTO: 9.4 FL (ref 6–12)
POTASSIUM SERPL-SCNC: 3 MMOL/L (ref 3.5–5.2)
PROT SERPL-MCNC: 8.2 G/DL (ref 6–8.5)
PROT UR QL STRIP: ABNORMAL
RBC # BLD AUTO: 3.12 10*6/MM3 (ref 3.77–5.28)
RBC # UR STRIP: ABNORMAL /HPF
REF LAB TEST METHOD: ABNORMAL
RSV RNA RESP QL NAA+PROBE: NOT DETECTED
SARS-COV-2 RNA RESP QL NAA+PROBE: NOT DETECTED
SODIUM SERPL-SCNC: 140 MMOL/L (ref 136–145)
SP GR UR STRIP: 1.02 (ref 1–1.03)
SQUAMOUS #/AREA URNS HPF: ABNORMAL /HPF
TROPONIN T NUMERIC DELTA: -1 NG/L
TROPONIN T SERPL HS-MCNC: 10 NG/L
UROBILINOGEN UR QL STRIP: ABNORMAL
WBC # UR STRIP: ABNORMAL /HPF
WBC NRBC COR # BLD AUTO: 10.44 10*3/MM3 (ref 3.4–10.8)
WHOLE BLOOD HOLD COAG: NORMAL
WHOLE BLOOD HOLD SPECIMEN: NORMAL

## 2025-04-09 PROCEDURE — 81001 URINALYSIS AUTO W/SCOPE: CPT | Performed by: EMERGENCY MEDICINE

## 2025-04-09 PROCEDURE — 87637 SARSCOV2&INF A&B&RSV AMP PRB: CPT | Performed by: EMERGENCY MEDICINE

## 2025-04-09 PROCEDURE — 71045 X-RAY EXAM CHEST 1 VIEW: CPT

## 2025-04-09 PROCEDURE — P9612 CATHETERIZE FOR URINE SPEC: HCPCS

## 2025-04-09 PROCEDURE — 82550 ASSAY OF CK (CPK): CPT | Performed by: EMERGENCY MEDICINE

## 2025-04-09 PROCEDURE — 99284 EMERGENCY DEPT VISIT MOD MDM: CPT

## 2025-04-09 PROCEDURE — 25010000002 CEFTRIAXONE PER 250 MG: Performed by: EMERGENCY MEDICINE

## 2025-04-09 PROCEDURE — 36415 COLL VENOUS BLD VENIPUNCTURE: CPT

## 2025-04-09 PROCEDURE — 25810000003 SODIUM CHLORIDE 0.9 % SOLUTION: Performed by: EMERGENCY MEDICINE

## 2025-04-09 PROCEDURE — 84484 ASSAY OF TROPONIN QUANT: CPT | Performed by: EMERGENCY MEDICINE

## 2025-04-09 PROCEDURE — 96374 THER/PROPH/DIAG INJ IV PUSH: CPT

## 2025-04-09 PROCEDURE — 85025 COMPLETE CBC W/AUTO DIFF WBC: CPT | Performed by: EMERGENCY MEDICINE

## 2025-04-09 PROCEDURE — 93010 ELECTROCARDIOGRAM REPORT: CPT | Performed by: INTERNAL MEDICINE

## 2025-04-09 PROCEDURE — 80053 COMPREHEN METABOLIC PANEL: CPT | Performed by: EMERGENCY MEDICINE

## 2025-04-09 PROCEDURE — 93005 ELECTROCARDIOGRAM TRACING: CPT | Performed by: EMERGENCY MEDICINE

## 2025-04-09 PROCEDURE — 83735 ASSAY OF MAGNESIUM: CPT | Performed by: EMERGENCY MEDICINE

## 2025-04-09 PROCEDURE — 93005 ELECTROCARDIOGRAM TRACING: CPT

## 2025-04-09 PROCEDURE — 70450 CT HEAD/BRAIN W/O DYE: CPT

## 2025-04-09 RX ORDER — SODIUM CHLORIDE 0.9 % (FLUSH) 0.9 %
10 SYRINGE (ML) INJECTION AS NEEDED
Status: DISCONTINUED | OUTPATIENT
Start: 2025-04-09 | End: 2025-04-10 | Stop reason: HOSPADM

## 2025-04-09 RX ADMIN — CEFTRIAXONE SODIUM 1000 MG: 1 INJECTION, POWDER, FOR SOLUTION INTRAMUSCULAR; INTRAVENOUS at 23:10

## 2025-04-09 RX ADMIN — SODIUM CHLORIDE 1000 ML: 9 INJECTION, SOLUTION INTRAVENOUS at 19:23

## 2025-04-10 VITALS
OXYGEN SATURATION: 98 % | TEMPERATURE: 98.1 F | HEART RATE: 92 BPM | WEIGHT: 132.28 LBS | HEIGHT: 63 IN | BODY MASS INDEX: 23.44 KG/M2 | SYSTOLIC BLOOD PRESSURE: 112 MMHG | RESPIRATION RATE: 20 BRPM | DIASTOLIC BLOOD PRESSURE: 77 MMHG

## 2025-04-10 LAB
QT INTERVAL: 274 MS
QTC INTERVAL: 396 MS

## 2025-04-10 RX ORDER — CEPHALEXIN 500 MG/1
500 CAPSULE ORAL 3 TIMES DAILY
Qty: 21 CAPSULE | Refills: 0 | Status: ON HOLD | OUTPATIENT
Start: 2025-04-10

## 2025-04-10 RX ORDER — ONDANSETRON 4 MG/1
4 TABLET, ORALLY DISINTEGRATING ORAL EVERY 8 HOURS PRN
Qty: 15 TABLET | Refills: 0 | Status: ON HOLD | OUTPATIENT
Start: 2025-04-10

## 2025-04-10 NOTE — ED PROVIDER NOTES
Time: 12:09 AM EDT  Date of encounter:  4/9/2025  Independent Historian/Clinical History and Information was obtained by:   Patient    History is limited by: N/A    Chief Complaint: Nausea      History of Present Illness:  Patient is a 60 y.o. year old female who presents to the emergency department for evaluation of decreased appetite, weakness, nausea that is gotten worse over the past week.  Patient denies fever and chills.  Patient has no cough or hemoptysis.  Patient denies dysuria and urinary frequency.  Patient denies chest pain and shortness of breath.      Patient Care Team  Primary Care Provider: Kady Vernon APRN    Past Medical History:     No Known Allergies  Past Medical History:   Diagnosis Date    Allergies     Dizziness Madina    Headache Madina    Hyperlipidemia March    Hypertension Madina    Malignant neoplasm of ovary 01/28/2025    Pacemaker     Pneumonia Jan 2023    TMJ dysfunction Madina     Past Surgical History:   Procedure Laterality Date    BREAST BIOPSY      CARDIAC ELECTROPHYSIOLOGY PROCEDURE N/A 01/25/2023    Procedure: Device Implant;  Surgeon: VINCE Alejandre MD;  Location: [facility] INVASIVE LOCATION;  Service: Cardiology;  Laterality: N/A;    CARDIAC SURGERY      heart surgery as an infant    COLONOSCOPY  August 2017    PACEMAKER IMPLANTATION       Family History   Problem Relation Age of Onset    Cancer Mother     Thyroid disease Mother     Heart disease Father     Asthma Daughter        Home Medications:  Prior to Admission medications    Medication Sig Start Date End Date Taking? Authorizing Provider   amLODIPine (NORVASC) 2.5 MG tablet Take 1 tablet by mouth once daily 4/15/24   VINCE Alejandre MD   aspirin 81 MG chewable tablet Chew 1 tablet Daily.    Provider, MD Johnathon   cephalexin (KEFLEX) 500 MG capsule Take 1 capsule by mouth 3 (Three) Times a Day. 4/10/25   Norma Hutton MD   EQ Allergy Relief, Cetirizine, 10 MG tablet Take 1 tablet by mouth once  daily 1/23/25   Kady Vernon APRN   fluticasone (FLONASE) 50 MCG/ACT nasal spray Administer 2 sprays into the nostril(s) as directed by provider Daily. 3/20/25   Olinda Burk PA-C   lidocaine-prilocaine (EMLA) 2.5-2.5 % cream APPLY  CREAM TOPICALLY TO AFFECTED AREA AS DIRECTED APPLY  TO  PORT  SITE  30  MINUTES  PRIOR  TO  USE 2/25/25   Waylon Arndt MD   metoprolol succinate XL (TOPROL-XL) 25 MG 24 hr tablet Take 1 tablet by mouth Daily. 3/6/24   ProviderJohnathon MD   montelukast (SINGULAIR) 10 MG tablet TAKE 1 TABLET BY MOUTH ONCE DAILY AT NIGHT 3/10/25   Kady Vernon APRN   OLANZapine (zyPREXA) 5 MG tablet Take 1 tablet by mouth Every Night. 3/26/25   Waylon Arndt MD   ondansetron ODT (ZOFRAN-ODT) 4 MG disintegrating tablet Place 1 tablet on the tongue Every 8 (Eight) Hours As Needed for Nausea or Vomiting. 4/10/25   Norma Hutton MD        Social History:   Social History     Tobacco Use    Smoking status: Never     Passive exposure: Never    Smokeless tobacco: Never   Vaping Use    Vaping status: Never Used   Substance Use Topics    Alcohol use: Never    Drug use: Never         Review of Systems:  Review of Systems   Constitutional:  Negative for chills and fever.   HENT:  Negative for congestion, rhinorrhea and sore throat.    Eyes:  Negative for pain and visual disturbance.   Respiratory:  Negative for apnea, cough, chest tightness and shortness of breath.    Cardiovascular:  Negative for chest pain and palpitations.   Gastrointestinal:  Negative for abdominal pain, diarrhea, nausea and vomiting.   Genitourinary:  Negative for difficulty urinating and dysuria.   Musculoskeletal:  Negative for joint swelling and myalgias.   Skin:  Negative for color change.   Neurological:  Positive for weakness. Negative for seizures and headaches.   Psychiatric/Behavioral: Negative.     All other systems reviewed and are negative.       Physical Exam:  /84   Pulse 100   Temp 98.1 °F  "(36.7 °C) (Oral)   Resp 20   Ht 160 cm (63\")   Wt 60 kg (132 lb 4.4 oz)   SpO2 99%   BMI 23.43 kg/m²     Physical Exam  Vitals and nursing note reviewed.   Constitutional:       General: She is not in acute distress.     Appearance: Normal appearance. She is not toxic-appearing.   HENT:      Head: Normocephalic and atraumatic.      Jaw: There is normal jaw occlusion.   Eyes:      General: Lids are normal.      Extraocular Movements: Extraocular movements intact.      Conjunctiva/sclera: Conjunctivae normal.      Pupils: Pupils are equal, round, and reactive to light.   Cardiovascular:      Rate and Rhythm: Normal rate and regular rhythm.      Pulses: Normal pulses.      Heart sounds: Normal heart sounds.   Pulmonary:      Effort: Pulmonary effort is normal. No respiratory distress.      Breath sounds: Normal breath sounds. No wheezing or rhonchi.   Abdominal:      General: Abdomen is flat.      Palpations: Abdomen is soft.      Tenderness: There is no abdominal tenderness. There is no guarding or rebound.   Musculoskeletal:         General: Normal range of motion.      Cervical back: Normal range of motion and neck supple.      Right lower leg: No edema.      Left lower leg: No edema.   Skin:     General: Skin is warm and dry.   Neurological:      Mental Status: She is alert and oriented to person, place, and time. Mental status is at baseline.   Psychiatric:         Mood and Affect: Mood normal.                    Medical Decision Making:      Comorbidities that affect care:    Cancer    External Notes reviewed:    Previous Clinic Note: Patient was last seen in clinic after follow-up of cisplatin therapy      The following orders were placed and all results were independently analyzed by me:  Orders Placed This Encounter   Procedures    COVID PRE-OP / PRE-PROCEDURE SCREENING ORDER (NO ISOLATION) - Swab, Nasopharynx    COVID-19, FLU A/B, RSV PCR 1 HR TAT - Swab, Nasopharynx    XR Chest 1 View    CT Head Without " Contrast    [place] Draw    Comprehensive Metabolic Panel    High Sensitivity Troponin T    Magnesium    Urinalysis With Microscopic If Indicated (No Culture) - Urine, Clean Catch    CBC Auto Differential    CK    High Sensitivity Troponin T 1Hr    Urinalysis, Microscopic Only - Urine, Clean Catch    NPO Diet NPO Type: Strict NPO    Undress & Gown    Continuous Pulse Oximetry    Vital Signs    Orthostatic Blood Pressure    Straight Cath    Oxygen Therapy- Nasal Cannula; Titrate 1-6 LPM Per SpO2; 90 - 95%    POC Glucose Once    ECG 12 Lead Altered Mental Status    Insert Peripheral IV    Fall Precautions    CBC & Differential    Green Top (Gel)    Lavender Top    Gold Top - SST    Light Blue Top    Extra Tubes    Gray Top       Medications Given in the Emergency Department:  Medications   sodium chloride 0.9 % flush 10 mL (has no administration in time range)   sodium chloride 0.9 % bolus 1,000 mL (0 mL Intravenous Stopped 4/9/25 2058)   cefTRIAXone (ROCEPHIN) in NS 1 gram/10ml IV PUSH syringe (1,000 mg Intravenous Given 4/9/25 2310)        ED Course:         Labs:    Lab Results (last 24 hours)       Procedure Component Value Units Date/Time    CBC & Differential [[id number]]  (Abnormal) Collected: 04/09/25 1912    Specimen: Blood Updated: 04/09/25 1922    Narrative:      The following orders were created for panel order CBC & Differential.  Procedure                               Abnormality         Status                     ---------                               -----------         ------                     CBC Auto Differential[[id number]]        Abnormal            Final result                 Please view results for these tests on the individual orders.    Comprehensive Metabolic Panel [[id number]]  (Abnormal) Collected: 04/09/25 1912    Specimen: Blood Updated: 04/09/25 1947     Glucose 142 mg/dL      BUN 14 mg/dL      Creatinine 0.58 mg/dL      Sodium 140 mmol/L      Potassium 3.0 mmol/L      Chloride 98  mmol/L      CO2 28.4 mmol/L      Calcium 12.6 mg/dL      Total Protein 8.2 g/dL      Albumin 3.5 g/dL      ALT (SGPT) 8 U/L      AST (SGOT) 21 U/L      Alkaline Phosphatase 77 U/L      Total Bilirubin 0.3 mg/dL      Globulin 4.7 gm/dL      A/G Ratio 0.7 g/dL      BUN/Creatinine Ratio 24.1     Anion Gap 13.6 mmol/L      eGFR 104.4 mL/min/1.73     Narrative:      GFR Categories in Chronic Kidney Disease (CKD)      GFR Category          GFR (mL/min/1.73)    Interpretation  G1                     90 or greater         Normal or high (1)  G2                      60-89                Mild decrease (1)  G3a                   45-59                Mild to moderate decrease  G3b                   30-44                Moderate to severe decrease  G4                    15-29                Severe decrease  G5                    14 or less           Kidney failure          (1)In the absence of evidence of kidney disease, neither GFR category G1 or G2 fulfill the criteria for CKD.    eGFR calculation 2021 CKD-EPI creatinine equation, which does not include race as a factor    High Sensitivity Troponin T [[id number]]  (Normal) Collected: 04/09/25 1912    Specimen: Blood Updated: 04/09/25 1947     HS Troponin T 10 ng/L     Narrative:      High Sensitive Troponin T Reference Range:  <14.0 ng/L- Negative Female for AMI  <22.0 ng/L- Negative Male for AMI  >=14 - Abnormal Female indicating possible myocardial injury.  >=22 - Abnormal Male indicating possible myocardial injury.   Clinicians would have to utilize clinical acumen, EKG, Troponin, and serial changes to determine if it is an Acute Myocardial Infarction or myocardial injury due to an underlying chronic condition.         Magnesium [[id number]]  (Normal) Collected: 04/09/25 1912    Specimen: Blood Updated: 04/09/25 1947     Magnesium 1.8 mg/dL     CBC Auto Differential [[id number]]  (Abnormal) Collected: 04/09/25 1912    Specimen: Blood Updated: 04/09/25 1922     WBC 10.44  10*3/mm3      RBC 3.12 10*6/mm3      Hemoglobin 8.9 g/dL      Hematocrit 28.2 %      MCV 90.4 fL      MCH 28.5 pg      MCHC 31.6 g/dL      RDW 16.3 %      RDW-SD 53.6 fl      MPV 9.4 fL      Platelets 391 10*3/mm3      Neutrophil % 74.0 %      Lymphocyte % 13.7 %      Monocyte % 11.1 %      Eosinophil % 0.3 %      Basophil % 0.5 %      Immature Grans % 0.4 %      Neutrophils, Absolute 7.73 10*3/mm3      Lymphocytes, Absolute 1.43 10*3/mm3      Monocytes, Absolute 1.16 10*3/mm3      Eosinophils, Absolute 0.03 10*3/mm3      Basophils, Absolute 0.05 10*3/mm3      Immature Grans, Absolute 0.04 10*3/mm3      nRBC 0.0 /100 WBC     CK [[id number]]  (Abnormal) Collected: 04/09/25 1912    Specimen: Blood Updated: 04/09/25 1947     Creatine Kinase 9 U/L     COVID PRE-OP / PRE-PROCEDURE SCREENING ORDER (NO ISOLATION) - Swab, Nasopharynx [[id number]]  (Normal) Collected: 04/09/25 1925    Specimen: Swab from Nasopharynx Updated: 04/09/25 2011    Narrative:      The following orders were created for panel order COVID PRE-OP / PRE-PROCEDURE SCREENING ORDER (NO ISOLATION) - Swab, Nasopharynx.  Procedure                               Abnormality         Status                     ---------                               -----------         ------                     COVID-19, FLU A/B, RSV P...[[id number]]  Normal              Final result                 Please view results for these tests on the individual orders.    COVID-19, FLU A/B, RSV PCR 1 HR TAT - Swab, Nasopharynx [[id number]]  (Normal) Collected: 04/09/25 1925    Specimen: Swab from Nasopharynx Updated: 04/09/25 2011     COVID19 Not Detected     Influenza A PCR Not Detected     Influenza B PCR Not Detected     RSV, PCR Not Detected    Narrative:      Fact sheet for providers: https://www.fda.gov/media/527293/download    Fact sheet for patients: https://www.fda.gov/media/184371/download    Test performed by PCR.    High Sensitivity Troponin T 1Hr [[id number]]  (Normal) Collected:  04/09/25 2036    Specimen: Blood Updated: 04/09/25 2058     HS Troponin T 9 ng/L      Troponin T Numeric Delta -1 ng/L     Narrative:      High Sensitive Troponin T Reference Range:  <14.0 ng/L- Negative Female for AMI  <22.0 ng/L- Negative Male for AMI  >=14 - Abnormal Female indicating possible myocardial injury.  >=22 - Abnormal Male indicating possible myocardial injury.   Clinicians would have to utilize clinical acumen, EKG, Troponin, and serial changes to determine if it is an Acute Myocardial Infarction or myocardial injury due to an underlying chronic condition.         Urinalysis With Microscopic If Indicated (No Culture) - Straight Cath [[id number]]  (Abnormal) Collected: 04/09/25 2158    Specimen: Urine from Straight Cath Updated: 04/09/25 2209     Color, UA Yellow     Appearance, UA Cloudy     pH, UA 6.0     Specific Gravity, UA 1.018     Glucose, UA Negative     Ketones, UA Negative     Bilirubin, UA Negative     Blood, UA Negative     Protein, UA Trace     Leuk Esterase, UA Trace     Nitrite, UA Positive     Urobilinogen, UA 1.0 E.U./dL    Urinalysis, Microscopic Only - Straight Cath [[id number]]  (Abnormal) Collected: 04/09/25 2158    Specimen: Urine from Straight Cath Updated: 04/09/25 2209     RBC, UA 0-2 /HPF      WBC, UA 21-50 /HPF      Bacteria, UA 4+ /HPF      Squamous Epithelial Cells, UA 3-6 /HPF      Hyaline Casts, UA 13-20 /LPF      Methodology Automated Microscopy             Imaging:    CT Head Without Contrast  Result Date: 4/9/2025  CT HEAD WO CONTRAST Date of Exam: 4/9/2025 7:46 PM EDT Indication: Headache headache. Comparison: CT head without contrast 1/23/2023 Technique: Axial CT images were obtained of the head without contrast administration.  Reconstructed coronal and sagittal images were also obtained. Automated exposure control and iterative construction methods were used. Findings: No intracranial hemorrhage. No mass effect or midline shift. Ventricles and cortical sulci are  normally configured. Gray-white matter differentiation maintained. Posterior fossa without acute abnormality. Globes symmetric. No retro-orbital abnormality. The mastoid air cells are well-aerated with there is no sinus fluid level. Negative for calvarial fracture.     Impression: No acute intracranial abnormality. Electronically Signed: Adán Dixon MD  4/9/2025 8:14 PM EDT  Workstation ID: [id number]    XR Chest 1 View  Result Date: 4/9/2025  XR CHEST 1 VW Date of Exam: 4/9/2025 6:48 PM EDT Indication: Weak/Dizzy/AMS triage protocol Comparison: 1/8/2025 Findings: Lines: None Lungs: Interval placement of right internal jugular port with the distal tip at the level of the cavoatrial junction. Unchanged position of left subclavian dual-lead pacemaker Pleura: No pleural effusion or pneumothorax. Cardiomediastinum: The cardiomediastinal silhouette is normal Soft Tissues: Unremarkable. Bones: No acute osseous abnormality.     Impression: No acute cardiopulmonary abnormality. Electronically Signed: Davide Frazier DO  4/9/2025 7:11 PM EDT  Workstation ID: [id number]        Differential Diagnosis and Discussion:    Weakness: Based on the patient's history, signs, and symptoms, the diffential diagnosis includes but is not limited to meningitis, stroke, sepsis, subarachnoid hemorrhage, intracranial bleeding, encephalitis, acute uti, dehydration, MS, myasthenia gravis, Guillan [place], migraine variant, neuromuscular disorders vertigo, electrolyte imbalance, and metabolic disorders.    PROCEDURES:    Labs were collected in the emergency department and all labs were reviewed and interpreted by me.  X-ray were performed in the emergency department and all X-ray impressions were independently interpreted by me.  An EKG was performed and the EKG was interpreted by me.    ECG 12 Lead Altered Mental Status   Preliminary Result   HEART CFSF=715  bpm   RR Yzwcawfp=409  ms   OK Nchyqnzh=617  ms   P Horizontal Axis=-8  deg   P Front  Axis=87  deg   QRSD Interval=82  ms   QT Boalu[place]o=263  ms   KLpB=364  ms   QRS Axis=44  deg   T Wave Axis=265  deg   - ABNORMAL ECG -   Pacemaker spikes or artifacts   Sinus tachycardia   LAE, consider biatrial enlargement   Probable anterior infarct, age indeterminate   Abnormal T, consider ischemia, lateral leads   Date and Time of Study:2025-04-09 18:25:18          Procedures    MDM     Based on the results of the patient's urinalysis and urinary complaints, signs, symptoms, and diagnostic testing is consistent with a urinary tract infection.  The patient´s CBC that was reviewed and interpreted by me shows no abnormalities of critical concern. Of note, there is no anemia requiring a blood transfusion and the platelet count is acceptable.  The patient´s CMP that was reviewed and interpretted by me shows no abnormalities of critical concern. Of note, the patient´s sodium and potassium are acceptable. The patient´s liver enzymes are unremarkable. The patient´s renal function (creatinine) is preserved. The patient has a normal anion gap.  Patient is resting comfortably, is alert, and is in no distress. The repeat examination is unremarkable and benign. The patient has no signs of urosepsis. The patient was started on antibiotics in the emergency department and will be discharged with antibiotics as an outpatient. The patient was counseled to return to the ER for fever >100.5, intractable pain or vomiting, or any other concerns that the may have. The patient has expressed a clear and thorough understanding and agreed to follow up as instructed.                Patient Care Considerations:    CT ABDOMEN AND PELVIS: I considered ordering a CT scan of the abdomen and pelvis however abdomen is soft and nontender.      Consultants/Shared Management Plan:    None    Social Determinants of Health:    Patient is independent, reliable, and has access to care.       Disposition and Care Coordination:    Discharged: I considered  escalation of care by admitting this patient to the hospital, however patient reports improvement with ED treatment.    I have explained the patient´s condition, diagnoses and treatment plan based on the information available to me at this time. I have answered questions and addressed any concerns. The patient has a good  understanding of the patient´s diagnosis, condition, and treatment plan as can be expected at this point. The vital signs have been stable. The patient´s condition is stable and appropriate for discharge from the emergency department.      The patient will pursue further outpatient evaluation with the primary care physician or other designated or consulting physician as outlined in the discharge instructions. They are agreeable to this plan of care and follow-up instructions have been explained in detail. The patient has received these instructions in written format and has expressed an understanding of the discharge instructions. The patient is aware that any significant change in condition or worsening of symptoms should prompt an immediate return to this or the closest emergency department or call to 911.  I have explained discharge medications and the need for follow up with the patient/caretakers. This was also printed in the discharge instructions. Patient was discharged with the following medications and follow up:      Medication List        New Prescriptions      cephalexin 500 MG capsule  Commonly known as: KEFLEX  Take 1 capsule by mouth 3 (Three) Times a Day.     ondansetron ODT 4 MG disintegrating tablet  Commonly known as: ZOFRAN-ODT  Place 1 tablet on the tongue Every 8 (Eight) Hours As Needed for Nausea or Vomiting.               Where to Get Your Medications        These medications [place] sent [place] [id number] [place] [address] [facility] - [phone]  - [phone]   [address] [facility] [address]      Phone: [phone]    cephalexin 500 MG capsule  ondansetron ODT 4 MG disintegrating tablet      Kady Vernon, APRN  [address] [address] [phone]    In 2 days         Final diagnoses:   Acute UTI        ED Disposition       ED Disposition   Discharge    Condition   Stable    Comment   --               This medical record created using voice recognition software.             Norma Hutton MD  04/10/25 0012

## 2025-04-11 ENCOUNTER — TELEPHONE (OUTPATIENT)
Dept: ONCOLOGY | Facility: HOSPITAL | Age: 60
End: 2025-04-11
Payer: COMMERCIAL

## 2025-04-11 NOTE — TELEPHONE ENCOUNTER
Caller: JAVON LINCOLN    Relationship: Emergency Contact    Best call back number: [phone]    What is the best time to reach you: ANYTIME    Who are you requesting to speak with (clinical staff, provider,  specific staff member): DR HUTCHINS / CLINICAL    What was the call regarding: PT WAS SEEN IN THE ER ON 4-9 WITH A UTI, PT'S  IS REQUESTING A CALL BACK TO GO OVER SOME SYMPTOMS SHE IS HAVING, SHE IS WEAK, CANNOT GET OUT OF BED, HAVING SPASMS IN HER ARMS, APPETITE IS DOWN.    PT CURRENTLY ON KEFLEX FOR A UTI

## 2025-04-11 NOTE — TELEPHONE ENCOUNTER
Fitz called and states that the pt has been getting progressively worse over the last 3 weeks. Fitz states the pt is not eating/drinking, is weak/fatigued, has intermittent tremors in bilateral arms, and has some AMS. Fitz states that he took the pt to the ER on 4/9/25 and the pt was dx with a UTI. The pt received Rocephin and 1L NS during her ER visit. The pt was dc with a Keflex script. Fitz states that he has been giving the pt her Keflex as directed by the ER. Fitz states that the pt has stopped chemo until after she has sx. Fitz states that the pt did better when on chemo and has been going down hill ever since stopping it. When questioned Fitz states that the pt can not get up out of her bed to the bedside commode with out his assistance. Fitz states that the pt has progressively been becoming weaker over the last 3 weeks. Fitz also states that the pt's cognitive function has been in decline over the last 3 weeks. Fitz is very concerned about the pt and is wanting to improve the pt's food/fluid intake, improve the pt's strength, stop the arm tremors, and help the pt get her mental status back to where it was 3 weeks ago.

## 2025-04-14 ENCOUNTER — HOSPITAL ENCOUNTER (INPATIENT)
Facility: HOSPITAL | Age: 60
LOS: 8 days | Discharge: HOSPICE | DRG: 640 | End: 2025-04-22
Attending: EMERGENCY MEDICINE | Admitting: INTERNAL MEDICINE
Payer: COMMERCIAL

## 2025-04-14 ENCOUNTER — APPOINTMENT (OUTPATIENT)
Dept: GENERAL RADIOLOGY | Facility: HOSPITAL | Age: 60
DRG: 640 | End: 2025-04-14
Payer: COMMERCIAL

## 2025-04-14 DIAGNOSIS — Z51.5 END OF LIFE CARE: ICD-10-CM

## 2025-04-14 DIAGNOSIS — E87.6 HYPOKALEMIA: ICD-10-CM

## 2025-04-14 DIAGNOSIS — E86.0 DEHYDRATION: Primary | ICD-10-CM

## 2025-04-14 DIAGNOSIS — R26.2 DIFFICULTY IN WALKING: ICD-10-CM

## 2025-04-14 DIAGNOSIS — Z78.9 DECREASED ACTIVITIES OF DAILY LIVING (ADL): ICD-10-CM

## 2025-04-14 DIAGNOSIS — R53.1 WEAKNESS: ICD-10-CM

## 2025-04-14 DIAGNOSIS — E83.52 HYPERCALCEMIA: ICD-10-CM

## 2025-04-14 DIAGNOSIS — R13.12 OROPHARYNGEAL DYSPHAGIA: ICD-10-CM

## 2025-04-14 LAB
ALBUMIN SERPL-MCNC: 3.6 G/DL (ref 3.5–5.2)
ALBUMIN/GLOB SERPL: 0.8 G/DL
ALP SERPL-CCNC: 68 U/L (ref 39–117)
ALT SERPL W P-5'-P-CCNC: 6 U/L (ref 1–33)
ANION GAP SERPL CALCULATED.3IONS-SCNC: 12.5 MMOL/L (ref 5–15)
AST SERPL-CCNC: 17 U/L (ref 1–32)
BASOPHILS # BLD AUTO: 0.03 10*3/MM3 (ref 0–0.2)
BASOPHILS NFR BLD AUTO: 0.3 % (ref 0–1.5)
BILIRUB SERPL-MCNC: 0.3 MG/DL (ref 0–1.2)
BILIRUB UR QL STRIP: NEGATIVE
BUN SERPL-MCNC: 16 MG/DL (ref 6–20)
BUN/CREAT SERPL: 29.6 (ref 7–25)
CALCIUM SPEC-SCNC: 12.1 MG/DL (ref 8.6–10.5)
CHLORIDE SERPL-SCNC: 97 MMOL/L (ref 98–107)
CLARITY UR: ABNORMAL
CO2 SERPL-SCNC: 30.5 MMOL/L (ref 22–29)
COLOR UR: YELLOW
CREAT SERPL-MCNC: 0.54 MG/DL (ref 0.57–1)
DEPRECATED RDW RBC AUTO: 52.6 FL (ref 37–54)
EGFRCR SERPLBLD CKD-EPI 2021: 106.2 ML/MIN/1.73
EOSINOPHIL # BLD AUTO: 0.01 10*3/MM3 (ref 0–0.4)
EOSINOPHIL NFR BLD AUTO: 0.1 % (ref 0.3–6.2)
ERYTHROCYTE [DISTWIDTH] IN BLOOD BY AUTOMATED COUNT: 15.9 % (ref 12.3–15.4)
GEN 5 1HR TROPONIN T REFLEX: 9 NG/L
GLOBULIN UR ELPH-MCNC: 4.3 GM/DL
GLUCOSE BLDC GLUCOMTR-MCNC: 113 MG/DL (ref 70–99)
GLUCOSE SERPL-MCNC: 113 MG/DL (ref 65–99)
GLUCOSE UR STRIP-MCNC: NEGATIVE MG/DL
HCT VFR BLD AUTO: 29.3 % (ref 34–46.6)
HGB BLD-MCNC: 9 G/DL (ref 12–15.9)
HGB UR QL STRIP.AUTO: NEGATIVE
HOLD SPECIMEN: NORMAL
IMM GRANULOCYTES # BLD AUTO: 0.06 10*3/MM3 (ref 0–0.05)
IMM GRANULOCYTES NFR BLD AUTO: 0.5 % (ref 0–0.5)
KETONES UR QL STRIP: ABNORMAL
LEUKOCYTE ESTERASE UR QL STRIP.AUTO: NEGATIVE
LYMPHOCYTES # BLD AUTO: 1.33 10*3/MM3 (ref 0.7–3.1)
LYMPHOCYTES NFR BLD AUTO: 11.7 % (ref 19.6–45.3)
MAGNESIUM SERPL-MCNC: 1.9 MG/DL (ref 1.6–2.6)
MCH RBC QN AUTO: 27.5 PG (ref 26.6–33)
MCHC RBC AUTO-ENTMCNC: 30.7 G/DL (ref 31.5–35.7)
MCV RBC AUTO: 89.6 FL (ref 79–97)
MONOCYTES # BLD AUTO: 1.04 10*3/MM3 (ref 0.1–0.9)
MONOCYTES NFR BLD AUTO: 9.1 % (ref 5–12)
NEUTROPHILS NFR BLD AUTO: 78.3 % (ref 42.7–76)
NEUTROPHILS NFR BLD AUTO: 8.91 10*3/MM3 (ref 1.7–7)
NITRITE UR QL STRIP: NEGATIVE
NRBC BLD AUTO-RTO: 0 /100 WBC (ref 0–0.2)
PH UR STRIP.AUTO: 6 [PH] (ref 5–8)
PLATELET # BLD AUTO: 354 10*3/MM3 (ref 140–450)
PMV BLD AUTO: 10.1 FL (ref 6–12)
POTASSIUM SERPL-SCNC: 2.8 MMOL/L (ref 3.5–5.2)
PROT SERPL-MCNC: 7.9 G/DL (ref 6–8.5)
PROT UR QL STRIP: ABNORMAL
RBC # BLD AUTO: 3.27 10*6/MM3 (ref 3.77–5.28)
SODIUM SERPL-SCNC: 140 MMOL/L (ref 136–145)
SP GR UR STRIP: 1.02 (ref 1–1.03)
TROPONIN T NUMERIC DELTA: -1 NG/L
TROPONIN T SERPL HS-MCNC: 10 NG/L
UROBILINOGEN UR QL STRIP: ABNORMAL
WBC NRBC COR # BLD AUTO: 11.38 10*3/MM3 (ref 3.4–10.8)
WHOLE BLOOD HOLD COAG: NORMAL
WHOLE BLOOD HOLD SPECIMEN: NORMAL

## 2025-04-14 PROCEDURE — 99285 EMERGENCY DEPT VISIT HI MDM: CPT

## 2025-04-14 PROCEDURE — 25010000002 CEFTRIAXONE PER 250 MG: Performed by: INTERNAL MEDICINE

## 2025-04-14 PROCEDURE — 25010000002 ENOXAPARIN PER 10 MG: Performed by: INTERNAL MEDICINE

## 2025-04-14 PROCEDURE — 80053 COMPREHEN METABOLIC PANEL: CPT | Performed by: EMERGENCY MEDICINE

## 2025-04-14 PROCEDURE — 83735 ASSAY OF MAGNESIUM: CPT | Performed by: EMERGENCY MEDICINE

## 2025-04-14 PROCEDURE — 25010000002 ONDANSETRON PER 1 MG

## 2025-04-14 PROCEDURE — 93005 ELECTROCARDIOGRAM TRACING: CPT | Performed by: EMERGENCY MEDICINE

## 2025-04-14 PROCEDURE — 82306 VITAMIN D 25 HYDROXY: CPT | Performed by: INTERNAL MEDICINE

## 2025-04-14 PROCEDURE — 25810000003 SODIUM CHLORIDE 0.9 % SOLUTION: Performed by: INTERNAL MEDICINE

## 2025-04-14 PROCEDURE — 25810000003 SODIUM CHLORIDE 0.9 % SOLUTION

## 2025-04-14 PROCEDURE — G0378 HOSPITAL OBSERVATION PER HR: HCPCS

## 2025-04-14 PROCEDURE — 82948 REAGENT STRIP/BLOOD GLUCOSE: CPT | Performed by: EMERGENCY MEDICINE

## 2025-04-14 PROCEDURE — 84484 ASSAY OF TROPONIN QUANT: CPT | Performed by: EMERGENCY MEDICINE

## 2025-04-14 PROCEDURE — 36415 COLL VENOUS BLD VENIPUNCTURE: CPT | Performed by: INTERNAL MEDICINE

## 2025-04-14 PROCEDURE — 36415 COLL VENOUS BLD VENIPUNCTURE: CPT

## 2025-04-14 PROCEDURE — 71045 X-RAY EXAM CHEST 1 VIEW: CPT

## 2025-04-14 PROCEDURE — 93010 ELECTROCARDIOGRAM REPORT: CPT | Performed by: INTERNAL MEDICINE

## 2025-04-14 PROCEDURE — 82607 VITAMIN B-12: CPT | Performed by: INTERNAL MEDICINE

## 2025-04-14 PROCEDURE — 85025 COMPLETE CBC W/AUTO DIFF WBC: CPT | Performed by: EMERGENCY MEDICINE

## 2025-04-14 PROCEDURE — 87040 BLOOD CULTURE FOR BACTERIA: CPT | Performed by: INTERNAL MEDICINE

## 2025-04-14 PROCEDURE — 81003 URINALYSIS AUTO W/O SCOPE: CPT | Performed by: INTERNAL MEDICINE

## 2025-04-14 PROCEDURE — 82746 ASSAY OF FOLIC ACID SERUM: CPT | Performed by: INTERNAL MEDICINE

## 2025-04-14 PROCEDURE — 99223 1ST HOSP IP/OBS HIGH 75: CPT | Performed by: INTERNAL MEDICINE

## 2025-04-14 RX ORDER — SODIUM CHLORIDE 9 MG/ML
40 INJECTION, SOLUTION INTRAVENOUS AS NEEDED
Status: DISCONTINUED | OUTPATIENT
Start: 2025-04-14 | End: 2025-04-22 | Stop reason: HOSPADM

## 2025-04-14 RX ORDER — SODIUM CHLORIDE 0.9 % (FLUSH) 0.9 %
10 SYRINGE (ML) INJECTION AS NEEDED
Status: DISCONTINUED | OUTPATIENT
Start: 2025-04-14 | End: 2025-04-22 | Stop reason: HOSPADM

## 2025-04-14 RX ORDER — ONDANSETRON 2 MG/ML
4 INJECTION INTRAMUSCULAR; INTRAVENOUS ONCE
Status: COMPLETED | OUTPATIENT
Start: 2025-04-14 | End: 2025-04-14

## 2025-04-14 RX ORDER — POTASSIUM CHLORIDE 750 MG/1
40 CAPSULE, EXTENDED RELEASE ORAL ONCE
Status: COMPLETED | OUTPATIENT
Start: 2025-04-14 | End: 2025-04-14

## 2025-04-14 RX ORDER — ACETAMINOPHEN 160 MG/5ML
650 SOLUTION ORAL EVERY 4 HOURS PRN
Status: DISCONTINUED | OUTPATIENT
Start: 2025-04-14 | End: 2025-04-22 | Stop reason: HOSPADM

## 2025-04-14 RX ORDER — SODIUM CHLORIDE 9 MG/ML
150 INJECTION, SOLUTION INTRAVENOUS CONTINUOUS
Status: DISCONTINUED | OUTPATIENT
Start: 2025-04-14 | End: 2025-04-15

## 2025-04-14 RX ORDER — ENOXAPARIN SODIUM 100 MG/ML
30 INJECTION SUBCUTANEOUS DAILY
Status: DISCONTINUED | OUTPATIENT
Start: 2025-04-14 | End: 2025-04-22 | Stop reason: HOSPADM

## 2025-04-14 RX ORDER — ACETAMINOPHEN 650 MG/1
650 SUPPOSITORY RECTAL EVERY 4 HOURS PRN
Status: DISCONTINUED | OUTPATIENT
Start: 2025-04-14 | End: 2025-04-22 | Stop reason: HOSPADM

## 2025-04-14 RX ORDER — SODIUM CHLORIDE 0.9 % (FLUSH) 0.9 %
10 SYRINGE (ML) INJECTION EVERY 12 HOURS SCHEDULED
Status: DISCONTINUED | OUTPATIENT
Start: 2025-04-14 | End: 2025-04-22 | Stop reason: HOSPADM

## 2025-04-14 RX ORDER — ACETAMINOPHEN 325 MG/1
650 TABLET ORAL EVERY 4 HOURS PRN
Status: DISCONTINUED | OUTPATIENT
Start: 2025-04-14 | End: 2025-04-22 | Stop reason: HOSPADM

## 2025-04-14 RX ADMIN — ONDANSETRON 4 MG: 2 INJECTION INTRAMUSCULAR; INTRAVENOUS at 11:28

## 2025-04-14 RX ADMIN — POTASSIUM CHLORIDE 40 MEQ: 750 CAPSULE, EXTENDED RELEASE ORAL at 11:26

## 2025-04-14 RX ADMIN — SODIUM CHLORIDE 2000 MG: 9 INJECTION INTRAMUSCULAR; INTRAVENOUS; SUBCUTANEOUS at 17:07

## 2025-04-14 RX ADMIN — SODIUM CHLORIDE 1000 ML: 9 INJECTION, SOLUTION INTRAVENOUS at 16:04

## 2025-04-14 RX ADMIN — SODIUM CHLORIDE 1000 ML: 9 INJECTION, SOLUTION INTRAVENOUS at 11:33

## 2025-04-14 RX ADMIN — Medication 10 ML: at 22:45

## 2025-04-14 RX ADMIN — SODIUM CHLORIDE 150 ML/HR: 9 INJECTION, SOLUTION INTRAVENOUS at 17:58

## 2025-04-14 RX ADMIN — ENOXAPARIN SODIUM 30 MG: 100 INJECTION SUBCUTANEOUS at 17:12

## 2025-04-14 NOTE — ED PROVIDER NOTES
"SHARED VISIT NOTE:    Patient is 60 y.o. year old female that presents to the ED for evaluation of generalized weakness..         ED Course:    /74 (BP Location: Right arm, Patient Position: Lying)   Pulse 101   Temp 98.3 °F (36.8 °C) (Oral)   Resp 22   Ht 160 cm (63\")   Wt 54.3 kg (119 lb 11.4 oz)   SpO2 96%   BMI 21.21 kg/m²   Results for orders placed or performed during the hospital encounter of 04/14/25   ECG 12 Lead Tachycardia    Collection Time: 04/14/25  9:05 AM   Result Value Ref Range    QT Interval 324 ms    QTC Interval 439 ms   POC Glucose Once    Collection Time: 04/14/25  9:10 AM    Specimen: Blood   Result Value Ref Range    Glucose 113 (H) 70 - 99 mg/dL   Comprehensive Metabolic Panel    Collection Time: 04/14/25  9:52 AM    Specimen: Blood   Result Value Ref Range    Glucose 113 (H) 65 - 99 mg/dL    BUN 16 6 - 20 mg/dL    Creatinine 0.54 (L) 0.57 - 1.00 mg/dL    Sodium 140 136 - 145 mmol/L    Potassium 2.8 (L) 3.5 - 5.2 mmol/L    Chloride 97 (L) 98 - 107 mmol/L    CO2 30.5 (H) 22.0 - 29.0 mmol/L    Calcium 12.1 (H) 8.6 - 10.5 mg/dL    Total Protein 7.9 6.0 - 8.5 g/dL    Albumin 3.6 3.5 - 5.2 g/dL    ALT (SGPT) 6 1 - 33 U/L    AST (SGOT) 17 1 - 32 U/L    Alkaline Phosphatase 68 39 - 117 U/L    Total Bilirubin 0.3 0.0 - 1.2 mg/dL    Globulin 4.3 gm/dL    A/G Ratio 0.8 g/dL    BUN/Creatinine Ratio 29.6 (H) 7.0 - 25.0    Anion Gap 12.5 5.0 - 15.0 mmol/L    eGFR 106.2 >60.0 mL/min/1.73   High Sensitivity Troponin T    Collection Time: 04/14/25  9:52 AM    Specimen: Blood   Result Value Ref Range    HS Troponin T 10 <14 ng/L   Magnesium    Collection Time: 04/14/25  9:52 AM    Specimen: Blood   Result Value Ref Range    Magnesium 1.9 1.6 - 2.6 mg/dL   CBC Auto Differential    Collection Time: 04/14/25  9:52 AM    Specimen: Blood   Result Value Ref Range    WBC 11.38 (H) 3.40 - 10.80 10*3/mm3    RBC 3.27 (L) 3.77 - 5.28 10*6/mm3    Hemoglobin 9.0 (L) 12.0 - 15.9 g/dL    Hematocrit 29.3 (L) " 34.0 - 46.6 %    MCV 89.6 79.0 - 97.0 fL    MCH 27.5 26.6 - 33.0 pg    MCHC 30.7 (L) 31.5 - 35.7 g/dL    RDW 15.9 (H) 12.3 - 15.4 %    RDW-SD 52.6 37.0 - 54.0 fl    MPV 10.1 6.0 - 12.0 fL    Platelets 354 140 - 450 10*3/mm3    Neutrophil % 78.3 (H) 42.7 - 76.0 %    Lymphocyte % 11.7 (L) 19.6 - 45.3 %    Monocyte % 9.1 5.0 - 12.0 %    Eosinophil % 0.1 (L) 0.3 - 6.2 %    Basophil % 0.3 0.0 - 1.5 %    Immature Grans % 0.5 0.0 - 0.5 %    Neutrophils, Absolute 8.91 (H) 1.70 - 7.00 10*3/mm3    Lymphocytes, Absolute 1.33 0.70 - 3.10 10*3/mm3    Monocytes, Absolute 1.04 (H) 0.10 - 0.90 10*3/mm3    Eosinophils, Absolute 0.01 0.00 - 0.40 10*3/mm3    Basophils, Absolute 0.03 0.00 - 0.20 10*3/mm3    Immature Grans, Absolute 0.06 (H) 0.00 - 0.05 10*3/mm3    nRBC 0.0 0.0 - 0.2 /100 WBC   Green Top (Gel)    Collection Time: 04/14/25  9:52 AM   Result Value Ref Range    Extra Tube Hold for add-ons.    Lavender Top    Collection Time: 04/14/25  9:52 AM   Result Value Ref Range    Extra Tube hold for add-on    Gold Top - SST    Collection Time: 04/14/25  9:52 AM   Result Value Ref Range    Extra Tube Hold for add-ons.    Light Blue Top    Collection Time: 04/14/25  9:52 AM   Result Value Ref Range    Extra Tube Hold for add-ons.    Gray Top    Collection Time: 04/14/25  9:52 AM   Result Value Ref Range    Extra Tube Hold for add-ons.    High Sensitivity Troponin T 1Hr    Collection Time: 04/14/25 11:27 AM    Specimen: Blood   Result Value Ref Range    HS Troponin T 9 <14 ng/L    Troponin T Numeric Delta -1 Abnormal if >/=3 ng/L     Medications   sodium chloride 0.9 % flush 10 mL (has no administration in time range)   sodium chloride 0.9 % bolus 1,000 mL (has no administration in time range)   sodium chloride 0.9 % bolus 1,000 mL (0 mL Intravenous Stopped 4/14/25 1354)   ondansetron (ZOFRAN) injection 4 mg (4 mg Intravenous Given 4/14/25 1128)   potassium chloride (MICRO-K/KLOR-CON) CR capsule (40 mEq Oral Given 4/14/25 1126)     XR  Chest 1 View  Result Date: 4/14/2025  Narrative: XR CHEST 1 VW Date of Exam: 4/14/2025 9:28 AM EDT Indication: Weak/Dizzy/AMS triage protocol Comparison: 4/9/2025 Findings: Heart size is within normal limits. Right-sided chest port terminates in the SVC. Left-sided transvenous pacemaker is in place with leads in the right atrium and ventricle. Lungs are expanded the chest wall and they appear clear. No acute infiltrates are  identified.     Impression: Impression: No active disease. Electronically Signed: James Albrecht MD  4/14/2025 9:30 AM EDT  Workstation ID: [id number]    CT Head Without Contrast  Result Date: 4/9/2025  Narrative: CT HEAD WO CONTRAST Date of Exam: 4/9/2025 7:46 PM EDT Indication: Headache headache. Comparison: CT head without contrast 1/23/2023 Technique: Axial CT images were obtained of the head without contrast administration.  Reconstructed coronal and sagittal images were also obtained. Automated exposure control and iterative construction methods were used. Findings: No intracranial hemorrhage. No mass effect or midline shift. Ventricles and cortical sulci are normally configured. Gray-white matter differentiation maintained. Posterior fossa without acute abnormality. Globes symmetric. No retro-orbital abnormality. The mastoid air cells are well-aerated with there is no sinus fluid level. Negative for calvarial fracture.     Impression: Impression: No acute intracranial abnormality. Electronically Signed: Adán Dixon MD  4/9/2025 8:14 PM EDT  Workstation ID: [id number]    XR Chest 1 View  Result Date: 4/9/2025  Narrative: XR CHEST 1 VW Date of Exam: 4/9/2025 6:48 PM EDT Indication: Weak/Dizzy/AMS triage protocol Comparison: 1/8/2025 Findings: Lines: None Lungs: Interval placement of right internal jugular port with the distal tip at the level of the cavoatrial junction. Unchanged position of left subclavian dual-lead pacemaker Pleura: No pleural effusion or pneumothorax. Cardiomediastinum: The  cardiomediastinal silhouette is normal Soft Tissues: Unremarkable. Bones: No acute osseous abnormality.     Impression: Impression: No acute cardiopulmonary abnormality. Electronically Signed: Davide Frazier DO  4/9/2025 7:11 PM EDT  Workstation ID: [id number]      MDM:    Procedures              SHARED VISIT ATTESTATION:    This visit was performed by both myself and an APC.  I performed the substantive portion of the medical decision making. The management plan was made or approved by me, and I take responsibility for patient management.           Lew Whittington,   15:14 EDT  04/14/25         Lew Whittington,   04/14/25 1514

## 2025-04-14 NOTE — CASE MANAGEMENT/SOCIAL WORK
Discharge Planning Assessment   Artur     Patient Name: Madina Reddy  MRN: [id number]  Today's Date: 4/14/2025    Admit Date: 4/14/2025        Discharge Needs Assessment       Row Name 04/14/25 1637       Living Environment    People in Home spouse    Current Living Arrangements home    Potentially Unsafe Housing Conditions none    In the past 12 months has the electric, gas, oil, or water company threatened to shut off services in your home? No    Primary Care Provided by spouse/significant other    Provides Primary Care For no one, unable/limited ability to care for self    Family Caregiver if Needed spouse;child(kennedy), adult    Quality of Family Relationships helpful;involved;supportive    Able to Return to Prior Arrangements yes       Resource/Environmental Concerns    Resource/Environmental Concerns none    Transportation Concerns none       Transportation Needs    In the past 12 months, has lack of transportation kept you from medical appointments or from getting medications? no    In the past 12 months, has lack of transportation kept you from meetings, work, or from getting things needed for daily living? No       Food Insecurity    Within the past 12 months, you worried that your food would run out before you got the money to buy more. Never true    Within the past 12 months, the food you bought just didn't last and you didn't have money to get more. Never true       Transition Planning    Patient/Family Anticipates Transition to home with family    Patient/Family Anticipated Services at Transition none    Transportation Anticipated family or friend will provide       Discharge Needs Assessment    Readmission Within the Last 30 Days no previous admission in last 30 days    Equipment Currently Used at Home walker, standard;cane, straight;wheelchair    Concerns to be Addressed denies needs/concerns at this time;other (see comments)  DME coordination    Do you want help finding or keeping work or a job? I  do not need or want help    Do you want help with school or training? For example, starting or completing job training or getting a high school diploma, GED or equivalent No    Anticipated Changes Related to Illness none    Equipment Needed After Discharge hospital bed                   Discharge Plan    No documentation.                      Demographic Summary       Row Name 04/14/25 1629       General Information    Admission Type observation    Arrived From home    Referral Source emergency department    Reason for Consult discharge planning    Preferred Language English       Contact Information    Permission Granted to Share Info With family/designee                   Functional Status       Row Name 04/14/25 1636       Functional Status    Usual Activity Tolerance good    Current Activity Tolerance good       Physical Activity    On average, how many days per week do you engage in moderate to strenuous exercise (like a brisk walk)? 0 days    On average, how many minutes do you engage in exercise at this level? 0 min    Number of minutes of exercise per week 0       Assessment of Health Literacy    How often do you have someone help you read hospital materials? Sometimes    How often do you have problems learning about your medical condition because of difficulty understanding written information? Sometimes    How often do you have a problem understanding what is told to you about your medical condition? Sometimes    How confident are you filling out medical forms by yourself? Somewhat    Health Literacy Moderate       Functional Status, IADL    Medications assistive equipment and person    Meal Preparation assistive equipment and person    Housekeeping assistive equipment and person    Laundry assistive equipment and person    Shopping assistive equipment and person    If for any reason you need help with day-to-day activities such as bathing, preparing meals, shopping, managing finances, etc., do you get the  help you need? I get all the help I need       Mental Status    General Appearance WDL WDL       Mental Status Summary    Recent Changes in Mental Status/Cognitive Functioning memory (recent)    Mental Status Comments AMS currently                   Psychosocial    No documentation.                  Abuse/Neglect       Row Name 04/14/25 1637       Personal Safety    Feels Unsafe at Home or Work/School no    Feels Threatened by Someone no    Does Anyone Try to Keep You From Having Contact with Others or Doing Things Outside Your Home? no    Physical Signs of Abuse Present no                   Legal       Row Name 04/14/25 1637       Financial Resource Strain    How hard is it for you to pay for the very basics like food, housing, medical care, and heating? Not very                   Substance Abuse       Row Name 04/14/25 1637       Substance Use    Substance Use Status never used                   Patient Forms    No documentation.                 SW met with pt and pts spouse at bedside this date. SW notes that pt appeared altered at times during assessment. Pts  was historian. Pts spouse reports that pt is having surgery within the next week and due to pts current condition they could benefit from a hospital bed. Pt reports that she requires assistance with her ADLs currently and does not drive. Pts PCP is LANDY Borrego and her pharmacy of choice is [facility].     GARFIELD Alfaro

## 2025-04-14 NOTE — H&P
[facility]   HOSPITALIST HISTORY AND PHYSICAL  Date: 2025   Patient Name: Madina Reddy  : 1965  MRN: [id number]  Primary Care Physician:  Kady Vernon APRN  Date of admission: 2025    Subjective   Subjective     Chief Complaint: Weakness, abnormal calcium at previous visit.    HPI:    Madina Reddy is a 60 y.o. female ovarian cancer with carcinomatosis and hepatic lesion, hypertension who presents with confusion weakness    Patient was recently seen in the ER on  due to weakness was diagnosed with UTI and sent home.  At that time her calcium was 12.6.  Was attempted to send her home see if she could drink enough fluids however she was unable to continue to get weaker and weaker.  Her biggest plaint is limited confusion and increased urination.  Patient was also started on antibiotics for UTI although culture was not sent so unclear what blood we are treating.  Due to the fact the patient did not really show any improvement the last 5 days her partner brought her to the hospital.    In the emergency department patient's vital signs are as follows: Temperature 98.3, pulse 101, respiratory is 22, blood pressure 101/74, 96% on room air.  CBC shows a white blood cell count of 11.3 and hemoglobin of 9.0.  CMP shows a potassium of 2.8 with a chloride of 97 and a bicarb of 30.5.  Glucose is 113 and calcium is 12.1.  It looks like her calcium was also 12.65 days ago.  So she has most likely been maintaining a slightly elevated calcium for several days.  Patient received 1 L normal saline and 40 mEq of potassium.  Patient be admitted to hospital for observation due to elevated calcium.    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    Past Surgical History:  Breast biopsy  EP study  Infant heart heart surgery  Pacemaker placement    Family  History:   Asthma  Cancer   heart disease    Social History:   Never smoked.  No alcohol.    Home Medications:  OLANZapine, amLODIPine, aspirin, cephalexin, cetirizine, fluticasone, lidocaine-prilocaine, metoprolol succinate XL, montelukast, and ondansetron ODT    Allergies:  No Known Allergies        Objective   Objective     Vitals:   Temp:  [98.3 °F (36.8 °C)-98.7 °F (37.1 °C)] 98.3 °F (36.8 °C)  Heart Rate:  [101-111] 101  Resp:  [15-22] 22  BP: (101-118)/(70-74) 101/74    Physical Exam    Constitutional: Chronically ill-appearing.  Unwell.  Nontoxic   Eyes: Pupils equal, sclerae anicteric, no conjunctival injection   HENT: NCAT, mucous membranes moist   Neck: Supple, no thyromegaly, no lymphadenopathy, trachea midline   Respiratory: Clear to auscultation bilaterally, nonlabored respirations    Cardiovascular: Tachycardia, no murmurs, rubs, or gallops, palpable pedal pulses bilaterally   Gastrointestinal: Positive bowel sounds, soft, nontender, nondistended   Musculoskeletal: No bilateral ankle edema, no clubbing or cyanosis to extremities   Psychiatric: Appropriate affect, cooperative   Neurologic: Oriented x 3, strength symmetric in all extremities, Cranial Nerves grossly intact to confrontation, speech clear   Skin: No rashes     Result Review    Result Review:  I have personally reviewed the results from the time of this admission to 4/14/2025 15:09 EDT and agree with these findings:  Calcium 12.1-12.6 over the last 5 days  Potassium of 2.8  Hemoglobin 9.0        Assessment & Plan   Assessment / Plan     Assessment/Plan:   Acute metabolic encephalopathy  Weakness  Hypercalcemia with recent calcium between 12.1 and 12.6 over the last 5 to 6 days  Hypokalemia  Urinary tract infection (treated in ER from 4/9 but no culture was obtained)  Hypertension  Chronic anemia due to ovarian cancer and chemotherapy    Plan:  --With the hospitalist service  -- Discussed the patient's admission to the hospital for  hypercalcemia  -- Asked the ER to give 1 more liter of normal saline  - Will start normal saline at 150 an hour-  --patient's calcium is 12.1 today but it was 12.62 days ago and she has been unable to get into a healthy range.  She describes extreme confusion, fatigue, weakness, and excessive urination  -- Would consider bisphosphonate before discharge as patient plans to have surgery next week for debulking  -- There is no culture data sent from recent presentation will continue ceftriaxone this is difficult to tell whether urinary tract infection or calcium is causing.  Most likely multifactorial  -- Blood cultures  -- Urine and urine culture unable to obtain although these may be sterile   -- Follow-up potassium level  -- Follow calcium level      VTE Prophylaxis:  Lovenox        CODE STATUS:     Full code    Admission Status:  I believe this patient meets observation status.    Electronically signed by Yadiel hCew MD, 04/14/25, 3:09 PM EDT.

## 2025-04-14 NOTE — PLAN OF CARE
Goal Outcome Evaluation:  Plan of Care Reviewed With: patient, spouse        Progress: no change  Outcome Evaluation: Patient arrived from ER, admission complete.

## 2025-04-14 NOTE — PAYOR COMM NOTE
"Madina Reddy (60 y.o. Female)     PATIENT INFORMATION  Name:  Madina Reddy  MRN#:     [id number]  :  1965     ADMISSION INFORMATION  CLASS: Observation   DOS:  2025    CURRENT ATTENDING PROVIDER INFORMATION  Name/NPI: Yadiel Chew MD (NPI: [id number])   Phone:  Phone: [phone]  Fax:  [phone]    REQUESTING PROVIDER and RENDERING FACILITY  Name:  [facility]   NPI:  [id number]  TID:  [id number]  Address:      [facility] [address] [place] [address]  Phone:               [phone]  Fax:  [phone]    UTILIZATION REVIEW CONTACT INFORMATION  Phone:      [phone]  Fax:           [phone]      ADMISSION DIAGNOSIS  Hypercalcemia E83.52  Hypokalemia   E87.6  Weakness  R53.1    ++++++++++++++++++++++++++++++++++++++++++++++++++++++++++++++++++++++++++++++++          Date of Birth   1965    Social Security Number       Address   [address] [place] [id number]    Home Phone   [phone]    MRN   [id number]       Jehovah's witness   Alevism    Marital Status                               Admission Date   2025    Admission Type   Emergency    Admitting Provider       Attending Provider   Lew Whittington DO    Department, Room/Bed   [facility] EMERGENCY ROOM, TELLY/TELLY       Discharge Date       Discharge Disposition       Discharge Destination                                 Attending Provider: Lew Whittington DO    Allergies: No Known Allergies    Isolation: None   Infection: None   Code Status: CPR    Ht: 160 cm (63\")   Wt: 54.3 kg (119 lb 11.4 oz)    Admission Cmt: None   Principal Problem: Hypercalcemia [E83.52]                   Active Insurance as of 2025       Primary Coverage       Payor Plan Insurance Group Employer/Plan Group    [id number]       Payor Plan Address Payor Plan Phone Number Payor Plan Fax Number Effective Dates    [address] [phone]  2023 - None Entered    " [facility] [id number]         Subscriber Name Subscriber Birth Date Member ID       CHRISTINA LINCOLN 1965 [id number]                      General Criteria: Observation Care Observation Care Admission Criteria       Indications Met   Last updated by Paulina Howard RN on 4/14/2025 1720     Review Status Created By   Primary Completed Paulina Howard RN      Criteria Review   General Criteria: Observation Care Observation Care Admission Criteria     Overall Determination: Indications Met     Criteria:  [×] Observation care [A] [B] [C] [D] is indicated for  ALL  of the following  (1) (2) (3) (4) (5) (6) (7) (8):      [×] Clinical care needed is not appropriate for lower level of care (ie, discharge to outpatient setting not appropriate).          4/14/2025  5:20 PM              -- 4/14/2025  5:20 PM by Paulina Howard RN --                  Failed outpt PO fluids. Admit for IVFs.      [×] Clinical care (eg, testing, monitoring, or treatment) needed beyond usual emergency department time frame (eg, 3 to 4 hours)      [×] Patient has clinical condition for which observation care is needed, as indicated by  1 or more  of the following :          [  ] Cardiac condition or finding (eg, Hypotension,Tachycardia, Orthostatic hypotension, Bradycardia, cardiac conduction abnormality, cardiac valve disorder, pericardial effusion, need for telemetry monitoring, evaluation or adjustment of cardiac device) (16) (17) (18) (19) (20)              4/14/2025  5:20 PM                  -- 4/14/2025  5:20 PM by Paulina Howard RN --                      Calcium 12.1 (was 12.6 on 4/9). Still with fatigue & weakness at home. Admit for IVFs.          [×] Electrolyte or metabolic condition or finding (eg, hypernatremia, hyponatremia, hyperkalemia, hypokalemia, hypercalcemia, metabolic acidosis, malnutrition, Anasarca) (21) (22) (23)     Notes:  -- 4/14/2025  5:20 PM by Paulina Howard RN --      Subject: Admission      To ED c/o weakness, fatigue &  intermittent confusion. Is AAOx3 in ED.       Pt has Ovarian cancer. Was seen in ED last week & dx with UTI & elevated Calcium (12.6). Sent home w/PO antibiotics & instructed to push PO fluids. However, continuing to get weaker.       In ED, initially tachy (-111) but dropped to 90s after IV bolus.  Other VSS. AAOX3. strength symmetric in all extremities.                  PMHx: Ovarian cancer with peritoneal carcinomatosis and hepatic lesion. Uterine cancer. Pacemaker.                   ED results:                   EKG: Sinus tach. Rate 110      Calcium 12.1 (was 12.6 on 25).       K+ 2.8 (3.0 on )       wBC 11.38, H/H 9.0/29 (at baseline)      cXR: NAD      BC x2                  UA on : + nitrite, Trace leukocytes, 4+ bacteria.       UA was not repeated today in ED.                   In ED:       KCL 40 PO      IV NS 2000ml bolus      Zofran IV                  Admit for OBS:       Will start normal saline at 150 an hour-      --patient's calcium is 12.1 today but it was 12.62 days ago and she has been unable to get into a healthy range. She describes extreme confusion, fatigue, weakness, and excessive urination      -- Would consider bisphosphonate before discharge as patient plans to have surgery next week for debulking      -- There is no culture data sent from recent presentation will continue ceftriaxone this is difficult to tell whether urinary tract infection or calcium is causing. Most likely multifactorial      -- Blood cultures      -- Urine and urine culture unable to obtain although these may be sterile       -- Follow-up potassium level      -- Follow calcium level                      History & Physical        Yadiel Chew MD at 25 [phone]             [facility]  [facility]   Patient Name: Madina Reddy  : 1965  MRN: [id number]  Primary Care Physician:  Kady Vernon APRN  Date of admission: 2025    Subjective  Subjective      Chief Complaint: Weakness, abnormal calcium at previous visit.    HPI:    Madina Reddy is a 60 y.o. female ovarian cancer with carcinomatosis and hepatic lesion, hypertension who presents with confusion weakness    Patient was recently seen in the ER on 4/9 due to weakness was diagnosed with UTI and sent home.  At that time her calcium was 12.6.  Was attempted to send her home see if she could drink enough fluids however she was unable to continue to get weaker and weaker.  Her biggest plaint is limited confusion and increased urination.  Patient was also started on antibiotics for UTI although culture was not sent so unclear what blood we are treating.  Due to the fact the patient did not really show any improvement the last 5 days her partner brought her to the hospital.    In the emergency department patient's vital signs are as follows: Temperature 98.3, pulse 101, respiratory is 22, blood pressure 101/74, 96% on room air.  CBC shows a white blood cell count of 11.3 and hemoglobin of 9.0.  CMP shows a potassium of 2.8 with a chloride of 97 and a bicarb of 30.5.  Glucose is 113 and calcium is 12.1.  It looks like her calcium was also 12.65 days ago.  So she has most likely been maintaining a slightly elevated calcium for several days.  Patient received 1 L normal saline and 40 mEq of potassium.  Patient be admitted to hospital for observation due to elevated calcium.    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    Past Surgical History:  Breast biopsy  EP study  Infant heart heart surgery  Pacemaker placement    Family History:   Asthma  Cancer   heart disease    Social History:   Never smoked.  No alcohol.    Home Medications:  OLANZapine, amLODIPine, aspirin, cephalexin, cetirizine, fluticasone, lidocaine-prilocaine, metoprolol succinate XL,  montelukast, and ondansetron ODT    Allergies:  No Known Allergies        Objective  Objective     Vitals:   Temp:  [98.3 °F (36.8 °C)-98.7 °F (37.1 °C)] 98.3 °F (36.8 °C)  Heart Rate:  [101-111] 101  Resp:  [15-22] 22  BP: (101-118)/(70-74) 101/74    Physical Exam    Constitutional: Chronically ill-appearing.  Unwell.  Nontoxic   Eyes: Pupils equal, sclerae anicteric, no conjunctival injection   HENT: NCAT, mucous membranes moist   Neck: Supple, no thyromegaly, no lymphadenopathy, trachea midline   Respiratory: Clear to auscultation bilaterally, nonlabored respirations    Cardiovascular: Tachycardia, no murmurs, rubs, or gallops, palpable pedal pulses bilaterally   Gastrointestinal: Positive bowel sounds, soft, nontender, nondistended   Musculoskeletal: No bilateral ankle edema, no clubbing or cyanosis to extremities   Psychiatric: Appropriate affect, cooperative   Neurologic: Oriented x 3, strength symmetric in all extremities, Cranial Nerves grossly intact to confrontation, speech clear   Skin: No rashes     Result Review   Result Review:  I have personally reviewed the results from the time of this admission to 4/14/2025 15:09 EDT and agree with these findings:  Calcium 12.1-12.6 over the last 5 days  Potassium of 2.8  Hemoglobin 9.0        Assessment & Plan  Assessment / Plan     Assessment/Plan:   Acute metabolic encephalopathy  Weakness  Hypercalcemia with recent calcium between 12.1 and 12.6 over the last 5 to 6 days  Hypokalemia  Urinary tract infection (treated in ER from 4/9 but no culture was obtained)  Hypertension  Chronic anemia due to ovarian cancer and chemotherapy    Plan:  --With the hospitalist service  -- Discussed the patient's admission to the hospital for hypercalcemia  -- Asked the ER to give 1 more liter of normal saline  - Will start normal saline at 150 an hour-  --patient's calcium is 12.1 today but it was 12.62 days ago and she has been unable to get into a healthy range.  She describes  extreme confusion, fatigue, weakness, and excessive urination  -- Would consider bisphosphonate before discharge as patient plans to have surgery next week for debulking  -- There is no culture data sent from recent presentation will continue ceftriaxone this is difficult to tell whether urinary tract infection or calcium is causing.  Most likely multifactorial  -- Blood cultures  -- Urine and urine culture unable to obtain although these may be sterile   -- Follow-up potassium level  -- Follow calcium level      VTE Prophylaxis:  Lovenox        CODE STATUS:     Full code    Admission Status:  I believe this patient meets observation status.    Electronically signed by Yadiel Chew MD, 04/14/25, 3:09 PM EDT.             Electronically signed by Yadiel Chew MD at 04/14/25 1624       Vital Signs (last day)       Date/Time Temp Temp src Pulse Resp BP Patient Position SpO2    04/14/25 1700 -- -- 98 -- 112/98 -- 96    04/14/25 1645 -- -- 99 -- -- -- 98    04/14/25 1630 -- -- 96 -- -- -- 100    04/14/25 1600 99 (37.2) Oral 100 -- 114/88 -- 96    04/14/25 1525 -- -- 107 -- -- -- 94    04/14/25 1517 99.2 (37.3) Oral 103 19 117/75 Lying 94    04/14/25 1500 -- -- 107 -- 117/75 -- 97    04/14/25 1430 -- -- 105 -- -- -- 94    04/14/25 1400 -- -- 105 -- 112/69 -- 97    04/14/25 1300 98.3 (36.8) Oral 101 22 101/74 Lying 96    04/14/25 1230 -- -- 103 -- -- -- 99    04/14/25 1155 -- -- 109 -- -- -- 94    04/14/25 1130 -- -- 110 -- -- -- 96    04/14/25 1100 -- -- 107 15 118/70 Lying 96    04/14/25 0858 98.7 (37.1) Oral 111 20 114/72 Sitting 96          Facility-Administered Medications as of 4/14/2025   Medication Dose Route Frequency Provider Last Rate Last Admin    acetaminophen (TYLENOL) tablet 650 mg  650 mg Oral Q4H PRN Yadiel Chew MD        Or    acetaminophen (TYLENOL) 160 MG/5ML oral solution 650 mg  650 mg Oral Q4H PRN Yadiel Chew MD        Or    acetaminophen (TYLENOL) suppository 650 mg  650 mg Rectal Q4H PRN Yadiel Chew MD         cefTRIAXone (ROCEPHIN) in NS 2 GRAMS/20ml IV PUSH syringe  2,000 mg Intravenous Q24H Yadiel Chew MD   2,000 mg at 04/14/25 1707    enoxaparin sodium (LOVENOX) syringe 30 mg  30 mg Subcutaneous Daily Yadiel Chew MD   30 mg at 04/14/25 1712    [COMPLETED] ondansetron (ZOFRAN) injection 4 mg  4 mg Intravenous Once Sindy Castillo APRN   4 mg at 04/14/25 1128    [COMPLETED] potassium chloride (MICRO-K/KLOR-CON) CR capsule  40 mEq Oral Once Sindy Castillo APRN   40 mEq at 04/14/25 1126    [COMPLETED] sodium chloride 0.9 % bolus 1,000 mL  1,000 mL Intravenous Once Sindy Castillo APRN   Stopped at 04/14/25 1354    [COMPLETED] sodium chloride 0.9 % bolus 1,000 mL  1,000 mL Intravenous Once Yadiel Chew MD 1,000 mL/hr at 04/14/25 1604 1,000 mL at 04/14/25 1604    sodium chloride 0.9 % flush 10 mL  10 mL Intravenous PRN Lew Whittington DO        sodium chloride 0.9 % flush 10 mL  10 mL Intravenous Q12H Yadiel Chew MD        sodium chloride 0.9 % flush 10 mL  10 mL Intravenous PRN Yadiel Chew MD        sodium chloride 0.9 % infusion 40 mL  40 mL Intravenous PRN Yadiel Chew MD        sodium chloride 0.9 % infusion  150 mL/hr Intravenous Continuous Yadiel Chew MD         Lab Results (last 24 hours)       Procedure Component Value Units Date/Time    Blood Culture - Blood, Hand, Left [[id number]] Collected: 04/14/25 1658    Specimen: Blood from Hand, Left Updated: 04/14/25 1702    Blood Culture - Blood, Arm, Right [[id number]] Collected: 04/14/25 1658    Specimen: Blood from Arm, Right Updated: 04/14/25 1702    High Sensitivity Troponin T 1Hr [[id number]]  (Normal) Collected: 04/14/25 1127    Specimen: Blood Updated: 04/14/25 1204     HS Troponin T 9 ng/L      Troponin T Numeric Delta -1 ng/L     Narrative:      High Sensitive Troponin T Reference Range:  <14.0 ng/L- Negative Female for AMI  <22.0 ng/L- Negative Male for AMI  >=14 - Abnormal Female indicating possible myocardial injury.  >=22 - Abnormal Male  indicating possible myocardial injury.   Clinicians would have to utilize clinical acumen, EKG, Troponin, and serial changes to determine if it is an Acute Myocardial Infarction or myocardial injury due to an underlying chronic condition.         Comprehensive Metabolic Panel [[id number]]  (Abnormal) Collected: 04/14/25 0952    Specimen: Blood Updated: 04/14/25 1022     Glucose 113 mg/dL      BUN 16 mg/dL      Creatinine 0.54 mg/dL      Sodium 140 mmol/L      Potassium 2.8 mmol/L      Chloride 97 mmol/L      CO2 30.5 mmol/L      Calcium 12.1 mg/dL      Total Protein 7.9 g/dL      Albumin 3.6 g/dL      ALT (SGPT) 6 U/L      AST (SGOT) 17 U/L      Alkaline Phosphatase 68 U/L      Total Bilirubin 0.3 mg/dL      Globulin 4.3 gm/dL      A/G Ratio 0.8 g/dL      BUN/Creatinine Ratio 29.6     Anion Gap 12.5 mmol/L      eGFR 106.2 mL/min/1.73     Narrative:      GFR Categories in Chronic Kidney Disease (CKD)      GFR Category          GFR (mL/min/1.73)    Interpretation  G1                     90 or greater         Normal or high (1)  G2                      60-89                Mild decrease (1)  G3a                   45-59                Mild to moderate decrease  G3b                   30-44                Moderate to severe decrease  G4                    15-29                Severe decrease  G5                    14 or less           Kidney failure          (1)In the absence of evidence of kidney disease, neither GFR category G1 or G2 fulfill the criteria for CKD.    eGFR calculation 2021 CKD-EPI creatinine equation, which does not include race as a factor    Magnesium [[id number]]  (Normal) Collected: 04/14/25 0952    Specimen: Blood Updated: 04/14/25 1022     Magnesium 1.9 mg/dL     High Sensitivity Troponin T [[id number]]  (Normal) Collected: 04/14/25 0952    Specimen: Blood Updated: 04/14/25 1022     HS Troponin T 10 ng/L     Narrative:      High Sensitive Troponin T Reference Range:  <14.0 ng/L- Negative Female for  AMI  <22.0 ng/L- Negative Male for AMI  >=14 - Abnormal Female indicating possible myocardial injury.  >=22 - Abnormal Male indicating possible myocardial injury.   Clinicians would have to utilize clinical acumen, EKG, Troponin, and serial changes to determine if it is an Acute Myocardial Infarction or myocardial injury due to an underlying chronic condition.         [place] Draw [[id number]] Collected: 04/14/25 0952    Specimen: Blood Updated: 04/14/25 1000    Narrative:      The following orders were created for panel order [place] Draw.  Procedure                               Abnormality         Status                     ---------                               -----------         ------                     Green Top (Gel)[[id number]]                                  Final result               Lavender Top[[id number]]                                     Final result               Gold Top - SST[[id number]]                                   Final result               Light Blue Top[[id number]]                                   Final result                 Please view results for these tests on the individual orders.    Green Top (Gel) [[id number]] Collected: 04/14/25 0952    Specimen: Blood Updated: 04/14/25 1000     Extra Tube Hold for add-ons.     Comment: Auto resulted.       Lavender Top [[id number]] Collected: 04/14/25 0952    Specimen: Blood Updated: 04/14/25 1000     Extra Tube hold for add-on     Comment: Auto resulted       Gold Top - SST [[id number]] Collected: 04/14/25 0952    Specimen: Blood Updated: 04/14/25 1000     Extra Tube Hold for add-ons.     Comment: Auto resulted.       Light Blue Top [[id number]] Collected: 04/14/25 0952    Specimen: Blood Updated: 04/14/25 1000     Extra Tube Hold for add-ons.     Comment: Auto resulted       Extra Tubes [[id number]] Collected: 04/14/25 0952    Specimen: Blood, Venous Line Updated: 04/14/25 1000    Narrative:      The following orders were created for panel order  Extra Tubes.  Procedure                               Abnormality         Status                     ---------                               -----------         ------                     Lambert Top[[id number]]                                         Final result                 Please view results for these tests on the individual orders.    Gray Top [[id number]] Collected: 04/14/25 0952    Specimen: Blood Updated: 04/14/25 1000     Extra Tube Hold for add-ons.     Comment: Auto resulted.       CBC & Differential [[id number]]  (Abnormal) Collected: 04/14/25 0952    Specimen: Blood Updated: 04/14/25 0959    Narrative:      The following orders were created for panel order CBC & Differential.  Procedure                               Abnormality         Status                     ---------                               -----------         ------                     CBC Auto Differential[[id number]]        Abnormal            Final result                 Please view results for these tests on the individual orders.    CBC Auto Differential [[id number]]  (Abnormal) Collected: 04/14/25 0952    Specimen: Blood Updated: 04/14/25 0959     WBC 11.38 10*3/mm3      RBC 3.27 10*6/mm3      Hemoglobin 9.0 g/dL      Hematocrit 29.3 %      MCV 89.6 fL      MCH 27.5 pg      MCHC 30.7 g/dL      RDW 15.9 %      RDW-SD 52.6 fl      MPV 10.1 fL      Platelets 354 10*3/mm3      Neutrophil % 78.3 %      Lymphocyte % 11.7 %      Monocyte % 9.1 %      Eosinophil % 0.1 %      Basophil % 0.3 %      Immature Grans % 0.5 %      Neutrophils, Absolute 8.91 10*3/mm3      Lymphocytes, Absolute 1.33 10*3/mm3      Monocytes, Absolute 1.04 10*3/mm3      Eosinophils, Absolute 0.01 10*3/mm3      Basophils, Absolute 0.03 10*3/mm3      Immature Grans, Absolute 0.06 10*3/mm3      nRBC 0.0 /100 WBC     POC Glucose Once [[id number]]  (Abnormal) Collected: 04/14/25 0910    Specimen: Blood Updated: 04/14/25 0911     Glucose 113 mg/dL      Comment: Serial Number:  [id number]:  [id number]             Imaging Results (Last 24 Hours)       Procedure Component Value Units Date/Time    XR Chest 1 View [[id number]] Collected: 04/14/25 0929     Updated: 04/14/25 0932    Narrative:      XR CHEST 1 VW    Date of Exam: 4/14/2025 9:28 AM EDT    Indication: Weak/Dizzy/AMS triage protocol    Comparison: 4/9/2025    Findings:  Heart size is within normal limits. Right-sided chest port terminates in the SVC. Left-sided transvenous pacemaker is in place with leads in the right atrium and ventricle. Lungs are expanded the chest wall and they appear clear. No acute infiltrates are   identified.      Impression:      Impression:  No active disease.        Electronically Signed: James Albrecht MD    4/14/2025 9:30 AM EDT    Workstation ID: [id number]          Orders (last 24 hrs)        Start     Ordered    04/15/25 0600  CBC Auto Differential  Morning Draw         04/14/25 1608    04/15/25 0600  Comprehensive Metabolic Panel  Morning Draw         04/14/25 1608    04/15/25 0600  Magnesium  Morning Draw         04/14/25 1608    04/14/25 2100  sodium chloride 0.9 % flush 10 mL  Every 12 Hours Scheduled         04/14/25 1608    04/14/25 2000  Vital Signs  Every 4 Hours       04/14/25 1608    04/14/25 1800  Oral Care  2 Times Daily       04/14/25 1608    04/14/25 1700  Strict Intake & Output  Every Hour       04/14/25 1608    04/14/25 1630  enoxaparin sodium (LOVENOX) syringe 30 mg  Daily         04/14/25 1608    04/14/25 1630  sodium chloride 0.9 % infusion  Continuous         04/14/25 1609    04/14/25 1630  cefTRIAXone (ROCEPHIN) in NS 2 GRAMS/20ml IV PUSH syringe  Every 24 Hours         04/14/25 1610    04/14/25 1610  Blood Culture - Blood, Hand, Left  Once        Placed in \"And\" Linked Group    04/14/25 1610    04/14/25 1610  Blood Culture - Blood, Arm, Right  Once        Comments: From a different site than #1.     Placed in \"And\" Linked Group    04/14/25 1610    04/14/25 1609  Daily Weights  " "Daily       04/14/25 1608    04/14/25 1609  Continuous Cardiac Monitoring  Continuous        Comments: Follow Standing Orders As Outlined in Process Instructions (Open Order Report to View Full Instructions)    04/14/25 1608    04/14/25 1609  Maintain IV Access  Continuous         04/14/25 1608    04/14/25 1609  Telemetry - Place Orders & Notify Provider of Results When Patient Experiences Acute Chest Pain, Dysrhythmia or Respiratory Distress  Continuous        Comments: Open Order Report to View Parameters Requiring Provider Notification    04/14/25 1608    04/14/25 1609  Diet: Cardiac; Healthy Heart (2-3 Na+); Fluid Consistency: Thin (IDDSI 0)  Diet Effective Now         04/14/25 1608    04/14/25 1609  Bowel Regimen Not Indicated  Once         04/14/25 1608    04/14/25 1608  acetaminophen (TYLENOL) tablet 650 mg  Every 4 Hours PRN        Placed in \"Or\" Linked Group    04/14/25 1608    04/14/25 1608  acetaminophen (TYLENOL) 160 MG/5ML oral solution 650 mg  Every 4 Hours PRN        Placed in \"Or\" Linked Group    04/14/25 1608    04/14/25 1608  acetaminophen (TYLENOL) suppository 650 mg  Every 4 Hours PRN        Placed in \"Or\" Linked Group    04/14/25 1608    04/14/25 1608  Initiate Observation Status  Once         04/14/25 1608    04/14/25 1608  Code Status and Medical Interventions: CPR (Attempt to Resuscitate); Full Support  Continuous         04/14/25 1608    04/14/25 1607  Intake & Output  Every Shift       04/14/25 1608    04/14/25 1607  Weigh Patient  Once         04/14/25 1608    04/14/25 1607  Insert Peripheral IV  Once         04/14/25 1608    04/14/25 1607  Saline Lock & Maintain IV Access  Continuous,   Status:  Canceled         04/14/25 1608    04/14/25 1606  sodium chloride 0.9 % flush 10 mL  As Needed         04/14/25 1608    04/14/25 1606  sodium chloride 0.9 % infusion 40 mL  As Needed         04/14/25 1608    04/14/25 1530  sodium chloride 0.9 % bolus 1,000 mL  Once         04/14/25 1508    04/14/25 " 1433  Inpatient Hospitalist Consult  Once        Comments: Intractable vomiting/dehydration/weakness/hypercalcemia/hypokalemia   Specialty:  Hospitalist  Provider:  Yadiel Chew MD    04/14/25 1433    04/14/25 1308  IP Consult to Hematology and Oncology  Once        Specialty:  Hematology and Oncology  Provider:  Waylon Arndt MD    04/14/25 1307    04/14/25 1115  sodium chloride 0.9 % bolus 1,000 mL  Once         04/14/25 1049    04/14/25 1115  ondansetron (ZOFRAN) injection 4 mg  Once         04/14/25 1049    04/14/25 1115  potassium chloride (MICRO-K/KLOR-CON) CR capsule  Once         04/14/25 1049    04/14/25 1052  High Sensitivity Troponin T 1Hr  PROCEDURE ONCE         04/14/25 1022    04/14/25 0957  Extra Tubes  Once         04/14/25 0956    04/14/25 0957  Lambert Top  PROCEDURE ONCE         04/14/25 0956    04/14/25 0859  NPO Diet NPO Type: Strict NPO  Diet Effective Now,   Status:  Canceled         04/14/25 0858    04/14/25 0859  Undress & Gown  Once         04/14/25 0858    04/14/25 0859  Cardiac Monitoring  Continuous,   Status:  Canceled        Comments: Follow Standing Orders As Outlined in Process Instructions (Open Order Report to View Full Instructions)    04/14/25 0858    04/14/25 0859  Continuous Pulse Oximetry  Continuous         04/14/25 0858    04/14/25 0859  Vital Signs  Per Hospital Policy/Protocol         04/14/25 0858    04/14/25 0859  Orthostatic Blood Pressure  Once         04/14/25 0858    04/14/25 0859  Fall Precautions  Continuous         04/14/25 0858    04/14/25 0859  XR Chest 1 View  1 Time Imaging         04/14/25 0858    04/14/25 0859  ECG 12 Lead Tachycardia  Once         04/14/25 0858    04/14/25 0859  POC Glucose Once  Once        Comments: Complete no more than 45 minutes prior to patient eating      04/14/25 0858    04/14/25 0859  Insert Peripheral IV  Once         04/14/25 0858    04/14/25 0859  Gilman Draw  Once         04/14/25 0858    04/14/25 0859  CBC & Differential   Once         04/14/25 0858    04/14/25 0859  Comprehensive Metabolic Panel  Once         04/14/25 0858    04/14/25 0859  High Sensitivity Troponin T  Once         04/14/25 0858    04/14/25 0859  Magnesium  Once         04/14/25 0858    04/14/25 0859  Urinalysis With Microscopic If Indicated (No Culture) - Urine, Clean Catch  Once         04/14/25 0858    04/14/25 0859  Green Top (Gel)  PROCEDURE ONCE         04/14/25 0858    04/14/25 0859  Lavender Top  PROCEDURE ONCE         04/14/25 0858    04/14/25 0859  Gold Top - SST  PROCEDURE ONCE         04/14/25 0858    04/14/25 0859  Light Blue Top  PROCEDURE ONCE         04/14/25 0858    04/14/25 0859  CBC Auto Differential  PROCEDURE ONCE         04/14/25 0858    04/14/25 0858  sodium chloride 0.9 % flush 10 mL  As Needed         04/14/25 0858    Unscheduled  Oxygen Therapy- Nasal Cannula; Titrate 1-6 LPM Per SpO2; 90 - 95%  Continuous PRN       04/14/25 0858                   No indicators present

## 2025-04-15 PROBLEM — E44.0 MODERATE PROTEIN-CALORIE MALNUTRITION: Status: ACTIVE | Noted: 2025-04-15

## 2025-04-15 LAB
25(OH)D3 SERPL-MCNC: 28.7 NG/ML (ref 30–100)
ALBUMIN SERPL-MCNC: 3.2 G/DL (ref 3.5–5.2)
ALBUMIN/GLOB SERPL: 0.8 G/DL
ALP SERPL-CCNC: 59 U/L (ref 39–117)
ALT SERPL W P-5'-P-CCNC: 5 U/L (ref 1–33)
ANION GAP SERPL CALCULATED.3IONS-SCNC: 10.3 MMOL/L (ref 5–15)
AST SERPL-CCNC: 17 U/L (ref 1–32)
BASOPHILS # BLD AUTO: 0.03 10*3/MM3 (ref 0–0.2)
BASOPHILS NFR BLD AUTO: 0.3 % (ref 0–1.5)
BILIRUB SERPL-MCNC: 0.2 MG/DL (ref 0–1.2)
BUN SERPL-MCNC: 13 MG/DL (ref 6–20)
BUN/CREAT SERPL: 31.7 (ref 7–25)
CALCIUM SPEC-SCNC: 10 MG/DL (ref 8.6–10.5)
CALCIUM SPEC-SCNC: 11 MG/DL (ref 8.6–10.5)
CHLORIDE SERPL-SCNC: 107 MMOL/L (ref 98–107)
CO2 SERPL-SCNC: 25.7 MMOL/L (ref 22–29)
CREAT SERPL-MCNC: 0.41 MG/DL (ref 0.57–1)
DEPRECATED RDW RBC AUTO: 54.4 FL (ref 37–54)
EGFRCR SERPLBLD CKD-EPI 2021: 113.5 ML/MIN/1.73
EOSINOPHIL # BLD AUTO: 0.01 10*3/MM3 (ref 0–0.4)
EOSINOPHIL NFR BLD AUTO: 0.1 % (ref 0.3–6.2)
ERYTHROCYTE [DISTWIDTH] IN BLOOD BY AUTOMATED COUNT: 16 % (ref 12.3–15.4)
FERRITIN SERPL-MCNC: 1713 NG/ML (ref 13–150)
FOLATE SERPL-MCNC: 8.01 NG/ML (ref 4.78–24.2)
GLOBULIN UR ELPH-MCNC: 3.8 GM/DL
GLUCOSE SERPL-MCNC: 102 MG/DL (ref 65–99)
HCT VFR BLD AUTO: 25.7 % (ref 34–46.6)
HGB BLD-MCNC: 7.8 G/DL (ref 12–15.9)
IMM GRANULOCYTES # BLD AUTO: 0.09 10*3/MM3 (ref 0–0.05)
IMM GRANULOCYTES NFR BLD AUTO: 0.9 % (ref 0–0.5)
IRON 24H UR-MRATE: 15 MCG/DL (ref 37–145)
IRON SATN MFR SERPL: 8 % (ref 20–50)
LYMPHOCYTES # BLD AUTO: 1.16 10*3/MM3 (ref 0.7–3.1)
LYMPHOCYTES NFR BLD AUTO: 12.1 % (ref 19.6–45.3)
MAGNESIUM SERPL-MCNC: 1.6 MG/DL (ref 1.6–2.6)
MCH RBC QN AUTO: 28 PG (ref 26.6–33)
MCHC RBC AUTO-ENTMCNC: 30.4 G/DL (ref 31.5–35.7)
MCV RBC AUTO: 92.1 FL (ref 79–97)
MONOCYTES # BLD AUTO: 0.91 10*3/MM3 (ref 0.1–0.9)
MONOCYTES NFR BLD AUTO: 9.5 % (ref 5–12)
NEUTROPHILS NFR BLD AUTO: 7.39 10*3/MM3 (ref 1.7–7)
NEUTROPHILS NFR BLD AUTO: 77.1 % (ref 42.7–76)
NRBC BLD AUTO-RTO: 0 /100 WBC (ref 0–0.2)
PLATELET # BLD AUTO: 264 10*3/MM3 (ref 140–450)
PMV BLD AUTO: 9.7 FL (ref 6–12)
POTASSIUM SERPL-SCNC: 2.9 MMOL/L (ref 3.5–5.2)
PROT SERPL-MCNC: 7 G/DL (ref 6–8.5)
PTH-INTACT SERPL-MCNC: 9.2 PG/ML (ref 15–65)
RBC # BLD AUTO: 2.79 10*6/MM3 (ref 3.77–5.28)
RETICS # AUTO: 0.04 10*6/MM3 (ref 0.02–0.13)
RETICS/RBC NFR AUTO: 1.39 % (ref 0.7–1.9)
SODIUM SERPL-SCNC: 143 MMOL/L (ref 136–145)
TIBC SERPL-MCNC: 194 MCG/DL (ref 298–536)
TRANSFERRIN SERPL-MCNC: 130 MG/DL (ref 200–360)
VIT B12 BLD-MCNC: 917 PG/ML (ref 211–946)
WBC NRBC COR # BLD AUTO: 9.59 10*3/MM3 (ref 3.4–10.8)

## 2025-04-15 PROCEDURE — 83540 ASSAY OF IRON: CPT | Performed by: INTERNAL MEDICINE

## 2025-04-15 PROCEDURE — 99232 SBSQ HOSP IP/OBS MODERATE 35: CPT | Performed by: STUDENT IN AN ORGANIZED HEALTH CARE EDUCATION/TRAINING PROGRAM

## 2025-04-15 PROCEDURE — 99223 1ST HOSP IP/OBS HIGH 75: CPT | Performed by: INTERNAL MEDICINE

## 2025-04-15 PROCEDURE — 25010000002 CEFTRIAXONE PER 250 MG: Performed by: INTERNAL MEDICINE

## 2025-04-15 PROCEDURE — 25810000003 SODIUM CHLORIDE 0.9 % SOLUTION: Performed by: STUDENT IN AN ORGANIZED HEALTH CARE EDUCATION/TRAINING PROGRAM

## 2025-04-15 PROCEDURE — 25010000002 NA FERRIC GLUC CPLX PER 12.5 MG: Performed by: STUDENT IN AN ORGANIZED HEALTH CARE EDUCATION/TRAINING PROGRAM

## 2025-04-15 PROCEDURE — 83735 ASSAY OF MAGNESIUM: CPT | Performed by: INTERNAL MEDICINE

## 2025-04-15 PROCEDURE — 85025 COMPLETE CBC W/AUTO DIFF WBC: CPT | Performed by: INTERNAL MEDICINE

## 2025-04-15 PROCEDURE — 25010000002 MAGNESIUM SULFATE 2 GM/50ML SOLUTION: Performed by: STUDENT IN AN ORGANIZED HEALTH CARE EDUCATION/TRAINING PROGRAM

## 2025-04-15 PROCEDURE — 25010000002 ENOXAPARIN PER 10 MG: Performed by: INTERNAL MEDICINE

## 2025-04-15 PROCEDURE — 82728 ASSAY OF FERRITIN: CPT | Performed by: INTERNAL MEDICINE

## 2025-04-15 PROCEDURE — 80053 COMPREHEN METABOLIC PANEL: CPT | Performed by: INTERNAL MEDICINE

## 2025-04-15 PROCEDURE — 84466 ASSAY OF TRANSFERRIN: CPT | Performed by: INTERNAL MEDICINE

## 2025-04-15 PROCEDURE — G0378 HOSPITAL OBSERVATION PER HR: HCPCS

## 2025-04-15 PROCEDURE — 25810000003 SODIUM CHLORIDE 0.9 % SOLUTION: Performed by: INTERNAL MEDICINE

## 2025-04-15 PROCEDURE — 85045 AUTOMATED RETICULOCYTE COUNT: CPT | Performed by: INTERNAL MEDICINE

## 2025-04-15 PROCEDURE — 83970 ASSAY OF PARATHORMONE: CPT | Performed by: INTERNAL MEDICINE

## 2025-04-15 PROCEDURE — 82397 CHEMILUMINESCENT ASSAY: CPT | Performed by: INTERNAL MEDICINE

## 2025-04-15 RX ORDER — MAGNESIUM SULFATE HEPTAHYDRATE 40 MG/ML
2 INJECTION, SOLUTION INTRAVENOUS ONCE
Status: COMPLETED | OUTPATIENT
Start: 2025-04-15 | End: 2025-04-15

## 2025-04-15 RX ORDER — FLUTICASONE PROPIONATE 50 MCG
2 SPRAY, SUSPENSION (ML) NASAL DAILY
Status: DISCONTINUED | OUTPATIENT
Start: 2025-04-15 | End: 2025-04-22 | Stop reason: HOSPADM

## 2025-04-15 RX ORDER — SODIUM CHLORIDE 9 MG/ML
100 INJECTION, SOLUTION INTRAVENOUS CONTINUOUS
Status: ACTIVE | OUTPATIENT
Start: 2025-04-15 | End: 2025-04-16

## 2025-04-15 RX ORDER — ASPIRIN 81 MG/1
81 TABLET, CHEWABLE ORAL DAILY
Status: DISCONTINUED | OUTPATIENT
Start: 2025-04-15 | End: 2025-04-22 | Stop reason: HOSPADM

## 2025-04-15 RX ORDER — MONTELUKAST SODIUM 10 MG/1
10 TABLET ORAL NIGHTLY
Status: DISCONTINUED | OUTPATIENT
Start: 2025-04-15 | End: 2025-04-22 | Stop reason: HOSPADM

## 2025-04-15 RX ORDER — OLANZAPINE 5 MG/1
5 TABLET, FILM COATED ORAL NIGHTLY
Status: DISCONTINUED | OUTPATIENT
Start: 2025-04-15 | End: 2025-04-22 | Stop reason: HOSPADM

## 2025-04-15 RX ORDER — METOPROLOL SUCCINATE 25 MG/1
25 TABLET, EXTENDED RELEASE ORAL DAILY
Status: DISCONTINUED | OUTPATIENT
Start: 2025-04-15 | End: 2025-04-22 | Stop reason: HOSPADM

## 2025-04-15 RX ORDER — ONDANSETRON 4 MG/1
4 TABLET, ORALLY DISINTEGRATING ORAL EVERY 8 HOURS PRN
Status: DISCONTINUED | OUTPATIENT
Start: 2025-04-15 | End: 2025-04-21

## 2025-04-15 RX ADMIN — ASPIRIN 81 MG: 81 TABLET, CHEWABLE ORAL at 10:34

## 2025-04-15 RX ADMIN — SODIUM CHLORIDE 150 ML/HR: 9 INJECTION, SOLUTION INTRAVENOUS at 13:26

## 2025-04-15 RX ADMIN — SODIUM CHLORIDE 2000 MG: 9 INJECTION INTRAMUSCULAR; INTRAVENOUS; SUBCUTANEOUS at 16:35

## 2025-04-15 RX ADMIN — METOPROLOL SUCCINATE 25 MG: 25 TABLET, EXTENDED RELEASE ORAL at 10:34

## 2025-04-15 RX ADMIN — SODIUM CHLORIDE 150 ML/HR: 9 INJECTION, SOLUTION INTRAVENOUS at 00:01

## 2025-04-15 RX ADMIN — FLUTICASONE PROPIONATE 2 SPRAY: 50 SPRAY, METERED NASAL at 10:35

## 2025-04-15 RX ADMIN — SODIUM CHLORIDE 150 ML/HR: 9 INJECTION, SOLUTION INTRAVENOUS at 06:09

## 2025-04-15 RX ADMIN — OLANZAPINE 5 MG: 5 TABLET, FILM COATED ORAL at 22:42

## 2025-04-15 RX ADMIN — SODIUM CHLORIDE 250 MG: 9 INJECTION, SOLUTION INTRAVENOUS at 10:35

## 2025-04-15 RX ADMIN — Medication 10 ML: at 08:58

## 2025-04-15 RX ADMIN — MONTELUKAST 10 MG: 10 TABLET, FILM COATED ORAL at 22:42

## 2025-04-15 RX ADMIN — MAGNESIUM SULFATE HEPTAHYDRATE 2 G: 40 INJECTION, SOLUTION INTRAVENOUS at 13:24

## 2025-04-15 RX ADMIN — ENOXAPARIN SODIUM 30 MG: 100 INJECTION SUBCUTANEOUS at 08:58

## 2025-04-15 NOTE — PLAN OF CARE
Goal Outcome Evaluation:  Plan of Care Reviewed With: patient        Progress: no change  Outcome Evaluation: Alert but confused. Slow to respond to questions and requires additional time.  Simple questions with one word answers are best.  VSS on Room air. Unable to record orhtostatic pressures as pt cannot stand at this time.  Tele NSR HR 94. NS at 100cc/hr infusing well into Right Single Port Subclavain catheter. No complaints of pain voiced.  No acute distress noted.  Care plan continues. Call bell within reach at all times for needs and safety.

## 2025-04-15 NOTE — PROGRESS NOTES
[facility]   Hospitalist Progress Note  Date: 4/15/2025  Patient Name: Madina Reddy  : 1965  MRN: [id number]  Date of admission: 2025  Room/Bed: 259/1      Subjective   Subjective     Chief Complaint: Weakness, confusion, hypercalcemia    Summary:Madina Reddy is a 60 y.o. female with history of malignant ovarian cancer with carcinomatosis and hepatic lesion currently status post 3 cycles of chemotherapy and hypertension who presented with weakness, confusion and found to have hypercalcemia.  Patient was recently diagnosed with UTI and/9 and was sent home on oral antibiotic.  Calcium during that time was 12.6.  Even after receiving antibiotic, patient did not show any improvement and gradually her symptoms worsen after which her partner brought her to the hospital.  On presentation, calcium was 12.1.  Hemoglobin 9.  Patient started on IV fluids and was admitted for further evaluation management.    Interval Followup: No acute events overnight.  Patient sitting in bed, eating her breakfast.  Partner at bedside.  Slight improvement compared to yesterday; as per partner.  Vital stable.  Hypokalemia, repleted.  Hemoglobin 7.8, found to have iron deficiency anemia and started on IV iron.  Calcium 11.0.    Review of Systems    All systems reviewed and negative except for what is outlined above.      Objective   Objective     Vitals:   Temp:  [98.1 °F (36.7 °C)-99.2 °F (37.3 °C)] 98.6 °F (37 °C)  Heart Rate:  [] 103  Resp:  [15-22] 20  BP: (101-134)/(68-98) 121/83    Physical Exam   General: Awake, alert, seems weak, NAD  Cardiovascular: RRR, no murmurs   Pulmonary: CTA bilaterally; no wheezes; no conversational dyspnea  Gastrointestinal: S/ND/NT, +BS  Neuro: Alert, awake, answering all questions appropriately; speech clear    Result Review    Result Review:  I have personally reviewed these results:  [x]  Laboratory      Lab 04/15/25  0609 25  0952 25   WBC 9.59 11.38* 10.44    HEMOGLOBIN 7.8* 9.0* 8.9*   HEMATOCRIT 25.7* 29.3* 28.2*   PLATELETS 264 354 391   NEUTROS ABS 7.39* 8.91* 7.73*   IMMATURE GRANS (ABS) 0.09* 0.06* 0.04   LYMPHS ABS 1.16 1.33 1.43   MONOS ABS 0.91* 1.04* 1.16*   EOS ABS 0.01 0.01 0.03   MCV 92.1 89.6 90.4         Lab 04/15/25  0609 04/14/25  0952 04/09/25  1912   SODIUM 143 140 140   POTASSIUM 2.9* 2.8* 3.0*   CHLORIDE 107 97* 98   CO2 25.7 30.5* 28.4   ANION GAP 10.3 12.5 13.6   BUN 13 16 14   CREATININE 0.41* 0.54* 0.58   EGFR 113.5 106.2 104.4   GLUCOSE 102* 113* 142*   CALCIUM 11.0* 12.1* 12.6*   MAGNESIUM 1.6 1.9 1.8         Lab 04/15/25  0609 04/14/25  0952 04/09/25  1912   TOTAL PROTEIN 7.0 7.9 8.2   ALBUMIN 3.2* 3.6 3.5   GLOBULIN 3.8 4.3 4.7   ALT (SGPT) 5 6 8   AST (SGOT) 17 17 21   BILIRUBIN 0.2 0.3 0.3   ALK PHOS 59 68 77         Lab 04/14/25  1127 04/14/25  0952 04/09/25 2036   HSTROP T 9 10 9             Lab 04/15/25  0609   IRON 15*   IRON SATURATION (TSAT) 8*   TIBC 194*   TRANSFERRIN 130*   FERRITIN 1,713.00*         Brief Urine Lab Results  (Last result in the past 365 days)        Color   Clarity   Blood   Leuk Est   Nitrite   Protein   CREAT   Urine HCG        04/14/25 1821 Yellow   Cloudy   Negative   Negative   Negative   Trace                 [x]  Microbiology   Microbiology Results (last 10 days)       Procedure Component Value - Date/Time    COVID PRE-OP / PRE-PROCEDURE SCREENING ORDER (NO ISOLATION) - Swab, Nasopharynx [[id number]]  (Normal) Collected: 04/09/25 1925    Lab Status: Final result Specimen: Swab from Nasopharynx Updated: 04/09/25 2011    Narrative:      The following orders were created for panel order COVID PRE-OP / PRE-PROCEDURE SCREENING ORDER (NO ISOLATION) - Swab, Nasopharynx.  Procedure                               Abnormality         Status                     ---------                               -----------         ------                     COVID-19, FLU A/B, RSV P...[[id number]]  Normal              Final result                  Please view results for these tests on the individual orders.    COVID-19, FLU A/B, RSV PCR 1 HR TAT - Swab, Nasopharynx [[id number]]  (Normal) Collected: 04/09/25 1925    Lab Status: Final result Specimen: Swab from Nasopharynx Updated: 04/09/25 2011     COVID19 Not Detected     Influenza A PCR Not Detected     Influenza B PCR Not Detected     RSV, PCR Not Detected    Narrative:      Fact sheet for providers: https://www.fda.gov/media/980944/download    Fact sheet for patients: https://www.fda.gov/media/919370/download    Test performed by PCR.          [x]  Radiology  XR Chest 1 View  Result Date: 4/14/2025  Impression: No active disease. Electronically Signed: James Albrecht MD  4/14/2025 9:30 AM EDT  Workstation ID: [id number]    CT Head Without Contrast  Result Date: 4/9/2025  Impression: No acute intracranial abnormality. Electronically Signed: Adán Dixon MD  4/9/2025 8:14 PM EDT  Workstation ID: [id number]    XR Chest 1 View  Result Date: 4/9/2025  Impression: No acute cardiopulmonary abnormality. Electronically Signed: Davide Frazier DO  4/9/2025 7:11 PM EDT  Workstation ID: [id number]    []  EKG/Telemetry   []  Cardiology/Vascular   []  Pathology  []  Old records  []  Other:    Assessment & Plan   Assessment / Plan     Assessment:  Acute metabolic encephalopathy, improving  Generalized weakness  Hypocalcemia, improving  Hypokalemia, repleted  UTI, unknown organism  Anemia, iron deficiency along with possible chemotherapy-induced  Leukocytosis, resolved  History of malignant ovarian cancer with carcinomatosis  History of hypertension    Plan:  Patient currently being managed medicine service.  Currently receiving NS at 150 cc/h.  Transitioning to 100 cc/h for next 24 hours given her poor oral intake.  Hypercalcemia, improving.  Continue to trend and monitor.  Currently on IV ceftriaxone for possible UTI.  Was on p.o. antibiotic outpatient, UA nonsignificant on presentation but given  immunocompromise state decided to continue IV antibiotics.  Blood culture pending.  Urine culture unable to be sent given UA nonsignificant on presentation.  Oncologist following.  Patient s/p 3 cycles of carboplatin and paclitaxel.  She is due to see GYN oncologist later this morning for debulking surgery.  Continue rest of current management.  Labs in AM.  Clinical course to dictate further management.  Discussed with RN.    VTE Prophylaxis:  Pharmacologic VTE prophylaxis orders are present.        CODE STATUS:   Code Status (Patient has no pulse and is not breathing): CPR (Attempt to Resuscitate)  Medical Interventions (Patient has pulse or is breathing): Full Support  Level Of Support Discussed With: Patient      Electronically signed by Demario Mireles MD, 04/15/25, 9:14 AM EDT.

## 2025-04-15 NOTE — PAYOR COMM NOTE
"Madina Reddy (60 y.o. Female)       Date of Birth   1965    Social Security Number       Address   [address] [place] [address] [place] [address]    Home Phone   [phone]    MRN   [id number]       Judaism   Pentecostalism    Marital Status                               Admission Date   4/14/2025    Admission Type   Emergency    Admitting Provider   Yadiel Chew MD    Attending Provider   Demario Mireles MD    Department, Room/Bed   [facility] [address], 259/1       Discharge Date       Discharge Disposition       Discharge Destination                                 Attending Provider: Demario Mireles MD    Allergies: No Known Allergies    Isolation: None   Infection: None   Code Status: CPR    Ht: 160 cm (63\")   Wt: 54.3 kg (119 lb 11.4 oz)    Admission Cmt: None   Principal Problem: Hypercalcemia [E83.52]                   Active Insurance as of 4/14/2025       Primary Coverage       Payor Plan Insurance Group Employer/Plan Group    [id number] HMO [id number]       Payor Plan Address Payor Plan Phone Number Payor Plan Fax Number Effective Dates    [address] [id number] [phone]  1/1/2023 - None Entered    [place] [address]         Subscriber Name Subscriber Birth Date Member ID       MADINA REDDY 1965 [id number]                     Emergency Contacts        (Rel.) Home Phone Work Phone Mobile Phone    JAVON REDDY (Spouse) -- -- [phone]    ERON DOWNEY (Son) -- -- [phone]        Update     [facility] [phone]-  F [phone]            Raghavendra Chávez, RN   Registered Nurse  Nursing     Plan of Care     Signed     Date of Service: 04/15/25 0526  Creation Time: 04/15/25 0526     Signed         Goal Outcome Evaluation:  Plan of Care Reviewed With: patient  Progress: no change  Outcome Evaluation: Alert but confused. Slow to respond to questions and requires additional time.  Simple questions with one word answers " are best.  VSS on Room air. Unable to record orhtostatic pressures as pt cannot stand at this time.  Tele NSR HR 94. NS at 100cc/hr infusing well into Right Single Port Subclavain catheter. No complaints of pain voiced.  No acute distress noted.  Care plan continues. Call bell within reach at all times for needs and safety.                           Waylon Arndt MD   Physician  Oncology     Consults     Signed     Date of Service: 04/15/25 0751  Creation Time: 04/15/25 0751  Consult Orders   IP Consult to Hematology and Oncology [[id number]] ordered by Yadiel Chew MD at 25 1307          Signed       Expand All Collapse All       [facility]   Consult Note     Patient Name: Madina Reddy  : 1965  MRN: [id number]  Primary Care Physician:  Kady Vernon APRN  Referring Physician: No ref. provider found  Date of admission: 2025     Subjective[]Expand by Default  Subjective      Reason for Consult/ Chief Complaint: Feeling bad     HPI:  Madina Reddy is a 60 y.o. female with ovarian cancer known to me from the clinic.  She has completed 3 cycles of carboplatin and paclitaxel with plans for debulking surgery later this month.  She was in the emergency room last week for UTI and started on antibiotics but she did not get better over the weekend.  Yesterday her family notes that she was not able to get out of bed and subsequently EMS was called and she was transported in to the emergency room.  Found to be volume depleted, evidence of UTI again and hypercalcemia.  She denies obvious blood loss.  She has not been eating or drinking very well.  She is admitted to the hospital for further treatment of the above.        Personal History      Medical History        Past Medical History:   Diagnosis Date    Allergies      Dizziness Madina    Headache Madina    Hyperlipidemia March    Hypertension Madina    Malignant neoplasm of ovary 2025    Pacemaker      Pneumonia 2023    TMJ  dysfunction Madina    Uterine cancer              Surgical History         Past Surgical History:   Procedure Laterality Date    BREAST BIOPSY        CARDIAC ELECTROPHYSIOLOGY PROCEDURE N/A 01/25/2023     Procedure: Device Implant;  Surgeon: VINCE Alejandre MD;  Location: [facility] INVASIVE LOCATION;  Service: Cardiology;  Laterality: N/A;    CARDIAC SURGERY         heart surgery as an infant    COLONOSCOPY   August 2017    PACEMAKER IMPLANTATION                Family History: family history includes Asthma in her daughter; Cancer in her mother; Heart disease in her father; Thyroid disease in her mother. Otherwise pertinent FHx was reviewed and not pertinent to current issue.     Social History:  reports that she has never smoked. She has never been exposed to tobacco smoke. She has never used smokeless tobacco. She reports that she does not drink alcohol and does not use drugs.     Home Medications:  OLANZapine, amLODIPine, aspirin, cephalexin, cetirizine, fluticasone, lidocaine-prilocaine, metoprolol succinate XL, montelukast, and ondansetron ODT     Allergies:  Allergies   No Known Allergies        Objective  Objective      Vitals:   Temp:  [98.1 °F (36.7 °C)-99.2 °F (37.3 °C)] 98.4 °F (36.9 °C)  Heart Rate:  [] 107  Resp:  [15-22] 20  BP: (101-134)/(68-98) 104/87     Physical Exam:              Constitutional: Awake, alert              Eyes: PERRLA, sclerae anicteric, no conjunctival injection              HENT: NCAT, mucous membranes moist              Neck: Supple, no thyromegaly, no lymphadenopathy, trachea midline              Respiratory: Clear to auscultation bilaterally, nonlabored respirations.  Port-A-Cath in place.              Cardiovascular: RRR, no murmurs, rubs, or gallops              Gastrointestinal: Positive bowel sounds, soft, tender, nondistended              Musculoskeletal: No bilateral ankle edema              Psychiatric: Appropriate affect, cooperative              Neurologic:  Oriented x 3, grossly nonfocal but globally weak, speech clear                Result Review  Result Review:  I have personally reviewed the results from the time of this admission to 4/15/2025 07:52 EDT and agree with these findings:  [x]  Laboratory  [x]  Microbiology cultures negative to date  [x]  Radiology CT head negative  []  EKG/Telemetry   []  Cardiology/Vascular   []  Pathology  []  Old records  []  Other:  Most notable findings include: Hemoglobin 7.8, calcium 11, albumin 3.2, potassium 2.9     Assessment & Plan  Assessment / Plan      Brief Patient Summary:  Madina Reddy is a 60 y.o. female who is known to me from clinic with history of ovarian cancer.  She has completed 3 cycles of carboplatin and paclitaxel in the [place] fashion with plans for surgery later this month for debulking purposes.  Plan is to follow this with 3 additional cycles of carboplatin and paclitaxel.  Brought into the emergency room for global weakness and recent diagnosis of UTI.     Active Hospital Problems:       Active Hospital Problems     Diagnosis      **Hypercalcemia        Plan:   Ovarian cancer.  Patient has completed the initial portion of her therapy with 3 cycles of carboplatin and paclitaxel.  She is due to see GYN oncology later this month for debulking surgery.     Anemia.  Persistent despite no chemotherapy for over a month.  She denies obvious blood loss.  I will check additional lab work including vitamin levels and reticulocyte count.  Monitor CBC while in hospital and consider transfusion for hemoglobin <7.     Hypercalcemia.  Trending down with volume resuscitation.  This could be related to volume depletion but parathyroid disease and hypercalcemia of malignancy are also in the differential.  She will have additional lab work including PTH, PTH related peptide and vitamin D levels.  Continue volume resuscitation.  Monitor while in hospital.     UTI.  Noted on lab work from the emergency room last week.  No  "culture was sent.  Urinalysis yesterday still shows leukocyte esterase and nitrites.  Culture has been sent but is currently negative.  Continue antibiotics per hospitalist service.              Electronically signed by Waylon Arndt MD, 04/15/25, 7:52 AM EDT.                   Tangela Clark RD   Registered Dietitian  Nutrition     Consults     Signed     Date of Service: 04/15/25 0811  Creation Time: 04/15/25 0811  Consult Orders   Inpatient Nutrition Consult [[id number]] ordered by Yadiel Chew MD at 04/14/25 1755          Signed       Expand All Collapse All    Nutrition Services     Patient Name: Madina Reddy  YOB: 1965  MRN: [id number]  Admission date: 4/14/2025        CLINICAL NUTRITION ASSESSMENT        Reason for Assessment  Identified at Risk by Screening Criteria       H&P:  Medical History        Past Medical History:   Diagnosis Date    Allergies      Dizziness Madina    Headache Madina    Hyperlipidemia March    Hypertension Madina    Malignant neoplasm of ovary 01/28/2025    Pacemaker      Pneumonia Jan 2023    TMJ dysfunction Madina    Uterine cancer               Current Problems:         Active Hospital Problems     Diagnosis      **Hypercalcemia           Nutrition/Diet History            Narrative   Admitted for weakness. Abdominal pain and some nausea/emesis reported PTA. Undergoing treatment for uterine cancer with metastasis. Recommend Boost BID for nutrition support.       Anthropometrics           Current Height, Weight Height: 160 cm (63\")  Weight: 54.3 kg (119 lb 11.4 oz)   Current BMI Body mass index is 21.21 kg/m².   BMI Classification Normal range   % % (52.4 kg)    Adjusted Body Weight (ABW)     Weight Hx       Wt Readings from Last 30 Encounters:   04/14/25 0858 54.3 kg (119 lb 11.4 oz)   04/09/25 1816 60 kg (132 lb 4.4 oz)   03/25/25 0846 60.7 kg (133 lb 13.1 oz)   02/26/25 0808 60.6 kg (133 lb 8 oz)   02/25/25 1323 61 kg (134 lb 7.7 oz) "   02/10/25 1048 60.3 kg (132 lb 15 oz)   01/28/25 1445 61.7 kg (136 lb 0.4 oz)   01/08/25 0108 64.2 kg (141 lb 8.6 oz)   01/07/25 1801 66.8 kg (147 lb 4.3 oz)   11/25/24 1349 69.9 kg (154 lb 3.2 oz)   11/13/24 1341 70.3 kg (155 lb)   08/12/24 1005 70.8 kg (156 lb)   03/25/24 1324 70.4 kg (155 lb 3.2 oz)   12/12/23 0700 70.3 kg (155 lb)   12/06/23 0808 72.7 kg (160 lb 3.2 oz)   09/25/23 0920 72 kg (158 lb 12.8 oz)   09/11/23 1408 70.8 kg (156 lb)   07/05/23 0959 69.4 kg (153 lb)   06/06/23 1436 68.1 kg (150 lb 3.2 oz)   03/31/23 0947 67.6 kg (149 lb)   03/30/23 1526 67.6 kg (149 lb)   02/27/23 1321 66.9 kg (147 lb 6.4 oz)   02/03/23 1503 65.8 kg (145 lb)   01/30/23 0902 66.7 kg (147 lb)   01/23/23 1058 64.3 kg (141 lb 12.1 oz)   10/10/22 0839 68.9 kg (152 lb)   05/17/22 1524 66.2 kg (146 lb)   02/15/22 1312 68.6 kg (151 lb 3.2 oz)   01/31/22 1048 67.6 kg (149 lb)   11/19/21 0814 68 kg (150 lb)   07/02/21 1058 68 kg (150 lb)            Wt Change Observation Wt loss of 22 lbs x 3 months (15%)           Estimated/Assessed Needs  Estimated Needs based on: Current Body Weight         Energy Requirements 30-35 kcal/kg    EST Needs (kcal/day) 0066-7844 kcal          Protein Requirements 1.5-2 g/kg   EST Daily Needs (g/day)  g          Fluid Requirements 1 ml/kcal    Estimated Needs (mL/day) 2855-3067 ml       Labs/Medications            Pertinent Labs Reviewed.          Results from last 7 days   Lab Units 04/15/25  0609 04/14/25 0952 04/09/25 1912   SODIUM mmol/L 143 140 140   POTASSIUM mmol/L 2.9* 2.8* 3.0*   CHLORIDE mmol/L 107 97* 98   CO2 mmol/L 25.7 30.5* 28.4   BUN mg/dL 13 16 14   CREATININE mg/dL 0.41* 0.54* 0.58   CALCIUM mg/dL 11.0* 12.1* 12.6*   BILIRUBIN mg/dL 0.2 0.3 0.3   ALK PHOS U/L 59 68 77   ALT (SGPT) U/L 5 6 8   AST (SGOT) U/L 17 17 21   GLUCOSE mg/dL 102* 113* 142*             Results from last 7 days   Lab Units 04/15/25  0609 04/14/25 0952 04/09/25 1912   MAGNESIUM mg/dL 1.6 1.9 1.8    HEMOGLOBIN g/dL 7.8* 9.0* 8.9*   HEMATOCRIT % 25.7* 29.3* 28.2*            COVID19   Date Value Ref Range Status   04/09/2025 Not Detected Not Detected - Ref. Range Final            Lab Results   Component Value Date     HGBA1C 5.30 12/12/2023           Pertinent Medications Reviewed.      Malnutrition Severity Assessment              Nutrition Diagnosis            Nutrition Dx Problem 1 Moderate malnutrition related to inadequate oral Intake as evidenced by decreased appetite. and unintended weight change.      Nutrition Intervention                   Current Nutrition Orders & Evaluation of Intake         Current PO Diet Diet: Cardiac; Healthy Heart (2-3 Na+); Fluid Consistency: Thin (IDDSI 0)   Supplement No active supplement orders                   Nutrition Intervention/Prescription          Continue Cardiac diet  Boost BID (240 kcal, 10 g pro x2)         Medical Nutrition Therapy/Nutrition Education             Learner     Readiness Patient  Acceptance     Method     Response Explanation  Verbalizes understanding      Monitor/Evaluation           Monitor Per protocol, I&O, PO intake, GI status      Nutrition Discharge Plan             Recommend to continue oral nutrition supplements on discharge.       Electronically signed by:  Tanegla Clark RD  04/15/25 08:11 EDT

## 2025-04-15 NOTE — CONSULTS
[facility]   Consult Note    Patient Name: Madina Reddy  : 1965  MRN: [id number]  Primary Care Physician:  Kady Vernon APRN  Referring Physician: No ref. provider found  Date of admission: 2025    Subjective   Subjective     Reason for Consult/ Chief Complaint: Feeling bad    HPI:  Madina Reddy is a 60 y.o. female with ovarian cancer known to me from the clinic.  She has completed 3 cycles of carboplatin and paclitaxel with plans for debulking surgery later this month.  She was in the emergency room last week for UTI and started on antibiotics but she did not get better over the weekend.  Yesterday her family notes that she was not able to get out of bed and subsequently EMS was called and she was transported in to the emergency room.  Found to be volume depleted, evidence of UTI again and hypercalcemia.  She denies obvious blood loss.  She has not been eating or drinking very well.  She is admitted to the hospital for further treatment of the above.      Personal History     Past Medical History:   Diagnosis Date    Allergies     Dizziness Madina    Headache Madina    Hyperlipidemia March    Hypertension Madina    Malignant neoplasm of ovary 2025    Pacemaker     Pneumonia 2023    TMJ dysfunction Madina    Uterine cancer        Past Surgical History:   Procedure Laterality Date    BREAST BIOPSY      CARDIAC ELECTROPHYSIOLOGY PROCEDURE N/A 2023    Procedure: Device Implant;  Surgeon: VINCE Alejandre MD;  Location: [facility] INVASIVE LOCATION;  Service: Cardiology;  Laterality: N/A;    CARDIAC SURGERY      heart surgery as an infant    COLONOSCOPY  2017    PACEMAKER IMPLANTATION         Family History: family history includes Asthma in her daughter; Cancer in her mother; Heart disease in her father; Thyroid disease in her mother. Otherwise pertinent FHx was reviewed and not pertinent to current issue.    Social History:  reports that she has never smoked.  She has never been exposed to tobacco smoke. She has never used smokeless tobacco. She reports that she does not drink alcohol and does not use drugs.    Home Medications:  OLANZapine, amLODIPine, aspirin, cephalexin, cetirizine, fluticasone, lidocaine-prilocaine, metoprolol succinate XL, montelukast, and ondansetron ODT    Allergies:  No Known Allergies    Objective    Objective     Vitals:   Temp:  [98.1 °F (36.7 °C)-99.2 °F (37.3 °C)] 98.4 °F (36.9 °C)  Heart Rate:  [] 107  Resp:  [15-22] 20  BP: (101-134)/(68-98) 104/87    Physical Exam:   Constitutional: Awake, alert   Eyes: PERRLA, sclerae anicteric, no conjunctival injection   HENT: NCAT, mucous membranes moist   Neck: Supple, no thyromegaly, no lymphadenopathy, trachea midline   Respiratory: Clear to auscultation bilaterally, nonlabored respirations.  Port-A-Cath in place.   Cardiovascular: RRR, no murmurs, rubs, or gallops   Gastrointestinal: Positive bowel sounds, soft, tender, nondistended   Musculoskeletal: No bilateral ankle edema   Psychiatric: Appropriate affect, cooperative   Neurologic: Oriented x 3, grossly nonfocal but globally weak, speech clear     Result Review    Result Review:  I have personally reviewed the results from the time of this admission to 4/15/2025 07:52 EDT and agree with these findings:  [x]  Laboratory  [x]  Microbiology cultures negative to date  [x]  Radiology CT head negative  []  EKG/Telemetry   []  Cardiology/Vascular   []  Pathology  []  Old records  []  Other:  Most notable findings include: Hemoglobin 7.8, calcium 11, albumin 3.2, potassium 2.9    Assessment & Plan   Assessment / Plan     Brief Patient Summary:  Madina Reddy is a 60 y.o. female who is known to me from clinic with history of ovarian cancer.  She has completed 3 cycles of carboplatin and paclitaxel in the [place] fashion with plans for surgery later this month for debulking purposes.  Plan is to follow this with 3 additional cycles of  carboplatin and paclitaxel.  Brought into the emergency room for global weakness and recent diagnosis of UTI.    Active Hospital Problems:  Active Hospital Problems    Diagnosis     **Hypercalcemia      Plan:   Ovarian cancer.  Patient has completed the initial portion of her therapy with 3 cycles of carboplatin and paclitaxel.  She is due to see GYN oncology later this month for debulking surgery.    Anemia.  Persistent despite no chemotherapy for over a month.  She denies obvious blood loss.  I will check additional lab work including vitamin levels and reticulocyte count.  Monitor CBC while in hospital and consider transfusion for hemoglobin <7.    Hypercalcemia.  Trending down with volume resuscitation.  This could be related to volume depletion but parathyroid disease and hypercalcemia of malignancy are also in the differential.  She will have additional lab work including PTH, PTH related peptide and vitamin D levels.  Continue volume resuscitation.  Monitor while in hospital.    UTI.  Noted on lab work from the emergency room last week.  No culture was sent.  Urinalysis yesterday still shows leukocyte esterase and nitrites.  Culture has been sent but is currently negative.  Continue antibiotics per hospitalist service.          Electronically signed by Waylon Arndt MD, 04/15/25, 7:52 AM EDT.

## 2025-04-15 NOTE — CONSULTS
"Nutrition Services    Patient Name: Madina Reddy  YOB: 1965  MRN: [id number]  Admission date: 4/14/2025      CLINICAL NUTRITION ASSESSMENT      Reason for Assessment  Identified at Risk by Screening Criteria      H&P:  Past Medical History:   Diagnosis Date    Allergies     Dizziness Madina    Headache Madina    Hyperlipidemia March    Hypertension Madina    Malignant neoplasm of ovary 01/28/2025    Pacemaker     Pneumonia Jan 2023    TMJ dysfunction Madina    Uterine cancer         Current Problems:   Active Hospital Problems    Diagnosis     **Hypercalcemia         Nutrition/Diet History         Narrative   Admitted for weakness. Abdominal pain and some nausea/emesis reported PTA. Undergoing treatment for uterine cancer with metastasis. Recommend Boost BID for nutrition support.      Anthropometrics        Current Height, Weight Height: 160 cm (63\")  Weight: 54.3 kg (119 lb 11.4 oz)   Current BMI Body mass index is 21.21 kg/m².   BMI Classification Normal range   % % (52.4 kg)    Adjusted Body Weight (ABW)    Weight Hx  Wt Readings from Last 30 Encounters:   04/14/25 0858 54.3 kg (119 lb 11.4 oz)   04/09/25 1816 60 kg (132 lb 4.4 oz)   03/25/25 0846 60.7 kg (133 lb 13.1 oz)   02/26/25 0808 60.6 kg (133 lb 8 oz)   02/25/25 1323 61 kg (134 lb 7.7 oz)   02/10/25 1048 60.3 kg (132 lb 15 oz)   01/28/25 1445 61.7 kg (136 lb 0.4 oz)   01/08/25 0108 64.2 kg (141 lb 8.6 oz)   01/07/25 1801 66.8 kg (147 lb 4.3 oz)   11/25/24 1349 69.9 kg (154 lb 3.2 oz)   11/13/24 1341 70.3 kg (155 lb)   08/12/24 1005 70.8 kg (156 lb)   03/25/24 1324 70.4 kg (155 lb 3.2 oz)   12/12/23 0700 70.3 kg (155 lb)   12/06/23 0808 72.7 kg (160 lb 3.2 oz)   09/25/23 0920 72 kg (158 lb 12.8 oz)   09/11/23 1408 70.8 kg (156 lb)   07/05/23 0959 69.4 kg (153 lb)   06/06/23 1436 68.1 kg (150 lb 3.2 oz)   03/31/23 0947 67.6 kg (149 lb)   03/30/23 1526 67.6 kg (149 lb)   02/27/23 1321 66.9 kg (147 lb 6.4 oz)   02/03/23 1503 " 65.8 kg (145 lb)   01/30/23 0902 66.7 kg (147 lb)   01/23/23 1058 64.3 kg (141 lb 12.1 oz)   10/10/22 0839 68.9 kg (152 lb)   05/17/22 1524 66.2 kg (146 lb)   02/15/22 1312 68.6 kg (151 lb 3.2 oz)   01/31/22 1048 67.6 kg (149 lb)   11/19/21 0814 68 kg (150 lb)   07/02/21 1058 68 kg (150 lb)          Wt Change Observation Wt loss of 22 lbs x 3 months (15%)      Estimated/Assessed Needs  Estimated Needs based on: Current Body Weight       Energy Requirements 30-35 kcal/kg    EST Needs (kcal/day) 7261-1446 kcal        Protein Requirements 1.5-2 g/kg   EST Daily Needs (g/day)  g        Fluid Requirements 1 ml/kcal    Estimated Needs (mL/day) 4960-1831 ml      Labs/Medications         Pertinent Labs Reviewed.   Results from last 7 days   Lab Units 04/15/25  0609 04/14/25  0952 04/09/25  1912   SODIUM mmol/L 143 140 140   POTASSIUM mmol/L 2.9* 2.8* 3.0*   CHLORIDE mmol/L 107 97* 98   CO2 mmol/L 25.7 30.5* 28.4   BUN mg/dL 13 16 14   CREATININE mg/dL 0.41* 0.54* 0.58   CALCIUM mg/dL 11.0* 12.1* 12.6*   BILIRUBIN mg/dL 0.2 0.3 0.3   ALK PHOS U/L 59 68 77   ALT (SGPT) U/L 5 6 8   AST (SGOT) U/L 17 17 21   GLUCOSE mg/dL 102* 113* 142*     Results from last 7 days   Lab Units 04/15/25  0609 04/14/25  0952 04/09/25  1912   MAGNESIUM mg/dL 1.6 1.9 1.8   HEMOGLOBIN g/dL 7.8* 9.0* 8.9*   HEMATOCRIT % 25.7* 29.3* 28.2*     COVID19   Date Value Ref Range Status   04/09/2025 Not Detected Not Detected - Ref. Range Final     Lab Results   Component Value Date    HGBA1C 5.30 12/12/2023         Pertinent Medications Reviewed.     Malnutrition Severity Assessment              Nutrition Diagnosis         Nutrition Dx Problem 1 Moderate malnutrition related to inadequate oral Intake as evidenced by decreased appetite. and unintended weight change.     Nutrition Intervention           Current Nutrition Orders & Evaluation of Intake       Current PO Diet Diet: Cardiac; Healthy Heart (2-3 Na+); Fluid Consistency: Thin (IDDSI 0)    Supplement No active supplement orders           Nutrition Intervention/Prescription        Continue Cardiac diet  Boost BID (240 kcal, 10 g pro x2)        Medical Nutrition Therapy/Nutrition Education          Learner     Readiness Patient  Acceptance     Method     Response Explanation  Verbalizes understanding     Monitor/Evaluation        Monitor Per protocol, I&O, PO intake, GI status     Nutrition Discharge Plan         Recommend to continue oral nutrition supplements on discharge.      Electronically signed by:  Tangela Clark RD  04/15/25 08:11 EDT

## 2025-04-15 NOTE — PLAN OF CARE
Goal Outcome Evaluation:              Outcome Evaluation: A&Ox3. Intermittent confusion. Forgetful. Slow to respond to answers. Received x1 Iron gluconate. Magnesium replacement. IVF 100ml/hr. No complaints at this time. Call light in reach.

## 2025-04-16 LAB
ANION GAP SERPL CALCULATED.3IONS-SCNC: 14.5 MMOL/L (ref 5–15)
BASOPHILS # BLD AUTO: 0.05 10*3/MM3 (ref 0–0.2)
BASOPHILS NFR BLD AUTO: 0.5 % (ref 0–1.5)
BUN SERPL-MCNC: 10 MG/DL (ref 6–20)
BUN/CREAT SERPL: 28.6 (ref 7–25)
CALCIUM SPEC-SCNC: 10.2 MG/DL (ref 8.6–10.5)
CHLORIDE SERPL-SCNC: 104 MMOL/L (ref 98–107)
CO2 SERPL-SCNC: 23.5 MMOL/L (ref 22–29)
CREAT SERPL-MCNC: 0.35 MG/DL (ref 0.57–1)
DEPRECATED RDW RBC AUTO: 54.4 FL (ref 37–54)
EGFRCR SERPLBLD CKD-EPI 2021: 117.9 ML/MIN/1.73
EOSINOPHIL # BLD AUTO: 0.01 10*3/MM3 (ref 0–0.4)
EOSINOPHIL NFR BLD AUTO: 0.1 % (ref 0.3–6.2)
ERYTHROCYTE [DISTWIDTH] IN BLOOD BY AUTOMATED COUNT: 16.3 % (ref 12.3–15.4)
GLUCOSE SERPL-MCNC: 99 MG/DL (ref 65–99)
HCT VFR BLD AUTO: 24.4 % (ref 34–46.6)
HCT VFR BLD AUTO: 24.8 % (ref 34–46.6)
HGB BLD-MCNC: 7.5 G/DL (ref 12–15.9)
HGB BLD-MCNC: 7.7 G/DL (ref 12–15.9)
IMM GRANULOCYTES # BLD AUTO: 0.15 10*3/MM3 (ref 0–0.05)
IMM GRANULOCYTES NFR BLD AUTO: 1.4 % (ref 0–0.5)
LYMPHOCYTES # BLD AUTO: 1.15 10*3/MM3 (ref 0.7–3.1)
LYMPHOCYTES NFR BLD AUTO: 10.4 % (ref 19.6–45.3)
MAGNESIUM SERPL-MCNC: 1.8 MG/DL (ref 1.6–2.6)
MCH RBC QN AUTO: 27.7 PG (ref 26.6–33)
MCHC RBC AUTO-ENTMCNC: 30.7 G/DL (ref 31.5–35.7)
MCV RBC AUTO: 90 FL (ref 79–97)
MONOCYTES # BLD AUTO: 0.96 10*3/MM3 (ref 0.1–0.9)
MONOCYTES NFR BLD AUTO: 8.7 % (ref 5–12)
NEUTROPHILS NFR BLD AUTO: 78.9 % (ref 42.7–76)
NEUTROPHILS NFR BLD AUTO: 8.69 10*3/MM3 (ref 1.7–7)
NRBC BLD AUTO-RTO: 0 /100 WBC (ref 0–0.2)
PHOSPHATE SERPL-MCNC: 2.3 MG/DL (ref 2.5–4.5)
PLATELET # BLD AUTO: 290 10*3/MM3 (ref 140–450)
PMV BLD AUTO: 10.3 FL (ref 6–12)
POTASSIUM SERPL-SCNC: 2.4 MMOL/L (ref 3.5–5.2)
RBC # BLD AUTO: 2.71 10*6/MM3 (ref 3.77–5.28)
SODIUM SERPL-SCNC: 142 MMOL/L (ref 136–145)
WBC NRBC COR # BLD AUTO: 11.01 10*3/MM3 (ref 3.4–10.8)

## 2025-04-16 PROCEDURE — 83735 ASSAY OF MAGNESIUM: CPT | Performed by: STUDENT IN AN ORGANIZED HEALTH CARE EDUCATION/TRAINING PROGRAM

## 2025-04-16 PROCEDURE — 80048 BASIC METABOLIC PNL TOTAL CA: CPT | Performed by: STUDENT IN AN ORGANIZED HEALTH CARE EDUCATION/TRAINING PROGRAM

## 2025-04-16 PROCEDURE — 25010000002 POTASSIUM CHLORIDE 10 MEQ/100ML SOLUTION: Performed by: STUDENT IN AN ORGANIZED HEALTH CARE EDUCATION/TRAINING PROGRAM

## 2025-04-16 PROCEDURE — 25010000002 CEFTRIAXONE PER 250 MG: Performed by: INTERNAL MEDICINE

## 2025-04-16 PROCEDURE — 85014 HEMATOCRIT: CPT | Performed by: STUDENT IN AN ORGANIZED HEALTH CARE EDUCATION/TRAINING PROGRAM

## 2025-04-16 PROCEDURE — 25810000003 SODIUM CHLORIDE 0.9 % SOLUTION: Performed by: STUDENT IN AN ORGANIZED HEALTH CARE EDUCATION/TRAINING PROGRAM

## 2025-04-16 PROCEDURE — 84100 ASSAY OF PHOSPHORUS: CPT | Performed by: STUDENT IN AN ORGANIZED HEALTH CARE EDUCATION/TRAINING PROGRAM

## 2025-04-16 PROCEDURE — 25010000002 ENOXAPARIN PER 10 MG: Performed by: INTERNAL MEDICINE

## 2025-04-16 PROCEDURE — 25010000002 MAGNESIUM SULFATE 2 GM/50ML SOLUTION: Performed by: STUDENT IN AN ORGANIZED HEALTH CARE EDUCATION/TRAINING PROGRAM

## 2025-04-16 PROCEDURE — 25010000002 NA FERRIC GLUC CPLX PER 12.5 MG: Performed by: STUDENT IN AN ORGANIZED HEALTH CARE EDUCATION/TRAINING PROGRAM

## 2025-04-16 PROCEDURE — 97165 OT EVAL LOW COMPLEX 30 MIN: CPT

## 2025-04-16 PROCEDURE — 97161 PT EVAL LOW COMPLEX 20 MIN: CPT

## 2025-04-16 PROCEDURE — 85018 HEMOGLOBIN: CPT | Performed by: STUDENT IN AN ORGANIZED HEALTH CARE EDUCATION/TRAINING PROGRAM

## 2025-04-16 PROCEDURE — 99232 SBSQ HOSP IP/OBS MODERATE 35: CPT | Performed by: STUDENT IN AN ORGANIZED HEALTH CARE EDUCATION/TRAINING PROGRAM

## 2025-04-16 PROCEDURE — 85025 COMPLETE CBC W/AUTO DIFF WBC: CPT | Performed by: STUDENT IN AN ORGANIZED HEALTH CARE EDUCATION/TRAINING PROGRAM

## 2025-04-16 PROCEDURE — G0378 HOSPITAL OBSERVATION PER HR: HCPCS

## 2025-04-16 RX ORDER — FENTANYL/ROPIVACAINE/NS/PF 2-625MCG/1
15 PLASTIC BAG, INJECTION (ML) EPIDURAL ONCE
Status: COMPLETED | OUTPATIENT
Start: 2025-04-16 | End: 2025-04-16

## 2025-04-16 RX ORDER — BISACODYL 5 MG/1
5 TABLET, DELAYED RELEASE ORAL DAILY PRN
Status: DISCONTINUED | OUTPATIENT
Start: 2025-04-16 | End: 2025-04-22 | Stop reason: HOSPADM

## 2025-04-16 RX ORDER — POTASSIUM CHLORIDE 750 MG/1
40 CAPSULE, EXTENDED RELEASE ORAL 2 TIMES DAILY WITH MEALS
Status: DISCONTINUED | OUTPATIENT
Start: 2025-04-16 | End: 2025-04-16

## 2025-04-16 RX ORDER — POLYETHYLENE GLYCOL 3350 17 G/17G
17 POWDER, FOR SOLUTION ORAL DAILY PRN
Status: DISCONTINUED | OUTPATIENT
Start: 2025-04-16 | End: 2025-04-22 | Stop reason: HOSPADM

## 2025-04-16 RX ORDER — AMOXICILLIN 250 MG
2 CAPSULE ORAL 2 TIMES DAILY PRN
Status: DISCONTINUED | OUTPATIENT
Start: 2025-04-16 | End: 2025-04-22 | Stop reason: HOSPADM

## 2025-04-16 RX ORDER — MAGNESIUM SULFATE HEPTAHYDRATE 40 MG/ML
2 INJECTION, SOLUTION INTRAVENOUS ONCE
Status: COMPLETED | OUTPATIENT
Start: 2025-04-16 | End: 2025-04-16

## 2025-04-16 RX ORDER — POTASSIUM CHLORIDE 7.45 MG/ML
10 INJECTION INTRAVENOUS
Status: COMPLETED | OUTPATIENT
Start: 2025-04-16 | End: 2025-04-16

## 2025-04-16 RX ORDER — BISACODYL 10 MG
10 SUPPOSITORY, RECTAL RECTAL DAILY PRN
Status: DISCONTINUED | OUTPATIENT
Start: 2025-04-16 | End: 2025-04-22 | Stop reason: HOSPADM

## 2025-04-16 RX ORDER — POTASSIUM CHLORIDE 750 MG/1
40 CAPSULE, EXTENDED RELEASE ORAL ONCE
Status: COMPLETED | OUTPATIENT
Start: 2025-04-16 | End: 2025-04-16

## 2025-04-16 RX ORDER — POTASSIUM CHLORIDE 1.5 G/1.58G
40 POWDER, FOR SOLUTION ORAL ONCE
Status: COMPLETED | OUTPATIENT
Start: 2025-04-16 | End: 2025-04-16

## 2025-04-16 RX ORDER — CALCIUM CARBONATE 500 MG/1
2 TABLET, CHEWABLE ORAL 3 TIMES DAILY PRN
Status: DISCONTINUED | OUTPATIENT
Start: 2025-04-16 | End: 2025-04-22 | Stop reason: HOSPADM

## 2025-04-16 RX ADMIN — SODIUM CHLORIDE 100 ML/HR: 9 INJECTION, SOLUTION INTRAVENOUS at 01:15

## 2025-04-16 RX ADMIN — Medication 10 ML: at 19:55

## 2025-04-16 RX ADMIN — FLUTICASONE PROPIONATE 2 SPRAY: 50 SPRAY, METERED NASAL at 08:47

## 2025-04-16 RX ADMIN — OLANZAPINE 5 MG: 5 TABLET, FILM COATED ORAL at 19:55

## 2025-04-16 RX ADMIN — SODIUM CHLORIDE 2000 MG: 9 INJECTION INTRAMUSCULAR; INTRAVENOUS; SUBCUTANEOUS at 16:05

## 2025-04-16 RX ADMIN — ENOXAPARIN SODIUM 30 MG: 100 INJECTION SUBCUTANEOUS at 08:47

## 2025-04-16 RX ADMIN — POTASSIUM CHLORIDE 40 MEQ: 1.5 POWDER, FOR SOLUTION ORAL at 12:21

## 2025-04-16 RX ADMIN — POTASSIUM PHOSPHATE, MONOBASIC AND POTASSIUM PHOSPHATE, DIBASIC 15 MMOL: 224; 236 INJECTION, SOLUTION, CONCENTRATE INTRAVENOUS at 13:29

## 2025-04-16 RX ADMIN — POTASSIUM CHLORIDE 10 MEQ: 7.46 INJECTION, SOLUTION INTRAVENOUS at 14:29

## 2025-04-16 RX ADMIN — Medication 10 ML: at 08:47

## 2025-04-16 RX ADMIN — POTASSIUM CHLORIDE 10 MEQ: 7.46 INJECTION, SOLUTION INTRAVENOUS at 08:47

## 2025-04-16 RX ADMIN — POTASSIUM CHLORIDE 10 MEQ: 7.46 INJECTION, SOLUTION INTRAVENOUS at 09:58

## 2025-04-16 RX ADMIN — SENNOSIDES AND DOCUSATE SODIUM 2 TABLET: 50; 8.6 TABLET ORAL at 17:19

## 2025-04-16 RX ADMIN — POTASSIUM CHLORIDE 10 MEQ: 7.46 INJECTION, SOLUTION INTRAVENOUS at 13:28

## 2025-04-16 RX ADMIN — CALCIUM CARBONATE 2 TABLET: 500 TABLET, CHEWABLE ORAL at 17:19

## 2025-04-16 RX ADMIN — SODIUM CHLORIDE 250 MG: 9 INJECTION, SOLUTION INTRAVENOUS at 17:13

## 2025-04-16 RX ADMIN — POTASSIUM CHLORIDE 10 MEQ: 7.46 INJECTION, SOLUTION INTRAVENOUS at 12:21

## 2025-04-16 RX ADMIN — POTASSIUM CHLORIDE 40 MEQ: 750 CAPSULE, EXTENDED RELEASE ORAL at 11:11

## 2025-04-16 RX ADMIN — ASPIRIN 81 MG: 81 TABLET, CHEWABLE ORAL at 08:46

## 2025-04-16 RX ADMIN — POTASSIUM CHLORIDE 10 MEQ: 7.46 INJECTION, SOLUTION INTRAVENOUS at 11:07

## 2025-04-16 RX ADMIN — METOPROLOL SUCCINATE 25 MG: 25 TABLET, EXTENDED RELEASE ORAL at 08:46

## 2025-04-16 RX ADMIN — ACETAMINOPHEN 650 MG: 325 TABLET ORAL at 20:45

## 2025-04-16 RX ADMIN — MAGNESIUM SULFATE HEPTAHYDRATE 2 G: 40 INJECTION, SOLUTION INTRAVENOUS at 08:47

## 2025-04-16 RX ADMIN — MONTELUKAST 10 MG: 10 TABLET, FILM COATED ORAL at 19:55

## 2025-04-16 NOTE — PROGRESS NOTES
[facility]   Hospitalist Progress Note  Date: 2025  Patient Name: Madina Reddy  : 1965  MRN: [id number]  Date of admission: 2025  Room/Bed: 259/1      Subjective   Subjective     Chief Complaint: Weakness, confusion, hypercalcemia    Summary:Madina Reddy is a 60 y.o. female with history of malignant ovarian cancer with carcinomatosis and hepatic lesion currently status post 3 cycles of chemotherapy and hypertension who presented with weakness, confusion and found to have hypercalcemia.  Patient was recently diagnosed with UTI and/9 and was sent home on oral antibiotic.  Calcium during that time was 12.6.  Even after receiving antibiotic, patient did not show any improvement and gradually her symptoms worsen after which her partner brought her to the hospital.  On presentation, calcium was 12.1.  Hemoglobin 9.  Patient started on IV fluids and was admitted for further evaluation management.  On IV ceftriaxone for possible UTI.  Blood culture sent.  Generalized weakness present.  Iron-deficiency anemia, receiving IV iron.  Replating electrolytes.    Interval Followup: No acute events overnight.  Patient sitting in bed, more awake and alert today.  States she feels better compared to yesterday.  Tried to take potassium pill but was not able to keep down the pill.  Later received about her potassium along with IV potassium.  Also replating phosphorus.      Review of Systems    All systems reviewed and negative except for what is outlined above.      Objective   Objective     Vitals:   Temp:  [98.1 °F (36.7 °C)-99.1 °F (37.3 °C)] 99 °F (37.2 °C)  Heart Rate:  [88-99] 99  Resp:  [18-20] 20  BP: (122-132)/(76-85) 130/80    Physical Exam   General: Awake, alert, seems weak, NAD  Cardiovascular: RRR, no murmurs   Pulmonary: CTA bilaterally; no wheezes; no conversational dyspnea  Gastrointestinal: S/ND/NT, +BS  Neuro: Alert, awake, answering all questions appropriately; speech clear    Result  Review    Result Review:  I have personally reviewed these results:  [x]  Laboratory      Lab 04/16/25  0630 04/15/25  0609 04/14/25  0952   WBC 11.01* 9.59 11.38*   HEMOGLOBIN 7.5* 7.8* 9.0*   HEMATOCRIT 24.4* 25.7* 29.3*   PLATELETS 290 264 354   NEUTROS ABS 8.69* 7.39* 8.91*   IMMATURE GRANS (ABS) 0.15* 0.09* 0.06*   LYMPHS ABS 1.15 1.16 1.33   MONOS ABS 0.96* 0.91* 1.04*   EOS ABS 0.01 0.01 0.01   MCV 90.0 92.1 89.6         Lab 04/16/25  0630 04/15/25  1639 04/15/25  0609 04/14/25  0952   SODIUM 142  --  143 140   POTASSIUM 2.4*  --  2.9* 2.8*   CHLORIDE 104  --  107 97*   CO2 23.5  --  25.7 30.5*   ANION GAP 14.5  --  10.3 12.5   BUN 10  --  13 16   CREATININE 0.35*  --  0.41* 0.54*   EGFR 117.9  --  113.5 106.2   GLUCOSE 99  --  102* 113*   CALCIUM 10.2 10.0 11.0* 12.1*   MAGNESIUM 1.8  --  1.6 1.9   PHOSPHORUS 2.3*  --   --   --          Lab 04/15/25  0609 04/14/25  0952 04/09/25  1912   TOTAL PROTEIN 7.0 7.9 8.2   ALBUMIN 3.2* 3.6 3.5   GLOBULIN 3.8 4.3 4.7   ALT (SGPT) 5 6 8   AST (SGOT) 17 17 21   BILIRUBIN 0.2 0.3 0.3   ALK PHOS 59 68 77         Lab 04/14/25  1127 04/14/25  0952 04/09/25  2036   HSTROP T 9 10 9             Lab 04/15/25  0609 04/14/25  0952   IRON 15*  --    IRON SATURATION (TSAT) 8*  --    TIBC 194*  --    TRANSFERRIN 130*  --    FERRITIN 1,713.00*  --    FOLATE  --  8.01   VITAMIN B 12  --  917         Brief Urine Lab Results  (Last result in the past 365 days)        Color   Clarity   Blood   Leuk Est   Nitrite   Protein   CREAT   Urine HCG        04/14/25 1821 Yellow   Cloudy   Negative   Negative   Negative   Trace                 [x]  Microbiology   Microbiology Results (last 10 days)       Procedure Component Value - Date/Time    Blood Culture - Blood, Hand, Left [[id number]]  (Normal) Collected: 04/14/25 1658    Lab Status: Preliminary result Specimen: Blood from Hand, Left Updated: 04/15/25 1716     Blood Culture No growth at 24 hours    Narrative:      Less than seven (7) mL's of  blood was collected.  Insufficient quantity may yield false negative results.    Blood Culture - Blood, Arm, Right [[id number]]  (Normal) Collected: 04/14/25 1658    Lab Status: Preliminary result Specimen: Blood from Arm, Right Updated: 04/15/25 1716     Blood Culture No growth at 24 hours    Narrative:      Less than seven (7) mL's of blood was collected.  Insufficient quantity may yield false negative results.    COVID PRE-OP / PRE-PROCEDURE SCREENING ORDER (NO ISOLATION) - Swab, Nasopharynx [[id number]]  (Normal) Collected: 04/09/25 1925    Lab Status: Final result Specimen: Swab from Nasopharynx Updated: 04/09/25 2011    Narrative:      The following orders were created for panel order COVID PRE-OP / PRE-PROCEDURE SCREENING ORDER (NO ISOLATION) - Swab, Nasopharynx.  Procedure                               Abnormality         Status                     ---------                               -----------         ------                     COVID-19, FLU A/B, RSV P...[[id number]]  Normal              Final result                 Please view results for these tests on the individual orders.    COVID-19, FLU A/B, RSV PCR 1 HR TAT - Swab, Nasopharynx [[id number]]  (Normal) Collected: 04/09/25 1925    Lab Status: Final result Specimen: Swab from Nasopharynx Updated: 04/09/25 2011     COVID19 Not Detected     Influenza A PCR Not Detected     Influenza B PCR Not Detected     RSV, PCR Not Detected    Narrative:      Fact sheet for providers: https://www.fda.gov/media/812315/download    Fact sheet for patients: https://www.fda.gov/media/110056/download    Test performed by PCR.          [x]  Radiology  XR Chest 1 View  Result Date: 4/14/2025  Impression: No active disease. Electronically Signed: James Albrecht MD  4/14/2025 9:30 AM EDT  Workstation ID: [id number]    CT Head Without Contrast  Result Date: 4/9/2025  Impression: No acute intracranial abnormality. Electronically Signed: Adán Dixon MD  4/9/2025 8:14 PM EDT   Workstation ID: [id number]    XR Chest 1 View  Result Date: 2025  Impression: No acute cardiopulmonary abnormality. Electronically Signed: Davide Frazier DO  2025 7:11 PM EDT  Workstation ID: [id number]    []  EKG/Telemetry   []  Cardiology/Vascular   []  Pathology  []  Old records  []  Other:    Assessment & Plan   Assessment / Plan     Assessment:  Acute metabolic encephalopathy, improving  Generalized weakness  Hypocalcemia, improving  Hypokalemia, repleted  UTI, unknown organism  Anemia, iron deficiency along with possible chemotherapy-induced  Leukocytosis, resolved  History of malignant ovarian cancer with carcinomatosis  History of hypertension    Plan:  Patient currently being managed medicine service.  Currently receiving NS at 100 cc/h.  Will let it .    Hypercalcemia, improving.  Likely hyperglycemia due to malignancy. PTHrp pending.  Continue to trend and monitor.  Currently on IV ceftriaxone for possible UTI.  Was on p.o. antibiotic outpatient, UA nonsignificant on presentation but given immunocompromise state decided to continue IV antibiotics.  Day 3/3 today.  Will discontinue after today's dose.  Blood culture showing no growth at 24 hours.  Urine culture unable to be sent given UA nonsignificant on presentation.  Oncologist following.  Patient s/p 3 cycles of carboplatin and paclitaxel.  She is due to see GYN oncologist later this morning for debulking surgery.  Replating potassium, phosphorus and magnesium.  Receiving IV iron for iron deficiency anemia.  Continue rest of current management.  Labs in AM.  Clinical course to dictate further management.  Discussed with RN.    VTE Prophylaxis:  Pharmacologic VTE prophylaxis orders are present.        CODE STATUS:   Code Status (Patient has no pulse and is not breathing): CPR (Attempt to Resuscitate)  Medical Interventions (Patient has pulse or is breathing): Full Support  Level Of Support Discussed With: Patient      Electronically signed  by Demario Mireles MD, 04/16/25, 9:14 AM EDT.

## 2025-04-16 NOTE — THERAPY EVALUATION
Patient Name: Madina Reddy  : 1965    MRN: [id number]                              Today's Date: 2025       Admit Date: 2025    Visit Dx:     ICD-10-CM ICD-9-CM   1. Dehydration  E86.0 276.51   2. Hypercalcemia  E83.52 275.42   3. Hypokalemia  E87.6 276.8   4. Weakness  R53.1 780.79   5. Difficulty in walking  R26.2 719.7   6. Decreased activities of daily living (ADL)  Z78.9 V49.89     Patient Active Problem List   Diagnosis    Family history of breast cancer in mother    Seasonal allergic rhinitis due to pollen    Sick sinus syndrome    ICD (implantable cardioverter-defibrillator) in place    Presence of cardiac pacemaker    Annual physical exam    Essential hypertension    Chronic pain of both knees    Dry mouth    Family history of Sjogren's disease    Family history of rheumatoid arthritis    Hyperlipidemia    Sinus pressure    Allergic rhinitis    Pelvic mass    Malignant neoplasm of ovary    Nausea    Encounter for adjustment or management of vascular access device    Hypercalcemia    Moderate protein-calorie malnutrition     Past Medical History:   Diagnosis Date    Allergies     Dizziness Madina    Headache Madina    Hyperlipidemia March    Hypertension Madina    Malignant neoplasm of ovary 2025    Pacemaker     Pneumonia 2023    TMJ dysfunction Madina    Uterine cancer      Past Surgical History:   Procedure Laterality Date    BREAST BIOPSY      CARDIAC ELECTROPHYSIOLOGY PROCEDURE N/A 2023    Procedure: Device Implant;  Surgeon: VINCE Alejandre MD;  Location: [facility] INVASIVE LOCATION;  Service: Cardiology;  Laterality: N/A;    CARDIAC SURGERY      heart surgery as an infant    COLONOSCOPY  2017    PACEMAKER IMPLANTATION        General Information       Row Name 25 1331          OT Time and Intention    Document Type evaluation  -LF     Mode of Treatment individual therapy;occupational therapy  -LF       Row Name 25 1331          General  Information    Patient Profile Reviewed yes  -LF     Prior Level of Function --  Pt pleasantly consufed with her  reporting assisting with ADLs, pt sponge bathes, and states that she has not walked in ~1 week d/t weakness. Has RW and no home O2.  -     Existing Precautions/Restrictions fall  -LF     Barriers to Rehab none identified  -       Row Name 04/16/25 4615          Occupational Profile    Reason for Services/Referral (Occupational Profile) Patient is a 60 year old female admitted to [facility] for generalized weakness on April 14th, 2025. Occupational therapy consulted due to recent decline in ADLs/functional transfers. No previous occupational therapy services for current condition.  -       Row Name 04/16/25 1331          Living Environment    Current Living Arrangements home  -     People in Home spouse  -       Row Name 04/16/25 1331          Cognition    Orientation Status (Cognition) oriented to;person  -       Row Name 04/16/25 1336          Safety Issues/Impairments Affecting Functional Mobility    Safety Issues Affecting Function (Mobility) awareness of need for assistance;insight into deficits/self-awareness;judgment;problem-solving;safety precaution awareness;sequencing abilities  -     Impairments Affecting Function (Mobility) balance;cognition;endurance/activity tolerance;pain;strength  -               User Key  (r) = Recorded By, (t) = Taken By, (c) = Cosigned By      Initials Name Provider Type    LF Lindy Peña OT Occupational Therapist                     Mobility/ADL's       Row Name 04/16/25 5797          Bed Mobility    Bed Mobility supine-sit;sit-supine  -LF     Supine-Sit [place] (Bed Mobility) maximum assist (25% patient effort)  -     Sit-Supine [place] (Bed Mobility) maximum assist (25% patient effort)  -     Bed Mobility, Safety Issues decreased use of arms for pushing/pulling;decreased use of legs for bridging/pushing;impaired  trunk control for bed mobility  -     Assistive Device (Bed Mobility) bed rails;head of bed elevated;repositioning sheet  -     Comment, (Bed Mobility) Posterior lean at EOB, further functional transfers deferred d/t decreased safety awareness.  -[facility] Name 04/16/25 1341          Activities of Daily Living    BADL Assessment/Intervention bathing;upper body dressing;lower body dressing;grooming;feeding;toileting  -[facility] Name 04/16/25 1341          Bathing Assessment/Intervention    [place] Level (Bathing) bathing skills;upper body;lower body;maximum assist (25% patient effort);dependent (less than 25% patient effort)  -[facility] Name 04/16/25 1341          Upper Body Dressing Assessment/Training    [place] Level (Upper Body Dressing) upper body dressing skills;maximum assist (25% patient effort)  -[facility] Name 04/16/25 [facility]          Lower Body Dressing Assessment/Training    [place] Level (Lower Body Dressing) lower body dressing skills;maximum assist (25% patient effort);dependent (less than 25% patient effort)  -[facility] Name 04/16/25 1341          Grooming Assessment/Training    [place] Level (Grooming) grooming skills;maximum assist (25% patient effort)  -[facility] Name 04/16/25 134          Self-Feeding Assessment/Training    [place] Level (Feeding) feeding skills;maximum assist (25% patient effort)  -[facility] Name 04/16/25 134          Toileting Assessment/Training    [place] Level (Toileting) toileting skills;maximum assist (25% patient effort);dependent (less than 25% patient effort)  -               User Key  (r) = Recorded By, (t) = Taken By, (c) = Cosigned By      Initials Name Provider Type     Lindy Peña OT Occupational Therapist                   Obj/Interventions       Row Name 04/16/25 1349          Sensory Assessment (Somatosensory)    Sensory Assessment (Somatosensory) UE sensation intact  -LF       Row Name 04/16/25  1344          Vision Assessment/Intervention    Visual Impairment/Limitations WFL  -LF       Row Name 04/16/25 1344          Range of Motion Comprehensive    General Range of Motion bilateral upper extremity ROM WFL  -LF       [facility] Name 04/16/25 1344          Strength Comprehensive (MMT)    Comment, General Manual Muscle Testing (MMT) Assessment 3+/5 BUEs  -LF       [facility] Name 04/16/25 1344          Motor Skills    Motor Skills coordination;functional endurance  -LF     Coordination bilateral;upper extremity;WFL  -LF     Functional Endurance Poor+  -LF       Row Name 04/16/25 1344          Balance    Balance Assessment sitting static balance  -LF     Static Sitting Balance moderate assist  -LF     Position, Sitting Balance supported;sitting edge of bed  -LF               User Key  (r) = Recorded By, (t) = Taken By, (c) = Cosigned By      Initials Name Provider Type    LF Lindy Peña OT Occupational Therapist                   Goals/Plan       Row Name 04/16/25 1346          Bed Mobility Goal 1 (OT)    Activity/Assistive Device (Bed Mobility Goal 1, OT) bed mobility activities, all  -LF     [place] Level/Cues Needed (Bed Mobility Goal 1, OT) minimum assist (75% or more patient effort)  -LF     Time Frame (Bed Mobility Goal 1, OT) long term goal (LTG);10 days  -LF       Row Name 04/16/25 1346          Transfer Goal 1 (OT)    Activity/Assistive Device (Transfer Goal 1, OT) transfers, all  -LF     [place] Level/Cues Needed (Transfer Goal 1, OT) minimum assist (75% or more patient effort)  -LF     Time Frame (Transfer Goal 1, OT) long term goal (LTG);10 days  -LF       Row Name 04/16/25 1346          Bathing Goal 1 (OT)    Activity/Device (Bathing Goal 1, OT) bathing skills, all  -LF     [place] Level/Cues Needed (Bathing Goal 1, OT) minimum assist (75% or more patient effort)  -LF     Time Frame (Bathing Goal 1, OT) long term goal (LTG);10 days  -LF       Row Name 04/16/25 1346          Dressing Goal 1  (OT)    Activity/Device (Dressing Goal 1, OT) dressing skills, all  -LF     [place]/Cues Needed (Dressing Goal 1, OT) minimum assist (75% or more patient effort)  -LF     Time Frame (Dressing Goal 1, OT) long term goal (LTG);10 days  -       Row Name 04/16/25 1346          Toileting Goal 1 (OT)    Activity/Device (Toileting Goal 1, OT) toileting skills, all  -LF     [place] Level/Cues Needed (Toileting Goal 1, OT) minimum assist (75% or more patient effort)  -LF     Time Frame (Toileting Goal 1, OT) long term goal (LTG);10 days  -[facility] Name 04/16/25 1346          Grooming Goal 1 (OT)    Activity/Device (Grooming Goal 1, OT) grooming skills, all  -LF     [place] (Grooming Goal 1, OT) standby assist  -LF     Time Frame (Grooming Goal 1, OT) long term goal (LTG);10 days  -LF       Row Name 04/16/25 1346          Problem Specific Goal 1 (OT)    Problem Specific Goal 1 (OT) Patient will demonstrate fair endurance to support ADLs/functional transfers.  -LF     Time Frame (Problem Specific Goal 1, OT) long term goal (LTG);10 days  -LF       Row Name 04/16/25 1346          Therapy Assessment/Plan (OT)    Planned Therapy Interventions (OT) activity tolerance training;BADL retraining;functional balance retraining;occupation/activity based interventions;patient/caregiver education/training;strengthening exercise;transfer/mobility retraining  -               User Key  (r) = Recorded By, (t) = Taken By, (c) = Cosigned By      Initials Name Provider Type     Lindy Peña OT Occupational Therapist                   Clinical Impression       Row Name 04/16/25 3299          Pain Assessment    Additional Documentation Pain Scale: FACES Pre/Post-Treatment (Group)  -LF       Row Name 04/16/25 0839          Pain Scale: FACES Pre/Post-Treatment    Pain: FACES Scale, Pretreatment 0-->no hurt  -     Posttreatment Pain Rating 0-->no hurt  -LF       Row Name 04/16/25 1347          Plan of Care Review     Plan of Care Reviewed With patient  -LF     Progress no change  -LF     Outcome Evaluation Patient presents with limitations in self-care, functional transfers, balance, and endurnace. She would benefit from continued skilled occupational therapy services to maximize independence with ADLs/functional transfers.  -       Row Name 04/16/25 1343          Therapy Assessment/Plan (OT)    Patient/Family Therapy Goal Statement (OT) To maximize independence.  -LF     Rehab Potential (OT) good  -LF     Criteria for Skilled Therapeutic Interventions Met (OT) yes;meets criteria;skilled treatment is necessary  -LF     Therapy Frequency (OT) 5 times/wk  -LF       Row Name 04/16/25 1346          Therapy Plan Review/Discharge Plan (OT)    Anticipated Discharge Disposition (OT) sub acute care setting  -       Row Name 04/16/25 1344          Vital Signs    O2 Delivery Pre Treatment room air  -LF     O2 Delivery Intra Treatment room air  -LF     O2 Delivery Post Treatment room air  -       Row Name 04/16/25 1348          Positioning and Restraints    Pre-Treatment Position in bed  -LF     Post Treatment Position bed  -LF     In Bed fowlers;call light within reach;encouraged to call for assist;exit alarm on;with family/caregiver;with nsg  -LF               User Key  (r) = Recorded By, (t) = Taken By, (c) = Cosigned By      Initials Name Provider Type     Lindy Peña, OT Occupational Therapist                   Outcome Measures       Row Name 04/16/25 5289          How much help from another is currently needed...    Putting on and taking off regular lower body clothing? 1  -LF     Bathing (including washing, rinsing, and drying) 2  -LF     Toileting (which includes using toilet bed pan or urinal) 1  -LF     Putting on and taking off regular upper body clothing 2  -LF     Taking care of personal grooming (such as brushing teeth) 2  -LF     Eating meals 3  -LF     AM-PAC 6 Clicks Score (OT) 11  -LF       Row Name 04/16/25  0855 04/16/25 0800       How much help from another person do you currently need...    Turning from your back to your side while in flat bed without using bedrails? 3  -AC 3  -MASSIMO (r) TM (t) MASSIMO (c)    Moving from lying on back to sitting on the side of a flat bed without bedrails? 1  -AC 1  -MASSIMO (r) TM (t) MASSIMO (c)    Moving to and from a bed to a chair (including a wheelchair)? 1  -AC 1  -MASSIMO (r) TM (t) MASSIMO (c)    Standing up from a chair using your arms (e.g., wheelchair, bedside chair)? 1  -AC 1  -MASSIMO (r) TM (t) MASSIMO (c)    Climbing 3-5 steps with a railing? 1  -AC 1  -MASSIMO (r) TM (t) MASSIMO (c)    To walk in hospital room? 1  -AC 1  -MASSIMO (r) TM (t) MASSIMO (c)    AM-PAC 6 Clicks Score (PT) 8  -AC 8  -MASSIMO (r) TM (t)    Highest Level of Mobility Goal 3 --> Sit at edge of bed  -AC 3 --> Sit at edge of bed  -MASSIMO (r) TM (t)      Row Name 04/16/25 0506          How much help from another person do you currently need...    Turning from your back to your side while in flat bed without using bedrails? 3  -DIVYA     Moving from lying on back to sitting on the side of a flat bed without bedrails? 3  -DIVYA     Moving to and from a bed to a chair (including a wheelchair)? 2  -DIVYA     Standing up from a chair using your arms (e.g., wheelchair, bedside chair)? 2  -DIVYA     Climbing 3-5 steps with a railing? 1  -DIVYA     To walk in hospital room? 1  -DIVYA     AM-PAC 6 Clicks Score (PT) 12  -DIVYA     Highest Level of Mobility Goal 4 --> Transfer to chair/commode  -DIVYA       Row Name 04/16/25 1347 04/16/25 0800       Functional Assessment    Outcome Measure Options AM-PAC 6 Clicks Daily Activity (OT);Optimal Instrument  -LF AM-PAC 6 Clicks Basic Mobility (PT)  -MASSIMO (r) TM (t) MASSIMO (c)      Row Name 04/16/25 1347          Optimal Instrument    Optimal Instrument Optimal - 3  -LF     Bending/Stooping 4  -LF     Standing 5  -LF     Reaching 1  -LF     From the list, choose the 3 activities you would most like to be able to do without any difficulty  Bending/stooping;Standing;Reaching  -LF     Total Score Optimal - 3 10  -LF               User Key  (r) = Recorded By, (t) = Taken By, (c) = Cosigned By      Initials Name Provider Type    LF Lindy Peña, VIRAL Occupational Therapist    Troy Dhaliwal, PT Physical Therapist    Loren Diaz, RN Registered Nurse    Raghavendra Correa, RN Registered Nurse    Brijesh Ramirez, PT Student PT Student                    Occupational Therapy Education       Title: PT OT SLP Therapies (In Progress)       Topic: Occupational Therapy (In Progress)       Point: ADL training (In Progress)       Learning Progress Summary            Patient Acceptance, E,TB, NR by  at 4/16/2025 1347                      Point: Precautions (In Progress)       Learning Progress Summary            Patient Acceptance, E,TB, NR by  at 4/16/2025 1347                      Point: Body mechanics (In Progress)       Learning Progress Summary            Patient Acceptance, E,TB, NR by  at 4/16/2025 1347                                      User Key       Initials Effective Dates Name Provider Type Discipline     06/16/21 -  Lindy Peña OT Occupational Therapist OT                  OT Recommendation and Plan  Planned Therapy Interventions (OT): activity tolerance training, BADL retraining, functional balance retraining, occupation/activity based interventions, patient/caregiver education/training, strengthening exercise, transfer/mobility retraining  Therapy Frequency (OT): 5 times/wk  Plan of Care Review  Plan of Care Reviewed With: patient  Progress: no change  Outcome Evaluation: Patient presents with limitations in self-care, functional transfers, balance, and endurnace. She would benefit from continued skilled occupational therapy services to maximize independence with ADLs/functional transfers.     Time Calculation:   Evaluation Complexity (OT)  Review Occupational Profile/Medical/Therapy History Complexity: brief/low  complexity  Assessment, Occupational Performance/Identification of Deficit Complexity: 3-5 performance deficits  Clinical Decision Making Complexity (OT): problem focused assessment/low complexity  Overall Complexity of Evaluation (OT): low complexity     Time Calculation- OT       Row Name 04/16/25 1348             Time Calculation- OT    OT Received On 04/16/25  -LF      OT Goal Re-Cert Due Date 04/25/25  -LF         Untimed Charges    OT Eval/Re-eval Minutes 35  -LF         Total Minutes    Untimed Charges Total Minutes 35  -LF       Total Minutes 35  -LF                User Key  (r) = Recorded By, (t) = Taken By, (c) = Cosigned By      Initials Name Provider Type    LF Lindy Peña, VIRAL Occupational Therapist                  Therapy Charges for Today       Code Description Service Date Service Provider Modifiers Qty    [id number] HC OT EVAL LOW COMPLEXITY 3 4/16/2025 Lindy Peña OT GO 1                 Lindy Peña OT  4/16/2025

## 2025-04-16 NOTE — PLAN OF CARE
Goal Outcome Evaluation:  Plan of Care Reviewed With: patient        Progress: no change  Outcome Evaluation: A&Ox3 with intermittent confusion. Forgetful. VSS on room air. Turned q2 hours.  No complaints of pain voiced.  IVF of NS at 100 ml.hr continues infusing well.  No acute distress noted.  Care plan continues. Call bell within reach at all times for needs and safety.

## 2025-04-16 NOTE — THERAPY EVALUATION
Acute Care - Physical Therapy Initial Evaluation   Artur     Patient Name: Madina Reddy  : 1965  MRN: [id number]  Today's Date: 2025      Visit Dx:     ICD-10-CM ICD-9-CM   1. Dehydration  E86.0 276.51   2. Hypercalcemia  E83.52 275.42   3. Hypokalemia  E87.6 276.8   4. Weakness  R53.1 780.79   5. Difficulty in walking  R26.2 719.7     Patient Active Problem List   Diagnosis    Family history of breast cancer in mother    Seasonal allergic rhinitis due to pollen    Sick sinus syndrome    ICD (implantable cardioverter-defibrillator) in place    Presence of cardiac pacemaker    Annual physical exam    Essential hypertension    Chronic pain of both knees    Dry mouth    Family history of Sjogren's disease    Family history of rheumatoid arthritis    Hyperlipidemia    Sinus pressure    Allergic rhinitis    Pelvic mass    Malignant neoplasm of ovary    Nausea    Encounter for adjustment or management of vascular access device    Hypercalcemia    Moderate protein-calorie malnutrition     Past Medical History:   Diagnosis Date    Allergies     Dizziness Madina    Headache Madina    Hyperlipidemia March    Hypertension Madina    Malignant neoplasm of ovary 2025    Pacemaker     Pneumonia 2023    TMJ dysfunction Madina    Uterine cancer      Past Surgical History:   Procedure Laterality Date    BREAST BIOPSY      CARDIAC ELECTROPHYSIOLOGY PROCEDURE N/A 2023    Procedure: Device Implant;  Surgeon: VINCE Alejandre MD;  Location: [facility] INVASIVE LOCATION;  Service: Cardiology;  Laterality: N/A;    CARDIAC SURGERY      heart surgery as an infant    COLONOSCOPY  2017    PACEMAKER IMPLANTATION       PT Assessment (Last 12 Hours)       PT Evaluation and Treatment       Row Name 25 0800          Physical Therapy Time and Intention    Subjective Information complains of;weakness;fatigue (P)   -TM     Document Type evaluation (P)   -TM     Mode of Treatment individual  therapy (P)   -     Patient Effort good (P)   -[facility] Name 04/16/25 0800          General Information    Prior Level of Function independent:;all household mobility (P)   -     Existing Precautions/Restrictions fall (P)   -[facility] Name 04/16/25 0800          Living Environment    Current Living Arrangements home (P)   -     People in Home spouse (P)   -     Primary Care Provided by spouse/significant other (P)   -[facility] Name 04/16/25 0800          Range of Motion (ROM)    Range of Motion ROM is WNL;bilateral lower extremities (P)   -[facility] Name 04/16/25 0800          Strength (Manual Muscle Testing)    Strength (Manual Muscle Testing) bilateral lower extremities (P)   global BLE 3+/5  -[facility] Name 04/16/25 0800          Bed Mobility    Bed Mobility supine-sit (P)   -     Supine-Sit Parmer (Bed Mobility) maximum assist (25% patient effort) (P)   -TM       Row Name 04/16/25 0800          Transfers    Transfers sit-stand transfer (P)   -TM       Row Name 04/16/25 0800          Sit-Stand Transfer    Sit-Stand Parmer (Transfers) maximum assist (25% patient effort) (P)   -[facility] Name 04/16/25 0800          Gait/Stairs (Locomotion)    Gait/Stairs Locomotion gait/ambulation independence;gait/ambulation assistive device (P)   -     Parmer Level (Gait) unable to assess (P)   unable to ambulate due to excessive weakness  -     Assistive Device (Gait) walker, front-wheeled (P)   -     Patient was able to Ambulate no, other medical factors prevent ambulation (P)   -     Reason Patient was unable to Ambulate Excessive Weakness (P)   -[facility] Name 04/16/25 0800          Safety Issues/Impairments Affecting Functional Mobility    Impairments Affecting Function (Mobility) balance;endurance/activity tolerance;strength (P)   -[facility] Name 04/16/25 0800          Balance    Balance Assessment standing static balance (P)   -     Static Standing Balance  maximum assist (P)   -TM       Row Name 04/16/25 0800          Plan of Care Review    Plan of Care Reviewed With patient (P)   -TM     Outcome Evaluation Pt presents with excessive weakness and deficits with balance and endurance and requires assistance to safely stand. Pt requires skilled therapy services via sub acute rehab to address these impairments (P)   -TM       Row Name 04/16/25 0800          Therapy Assessment/Plan (PT)    Patient/Family Therapy Goals Statement (PT) Walk safely (P)   -TM     Rehab Potential (PT) good (P)   -TM     Criteria for Skilled Interventions Met (PT) skilled treatment is necessary (P)   -TM     Therapy Frequency (PT) daily (P)   -TM     Predicted Duration of Therapy Intervention (PT) 10 days (P)   -TM     Problem List (PT) problems related to;balance;mobility;strength (P)   -TM     Activity Limitations Related to Problem List (PT) unable to ambulate safely (P)   -TM       Row Name 04/16/25 0800          PT Evaluation Complexity    History, PT Evaluation Complexity no personal factors and/or comorbidities (P)   -TM     Examination of Body Systems (PT Eval Complexity) total of 4 or more elements (P)   -TM     Clinical Presentation (PT Evaluation Complexity) stable (P)   -TM     Clinical Decision Making (PT Evaluation Complexity) low complexity (P)   -TM     Overall Complexity (PT Evaluation Complexity) low complexity (P)   -TM       Row Name 04/16/25 0800          Therapy Plan Review/Discharge Plan (PT)    Therapy Plan Review (PT) evaluation/treatment results reviewed;patient (P)   -TM       Row Name 04/16/25 0800          Physical Therapy Goals    Bed Mobility Goal Selection (PT) bed mobility, PT goal 1 (P)   -TM     Transfer Goal Selection (PT) transfer, PT goal 1 (P)   -TM     Gait Training Goal Selection (PT) gait training, PT goal 1 (P)   -TM     Strength Goal Selection (PT) strength, PT goal 1;strength, PT goal 2;strength, PT goal 3 (P)   -TM     Balance Goal Selection (PT)  balance, PT goal 1 (P)   -TM       Row Name 04/16/25 0800          Bed Mobility Goal 1 (PT)    Activity/Assistive Device (Bed Mobility Goal 1, PT) bed mobility activities, all (P)   -TM     Hayes Level/Cues Needed (Bed Mobility Goal 1, PT) independent (P)   -TM     Time Frame (Bed Mobility Goal 1, PT) 10 days (P)   -TM       Row Name 04/16/25 0800          Transfer Goal 1 (PT)    Activity/Assistive Device (Transfer Goal 1, PT) transfers, all (P)   -TM     Hayes Level/Cues Needed (Transfer Goal 1, PT) independent (P)   -TM     Time Frame (Transfer Goal 1, PT) 10 days (P)   -TM       Row Name 04/16/25 0800          Gait Training Goal 1 (PT)    Activity/Assistive Device (Gait Training Goal 1, PT) gait (walking locomotion);assistive device use (P)   -TM     Hayes Level (Gait Training Goal 1, PT) minimum assist (75% or more patient effort) (P)   -TM     Distance (Gait Training Goal 1, PT) 25' (P)   -TM     Time Frame (Gait Training Goal 1, PT) 10 days (P)   -TM       Row Name 04/16/25 0800          Strength Goal 1 (PT)    Strength Goal 1 (PT) bilateral hip flexion 5/5 (P)   -TM     Time Frame (Strength Goal 1, PT) 10 days (P)   -TM       Row Name 04/16/25 0800          Strength Goal 2 (PT)    Strength Goal 2 (PT) bilateral knee extension 5/5 (P)   -TM     Time Frame (Strength Goal 2, PT) 10 days (P)   -TM       Row Name 04/16/25 0800          Strength Goal 3 (PT)    Strength Goal 3 (PT) bilateral ankle dorsiflexion 5/5 (P)   -TM     Time Frame (Strength Goal 3, PT) 10 days (P)   -TM       Row Name 04/16/25 0800          Balance Goal 1 (PT)    Activity/Assistive Device (Balance Goal) standing static balance (P)   -TM     Hayes Level/Cues Needed (Balance Goal 1, PT) independent (P)   -TM     Time Frame (Balance Goal 1, PT) 2 weeks (P)   -TM               User Key  (r) = Recorded By, (t) = Taken By, (c) = Cosigned By      Initials Name Provider Type    TM Brijesh Stephenson, PT Student PT Student                     Physical Therapy Education       Title: PT OT SLP Therapies (Done)       Topic: Physical Therapy (Done)       Point: Mobility training (Done)       Learning Progress Summary            Patient Acceptance, E,TB, VU by TM at 4/16/2025 0850                      Point: Home exercise program (Done)       Learning Progress Summary            Patient Acceptance, E,TB, VU by TM at 4/16/2025 0850                      Point: Body mechanics (Done)       Learning Progress Summary            Patient Acceptance, E,TB, VU by TM at 4/16/2025 0850                      Point: Precautions (Done)       Learning Progress Summary            Patient Acceptance, E,TB, VU by TM at 4/16/2025 0850                                      User Key       Initials Effective Dates Name Provider Type Discipline    TM 02/04/25 -  Brijesh Stephenson, PT Student PT Student PT                  PT Recommendation and Plan  Anticipated Discharge Disposition (PT): (P) sub acute care setting  Planned Therapy Interventions (PT): (P) balance training, bed mobility training, gait training, home exercise program, neuromuscular re-education, patient/family education, strengthening, transfer training  Therapy Frequency (PT): (P) daily  Plan of Care Reviewed With: (P) patient  Outcome Evaluation: (P) Pt presents with excessive weakness and deficits with balance and endurance and requires assistance to safely stand. Pt requires skilled therapy services via sub acute rehab to address these impairments   Outcome Measures       Row Name 04/16/25 0800             How much help from another person do you currently need...    Turning from your back to your side while in flat bed without using bedrails? 3 (P)   -TM      Moving from lying on back to sitting on the side of a flat bed without bedrails? 1 (P)   -TM      Moving to and from a bed to a chair (including a wheelchair)? 1 (P)   -TM      Standing up from a chair using your arms (e.g., wheelchair, bedside  chair)? 1 (P)   -TM      Climbing 3-5 steps with a railing? 1 (P)   -TM      To walk in hospital room? 1 (P)   -TM      AM-PAC 6 Clicks Score (PT) 8 (P)   -TM         Functional Assessment    Outcome Measure Options AM-PAC 6 Clicks Basic Mobility (PT) (P)   -TM                User Key  (r) = Recorded By, (t) = Taken By, (c) = Cosigned By      Initials Name Provider Type    TM Brijesh Stephenson PT Student PT Student                     Time Calculation:    PT Charges       Row Name 04/16/25 0842             Time Calculation    PT Received On 04/16/25 (P)   -TM      PT Goal Re-Cert Due Date 04/25/25 (P)   -TM         Untimed Charges    PT Eval/Re-eval Minutes 25 (P)   -TM         Total Minutes    Untimed Charges Total Minutes 25 (P)   -TM       Total Minutes 25 (P)   -TM                User Key  (r) = Recorded By, (t) = Taken By, (c) = Cosigned By      Initials Name Provider Type    TM Brijesh Stephenson, PT Student PT Student                  Therapy Charges for Today       Code Description Service Date Service Provider Modifiers Qty    16780239938 HC PT EVAL LOW COMPLEXITY 2 4/16/2025 Brijesh Stephenson, PT Student GP 1            PT G-Codes  Outcome Measure Options: (P) AM-PAC 6 Clicks Basic Mobility (PT)  AM-PAC 6 Clicks Score (PT): (P) 8    JESSICA Caro  4/16/2025

## 2025-04-16 NOTE — PROGRESS NOTES
[facility]     Progress Note    Patient Name: Madina Reddy  : 1965  MRN: [id number]  Primary Care Physician:  Kady Vernon APRN  Date of admission: 2025    Subjective   Subjective     Chief Complaint: Feeling weak    HPI:  Patient Reports feeling a little better this morning.  She still feels weak but not as bad as before hospitalization.  She was able to eat and drink a little bit.  She denies fever or chills.  She denies obvious blood loss or excessive bruising.      Objective   Objective     Vitals:   Temp:  [98.1 °F (36.7 °C)-99.1 °F (37.3 °C)] 99 °F (37.2 °C)  Heart Rate:  [88-99] 99  Resp:  [18-20] 20  BP: (122-132)/(76-85) 130/80    Physical Exam    Constitutional: Awake, alert   Eyes: PERRLA, sclerae anicteric, no conjunctival injection   HENT: NCAT, mucous membranes moist   Neck: Supple, no thyromegaly, no lymphadenopathy, trachea midline   Respiratory: Clear to auscultation bilaterally, nonlabored respirations    Cardiovascular: RRR, no murmurs, rubs, or gallops  Gastrointestinal: Positive bowel sounds, soft, tender in the lower quadrants without rebound or guarding, nondistended   Musculoskeletal: No bilateral ankle edema   Psychiatric: Appropriate affect, cooperative  Neurologic: Oriented x 3, Grossly nonfocal but globally weak, speech clear     Result Review    Result Review:  I have personally reviewed the results from the time of this admission to 2025 08:08 EDT and agree with these findings:  [x]  Laboratory  []  Microbiology  []  Radiology  []  EKG/Telemetry   []  Cardiology/Vascular   []  Pathology  []  Old records  []  Other:  Most notable findings include: PTH 9.2, calcium 10.2, potassium 2.4, creatinine 0.35, WBC 11, hemoglobin 7.5, platelet 290    Assessment & Plan   Assessment / Plan     Brief Patient Summary:  Madina Reddy is a 60 y.o. female who admitted to hospital for weakness and presumptive UTI.  Noted to have hypercalcemia.    Active Hospital  Problems:  Active Hospital Problems    Diagnosis     **Hypercalcemia     Moderate protein-calorie malnutrition      Plan:   Ovarian cancer.  Status post 3 cycles of carboplatin and paclitaxel with plans for debulking surgery next week at [facility].    Hypercalcemia.  Calcium level is normalizing.  PTH is decreased.  PTH related peptide is pending but this could be hypercalcemia of malignancy.  If the calcium trends up again, bisphosphonate would be appropriate.    Anemia.  Multifactorial.  She does have evidence of iron deficiency and received IV iron yesterday.  Likely a component of anemia from chemotherapy given her recent treatments.  Her nutrition status is also borderline as evidenced by low creatinine.  Monitor.  Consider transfusion for hemoglobin <7.    UTI.  UA was suggestive of UTI when she was in the emergency room last week.  No culture was obtained.  Further treatment per hospitalist service.       VTE Prophylaxis:  Pharmacologic VTE prophylaxis orders are present.        CODE STATUS:   Code Status (Patient has no pulse and is not breathing): CPR (Attempt to Resuscitate)  Medical Interventions (Patient has pulse or is breathing): Full Support  Level Of Support Discussed With: Patient        Electronically signed by Waylon Arndt MD, 04/16/25, 8:08 AM EDT.

## 2025-04-16 NOTE — PLAN OF CARE
Goal Outcome Evaluation:  Plan of Care Reviewed With: (P) patient           Outcome Evaluation: (P) Pt presents with excessive weakness and deficits with balance and endurance and requires assistance to safely stand. Pt requires skilled therapy services via sub acute rehab to address these impairments    Anticipated Discharge Disposition (PT): (P) sub acute care setting

## 2025-04-16 NOTE — PLAN OF CARE
Goal Outcome Evaluation:  Plan of Care Reviewed With: patient        Progress: no change  Outcome Evaluation: Patient presents with limitations in self-care, functional transfers, balance, and endurnace. She would benefit from continued skilled occupational therapy services to maximize independence with ADLs/functional transfers.    Anticipated Discharge Disposition (OT): sub acute care setting

## 2025-04-16 NOTE — PLAN OF CARE
Goal Outcome Evaluation:              Outcome Evaluation: Potassium, Phosphorus, and Magnesium replaced. Bowel regimen initiated. Complaints of indigestion, treated see MAR. IV fluids stopped. A&Ox3. Intermittent confusion. Room air. Call light in reach. Family at bedside. Spouse updated. Port dressing changed. Supplemental drinks provided.

## 2025-04-17 LAB
ABO GROUP BLD: NORMAL
ABO GROUP BLD: NORMAL
ANION GAP SERPL CALCULATED.3IONS-SCNC: 11.3 MMOL/L (ref 5–15)
BASOPHILS # BLD AUTO: 0.04 10*3/MM3 (ref 0–0.2)
BASOPHILS NFR BLD AUTO: 0.4 % (ref 0–1.5)
BLD GP AB SCN SERPL QL: NEGATIVE
BUN SERPL-MCNC: 9 MG/DL (ref 6–20)
BUN/CREAT SERPL: 22 (ref 7–25)
CALCIUM SPEC-SCNC: 10.8 MG/DL (ref 8.6–10.5)
CHLORIDE SERPL-SCNC: 104 MMOL/L (ref 98–107)
CO2 SERPL-SCNC: 25.7 MMOL/L (ref 22–29)
CREAT SERPL-MCNC: 0.41 MG/DL (ref 0.57–1)
DEPRECATED RDW RBC AUTO: 55.2 FL (ref 37–54)
EGFRCR SERPLBLD CKD-EPI 2021: 113.5 ML/MIN/1.73
EOSINOPHIL # BLD AUTO: 0.04 10*3/MM3 (ref 0–0.4)
EOSINOPHIL NFR BLD AUTO: 0.4 % (ref 0.3–6.2)
ERYTHROCYTE [DISTWIDTH] IN BLOOD BY AUTOMATED COUNT: 16.6 % (ref 12.3–15.4)
GLUCOSE SERPL-MCNC: 111 MG/DL (ref 65–99)
HCT VFR BLD AUTO: 24.6 % (ref 34–46.6)
HGB BLD-MCNC: 7.5 G/DL (ref 12–15.9)
IMM GRANULOCYTES # BLD AUTO: 0.16 10*3/MM3 (ref 0–0.05)
IMM GRANULOCYTES NFR BLD AUTO: 1.5 % (ref 0–0.5)
LYMPHOCYTES # BLD AUTO: 1.23 10*3/MM3 (ref 0.7–3.1)
LYMPHOCYTES NFR BLD AUTO: 11.2 % (ref 19.6–45.3)
MAGNESIUM SERPL-MCNC: 2 MG/DL (ref 1.6–2.6)
MCH RBC QN AUTO: 27.6 PG (ref 26.6–33)
MCHC RBC AUTO-ENTMCNC: 30.5 G/DL (ref 31.5–35.7)
MCV RBC AUTO: 90.4 FL (ref 79–97)
MONOCYTES # BLD AUTO: 1.1 10*3/MM3 (ref 0.1–0.9)
MONOCYTES NFR BLD AUTO: 10 % (ref 5–12)
NEUTROPHILS NFR BLD AUTO: 76.5 % (ref 42.7–76)
NEUTROPHILS NFR BLD AUTO: 8.4 10*3/MM3 (ref 1.7–7)
NRBC BLD AUTO-RTO: 0 /100 WBC (ref 0–0.2)
PHOSPHATE SERPL-MCNC: 2.8 MG/DL (ref 2.5–4.5)
PLATELET # BLD AUTO: 268 10*3/MM3 (ref 140–450)
PMV BLD AUTO: 10.4 FL (ref 6–12)
POTASSIUM SERPL-SCNC: 3.5 MMOL/L (ref 3.5–5.2)
RBC # BLD AUTO: 2.72 10*6/MM3 (ref 3.77–5.28)
RH BLD: POSITIVE
RH BLD: POSITIVE
SODIUM SERPL-SCNC: 141 MMOL/L (ref 136–145)
T&S EXPIRATION DATE: NORMAL
WBC NRBC COR # BLD AUTO: 10.97 10*3/MM3 (ref 3.4–10.8)

## 2025-04-17 PROCEDURE — 86900 BLOOD TYPING SEROLOGIC ABO: CPT

## 2025-04-17 PROCEDURE — 86900 BLOOD TYPING SEROLOGIC ABO: CPT | Performed by: STUDENT IN AN ORGANIZED HEALTH CARE EDUCATION/TRAINING PROGRAM

## 2025-04-17 PROCEDURE — 25810000003 SODIUM CHLORIDE 0.9 % SOLUTION: Performed by: STUDENT IN AN ORGANIZED HEALTH CARE EDUCATION/TRAINING PROGRAM

## 2025-04-17 PROCEDURE — 63710000001 ONDANSETRON ODT 4 MG TABLET DISPERSIBLE: Performed by: STUDENT IN AN ORGANIZED HEALTH CARE EDUCATION/TRAINING PROGRAM

## 2025-04-17 PROCEDURE — 86923 COMPATIBILITY TEST ELECTRIC: CPT

## 2025-04-17 PROCEDURE — 86901 BLOOD TYPING SEROLOGIC RH(D): CPT

## 2025-04-17 PROCEDURE — 25010000002 NA FERRIC GLUC CPLX PER 12.5 MG: Performed by: STUDENT IN AN ORGANIZED HEALTH CARE EDUCATION/TRAINING PROGRAM

## 2025-04-17 PROCEDURE — G0378 HOSPITAL OBSERVATION PER HR: HCPCS

## 2025-04-17 PROCEDURE — 97530 THERAPEUTIC ACTIVITIES: CPT

## 2025-04-17 PROCEDURE — 85025 COMPLETE CBC W/AUTO DIFF WBC: CPT | Performed by: STUDENT IN AN ORGANIZED HEALTH CARE EDUCATION/TRAINING PROGRAM

## 2025-04-17 PROCEDURE — 99233 SBSQ HOSP IP/OBS HIGH 50: CPT | Performed by: INTERNAL MEDICINE

## 2025-04-17 PROCEDURE — 83735 ASSAY OF MAGNESIUM: CPT | Performed by: STUDENT IN AN ORGANIZED HEALTH CARE EDUCATION/TRAINING PROGRAM

## 2025-04-17 PROCEDURE — 36430 TRANSFUSION BLD/BLD COMPNT: CPT

## 2025-04-17 PROCEDURE — 84100 ASSAY OF PHOSPHORUS: CPT | Performed by: STUDENT IN AN ORGANIZED HEALTH CARE EDUCATION/TRAINING PROGRAM

## 2025-04-17 PROCEDURE — 25010000002 POTASSIUM CHLORIDE 10 MEQ/100ML SOLUTION: Performed by: STUDENT IN AN ORGANIZED HEALTH CARE EDUCATION/TRAINING PROGRAM

## 2025-04-17 PROCEDURE — 86901 BLOOD TYPING SEROLOGIC RH(D): CPT | Performed by: STUDENT IN AN ORGANIZED HEALTH CARE EDUCATION/TRAINING PROGRAM

## 2025-04-17 PROCEDURE — 99232 SBSQ HOSP IP/OBS MODERATE 35: CPT | Performed by: STUDENT IN AN ORGANIZED HEALTH CARE EDUCATION/TRAINING PROGRAM

## 2025-04-17 PROCEDURE — P9016 RBC LEUKOCYTES REDUCED: HCPCS

## 2025-04-17 PROCEDURE — 80048 BASIC METABOLIC PNL TOTAL CA: CPT | Performed by: STUDENT IN AN ORGANIZED HEALTH CARE EDUCATION/TRAINING PROGRAM

## 2025-04-17 PROCEDURE — 86850 RBC ANTIBODY SCREEN: CPT | Performed by: STUDENT IN AN ORGANIZED HEALTH CARE EDUCATION/TRAINING PROGRAM

## 2025-04-17 PROCEDURE — 25010000002 ENOXAPARIN PER 10 MG: Performed by: INTERNAL MEDICINE

## 2025-04-17 RX ORDER — POTASSIUM CHLORIDE 7.45 MG/ML
10 INJECTION INTRAVENOUS
Status: COMPLETED | OUTPATIENT
Start: 2025-04-17 | End: 2025-04-17

## 2025-04-17 RX ADMIN — FLUTICASONE PROPIONATE 2 SPRAY: 50 SPRAY, METERED NASAL at 09:09

## 2025-04-17 RX ADMIN — METOPROLOL SUCCINATE 25 MG: 25 TABLET, EXTENDED RELEASE ORAL at 09:08

## 2025-04-17 RX ADMIN — OLANZAPINE 5 MG: 5 TABLET, FILM COATED ORAL at 20:11

## 2025-04-17 RX ADMIN — ACETAMINOPHEN 650 MG: 325 TABLET ORAL at 20:11

## 2025-04-17 RX ADMIN — SODIUM CHLORIDE 250 MG: 9 INJECTION, SOLUTION INTRAVENOUS at 17:36

## 2025-04-17 RX ADMIN — ENOXAPARIN SODIUM 30 MG: 100 INJECTION SUBCUTANEOUS at 09:08

## 2025-04-17 RX ADMIN — POTASSIUM CHLORIDE 10 MEQ: 7.46 INJECTION, SOLUTION INTRAVENOUS at 11:37

## 2025-04-17 RX ADMIN — BISACODYL 5 MG: 5 TABLET, COATED ORAL at 18:13

## 2025-04-17 RX ADMIN — ASPIRIN 81 MG: 81 TABLET, CHEWABLE ORAL at 09:08

## 2025-04-17 RX ADMIN — Medication 10 ML: at 20:12

## 2025-04-17 RX ADMIN — POTASSIUM CHLORIDE 10 MEQ: 7.46 INJECTION, SOLUTION INTRAVENOUS at 10:36

## 2025-04-17 RX ADMIN — POTASSIUM CHLORIDE 10 MEQ: 7.46 INJECTION, SOLUTION INTRAVENOUS at 12:33

## 2025-04-17 RX ADMIN — ONDANSETRON 4 MG: 4 TABLET, ORALLY DISINTEGRATING ORAL at 07:19

## 2025-04-17 RX ADMIN — ONDANSETRON 4 MG: 4 TABLET, ORALLY DISINTEGRATING ORAL at 20:19

## 2025-04-17 RX ADMIN — MONTELUKAST 10 MG: 10 TABLET, FILM COATED ORAL at 20:12

## 2025-04-17 RX ADMIN — Medication 10 ML: at 09:08

## 2025-04-17 NOTE — PROGRESS NOTES
[facility]     Progress Note    Patient Name: Madina Reddy  : 1965  MRN: [id number]  Primary Care Physician:  Kady Vernon APRN  Date of admission: 2025    Subjective   Subjective     Chief Complaint: Nausea    HPI:  Patient Reports nausea this morning.  She was yesterday.  She has not moved her bowels in a couple days but did receive laxative last night.  She notes that she feels very weak but she is trying to move about.  No issues from her Port-A-Cath.      Objective   Objective     Vitals:   Temp:  [98.4 °F (36.9 °C)-101.1 °F (38.4 °C)] 98.9 °F (37.2 °C)  Heart Rate:  [] 98  Resp:  [16-18] 16  BP: (112-143)/(69-82) 128/72    Physical Exam    Constitutional: Awake, alert   Eyes: PERRLA, sclerae anicteric, no conjunctival injection   HENT: NCAT, mucous membranes moist   Neck: Supple, no thyromegaly, no lymphadenopathy, trachea midline   Respiratory: Clear to auscultation bilaterally, nonlabored respirations    Cardiovascular: RRR, no murmurs, rubs, or gallops   Gastrointestinal: Pos bowel sounds, soft, tender in the lower quadrants without rebound or guarding, nondistended   Musculoskeletal: No bilateral ankle edema   Psychiatric: Appropriate affect, cooperative     Result Review    Result Review:  I have personally reviewed the results from the time of this admission to 2025 07:44 EDT and agree with these findings:  [x]  Laboratory  [x]  Microbiology cultures negative to date  []  Radiology  []  EKG/Telemetry   []  Cardiology/Vascular   []  Pathology  []  Old records  []  Other:  Most notable findings include: WBC 10.9, hemoglobin 7.5, platelet 268, creatinine 0.41, BUN 9, calcium 10.8, potassium 3.5    Assessment & Plan   Assessment / Plan     Brief Patient Summary:  Madina Reddy is a 60 y.o. female who is admitted to the hospital for altered mental status, weakness and presumptive UTI.  She has history of ovarian cancer and has completed 3 cycles of carboplatin and  paclitaxel with plans for debulking surgery later this month.    Active Hospital Problems:  Active Hospital Problems    Diagnosis     **Hypercalcemia     Moderate protein-calorie malnutrition      Plan:   Ovarian cancer.  Status post 3 cycles of carboplatin and paclitaxel.  Plans for debulking surgery later this month at [facility].    Anemia.  Multifactorial including recent chemotherapy.  She does show borderline iron levels and received a dose of IV iron.  Hemoglobin today is 7.5 which is relatively stable compared yesterday.  She denies obvious bleeding.    Hypercalcemia.  PTH related peptide pending but PTH was normal.  This would suggest hypercalcemia of malignancy.  The calcium trend is better but still mildly elevated.  Consider bisphosphonate if increases again.    Deconditioning.  Patient working with therapy.       VTE Prophylaxis:  Pharmacologic VTE prophylaxis orders are present.        CODE STATUS:   Code Status (Patient has no pulse and is not breathing): CPR (Attempt to Resuscitate)  Medical Interventions (Patient has pulse or is breathing): Full Support  Level Of Support Discussed With: Patient        Electronically signed by Waylon Arndt MD, 04/17/25, 7:44 AM EDT.

## 2025-04-17 NOTE — PLAN OF CARE
Problem: Adult Inpatient Plan of Care  Goal: Plan of Care Review  Outcome: Progressing  Flowsheets (Taken 4/17/2025 0405)  Progress: no change  Outcome Evaluation: Patient alert and oriented with forgetfulness, VSS, on room air, NSR to ST on tele monitor, no s/s of distress, denies any pain at this time, able to sleep in between care, call light within reach, care plan ongoing  Plan of Care Reviewed With: patient   Goal Outcome Evaluation:  Plan of Care Reviewed With: patient

## 2025-04-17 NOTE — PLAN OF CARE
Goal Outcome Evaluation:  Plan of Care Reviewed With: patient, spouse      Zofran x1 given. IV K+ runs x3. 1 unit of blood given. Patient worked well with PT. Speech, nutrition, palliative care consulted. IV iron x1 given. Dulcolax x1 given for BM. No other complaints from patient during shift.

## 2025-04-17 NOTE — PROGRESS NOTES
[facility]   Hospitalist Progress Note  Date: 2025  Patient Name: Madina Reddy  : 1965  MRN: [id number]  Date of admission: 2025  Room/Bed: 259/1      Subjective   Subjective     Chief Complaint: Weakness, confusion, hypercalcemia    Summary:Madina Reddy is a 60 y.o. female with history of malignant ovarian cancer with carcinomatosis and hepatic lesion currently status post 3 cycles of chemotherapy and hypertension who presented with weakness, confusion and found to have hypercalcemia.  Patient was recently diagnosed with UTI and/9 and was sent home on oral antibiotic.  Calcium during that time was 12.6.  Even after receiving antibiotic, patient did not show any improvement and gradually her symptoms worsen after which her partner brought her to the hospital.  On presentation, calcium was 12.1.  Hemoglobin 9.  Patient started on IV fluids and was admitted for further evaluation management.  On IV ceftriaxone for possible UTI.  Blood culture sent.  Generalized weakness present.  Iron-deficiency anemia, receiving IV iron.  Repleting electrolytes.  Having intermittent confusion.    Interval Followup: No acute events overnight.  Patient sitting in bed, answering most questions appropriately but intermittently confused.  Patient's  at bedside, he agreed to reach out to surgeon at [place] if they are willing to go ahead with debulking surgery which was scheduled for coming Monday.  Discussed with patient's  that less likely that he might agree with surgery given her current clinical status.  Palliative consulted for goals of care discussion.   agreed with that.  Transfusing 1 unit of PRBC given symptomatic anemia with hemoglobin 7.5 today    Review of Systems    All systems reviewed and negative except for what is outlined above.      Objective   Objective     Vitals:   Temp:  [97.7 °F (36.5 °C)-101.1 °F (38.4 °C)] 97.7 °F (36.5 °C)  Heart Rate:  [] 114  Resp:   [16-18] 16  BP: (112-143)/(69-82) 131/74    Physical Exam   General: Awake, alert, seems weak, NAD  Cardiovascular: RRR, no murmurs   Pulmonary: CTA bilaterally; no wheezes; no conversational dyspnea  Gastrointestinal: S/ND/NT, +BS  Neuro: Alert, awake, answering all questions appropriately but intermittently confused; speech clear    Result Review    Result Review:  I have personally reviewed these results:  [x]  Laboratory      Lab 04/17/25  0509 04/16/25  1613 04/16/25  0630 04/15/25  0609   WBC 10.97*  --  11.01* 9.59   HEMOGLOBIN 7.5* 7.7* 7.5* 7.8*   HEMATOCRIT 24.6* 24.8* 24.4* 25.7*   PLATELETS 268  --  290 264   NEUTROS ABS 8.40*  --  8.69* 7.39*   IMMATURE GRANS (ABS) 0.16*  --  0.15* 0.09*   LYMPHS ABS 1.23  --  1.15 1.16   MONOS ABS 1.10*  --  0.96* 0.91*   EOS ABS 0.04  --  0.01 0.01   MCV 90.4  --  90.0 92.1         Lab 04/17/25  0509 04/16/25  0630 04/15/25  1639 04/15/25  0609   SODIUM 141 142  --  143   POTASSIUM 3.5 2.4*  --  2.9*   CHLORIDE 104 104  --  107   CO2 25.7 23.5  --  25.7   ANION GAP 11.3 14.5  --  10.3   BUN 9 10  --  13   CREATININE 0.41* 0.35*  --  0.41*   EGFR 113.5 117.9  --  113.5   GLUCOSE 111* 99  --  102*   CALCIUM 10.8* 10.2 10.0 11.0*   MAGNESIUM 2.0 1.8  --  1.6   PHOSPHORUS 2.8 2.3*  --   --          Lab 04/15/25  0609 04/14/25  0952   TOTAL PROTEIN 7.0 7.9   ALBUMIN 3.2* 3.6   GLOBULIN 3.8 4.3   ALT (SGPT) 5 6   AST (SGOT) 17 17   BILIRUBIN 0.2 0.3   ALK PHOS 59 68         Lab 04/14/25  1127 04/14/25  0952   HSTROP T 9 10             Lab 04/15/25  0609 04/14/25  0952   IRON 15*  --    IRON SATURATION (TSAT) 8*  --    TIBC 194*  --    TRANSFERRIN 130*  --    FERRITIN 1,713.00*  --    FOLATE  --  8.01   VITAMIN B 12  --  917         Brief Urine Lab Results  (Last result in the past 365 days)        Color   Clarity   Blood   Leuk Est   Nitrite   Protein   CREAT   Urine HCG        04/14/25 1821 Yellow   Cloudy   Negative   Negative   Negative   Trace                 [x]   Microbiology   Microbiology Results (last 10 days)       Procedure Component Value - Date/Time    Blood Culture - Blood, Hand, Left [[id number]]  (Normal) Collected: 04/14/25 1658    Lab Status: Preliminary result Specimen: Blood from Hand, Left Updated: 04/16/25 1715     Blood Culture No growth at 2 days    Narrative:      Less than seven (7) mL's of blood was collected.  Insufficient quantity may yield false negative results.    Blood Culture - Blood, Arm, Right [[id number]]  (Normal) Collected: 04/14/25 1658    Lab Status: Preliminary result Specimen: Blood from Arm, Right Updated: 04/16/25 1715     Blood Culture No growth at 2 days    Narrative:      Less than seven (7) mL's of blood was collected.  Insufficient quantity may yield false negative results.    COVID PRE-OP / PRE-PROCEDURE SCREENING ORDER (NO ISOLATION) - Swab, Nasopharynx [[id number]]  (Normal) Collected: 04/09/25 1925    Lab Status: Final result Specimen: Swab from Nasopharynx Updated: 04/09/25 2011    Narrative:      The following orders were created for panel order COVID PRE-OP / PRE-PROCEDURE SCREENING ORDER (NO ISOLATION) - Swab, Nasopharynx.  Procedure                               Abnormality         Status                     ---------                               -----------         ------                     COVID-19, FLU A/B, RSV P...[[id number]]  Normal              Final result                 Please view results for these tests on the individual orders.    COVID-19, FLU A/B, RSV PCR 1 HR TAT - Swab, Nasopharynx [[id number]]  (Normal) Collected: 04/09/25 1925    Lab Status: Final result Specimen: Swab from Nasopharynx Updated: 04/09/25 2011     COVID19 Not Detected     Influenza A PCR Not Detected     Influenza B PCR Not Detected     RSV, PCR Not Detected    Narrative:      Fact sheet for providers: https://www.fda.gov/media/400460/download    Fact sheet for patients: https://www.fda.gov/media/563393/download    Test performed by PCR.           [x]  Radiology  XR Chest 1 View  Result Date: 4/14/2025  Impression: No active disease. Electronically Signed: James Albrecht MD  4/14/2025 9:30 AM EDT  Workstation ID: [id number]    CT Head Without Contrast  Result Date: 4/9/2025  Impression: No acute intracranial abnormality. Electronically Signed: Adán Dixon MD  4/9/2025 8:14 PM EDT  Workstation ID: [id number]    XR Chest 1 View  Result Date: 4/9/2025  Impression: No acute cardiopulmonary abnormality. Electronically Signed: Davide Frazier DO  4/9/2025 7:11 PM EDT  Workstation ID: [id number]    []  EKG/Telemetry   []  Cardiology/Vascular   []  Pathology  []  Old records  []  Other:    Assessment & Plan   Assessment / Plan     Assessment:  Acute metabolic encephalopathy, improving  Generalized weakness  Hypocalcemia, improving  Hypokalemia, repleted  UTI, unknown organism  Anemia, iron deficiency along with possible chemotherapy-induced  Leukocytosis, resolved  History of malignant ovarian cancer with carcinomatosis  History of hypertension    Plan:  Patient currently being managed medicine service.  S/p IV fluid.  Hypercalcemia, stable.  Likely hyperglycemia due to malignancy. PTHrp pending.  Continue to trend and monitor.  Completed 3-day course of IV ceftriaxone.   Blood culture showing no growth at 2 days.  Urine culture unable to be sent given UA nonsignificant on presentation.  Oncologist following.  Patient s/p 3 cycles of carboplatin and paclitaxel.  She is due to see GYN oncologist later this morning for debulking surgery.  Electrolytes repleted yesterday.  Better today.  Receiving IV iron for iron deficiency anemia.  Patient is planned to undergo debulking surgery at [place] this Monday, patient's  to reach out to surgeon if they are still willing to go ahead with the surgery given her current clinical status.  Patient's  agreed for skilled nursing facility if no plan for surgery.  Palliative consulted for goals of care  discussion.  Guarded prognosis.  Continue rest of current management.  Oncologist on board, appreciate further input.  Labs in AM.  Clinical course to dictate further management.    Discussed with RN.    VTE Prophylaxis:  Pharmacologic VTE prophylaxis orders are present.        CODE STATUS:   Code Status (Patient has no pulse and is not breathing): CPR (Attempt to Resuscitate)  Medical Interventions (Patient has pulse or is breathing): Full Support  Level Of Support Discussed With: Patient      Electronically signed by Demario Mireles MD, 04/17/25, 9:14 AM EDT.

## 2025-04-17 NOTE — THERAPY TREATMENT NOTE
Acute Care - Physical Therapy Treatment Note   [facility]     Patient Name: Madina Reddy  : 1965  MRN: [id number]  Today's Date: 2025      Visit Dx:     ICD-10-CM ICD-9-CM   1. Dehydration  E86.0 276.51   2. Hypercalcemia  E83.52 275.42   3. Hypokalemia  E87.6 276.8   4. Weakness  R53.1 780.79   5. Difficulty in walking  R26.2 719.7   6. Decreased activities of daily living (ADL)  Z78.9 V49.89     Patient Active Problem List   Diagnosis    Family history of breast cancer in mother    Seasonal allergic rhinitis due to pollen    Sick sinus syndrome    ICD (implantable cardioverter-defibrillator) in place    Presence of cardiac pacemaker    Annual physical exam    Essential hypertension    Chronic pain of both knees    Dry mouth    Family history of Sjogren's disease    Family history of rheumatoid arthritis    Hyperlipidemia    Sinus pressure    Allergic rhinitis    Pelvic mass    Malignant neoplasm of ovary    Nausea    Encounter for adjustment or management of vascular access device    Hypercalcemia    Moderate protein-calorie malnutrition     Past Medical History:   Diagnosis Date    Allergies     Dizziness Madina    Headache Madina    Hyperlipidemia March    Hypertension Madina    Malignant neoplasm of ovary 2025    Pacemaker     Pneumonia 2023    TMJ dysfunction Madina    Uterine cancer      Past Surgical History:   Procedure Laterality Date    BREAST BIOPSY      CARDIAC ELECTROPHYSIOLOGY PROCEDURE N/A 2023    Procedure: Device Implant;  Surgeon: VINCE Alejandre MD;  Location: [facility] INVASIVE LOCATION;  Service: Cardiology;  Laterality: N/A;    CARDIAC SURGERY      heart surgery as an infant    COLONOSCOPY  2017    PACEMAKER IMPLANTATION       PT Assessment (Last 12 Hours)       PT Evaluation and Treatment       Row Name 25 0900          Physical Therapy Time and Intention    Subjective Information complains of;fatigue (P)   -TM     Document Type therapy  note (daily note) (P)   -TM     Mode of Treatment individual therapy (P)   -     Patient Effort fair (P)   -       Row Name 04/17/25 0900          Bed Mobility    Bed Mobility supine-sit;sit-supine (P)   -TM     Supine-Sit [place] (Bed Mobility) maximum assist (25% patient effort) (P)   -TM     Sit-Supine [place] (Bed Mobility) maximum assist (25% patient effort) (P)   -TM       Row Name 04/17/25 0900          Transfers    Transfers sit-stand transfer (P)   -       Row Name 04/17/25 0900          Sit-Stand Transfer    Sit-Stand [place] (Transfers) maximum assist (25% patient effort) (P)   -       Row Name 04/17/25 0900          Gait/Stairs (Locomotion)    Gait/Stairs Locomotion gait/ambulation independence;gait/ambulation assistive device (P)   -     [place] Level (Gait) unable to assess (P)   -     Assistive Device (Gait) walker, front-wheeled (P)   -     Patient was able to Ambulate no, other medical factors prevent ambulation (P)   -     Reason Patient was unable to Ambulate Excessive Weakness (P)   -       Row Name 04/17/25 0900          Safety Issues/Impairments Affecting Functional Mobility    Impairments Affecting Function (Mobility) balance;cognition;endurance/activity tolerance;pain;strength (P)   -TM       Row Name 04/17/25 0900          Balance    Balance Assessment standing static balance (P)   -TM     Static Sitting Balance moderate assist (P)   -TM     Position, Sitting Balance sitting edge of bed (P)   -     Static Standing Balance maximum assist (P)   -[facility] Name 04/17/25 0900          Progress Summary (PT)    Progress Toward Functional Goals (PT) progress toward functional goals is good (P)   -TM       Row Name 04/17/25 0900          Therapy Plan Review/Discharge Plan (PT)    Therapy Plan Review (PT) evaluation/treatment results reviewed;patient (P)   -               User Key  (r) = Recorded By, (t) = Taken By, (c) = Cosigned By      Initials Name  Provider Type    TM Brijesh Stephenson, PT Student PT Student                    Physical Therapy Education       Title: PT OT SLP Therapies (In Progress)       Topic: Physical Therapy (Done)       Point: Mobility training (Done)       Learning Progress Summary            Patient Acceptance, E,TB, VU by TM at 4/16/2025 0850                      Point: Home exercise program (Done)       Learning Progress Summary            Patient Acceptance, E,TB, VU by TM at 4/16/2025 0850                      Point: Body mechanics (Done)       Learning Progress Summary            Patient Acceptance, E,TB, VU by TM at 4/16/2025 0850                      Point: Precautions (Done)       Learning Progress Summary            Patient Acceptance, E,TB, VU by TM at 4/16/2025 0850                                      User Key       Initials Effective Dates Name Provider Type Discipline    TM 02/04/25 -  Brijesh Stephenson, PT Student PT Student PT                  PT Recommendation and Plan  Anticipated Discharge Disposition (PT): sub acute care setting  Planned Therapy Interventions (PT): balance training, bed mobility training, gait training, home exercise program, neuromuscular re-education, patient/family education, strengthening, transfer training  Therapy Frequency (PT): daily  Progress Summary (PT)  Progress Toward Functional Goals (PT): (P) progress toward functional goals is good  Plan of Care Reviewed With: patient  Outcome Evaluation: Pt presents with excessive weakness and deficits with balance and endurance and requires assistance to safely stand. Pt requires skilled therapy services via sub acute rehab to address these impairments   Outcome Measures       Row Name 04/17/25 0900 04/16/25 0800          How much help from another person do you currently need...    Turning from your back to your side while in flat bed without using bedrails? 3 (P)   -TM 3  -MASSIMO (r) TM (t) MASSIMO (c)     Moving from lying on back to sitting on the side of a flat  bed without bedrails? 2 (P)   -TM 1  -MASSIMO (r) TM (t) MASSIMO (c)     Moving to and from a bed to a chair (including a wheelchair)? 1 (P)   -TM 1  -MASSIMO (r) TM (t) MASSIMO (c)     Standing up from a chair using your arms (e.g., wheelchair, bedside chair)? 1 (P)   -TM 1  -MASSIMO (r) TM (t) MASSIMO (c)     Climbing 3-5 steps with a railing? 1 (P)   -TM 1  -MASSIMO (r) TM (t) MASSIMO (c)     To walk in hospital room? 1 (P)   -TM 1  -MASSIMO (r) TM (t) MASSIMO (c)     AM-PAC 6 Clicks Score (PT) 9 (P)   -TM 8  -MASSIMO (r) TM (t)        Functional Assessment    Outcome Measure Options AM-PAC 6 Clicks Basic Mobility (PT) (P)   -TM AM-PAC 6 Clicks Basic Mobility (PT)  -MASSIMO (r) TM (t) AMSSIMO (c)               User Key  (r) = Recorded By, (t) = Taken By, (c) = Cosigned By      Initials Name Provider Type    MASSIMO Troy Arias, PT Physical Therapist    TM Brijesh Stephenson, PT Student PT Student                     Time Calculation:    PT Charges       Row Name 04/17/25 0918             Time Calculation    PT Received On 04/17/25 (P)   -TM         Timed Charges    60860 - PT Therapeutic Activity Minutes 10 (P)   -TM         Total Minutes    Timed Charges Total Minutes 10 (P)   -TM       Total Minutes 10 (P)   -TM                User Key  (r) = Recorded By, (t) = Taken By, (c) = Cosigned By      Initials Name Provider Type    TM Brijesh Stephenson, PT Student PT Student                  Therapy Charges for Today       Code Description Service Date Service Provider Modifiers Qty    [id number]  PT THERAPEUTIC ACT EA 15 MIN 4/17/2025 Brijesh Stephenson PT Student GP 1            PT G-Codes  Outcome Measure Options: (P) AM-PAC 6 Clicks Basic Mobility (PT)  AM-PAC 6 Clicks Score (PT): (P) 9  AM-PAC 6 Clicks Score (OT): 11    Brijesh Stephenson PT Student  4/17/2025

## 2025-04-18 LAB
ANION GAP SERPL CALCULATED.3IONS-SCNC: 8 MMOL/L (ref 5–15)
BASOPHILS # BLD AUTO: 0.05 10*3/MM3 (ref 0–0.2)
BASOPHILS NFR BLD AUTO: 0.4 % (ref 0–1.5)
BH BB BLOOD EXPIRATION DATE: NORMAL
BH BB BLOOD TYPE BARCODE: 6200
BH BB DISPENSE STATUS: NORMAL
BH BB PRODUCT CODE: NORMAL
BH BB UNIT NUMBER: NORMAL
BUN SERPL-MCNC: 10 MG/DL (ref 6–20)
BUN/CREAT SERPL: 24.4 (ref 7–25)
CALCIUM SPEC-SCNC: 11.5 MG/DL (ref 8.6–10.5)
CHLORIDE SERPL-SCNC: 104 MMOL/L (ref 98–107)
CO2 SERPL-SCNC: 27 MMOL/L (ref 22–29)
CREAT SERPL-MCNC: 0.41 MG/DL (ref 0.57–1)
CROSSMATCH INTERPRETATION: NORMAL
DEPRECATED RDW RBC AUTO: 54 FL (ref 37–54)
EGFRCR SERPLBLD CKD-EPI 2021: 113.5 ML/MIN/1.73
EOSINOPHIL # BLD AUTO: 0.08 10*3/MM3 (ref 0–0.4)
EOSINOPHIL NFR BLD AUTO: 0.7 % (ref 0.3–6.2)
ERYTHROCYTE [DISTWIDTH] IN BLOOD BY AUTOMATED COUNT: 16.4 % (ref 12.3–15.4)
GLUCOSE SERPL-MCNC: 112 MG/DL (ref 65–99)
HCT VFR BLD AUTO: 29.8 % (ref 34–46.6)
HGB BLD-MCNC: 9.2 G/DL (ref 12–15.9)
IMM GRANULOCYTES # BLD AUTO: 0.22 10*3/MM3 (ref 0–0.05)
IMM GRANULOCYTES NFR BLD AUTO: 1.9 % (ref 0–0.5)
LYMPHOCYTES # BLD AUTO: 1.72 10*3/MM3 (ref 0.7–3.1)
LYMPHOCYTES NFR BLD AUTO: 15 % (ref 19.6–45.3)
MAGNESIUM SERPL-MCNC: 1.8 MG/DL (ref 1.6–2.6)
MCH RBC QN AUTO: 27.7 PG (ref 26.6–33)
MCHC RBC AUTO-ENTMCNC: 30.9 G/DL (ref 31.5–35.7)
MCV RBC AUTO: 89.8 FL (ref 79–97)
MONOCYTES # BLD AUTO: 1.23 10*3/MM3 (ref 0.1–0.9)
MONOCYTES NFR BLD AUTO: 10.8 % (ref 5–12)
NEUTROPHILS NFR BLD AUTO: 71.2 % (ref 42.7–76)
NEUTROPHILS NFR BLD AUTO: 8.13 10*3/MM3 (ref 1.7–7)
NRBC BLD AUTO-RTO: 0 /100 WBC (ref 0–0.2)
PHOSPHATE SERPL-MCNC: 2.7 MG/DL (ref 2.5–4.5)
PLATELET # BLD AUTO: 232 10*3/MM3 (ref 140–450)
PMV BLD AUTO: 10.6 FL (ref 6–12)
POTASSIUM SERPL-SCNC: 3.7 MMOL/L (ref 3.5–5.2)
RBC # BLD AUTO: 3.32 10*6/MM3 (ref 3.77–5.28)
SODIUM SERPL-SCNC: 139 MMOL/L (ref 136–145)
UNIT  ABO: NORMAL
UNIT  RH: NORMAL
WBC NRBC COR # BLD AUTO: 11.43 10*3/MM3 (ref 3.4–10.8)

## 2025-04-18 PROCEDURE — 85025 COMPLETE CBC W/AUTO DIFF WBC: CPT | Performed by: STUDENT IN AN ORGANIZED HEALTH CARE EDUCATION/TRAINING PROGRAM

## 2025-04-18 PROCEDURE — 80048 BASIC METABOLIC PNL TOTAL CA: CPT | Performed by: STUDENT IN AN ORGANIZED HEALTH CARE EDUCATION/TRAINING PROGRAM

## 2025-04-18 PROCEDURE — 92610 EVALUATE SWALLOWING FUNCTION: CPT

## 2025-04-18 PROCEDURE — G0378 HOSPITAL OBSERVATION PER HR: HCPCS

## 2025-04-18 PROCEDURE — 99232 SBSQ HOSP IP/OBS MODERATE 35: CPT | Performed by: STUDENT IN AN ORGANIZED HEALTH CARE EDUCATION/TRAINING PROGRAM

## 2025-04-18 PROCEDURE — 83735 ASSAY OF MAGNESIUM: CPT | Performed by: STUDENT IN AN ORGANIZED HEALTH CARE EDUCATION/TRAINING PROGRAM

## 2025-04-18 PROCEDURE — 84100 ASSAY OF PHOSPHORUS: CPT | Performed by: STUDENT IN AN ORGANIZED HEALTH CARE EDUCATION/TRAINING PROGRAM

## 2025-04-18 PROCEDURE — 25010000002 ENOXAPARIN PER 10 MG: Performed by: INTERNAL MEDICINE

## 2025-04-18 PROCEDURE — 25010000002 ZOLEDRONIC ACID 4 MG/100ML SOLUTION: Performed by: INTERNAL MEDICINE

## 2025-04-18 PROCEDURE — 99233 SBSQ HOSP IP/OBS HIGH 50: CPT | Performed by: INTERNAL MEDICINE

## 2025-04-18 RX ORDER — LACTULOSE 10 G/15ML
30 SOLUTION ORAL 3 TIMES DAILY
Status: DISCONTINUED | OUTPATIENT
Start: 2025-04-18 | End: 2025-04-22 | Stop reason: HOSPADM

## 2025-04-18 RX ORDER — ZOLEDRONIC ACID 0.04 MG/ML
4 INJECTION, SOLUTION INTRAVENOUS ONCE
Status: COMPLETED | OUTPATIENT
Start: 2025-04-18 | End: 2025-04-18

## 2025-04-18 RX ADMIN — Medication 10 ML: at 09:19

## 2025-04-18 RX ADMIN — ZOLEDRONIC ACID 4 MG: 0.04 INJECTION, SOLUTION INTRAVENOUS at 09:19

## 2025-04-18 RX ADMIN — LACTULOSE 30 G: 10 SOLUTION ORAL at 16:59

## 2025-04-18 RX ADMIN — FLUTICASONE PROPIONATE 2 SPRAY: 50 SPRAY, METERED NASAL at 09:19

## 2025-04-18 RX ADMIN — Medication 10 ML: at 21:18

## 2025-04-18 RX ADMIN — METOPROLOL SUCCINATE 25 MG: 25 TABLET, EXTENDED RELEASE ORAL at 09:18

## 2025-04-18 RX ADMIN — ASPIRIN 81 MG: 81 TABLET, CHEWABLE ORAL at 09:18

## 2025-04-18 RX ADMIN — MONTELUKAST 10 MG: 10 TABLET, FILM COATED ORAL at 21:18

## 2025-04-18 RX ADMIN — OLANZAPINE 5 MG: 5 TABLET, FILM COATED ORAL at 21:17

## 2025-04-18 RX ADMIN — LACTULOSE 30 G: 10 SOLUTION ORAL at 21:18

## 2025-04-18 RX ADMIN — BISACODYL 10 MG: 10 SUPPOSITORY RECTAL at 04:07

## 2025-04-18 RX ADMIN — ENOXAPARIN SODIUM 30 MG: 100 INJECTION SUBCUTANEOUS at 09:18

## 2025-04-18 NOTE — PLAN OF CARE
Goal Outcome Evaluation:  Plan of Care Reviewed With: patient      ASSESSMENT/ PLAN OF CARE:  Pt presents with limitations, noted below, that impede her ability to swallow safely and maintain nutrition. The skills of a therapist will be required to safely and effectively implement the following treatment plan to restore maximal level of function.    PROBLEMS:  1.   Swallow delay, risk of aspiration                       LTG 1: 30 days: Patient will tolerate diet regular solids and thin liquid utilizing appropriate positioning and strategies with minimal assistance.                       STG 1a: 14 days: Patient will tolerate diet of regular solids with additional moisture and thin liquids with minimal assistance for strategies.                       STG 1b: 14 days: Patient will tolerate 8 of 10 trials of thin liquids with min to no signs or symptoms of aspiration.                       STG 1c: 14 days:  Patient/family education.                       TREATMENT: Dysphagia therapy to address swallow function through exercises and education of strategies.     FREQUENCY/DURATION: Once daily 5 times per week    REHAB POTENTIAL:  Pt has fair to good rehab potential.  The following limitations may influence improvement/ length of tx: Medical status.    RECOMMENDATIONS:   1.   DIET: Regular solids Small additional moisture, thin liquid.    2.  POSITION: Positioning fully upright for all p.o. intake and 30 minutes following.    3.  COMPENSATORY STRATEGIES: Alternate small bites and small sips of solids and liquids at a slow rate.  Medications whole in applesauce, may crush larger pills.          Anticipated Discharge Disposition (SLP): skilled nursing facility

## 2025-04-18 NOTE — THERAPY EVALUATION
Acute Care - Speech Language Pathology   Swallow Initial Evaluation  Artur     Patient Name: Madina Reddy  : 1965  MRN: [id number]  Today's Date: 2025               Admit Date: 2025    Visit Dx:     ICD-10-CM ICD-9-CM   1. Dehydration  E86.0 276.51   2. Hypercalcemia  E83.52 275.42   3. Hypokalemia  E87.6 276.8   4. Weakness  R53.1 780.79   5. Difficulty in walking  R26.2 719.7   6. Decreased activities of daily living (ADL)  Z78.9 V49.89   7. Oropharyngeal dysphagia  R13.12 787.22     Patient Active Problem List   Diagnosis    Family history of breast cancer in mother    Seasonal allergic rhinitis due to pollen    Sick sinus syndrome    ICD (implantable cardioverter-defibrillator) in place    Presence of cardiac pacemaker    Annual physical exam    Essential hypertension    Chronic pain of both knees    Dry mouth    Family history of Sjogren's disease    Family history of rheumatoid arthritis    Hyperlipidemia    Sinus pressure    Allergic rhinitis    Pelvic mass    Malignant neoplasm of ovary    Nausea    Encounter for adjustment or management of vascular access device    Hypercalcemia    Moderate protein-calorie malnutrition     Past Medical History:   Diagnosis Date    Allergies     Dizziness Madina    Headache Madina    Hyperlipidemia March    Hypertension Madina    Malignant neoplasm of ovary 2025    Pacemaker     Pneumonia 2023    TMJ dysfunction Madina    Uterine cancer      Past Surgical History:   Procedure Laterality Date    BREAST BIOPSY      CARDIAC ELECTROPHYSIOLOGY PROCEDURE N/A 2023    Procedure: Device Implant;  Surgeon: VINCE Alejandre MD;  Location: [facility] INVASIVE LOCATION;  Service: Cardiology;  Laterality: N/A;    CARDIAC SURGERY      heart surgery as an infant    COLONOSCOPY  2017    PACEMAKER IMPLANTATION               Inpatient Speech Pathology Dysphagia Evaluation        PAIN SCALE: None indicated.    PRECAUTIONS/CONTRAINDICATIONS:  Standard    SUSPECTED ABUSE/NEGLECT/EXPLOITATION: None indicated.    SOCIAL/PSYCHOLOGICAL NEEDS/BARRIERS: None indicated.    PAST SOCIAL HISTORY: 60-year-old female lives at home with her spouse    PRIOR FUNCTION: On regular diet    PATIENT GOALS/EXPECTATIONS: Continue eating orally    HISTORY: 60-year-old female the above diagnosis referred for speech therapy evaluation to assess for swallowing.  No previous speech pathology services are reported.  Patient is reporting having difficulty with swallowing pills.  Patient reported feeling that taking meds with applesauce was easier. States occasionally getting strangled with liquids.    CURRENT DIET LEVEL: Regular, thin    OBJECTIVE:    TEST ADMINISTERED: Clinical dysphagia evaluation    COGNITION/SAFETY AWARENESS: Patient follows directions and responds to questions without difficulty.  Patient is noted with some confusion at times.    BEHAVIORAL OBSERVATIONS: Alert and cooperative    ORAL MOTOR EXAM: Decreased general oral motor strength/range of motion.  Patient states complaint of dry mouth the oral mucosa appeared adequate at this assessment    VOICE QUALITY: Soft but adequate    REFLEX EXAM: Patient demonstrated cough    POSTURE: Assisted sitting upright in bed, patient requiring additional pillows to improve positioning.    FEEDING/SWALLOWING FUNCTION: Assessed with nectar liquid, thin liquids, puréed solids, crunchy solid.    CLINICAL OBSERVATIONS: Nectar liquid by spoon and by cup with delay, vocal quality remaining clear to cervical auscultation.  Thin liquid by cup with delay, multiple trials with cough noted x 1.  Thin liquid by straw delay, vocal quality remaining clear to cervical auscultation.  Purée solid with swallow completed with laryngeal elevation noted to palpation.  Crunchy solid with adequate slow chewing followed by swallow completed, minimal oral residue with patient requesting liquid wash.  Trial of whole pill taken with yogurt, swallow  yogurt leaving pill behind which she then swallowed.      DYSPHAGIA CRITERIA: Swallow delay, risk of aspiration    FUNCTIONAL ASSESSMENT INSTRUMENT: Patient currently scored a level 6 of 7 on Functional Communication Measures for swallowing indicating a 1-19% limitation in function.    ASSESSMENT/ PLAN OF CARE:  Pt presents with limitations, noted below, that impede her ability to swallow safely and maintain nutrition. The skills of a therapist will be required to safely and effectively implement the following treatment plan to restore maximal level of function.    PROBLEMS:  1.   Swallow delay, risk of aspiration                       LTG 1: 30 days: Patient will tolerate diet regular solids and thin liquid utilizing appropriate positioning and strategies with minimal assistance.                       STG 1a: 14 days: Patient will tolerate diet of regular solids with additional moisture and thin liquids with minimal assistance for strategies.                       STG 1b: 14 days: Patient will tolerate 8 of 10 trials of thin liquids with min to no signs or symptoms of aspiration.                       STG 1c: 14 days:  Patient/family education.                       TREATMENT: Dysphagia therapy to address swallow function through exercises and education of strategies.     FREQUENCY/DURATION: Once daily 5 times per week    REHAB POTENTIAL:  Pt has fair to good rehab potential.  The following limitations may influence improvement/ length of tx: Medical status.    RECOMMENDATIONS:   1.   DIET: Regular solids Small additional moisture, thin liquid.    2.  POSITION: Positioning fully upright for all p.o. intake and 30 minutes following.    3.  COMPENSATORY STRATEGIES: Alternate small bites and small sips of solids and liquids at a slow rate.  Medications whole in applesauce, may crush larger pills.    Pt/responsible party agrees with plan of care and has been informed of all alternatives, risks and benefits.                             Anticipated Discharge Disposition (SLP): skilled nursing facility (04/18/25 1021)                                                               EDUCATION  The patient has been educated in the following areas:   Modified Diet Instruction.                Time Calculation:    Time Calculation- SLP       Row Name 04/18/25 1021             Time Calculation- SLP    SLP Start Time 0900  -TB      SLP Stop Time 1000  -TB      SLP Time Calculation (min) 60 min  -TB      SLP Received On 04/18/25  -TB         Untimed Charges    SLP Eval/Re-eval  ST Eval Oral Pharyng Swallow - 61363  -TB      54799-ED Eval Oral Pharyng Swallow Minutes 60  -TB         Total Minutes    Untimed Charges Total Minutes 60  -TB       Total Minutes 60  -TB                User Key  (r) = Recorded By, (t) = Taken By, (c) = Cosigned By      Initials Name Provider Type    TB Araseli Corrales SLP Speech and Language Pathologist                    Therapy Charges for Today       Code Description Service Date Service Provider Modifiers Qty    [id number] HC ST EVAL ORAL PHARYNG SWALLOW 4 4/18/2025 Araseli Corrales SLP GN 1                 DEBBIE Michelle  4/18/2025

## 2025-04-18 NOTE — CONSULTS
Palliative care consult for goals of care- at time of visit patient is sitting up in bed, alert to self, place, time and situation during visit. Patients spouse at bedside. Introduced self and explained role and purpose of visit. Discussed patients current condition- reviewed ACP on file. Provided education on code status including cpr, intubation, and code event. Patient requests code status change to no cpr/no intubation. Provided education on palliative care versus hospice care. Patient/patients family request hospice referral- this was placed. Discussed quality of life- and encouraged continued discussions in this regard. Emotional support provided. Updated provider. Palliative care will continue to follow to support and assist.    Angela MAZA, RN, CHPN  Palliative Care

## 2025-04-18 NOTE — PLAN OF CARE
Goal Outcome Evaluation:  Plan of Care Reviewed With: patient           Outcome Evaluation: Patient aox3 with forgetfulness on room air. Sinus tachy on monitor. Pt code status changed to DNR/DNI, Seen by palliative and speech, 1000 ml water enema given with no effectiveness: MD at bedside to evaluate, Zometa given to decrease calcium levels, lactulose started 3x daily.  Hospice care meeting. Call light in reach. Care plan ongoing.

## 2025-04-18 NOTE — PROGRESS NOTES
[facility]     Progress Note    Patient Name: Madina Reddy  : 1965  MRN: [id number]  Primary Care Physician:  Kady Vernon APRN  Date of admission: 2025    Subjective   Subjective     Chief Complaint: Ovarian cancer    HPI:  Patient Reports feeling little better this morning.  She worked with physical therapy yesterday.  She is eating better.  She had some nausea yesterday but notes Zofran helped.  She also had constipation but received a laxative and she reports a good bowel movement.      Objective   Objective     Vitals:   Temp:  [97.7 °F (36.5 °C)-100 °F (37.8 °C)] 98.6 °F (37 °C)  Heart Rate:  [] 95  Resp:  [16-18] 16  BP: (103-127)/(68-81) 114/78    Physical Exam    Constitutional: Awake, alert   Eyes: PERRLA, sclerae anicteric, no conjunctival injection   HENT: NCAT, mucous membranes moist   Neck: Supple, no thyromegaly, no lymphadenopathy, trachea midline   Respiratory: Clear to auscultation bilaterally, nonlabored respirations    Cardiovascular: RRR, no murmurs, rubs, or gallops   Gastrointestinal: Pos bowel sounds, soft, nontender, nondistended   Musculoskeletal: No bilateral ankle edema   Psychiatric: Appropriate affect, cooperative   Neurologic: Oriented x 3, strength symmetric in all extremities, Cranial Nerves grossly intact to confrontation, speech clear   Skin: No rashes    Lymph Node:      Result Review    Result Review:  I have personally reviewed the results from the time of this admission to 2025 08:07 EDT and agree with these findings:  [x]  Laboratory  []  Microbiology  []  Radiology  []  EKG/Telemetry   []  Cardiology/Vascular   []  Pathology  []  Old records  []  Other:  Most notable findings include: Calcium 11.5, hemoglobin 9.2    Assessment & Plan   Assessment / Plan     Brief Patient Summary:  Madina Reddy is a 60 y.o. female who admitted to the hospital for weakness.    Active Hospital Problems:  Active Hospital Problems    Diagnosis      **Hypercalcemia     Moderate protein-calorie malnutrition      Plan:   Ovarian cancer.  Status post 3 cycles of carboplatin paclitaxel.  Plan is for surgical debulking which she will need to improve her performance status prior to that.  I will have the office reach out to Dr. Watson, GYN oncology at [facility] to see when they would like to see her back for surgical consideration.    Anemia.  Status post 1 unit of packed red blood cells.  Hemoglobin today 9.2.  She is status post IV iron.    Hypercalcemia.  Calcium is trending up.  Likely hypercalcemia of malignancy.  I will treat her with Zometa x 1.  Monitor calcium.  Push fluids either IV or oral       VTE Prophylaxis:  Pharmacologic VTE prophylaxis orders are present.        CODE STATUS:   Code Status (Patient has no pulse and is not breathing): CPR (Attempt to Resuscitate)  Medical Interventions (Patient has pulse or is breathing): Full Support  Level Of Support Discussed With: Patient        Electronically signed by Waylon Arndt MD, 04/18/25, 8:07 AM EDT.

## 2025-04-18 NOTE — PLAN OF CARE
Problem: Adult Inpatient Plan of Care  Goal: Plan of Care Review  Outcome: Progressing  Flowsheets (Taken 4/18/2025 0322)  Progress: no change  Outcome Evaluation: Patient alert and oriented with forgetfulness, VSS, on room air, NSR on tele monitor, during shift patient had nausea, constipation, and temperature of 100 F, PRN medications given. See MAR. No s/s of distress, denies any pain at this time, able to sleep in between care, call light within reach, care plan ongoing  Plan of Care Reviewed With: patient   Goal Outcome Evaluation:  Plan of Care Reviewed With: patient

## 2025-04-18 NOTE — PROGRESS NOTES
[facility]   Hospitalist Progress Note  Date: 2025  Patient Name: Madina Reddy  : 1965  MRN: [id number]  Date of admission: 2025  Room/Bed: 259/1      Subjective   Subjective     Chief Complaint: Weakness, confusion, hypercalcemia    Summary:Madina Reddy is a 60 y.o. female with history of malignant ovarian cancer with carcinomatosis and hepatic lesion currently status post 3 cycles of chemotherapy and hypertension who presented with weakness, confusion and found to have hypercalcemia.  Patient was recently diagnosed with UTI and/9 and was sent home on oral antibiotic.  Calcium during that time was 12.6.  Even after receiving antibiotic, patient did not show any improvement and gradually her symptoms worsen after which her partner brought her to the hospital.  On presentation, calcium was 12.1.  Hemoglobin 9.  Patient started on IV fluids and was admitted for further evaluation management.  On IV ceftriaxone for possible UTI.  Blood culture sent.  Generalized weakness present.  Iron-deficiency anemia, receiving IV iron.  Repleting electrolytes.  Having intermittent confusion.  Patient has remained very weak throughout the hospitalization.  Discussed with  at bedside, agreed on palliative consult for goals of care discussion.  Patient has pending debulking surgery, will schedule for coming Monday.  Physically very weak and likely unfit for possible surgery currently.  Also discussed with  regarding it.  Oncologist following.  Palliative consulted for goals of care.    Interval Followup: No acute events overnight.  Patient had abdominal pain today.  Constipated.  Receiving tap enema along with lactulose.  Patient and  interested in hospice, hospice meeting scheduled for today.  No other complaints. Vitals stable.    Review of Systems    All systems reviewed and negative except for what is outlined above.      Objective   Objective     Vitals:   Temp:  [97.7 °F (36.5  °C)-100 °F (37.8 °C)] 99.1 °F (37.3 °C)  Heart Rate:  [] 102  Resp:  [16-18] 16  BP: (103-127)/(68-81) 118/80    Physical Exam   General: Awake, alert, seems weak, NAD  Cardiovascular: RRR, no murmurs   Pulmonary: CTA bilaterally; no wheezes; no conversational dyspnea  Gastrointestinal: S/ND/NT, +BS  Neuro: Alert, awake, answering all questions appropriately but intermittently confused; speech clear    Result Review    Result Review:  I have personally reviewed these results:  [x]  Laboratory      Lab 04/18/25  0408 04/17/25  0509 04/16/25  1613 04/16/25  0630   WBC 11.43* 10.97*  --  11.01*   HEMOGLOBIN 9.2* 7.5* 7.7* 7.5*   HEMATOCRIT 29.8* 24.6* 24.8* 24.4*   PLATELETS 232 268  --  290   NEUTROS ABS 8.13* 8.40*  --  8.69*   IMMATURE GRANS (ABS) 0.22* 0.16*  --  0.15*   LYMPHS ABS 1.72 1.23  --  1.15   MONOS ABS 1.23* 1.10*  --  0.96*   EOS ABS 0.08 0.04  --  0.01   MCV 89.8 90.4  --  90.0         Lab 04/18/25  0408 04/17/25  0509 04/16/25  0630   SODIUM 139 141 142   POTASSIUM 3.7 3.5 2.4*   CHLORIDE 104 104 104   CO2 27.0 25.7 23.5   ANION GAP 8.0 11.3 14.5   BUN 10 9 10   CREATININE 0.41* 0.41* 0.35*   EGFR 113.5 113.5 117.9   GLUCOSE 112* 111* 99   CALCIUM 11.5* 10.8* 10.2   MAGNESIUM 1.8 2.0 1.8   PHOSPHORUS 2.7 2.8 2.3*         Lab 04/15/25  0609 04/14/25  0952   TOTAL PROTEIN 7.0 7.9   ALBUMIN 3.2* 3.6   GLOBULIN 3.8 4.3   ALT (SGPT) 5 6   AST (SGOT) 17 17   BILIRUBIN 0.2 0.3   ALK PHOS 59 68         Lab 04/14/25  1127 04/14/25  0952   HSTROP T 9 10             Lab 04/17/25  1034 04/17/25  0509 04/15/25  0609 04/14/25  0952   IRON  --   --  15*  --    IRON SATURATION (TSAT)  --   --  8*  --    TIBC  --   --  194*  --    TRANSFERRIN  --   --  130*  --    FERRITIN  --   --  1,713.00*  --    FOLATE  --   --   --  8.01   VITAMIN B 12  --   --   --  917   ABO TYPING A   < >  --   --    RH TYPING Positive   < >  --   --    ANTIBODY SCREEN Negative  --   --   --     < > = values in this interval not  displayed.         Brief Urine Lab Results  (Last result in the past 365 days)        Color   Clarity   Blood   Leuk Est   Nitrite   Protein   CREAT   Urine HCG        04/14/25 1821 Yellow   Cloudy   Negative   Negative   Negative   Trace                 [x]  Microbiology   Microbiology Results (last 10 days)       Procedure Component Value - Date/Time    Blood Culture - Blood, Hand, Left [[id number]]  (Normal) Collected: 04/14/25 1658    Lab Status: Preliminary result Specimen: Blood from Hand, Left Updated: 04/17/25 1715     Blood Culture No growth at 3 days    Narrative:      Less than seven (7) mL's of blood was collected.  Insufficient quantity may yield false negative results.    Blood Culture - Blood, Arm, Right [[id number]]  (Normal) Collected: 04/14/25 1658    Lab Status: Preliminary result Specimen: Blood from Arm, Right Updated: 04/17/25 1715     Blood Culture No growth at 3 days    Narrative:      Less than seven (7) mL's of blood was collected.  Insufficient quantity may yield false negative results.    COVID PRE-OP / PRE-PROCEDURE SCREENING ORDER (NO ISOLATION) - Swab, Nasopharynx [[id number]]  (Normal) Collected: 04/09/25 1925    Lab Status: Final result Specimen: Swab from Nasopharynx Updated: 04/09/25 2011    Narrative:      The following orders were created for panel order COVID PRE-OP / PRE-PROCEDURE SCREENING ORDER (NO ISOLATION) - Swab, Nasopharynx.  Procedure                               Abnormality         Status                     ---------                               -----------         ------                     COVID-19, FLU A/B, RSV P...[[id number]]  Normal              Final result                 Please view results for these tests on the individual orders.    COVID-19, FLU A/B, RSV PCR 1 HR TAT - Swab, Nasopharynx [[id number]]  (Normal) Collected: 04/09/25 1925    Lab Status: Final result Specimen: Swab from Nasopharynx Updated: 04/09/25 2011     COVID19 Not Detected     Influenza A PCR  Not Detected     Influenza B PCR Not Detected     RSV, PCR Not Detected    Narrative:      Fact sheet for providers: https://www.fda.gov/media/933960/download    Fact sheet for patients: https://www.fda.gov/media/314771/download    Test performed by PCR.          [x]  Radiology  XR Chest 1 View  Result Date: 4/14/2025  Impression: No active disease. Electronically Signed: James Albrecht MD  4/14/2025 9:30 AM EDT  Workstation ID: [id number]    []  EKG/Telemetry   []  Cardiology/Vascular   []  Pathology  []  Old records  []  Other:    Assessment & Plan   Assessment / Plan     Assessment:  Acute metabolic encephalopathy, improving  Generalized weakness  Hypocalcemia, improving  Hypokalemia, repleted  UTI, unknown organism  Anemia, iron deficiency along with possible chemotherapy-induced  Leukocytosis, resolved  History of malignant ovarian cancer with carcinomatosis  History of hypertension    Plan:  Patient currently being managed medicine service.  S/p IV fluid.  Hypercalcemia, stable.  Likely hyperglycemia due to malignancy. PTHrp pending.  Continue to trend and monitor.  Completed 3-day course of IV ceftriaxone.   Blood culture showing no growth at 3 days.  Urine culture unable to be sent given UA nonsignificant on presentation.  Oncologist following.  Patient s/p 3 cycles of carboplatin and paclitaxel.  She is due to see GYN oncologist later this Monday for debulking surgery.  But less likely patient will be able to tolerate surgery given her current clinical status.  Receiving IV iron for iron deficiency anemia.  Palliative consulted for goals of care discussion.  Agreed with home with hospice.  Hospice meeting later today.  Guarded prognosis.  Continue rest of current management.  Oncologist on board, appreciate further input.  Labs in AM.  Clinical course to dictate further management.    Discussed with RN.    VTE Prophylaxis:  Pharmacologic VTE prophylaxis orders are present.        CODE STATUS:   Code Status  (Patient has no pulse and is not breathing): CPR (Attempt to Resuscitate)  Medical Interventions (Patient has pulse or is breathing): Full Support  Level Of Support Discussed With: Patient      Electronically signed by Demario Mireles MD, 04/18/25, 9:14 AM EDT.

## 2025-04-19 LAB
ANION GAP SERPL CALCULATED.3IONS-SCNC: 8.5 MMOL/L (ref 5–15)
BACTERIA SPEC AEROBE CULT: NORMAL
BACTERIA SPEC AEROBE CULT: NORMAL
BASOPHILS # BLD AUTO: 0.05 10*3/MM3 (ref 0–0.2)
BASOPHILS NFR BLD AUTO: 0.4 % (ref 0–1.5)
BUN SERPL-MCNC: 10 MG/DL (ref 6–20)
BUN/CREAT SERPL: 26.3 (ref 7–25)
CALCIUM SPEC-SCNC: 11.5 MG/DL (ref 8.6–10.5)
CHLORIDE SERPL-SCNC: 102 MMOL/L (ref 98–107)
CO2 SERPL-SCNC: 28.5 MMOL/L (ref 22–29)
CREAT SERPL-MCNC: 0.38 MG/DL (ref 0.57–1)
DEPRECATED RDW RBC AUTO: 56 FL (ref 37–54)
EGFRCR SERPLBLD CKD-EPI 2021: 115.6 ML/MIN/1.73
EOSINOPHIL # BLD AUTO: 0.04 10*3/MM3 (ref 0–0.4)
EOSINOPHIL NFR BLD AUTO: 0.3 % (ref 0.3–6.2)
ERYTHROCYTE [DISTWIDTH] IN BLOOD BY AUTOMATED COUNT: 16.6 % (ref 12.3–15.4)
GLUCOSE SERPL-MCNC: 115 MG/DL (ref 65–99)
HCT VFR BLD AUTO: 31.7 % (ref 34–46.6)
HGB BLD-MCNC: 9.5 G/DL (ref 12–15.9)
IMM GRANULOCYTES # BLD AUTO: 0.18 10*3/MM3 (ref 0–0.05)
IMM GRANULOCYTES NFR BLD AUTO: 1.5 % (ref 0–0.5)
LYMPHOCYTES # BLD AUTO: 1.09 10*3/MM3 (ref 0.7–3.1)
LYMPHOCYTES NFR BLD AUTO: 8.9 % (ref 19.6–45.3)
MAGNESIUM SERPL-MCNC: 2 MG/DL (ref 1.6–2.6)
MCH RBC QN AUTO: 27.5 PG (ref 26.6–33)
MCHC RBC AUTO-ENTMCNC: 30 G/DL (ref 31.5–35.7)
MCV RBC AUTO: 91.9 FL (ref 79–97)
MONOCYTES # BLD AUTO: 1.2 10*3/MM3 (ref 0.1–0.9)
MONOCYTES NFR BLD AUTO: 9.8 % (ref 5–12)
NEUTROPHILS NFR BLD AUTO: 79.1 % (ref 42.7–76)
NEUTROPHILS NFR BLD AUTO: 9.7 10*3/MM3 (ref 1.7–7)
NRBC BLD AUTO-RTO: 0 /100 WBC (ref 0–0.2)
PHOSPHATE SERPL-MCNC: 2 MG/DL (ref 2.5–4.5)
PLATELET # BLD AUTO: 247 10*3/MM3 (ref 140–450)
PMV BLD AUTO: 10.9 FL (ref 6–12)
POTASSIUM SERPL-SCNC: 3.6 MMOL/L (ref 3.5–5.2)
RBC # BLD AUTO: 3.45 10*6/MM3 (ref 3.77–5.28)
SODIUM SERPL-SCNC: 139 MMOL/L (ref 136–145)
WBC NRBC COR # BLD AUTO: 12.26 10*3/MM3 (ref 3.4–10.8)

## 2025-04-19 PROCEDURE — 25010000002 ENOXAPARIN PER 10 MG: Performed by: INTERNAL MEDICINE

## 2025-04-19 PROCEDURE — 84100 ASSAY OF PHOSPHORUS: CPT | Performed by: STUDENT IN AN ORGANIZED HEALTH CARE EDUCATION/TRAINING PROGRAM

## 2025-04-19 PROCEDURE — 25810000003 SODIUM CHLORIDE 0.9 % SOLUTION: Performed by: STUDENT IN AN ORGANIZED HEALTH CARE EDUCATION/TRAINING PROGRAM

## 2025-04-19 PROCEDURE — G0378 HOSPITAL OBSERVATION PER HR: HCPCS

## 2025-04-19 PROCEDURE — 80048 BASIC METABOLIC PNL TOTAL CA: CPT | Performed by: STUDENT IN AN ORGANIZED HEALTH CARE EDUCATION/TRAINING PROGRAM

## 2025-04-19 PROCEDURE — 85025 COMPLETE CBC W/AUTO DIFF WBC: CPT | Performed by: STUDENT IN AN ORGANIZED HEALTH CARE EDUCATION/TRAINING PROGRAM

## 2025-04-19 PROCEDURE — 99232 SBSQ HOSP IP/OBS MODERATE 35: CPT | Performed by: STUDENT IN AN ORGANIZED HEALTH CARE EDUCATION/TRAINING PROGRAM

## 2025-04-19 PROCEDURE — 83735 ASSAY OF MAGNESIUM: CPT | Performed by: STUDENT IN AN ORGANIZED HEALTH CARE EDUCATION/TRAINING PROGRAM

## 2025-04-19 RX ORDER — FENTANYL/ROPIVACAINE/NS/PF 2-625MCG/1
15 PLASTIC BAG, INJECTION (ML) EPIDURAL
Status: COMPLETED | OUTPATIENT
Start: 2025-04-19 | End: 2025-04-19

## 2025-04-19 RX ADMIN — POTASSIUM PHOSPHATE, MONOBASIC AND POTASSIUM PHOSPHATE, DIBASIC 15 MMOL: 224; 236 INJECTION, SOLUTION, CONCENTRATE INTRAVENOUS at 19:30

## 2025-04-19 RX ADMIN — ENOXAPARIN SODIUM 30 MG: 100 INJECTION SUBCUTANEOUS at 09:07

## 2025-04-19 RX ADMIN — Medication 10 ML: at 09:06

## 2025-04-19 RX ADMIN — LACTULOSE 30 G: 10 SOLUTION ORAL at 09:06

## 2025-04-19 RX ADMIN — ASPIRIN 81 MG: 81 TABLET, CHEWABLE ORAL at 09:06

## 2025-04-19 RX ADMIN — FLUTICASONE PROPIONATE 2 SPRAY: 50 SPRAY, METERED NASAL at 09:07

## 2025-04-19 RX ADMIN — OLANZAPINE 5 MG: 5 TABLET, FILM COATED ORAL at 19:30

## 2025-04-19 RX ADMIN — METOPROLOL SUCCINATE 25 MG: 25 TABLET, EXTENDED RELEASE ORAL at 09:06

## 2025-04-19 RX ADMIN — Medication 10 ML: at 19:30

## 2025-04-19 RX ADMIN — LACTULOSE 30 G: 10 SOLUTION ORAL at 15:26

## 2025-04-19 RX ADMIN — LACTULOSE 30 G: 10 SOLUTION ORAL at 19:30

## 2025-04-19 RX ADMIN — MONTELUKAST 10 MG: 10 TABLET, FILM COATED ORAL at 19:30

## 2025-04-19 RX ADMIN — POTASSIUM PHOSPHATE, MONOBASIC AND POTASSIUM PHOSPHATE, DIBASIC 15 MMOL: 224; 236 INJECTION, SOLUTION, CONCENTRATE INTRAVENOUS at 16:06

## 2025-04-19 NOTE — PLAN OF CARE
Goal Outcome Evaluation:  Plan of Care Reviewed With: patient           Outcome Evaluation: Patient has mumbled, incoherent speech and confused -- potassium phosphate 15mmol in 0.9%  mL IVPB infusion administered -- Q2 turn -- family at bedside, call light in reach, patient safety ensured.

## 2025-04-19 NOTE — PROGRESS NOTES
[facility]   Hospitalist Progress Note  Date: 2025  Patient Name: Madina Reddy  : 1965  MRN: [id number]  Date of admission: 2025  Room/Bed: 259/1      Subjective   Subjective     Chief Complaint: Weakness, confusion, hypercalcemia    Summary:Madina Reddy is a 60 y.o. female with history of malignant ovarian cancer with carcinomatosis and hepatic lesion currently status post 3 cycles of chemotherapy and hypertension who presented with weakness, confusion and found to have hypercalcemia.  Patient was recently diagnosed with UTI and/9 and was sent home on oral antibiotic.  Calcium during that time was 12.6.  Even after receiving antibiotic, patient did not show any improvement and gradually her symptoms worsen after which her partner brought her to the hospital.  On presentation, calcium was 12.1.  Hemoglobin 9.  Patient started on IV fluids and was admitted for further evaluation management.  On IV ceftriaxone for possible UTI.  Blood culture sent.  Generalized weakness present.  Iron-deficiency anemia, receiving IV iron.  Repleting electrolytes.  Having intermittent confusion.  Patient has remained very weak throughout the hospitalization.  Discussed with  at bedside, agreed on palliative consult for goals of care discussion.  Patient has pending debulking surgery, will schedule for coming Monday.  Physically very weak and likely unfit for possible surgery currently.  Also discussed with  regarding it.  Oncologist following.  Palliative consulted for goals of care.  Patient/family wishes to go home with hospice.  Hospice on board.    Interval Followup: No acute events overnight.  Patient laying in bed, denies any active complaints.  Seems lethargic.  Intermittently confused.  Patient's friend at bedside during my evaluation.  Vital stable.    Review of Systems    All systems reviewed and negative except for what is outlined above.      Objective   Objective     Vitals:    Temp:  [98.1 °F (36.7 °C)-98.8 °F (37.1 °C)] 98.8 °F (37.1 °C)  Heart Rate:  [] 113  Resp:  [16] 16  BP: (117-132)/(82-95) 126/95    Physical Exam   General: Awake, alert, seems weak and slightly lethargic today, NAD  Cardiovascular: RRR, no murmurs   Pulmonary: CTA bilaterally; no wheezes; no conversational dyspnea  Gastrointestinal: S/ND/NT, +BS  Neuro: Alert, awake, answering few questions appropriately but intermittently confused; speech clear    Result Review    Result Review:  I have personally reviewed these results:  [x]  Laboratory      Lab 04/19/25  0533 04/18/25  0408 04/17/25  0509   WBC 12.26* 11.43* 10.97*   HEMOGLOBIN 9.5* 9.2* 7.5*   HEMATOCRIT 31.7* 29.8* 24.6*   PLATELETS 247 232 268   NEUTROS ABS 9.70* 8.13* 8.40*   IMMATURE GRANS (ABS) 0.18* 0.22* 0.16*   LYMPHS ABS 1.09 1.72 1.23   MONOS ABS 1.20* 1.23* 1.10*   EOS ABS 0.04 0.08 0.04   MCV 91.9 89.8 90.4         Lab 04/19/25  0533 04/18/25  0408 04/17/25  0509   SODIUM 139 139 141   POTASSIUM 3.6 3.7 3.5   CHLORIDE 102 104 104   CO2 28.5 27.0 25.7   ANION GAP 8.5 8.0 11.3   BUN 10 10 9   CREATININE 0.38* 0.41* 0.41*   EGFR 115.6 113.5 113.5   GLUCOSE 115* 112* 111*   CALCIUM 11.5* 11.5* 10.8*   MAGNESIUM 2.0 1.8 2.0   PHOSPHORUS 2.0* 2.7 2.8         Lab 04/15/25  0609 04/14/25  0952   TOTAL PROTEIN 7.0 7.9   ALBUMIN 3.2* 3.6   GLOBULIN 3.8 4.3   ALT (SGPT) 5 6   AST (SGOT) 17 17   BILIRUBIN 0.2 0.3   ALK PHOS 59 68         Lab 04/14/25  1127 04/14/25  0952   HSTROP T 9 10             Lab 04/17/25  1034 04/17/25  0509 04/15/25  0609 04/14/25  0952   IRON  --   --  15*  --    IRON SATURATION (TSAT)  --   --  8*  --    TIBC  --   --  194*  --    TRANSFERRIN  --   --  130*  --    FERRITIN  --   --  1,713.00*  --    FOLATE  --   --   --  8.01   VITAMIN B 12  --   --   --  917   ABO TYPING A   < >  --   --    RH TYPING Positive   < >  --   --    ANTIBODY SCREEN Negative  --   --   --     < > = values in this interval not displayed.          Brief Urine Lab Results  (Last result in the past 365 days)        Color   Clarity   Blood   Leuk Est   Nitrite   Protein   CREAT   Urine HCG        04/14/25 1821 Yellow   Cloudy   Negative   Negative   Negative   Trace                 [x]  Microbiology   Microbiology Results (last 10 days)       Procedure Component Value - Date/Time    Blood Culture - Blood, Hand, Left [[id number]]  (Normal) Collected: 04/14/25 1658    Lab Status: Preliminary result Specimen: Blood from Hand, Left Updated: 04/18/25 1715     Blood Culture No growth at 4 days    Narrative:      Less than seven (7) mL's of blood was collected.  Insufficient quantity may yield false negative results.    Blood Culture - Blood, Arm, Right [[id number]]  (Normal) Collected: 04/14/25 1658    Lab Status: Preliminary result Specimen: Blood from Arm, Right Updated: 04/18/25 1715     Blood Culture No growth at 4 days    Narrative:      Less than seven (7) mL's of blood was collected.  Insufficient quantity may yield false negative results.    COVID PRE-OP / PRE-PROCEDURE SCREENING ORDER (NO ISOLATION) - Swab, Nasopharynx [[id number]]  (Normal) Collected: 04/09/25 1925    Lab Status: Final result Specimen: Swab from Nasopharynx Updated: 04/09/25 2011    Narrative:      The following orders were created for panel order COVID PRE-OP / PRE-PROCEDURE SCREENING ORDER (NO ISOLATION) - Swab, Nasopharynx.  Procedure                               Abnormality         Status                     ---------                               -----------         ------                     COVID-19, FLU A/B, RSV P...[[id number]]  Normal              Final result                 Please view results for these tests on the individual orders.    COVID-19, FLU A/B, RSV PCR 1 HR TAT - Swab, Nasopharynx [[id number]]  (Normal) Collected: 04/09/25 1925    Lab Status: Final result Specimen: Swab from Nasopharynx Updated: 04/09/25 2011     COVID19 Not Detected     Influenza A PCR Not Detected      Influenza B PCR Not Detected     RSV, PCR Not Detected    Narrative:      Fact sheet for providers: https://www.fda.gov/media/011611/download    Fact sheet for patients: https://www.fda.gov/media/435327/download    Test performed by PCR.          [x]  Radiology  XR Chest 1 View  Result Date: 4/14/2025  Impression: No active disease. Electronically Signed: James Albrecht MD  4/14/2025 9:30 AM EDT  Workstation ID: [id number]    []  EKG/Telemetry   []  Cardiology/Vascular   []  Pathology  []  Old records  []  Other:    Assessment & Plan   Assessment / Plan     Assessment:  Acute metabolic encephalopathy, improving  Generalized weakness  Hypocalcemia, improving  Hypokalemia, repleted  UTI, unknown organism  Anemia, iron deficiency along with possible chemotherapy-induced  Leukocytosis, resolved  History of malignant ovarian cancer with carcinomatosis  History of hypertension    Plan:  Patient currently being managed medicine service.  S/p IV fluid.  Hypercalcemia, stable.  Likely hyperglycemia due to malignancy. PTHrp pending.  Continue to trend and monitor.  S/p 1 dose of zoledronic acid on 4/18 for hypercalcemia.  Completed 3-day course of IV ceftriaxone.   Blood culture showing no growth at 4 days.  Urine culture unable to be sent given UA nonsignificant on presentation.  Oncologist following.  Patient s/p 3 cycles of carboplatin and paclitaxel.  She is due to see GYN oncologist later this Monday for debulking surgery.  But less likely patient will be able to tolerate surgery given her current clinical status.  Receiving IV iron for iron deficiency anemia.  Palliative consulted for goals of care discussion.  Agreed with home with hospice.  Hospice on board.  Guarded prognosis.  Continue rest of current management.  Oncologist on board, appreciate further input.  Labs in AM.  Clinical course to dictate further management.    Discussed with RN.    VTE Prophylaxis:  Pharmacologic VTE prophylaxis orders are  present.        CODE STATUS:   Code Status (Patient has no pulse and is not breathing): No CPR (Do Not Attempt to Resuscitate)  Medical Interventions (Patient has pulse or is breathing): Limited Support  Medical Intervention Limits: No intubation (DNI)      Electronically signed by Demario Mireles MD, 04/19/25, 9:14 AM EDT.

## 2025-04-19 NOTE — PLAN OF CARE
Goal Outcome Evaluation:  Plan of Care Reviewed With: patient        Progress: no change  Outcome Evaluation: Patient remains alert to self and situation only. Sinus tach on monitor. Vitals WNL. Lactulose administered. Code status was changed to DNR/DNI. Hospice met with patient and family. Palitive following. Will continue to monitor.

## 2025-04-19 NOTE — CONSULTS
"Nutrition Services    Patient Name: Madina Reddy  YOB: 1965  MRN: [id number]  Admission date: 4/14/2025      NUTRITION SCREENING      Trending Narrative: Consult received for chronic poor intake. Patient already being followed by RDs for poor intake and wt loss.        PO Diet: Diet: Cardiac; Healthy Heart (2-3 Na+); Fluid Consistency: Thin (IDDSI 0)   PO Supplements: Boost BID   Trending PO Intake:  25-50%       Nutritionally-Pertinent Medications RDN Reviewed, C/W clinical course         Labs (reviewed below): Phos low, recommend repletion     Results from last 7 days   Lab Units 04/19/25  0533 04/18/25  0408 04/17/25  0509 04/15/25  1639 04/15/25  0609 04/14/25  0952   SODIUM mmol/L 139 139 141   < > 143 140   POTASSIUM mmol/L 3.6 3.7 3.5   < > 2.9* 2.8*   CHLORIDE mmol/L 102 104 104   < > 107 97*   CO2 mmol/L 28.5 27.0 25.7   < > 25.7 30.5*   BUN mg/dL 10 10 9   < > 13 16   CREATININE mg/dL 0.38* 0.41* 0.41*   < > 0.41* 0.54*   CALCIUM mg/dL 11.5* 11.5* 10.8*   < > 11.0* 12.1*   BILIRUBIN mg/dL  --   --   --   --  0.2 0.3   ALK PHOS U/L  --   --   --   --  59 68   ALT (SGPT) U/L  --   --   --   --  5 6   AST (SGOT) U/L  --   --   --   --  17 17   GLUCOSE mg/dL 115* 112* 111*   < > 102* 113*    < > = values in this interval not displayed.     Results from last 7 days   Lab Units 04/19/25 0533 04/18/25 0408 04/17/25  0509   MAGNESIUM mg/dL 2.0 1.8 2.0   PHOSPHORUS mg/dL 2.0* 2.7 2.8   HEMOGLOBIN g/dL 9.5* 9.2* 7.5*   HEMATOCRIT % 31.7* 29.8* 24.6*     Lab Results   Component Value Date    HGBA1C 5.30 12/12/2023          GI Function:  Received water enema and lactulose yesterday, had BM. Large BM reported today as well.       Skin: No PI, Guero 14       Weight Review: Estimated body mass index is 23.51 kg/m² as calculated from the following:    Height as of this encounter: 160 cm (63\").    Weight as of this encounter: 60.2 kg (132 lb 11.5 oz).    Comment:   No wt loss since last visit, wt at " last visit (54.3 kg) appears to be outlier    Wt Readings from Last 30 Encounters:   04/18/25 0500 60.2 kg (132 lb 11.5 oz)   04/17/25 0500 59.3 kg (130 lb 11.7 oz)   04/15/25 1822 61 kg (134 lb 7.7 oz)   04/14/25 0858 54.3 kg (119 lb 11.4 oz)   04/09/25 1816 60 kg (132 lb 4.4 oz)   03/25/25 0846 60.7 kg (133 lb 13.1 oz)   02/26/25 0808 60.6 kg (133 lb 8 oz)   02/25/25 1323 61 kg (134 lb 7.7 oz)   02/10/25 1048 60.3 kg (132 lb 15 oz)   01/28/25 1445 61.7 kg (136 lb 0.4 oz)   01/08/25 0108 64.2 kg (141 lb 8.6 oz)   01/07/25 1801 66.8 kg (147 lb 4.3 oz)   11/25/24 1349 69.9 kg (154 lb 3.2 oz)   11/13/24 1341 70.3 kg (155 lb)   08/12/24 1005 70.8 kg (156 lb)   03/25/24 1324 70.4 kg (155 lb 3.2 oz)   12/12/23 0700 70.3 kg (155 lb)   12/06/23 0808 72.7 kg (160 lb 3.2 oz)   09/25/23 0920 72 kg (158 lb 12.8 oz)   09/11/23 1408 70.8 kg (156 lb)   07/05/23 0959 69.4 kg (153 lb)   06/06/23 1436 68.1 kg (150 lb 3.2 oz)   03/31/23 0947 67.6 kg (149 lb)   03/30/23 1526 67.6 kg (149 lb)   02/27/23 1321 66.9 kg (147 lb 6.4 oz)   02/03/23 1503 65.8 kg (145 lb)   01/30/23 0902 66.7 kg (147 lb)   01/23/23 1058 64.3 kg (141 lb 12.1 oz)   10/10/22 0839 68.9 kg (152 lb)   05/17/22 1524 66.2 kg (146 lb)   02/15/22 1312 68.6 kg (151 lb 3.2 oz)   01/31/22 1048 67.6 kg (149 lb)   11/19/21 0814 68 kg (150 lb)   07/02/21 1058 68 kg (150 lb)          --       Nutrition Problem Statement: Moderate malnutrition related to inadequate oral intake as evidenced by decreased appetite and unintended weight change. - Ongoing        Nutrition Intervention: Continue Boost BID (240 kcal, 10 g pro each)  Modify to House diet    - Healthy Heart diet is low in fat, pt intake is poor and low fat diet inappropriate        Monitoring/Evaluation Per protocol, PO intake, Supplement intake, GI status          RD to follow up per protocol.    Electronically signed by:  Birgit Parikh RD  04/19/25 09:29 EDT

## 2025-04-20 LAB
ANION GAP SERPL CALCULATED.3IONS-SCNC: 10.6 MMOL/L (ref 5–15)
BASOPHILS # BLD AUTO: 0.05 10*3/MM3 (ref 0–0.2)
BASOPHILS NFR BLD AUTO: 0.4 % (ref 0–1.5)
BUN SERPL-MCNC: 14 MG/DL (ref 6–20)
BUN/CREAT SERPL: 28 (ref 7–25)
CALCIUM SPEC-SCNC: 10.3 MG/DL (ref 8.6–10.5)
CHLORIDE SERPL-SCNC: 104 MMOL/L (ref 98–107)
CO2 SERPL-SCNC: 26.4 MMOL/L (ref 22–29)
CREAT SERPL-MCNC: 0.5 MG/DL (ref 0.57–1)
DEPRECATED RDW RBC AUTO: 56.9 FL (ref 37–54)
EGFRCR SERPLBLD CKD-EPI 2021: 108.2 ML/MIN/1.73
EOSINOPHIL # BLD AUTO: 0.02 10*3/MM3 (ref 0–0.4)
EOSINOPHIL NFR BLD AUTO: 0.2 % (ref 0.3–6.2)
ERYTHROCYTE [DISTWIDTH] IN BLOOD BY AUTOMATED COUNT: 17.1 % (ref 12.3–15.4)
GLUCOSE SERPL-MCNC: 112 MG/DL (ref 65–99)
HCT VFR BLD AUTO: 31 % (ref 34–46.6)
HGB BLD-MCNC: 9.4 G/DL (ref 12–15.9)
IMM GRANULOCYTES # BLD AUTO: 0.23 10*3/MM3 (ref 0–0.05)
IMM GRANULOCYTES NFR BLD AUTO: 1.9 % (ref 0–0.5)
LYMPHOCYTES # BLD AUTO: 1.4 10*3/MM3 (ref 0.7–3.1)
LYMPHOCYTES NFR BLD AUTO: 11.3 % (ref 19.6–45.3)
MAGNESIUM SERPL-MCNC: 1.8 MG/DL (ref 1.6–2.6)
MCH RBC QN AUTO: 28.1 PG (ref 26.6–33)
MCHC RBC AUTO-ENTMCNC: 30.3 G/DL (ref 31.5–35.7)
MCV RBC AUTO: 92.5 FL (ref 79–97)
MONOCYTES # BLD AUTO: 1.45 10*3/MM3 (ref 0.1–0.9)
MONOCYTES NFR BLD AUTO: 11.7 % (ref 5–12)
NEUTROPHILS NFR BLD AUTO: 74.5 % (ref 42.7–76)
NEUTROPHILS NFR BLD AUTO: 9.23 10*3/MM3 (ref 1.7–7)
NRBC BLD AUTO-RTO: 0 /100 WBC (ref 0–0.2)
PHOSPHATE SERPL-MCNC: 2.2 MG/DL (ref 2.5–4.5)
PLATELET # BLD AUTO: 231 10*3/MM3 (ref 140–450)
PMV BLD AUTO: 10.4 FL (ref 6–12)
POTASSIUM SERPL-SCNC: 3.9 MMOL/L (ref 3.5–5.2)
RBC # BLD AUTO: 3.35 10*6/MM3 (ref 3.77–5.28)
SODIUM SERPL-SCNC: 141 MMOL/L (ref 136–145)
WBC NRBC COR # BLD AUTO: 12.38 10*3/MM3 (ref 3.4–10.8)

## 2025-04-20 PROCEDURE — 25010000002 ENOXAPARIN PER 10 MG: Performed by: INTERNAL MEDICINE

## 2025-04-20 PROCEDURE — G0378 HOSPITAL OBSERVATION PER HR: HCPCS

## 2025-04-20 PROCEDURE — 80048 BASIC METABOLIC PNL TOTAL CA: CPT | Performed by: STUDENT IN AN ORGANIZED HEALTH CARE EDUCATION/TRAINING PROGRAM

## 2025-04-20 PROCEDURE — 25810000003 SODIUM CHLORIDE 0.9 % SOLUTION: Performed by: STUDENT IN AN ORGANIZED HEALTH CARE EDUCATION/TRAINING PROGRAM

## 2025-04-20 PROCEDURE — 85025 COMPLETE CBC W/AUTO DIFF WBC: CPT | Performed by: STUDENT IN AN ORGANIZED HEALTH CARE EDUCATION/TRAINING PROGRAM

## 2025-04-20 PROCEDURE — 83735 ASSAY OF MAGNESIUM: CPT | Performed by: STUDENT IN AN ORGANIZED HEALTH CARE EDUCATION/TRAINING PROGRAM

## 2025-04-20 PROCEDURE — 84100 ASSAY OF PHOSPHORUS: CPT | Performed by: STUDENT IN AN ORGANIZED HEALTH CARE EDUCATION/TRAINING PROGRAM

## 2025-04-20 PROCEDURE — 99232 SBSQ HOSP IP/OBS MODERATE 35: CPT | Performed by: STUDENT IN AN ORGANIZED HEALTH CARE EDUCATION/TRAINING PROGRAM

## 2025-04-20 PROCEDURE — 63710000001 ONDANSETRON ODT 4 MG TABLET DISPERSIBLE: Performed by: STUDENT IN AN ORGANIZED HEALTH CARE EDUCATION/TRAINING PROGRAM

## 2025-04-20 RX ORDER — FENTANYL/ROPIVACAINE/NS/PF 2-625MCG/1
15 PLASTIC BAG, INJECTION (ML) EPIDURAL ONCE
Status: COMPLETED | OUTPATIENT
Start: 2025-04-20 | End: 2025-04-20

## 2025-04-20 RX ADMIN — Medication 10 ML: at 09:49

## 2025-04-20 RX ADMIN — ACETAMINOPHEN 650 MG: 325 TABLET ORAL at 09:48

## 2025-04-20 RX ADMIN — FLUTICASONE PROPIONATE 2 SPRAY: 50 SPRAY, METERED NASAL at 09:49

## 2025-04-20 RX ADMIN — Medication 10 ML: at 20:54

## 2025-04-20 RX ADMIN — MONTELUKAST 10 MG: 10 TABLET, FILM COATED ORAL at 20:54

## 2025-04-20 RX ADMIN — METOPROLOL SUCCINATE 25 MG: 25 TABLET, EXTENDED RELEASE ORAL at 09:48

## 2025-04-20 RX ADMIN — ASPIRIN 81 MG: 81 TABLET, CHEWABLE ORAL at 09:48

## 2025-04-20 RX ADMIN — ENOXAPARIN SODIUM 30 MG: 100 INJECTION SUBCUTANEOUS at 09:48

## 2025-04-20 RX ADMIN — POTASSIUM PHOSPHATE, MONOBASIC AND POTASSIUM PHOSPHATE, DIBASIC 15 MMOL: 224; 236 INJECTION, SOLUTION, CONCENTRATE INTRAVENOUS at 09:49

## 2025-04-20 RX ADMIN — OLANZAPINE 5 MG: 5 TABLET, FILM COATED ORAL at 20:54

## 2025-04-20 RX ADMIN — LACTULOSE 30 G: 10 SOLUTION ORAL at 16:31

## 2025-04-20 RX ADMIN — LACTULOSE 30 G: 10 SOLUTION ORAL at 20:54

## 2025-04-20 RX ADMIN — ACETAMINOPHEN 650 MG: 325 TABLET ORAL at 14:48

## 2025-04-20 RX ADMIN — LACTULOSE 30 G: 10 SOLUTION ORAL at 09:48

## 2025-04-20 RX ADMIN — ONDANSETRON 4 MG: 4 TABLET, ORALLY DISINTEGRATING ORAL at 13:29

## 2025-04-20 NOTE — PROGRESS NOTES
[facility]   Hospitalist Progress Note  Date: 2025  Patient Name: Madina Reddy  : 1965  MRN: [id number]  Date of admission: 2025  Room/Bed: 259/1      Subjective   Subjective     Chief Complaint: Weakness, confusion, hypercalcemia    Summary:Madina Reddy is a 60 y.o. female with history of malignant ovarian cancer with carcinomatosis and hepatic lesion currently status post 3 cycles of chemotherapy and hypertension who presented with weakness, confusion and found to have hypercalcemia.  Patient was recently diagnosed with UTI and/9 and was sent home on oral antibiotic.  Calcium during that time was 12.6.  Even after receiving antibiotic, patient did not show any improvement and gradually her symptoms worsen after which her partner brought her to the hospital.  On presentation, calcium was 12.1.  Hemoglobin 9.  Patient started on IV fluids and was admitted for further evaluation management.  On IV ceftriaxone for possible UTI.  Blood culture sent.  Generalized weakness present.  Iron-deficiency anemia, receiving IV iron.  Repleting electrolytes.  Having intermittent confusion.  Patient has remained very weak throughout the hospitalization.  Discussed with  at bedside, agreed on palliative consult for goals of care discussion.  Patient has pending debulking surgery, will schedule for coming Monday.  Physically very weak and likely unfit for possible surgery currently.  Also discussed with  regarding it.  Oncologist following.  Palliative consulted for goals of care.  Patient/family wishes to go home with hospice.  Hospice meeting scheduled on .    Interval Followup: No acute events overnight.  Patient laying in bed, more alert compared to yesterday but still lethargic.  Complaining of right hip pain, asking for pain medication.  Intermittently confused.  Patient's  at bedside.  Updated.  Hospice meeting tomorrow at 12 PM.  Phosphorus repleted.    Review of  Systems    All systems reviewed and negative except for what is outlined above.      Objective   Objective     Vitals:   Temp:  [98.6 °F (37 °C)-99.7 °F (37.6 °C)] 99.1 °F (37.3 °C)  Heart Rate:  [104-118] 113  Resp:  [16-18] 16  BP: (106-123)/(76-99) 106/77    Physical Exam   General: Awake, more alert compared to yesterday, seems weak and lethargic, NAD  Cardiovascular: RRR, no murmurs   Pulmonary: CTA bilaterally; no wheezes; no conversational dyspnea  Gastrointestinal: S/ND/NT, +BS  Neuro: Alert, awake, answering questions appropriately; speech clear    Result Review    Result Review:  I have personally reviewed these results:  [x]  Laboratory      Lab 04/20/25  0246 04/19/25  0533 04/18/25  0408   WBC 12.38* 12.26* 11.43*   HEMOGLOBIN 9.4* 9.5* 9.2*   HEMATOCRIT 31.0* 31.7* 29.8*   PLATELETS 231 247 232   NEUTROS ABS 9.23* 9.70* 8.13*   IMMATURE GRANS (ABS) 0.23* 0.18* 0.22*   LYMPHS ABS 1.40 1.09 1.72   MONOS ABS 1.45* 1.20* 1.23*   EOS ABS 0.02 0.04 0.08   MCV 92.5 91.9 89.8         Lab 04/20/25  0246 04/19/25  0533 04/18/25  0408   SODIUM 141 139 139   POTASSIUM 3.9 3.6 3.7   CHLORIDE 104 102 104   CO2 26.4 28.5 27.0   ANION GAP 10.6 8.5 8.0   BUN 14 10 10   CREATININE 0.50* 0.38* 0.41*   EGFR 108.2 115.6 113.5   GLUCOSE 112* 115* 112*   CALCIUM 10.3 11.5* 11.5*   MAGNESIUM 1.8 2.0 1.8   PHOSPHORUS 2.2* 2.0* 2.7         Lab 04/15/25  0609 04/14/25  0952   TOTAL PROTEIN 7.0 7.9   ALBUMIN 3.2* 3.6   GLOBULIN 3.8 4.3   ALT (SGPT) 5 6   AST (SGOT) 17 17   BILIRUBIN 0.2 0.3   ALK PHOS 59 68         Lab 04/14/25  1127 04/14/25  0952   HSTROP T 9 10             Lab 04/17/25  1034 04/17/25  0509 04/15/25  0609 04/14/25  0952   IRON  --   --  15*  --    IRON SATURATION (TSAT)  --   --  8*  --    TIBC  --   --  194*  --    TRANSFERRIN  --   --  130*  --    FERRITIN  --   --  1,713.00*  --    FOLATE  --   --   --  8.01   VITAMIN B 12  --   --   --  917   ABO TYPING A   < >  --   --    RH TYPING Positive   < >  --   --     ANTIBODY SCREEN Negative  --   --   --     < > = values in this interval not displayed.         Brief Urine Lab Results  (Last result in the past 365 days)        Color   Clarity   Blood   Leuk Est   Nitrite   Protein   CREAT   Urine HCG        04/14/25 1821 Yellow   Cloudy   Negative   Negative   Negative   Trace                 [x]  Microbiology   Microbiology Results (last 10 days)       Procedure Component Value - Date/Time    Blood Culture - Blood, Hand, Left [[id number]]  (Normal) Collected: 04/14/25 1658    Lab Status: Final result Specimen: Blood from Hand, Left Updated: 04/19/25 1715     Blood Culture No growth at 5 days    Narrative:      Less than seven (7) mL's of blood was collected.  Insufficient quantity may yield false negative results.    Blood Culture - Blood, Arm, Right [[id number]]  (Normal) Collected: 04/14/25 1658    Lab Status: Final result Specimen: Blood from Arm, Right Updated: 04/19/25 1715     Blood Culture No growth at 5 days    Narrative:      Less than seven (7) mL's of blood was collected.  Insufficient quantity may yield false negative results.          [x]  Radiology  XR Chest 1 View  Result Date: 4/14/2025  Impression: No active disease. Electronically Signed: James Albrecht MD  4/14/2025 9:30 AM EDT  Workstation ID: [id number]    []  EKG/Telemetry   []  Cardiology/Vascular   []  Pathology  []  Old records  []  Other:    Assessment & Plan   Assessment / Plan     Assessment:  Acute metabolic encephalopathy, improving  Generalized weakness  Hypocalcemia, improving  Hypokalemia, repleted  UTI, unknown organism  Anemia, iron deficiency along with possible chemotherapy-induced  Leukocytosis, resolved  History of malignant ovarian cancer with carcinomatosis  History of hypertension    Plan:  Patient currently being managed medicine service.  S/p IV fluid.  Hypercalcemia, stable.  Likely hyperglycemia due to malignancy. Continue to trend and monitor.  S/p 1 dose of zoledronic acid on 4/18  for hypercalcemia.  Completed 3-day course of IV ceftriaxone.   Blood culture showed no growth till date.  Urine culture unable to be sent given UA nonsignificant on presentation.  Oncologist following.  Patient s/p 3 cycles of carboplatin and paclitaxel.  She is due to see GYN oncologist this Monday for debulking surgery.  But less likely patient will be able to tolerate surgery given her current clinical status.  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  Palliative consulted for goals of care discussion.  Agreed with home with hospice.  Hospice meeting scheduled for tomorrow 4/21 at 12 PM.  Guarded prognosis.  Continue rest of current management.  Oncologist on board, appreciate further input.  Labs in AM.  Clinical course to dictate further management.    Discussed with RN.    VTE Prophylaxis:  Pharmacologic VTE prophylaxis orders are present.        CODE STATUS:   Code Status (Patient has no pulse and is not breathing): No CPR (Do Not Attempt to Resuscitate)  Medical Interventions (Patient has pulse or is breathing): Limited Support  Medical Intervention Limits: No intubation (DNI)      Electronically signed by Demario Mireles MD, 04/20/25, 9:14 AM EDT.

## 2025-04-20 NOTE — PLAN OF CARE
Problem: Adult Inpatient Plan of Care  Goal: Plan of Care Review  Outcome: Progressing  Flowsheets (Taken 4/20/2025 0220)  Progress: no change  Outcome Evaluation: Patient alert to voice with forgetfulness. VSS. On room air, NSR to ST on tele monitor. Potassium phosphate replaced per order.  No s/s of distress. Denies any pain at this time. Able to sleep in between care. Call light within reach. Care plan ongoing  Plan of Care Reviewed With: patient   Goal Outcome Evaluation:  Plan of Care Reviewed With: patient

## 2025-04-21 PROBLEM — R73.9 HYPERGLYCEMIA: Status: ACTIVE | Noted: 2025-04-21

## 2025-04-21 LAB
ANION GAP SERPL CALCULATED.3IONS-SCNC: 9.8 MMOL/L (ref 5–15)
BACTERIA UR QL AUTO: ABNORMAL /HPF
BASOPHILS # BLD AUTO: 0.07 10*3/MM3 (ref 0–0.2)
BASOPHILS NFR BLD AUTO: 0.5 % (ref 0–1.5)
BILIRUB UR QL STRIP: NEGATIVE
BUN SERPL-MCNC: 13 MG/DL (ref 6–20)
BUN/CREAT SERPL: 27.7 (ref 7–25)
CALCIUM SPEC-SCNC: 9.6 MG/DL (ref 8.6–10.5)
CHLORIDE SERPL-SCNC: 105 MMOL/L (ref 98–107)
CLARITY UR: CLEAR
CO2 SERPL-SCNC: 25.2 MMOL/L (ref 22–29)
COLOR UR: YELLOW
CREAT SERPL-MCNC: 0.47 MG/DL (ref 0.57–1)
DEPRECATED RDW RBC AUTO: 57.5 FL (ref 37–54)
EGFRCR SERPLBLD CKD-EPI 2021: 109.8 ML/MIN/1.73
EOSINOPHIL # BLD AUTO: 0.05 10*3/MM3 (ref 0–0.4)
EOSINOPHIL NFR BLD AUTO: 0.3 % (ref 0.3–6.2)
ERYTHROCYTE [DISTWIDTH] IN BLOOD BY AUTOMATED COUNT: 17 % (ref 12.3–15.4)
GLUCOSE SERPL-MCNC: 105 MG/DL (ref 65–99)
GLUCOSE UR STRIP-MCNC: NEGATIVE MG/DL
HCT VFR BLD AUTO: 31.2 % (ref 34–46.6)
HGB BLD-MCNC: 9.2 G/DL (ref 12–15.9)
HGB UR QL STRIP.AUTO: NEGATIVE
HYALINE CASTS UR QL AUTO: ABNORMAL /LPF
IMM GRANULOCYTES # BLD AUTO: 0.19 10*3/MM3 (ref 0–0.05)
IMM GRANULOCYTES NFR BLD AUTO: 1.3 % (ref 0–0.5)
KETONES UR QL STRIP: NEGATIVE
LEUKOCYTE ESTERASE UR QL STRIP.AUTO: ABNORMAL
LYMPHOCYTES # BLD AUTO: 1.43 10*3/MM3 (ref 0.7–3.1)
LYMPHOCYTES NFR BLD AUTO: 9.8 % (ref 19.6–45.3)
MAGNESIUM SERPL-MCNC: 2 MG/DL (ref 1.6–2.6)
MCH RBC QN AUTO: 27.5 PG (ref 26.6–33)
MCHC RBC AUTO-ENTMCNC: 29.5 G/DL (ref 31.5–35.7)
MCV RBC AUTO: 93.1 FL (ref 79–97)
MONOCYTES # BLD AUTO: 1.27 10*3/MM3 (ref 0.1–0.9)
MONOCYTES NFR BLD AUTO: 8.7 % (ref 5–12)
NEUTROPHILS NFR BLD AUTO: 11.61 10*3/MM3 (ref 1.7–7)
NEUTROPHILS NFR BLD AUTO: 79.4 % (ref 42.7–76)
NITRITE UR QL STRIP: NEGATIVE
NRBC BLD AUTO-RTO: 0 /100 WBC (ref 0–0.2)
PH UR STRIP.AUTO: 5.5 [PH] (ref 5–8)
PHOSPHATE SERPL-MCNC: 1.9 MG/DL (ref 2.5–4.5)
PLATELET # BLD AUTO: 232 10*3/MM3 (ref 140–450)
PMV BLD AUTO: 10.6 FL (ref 6–12)
POTASSIUM SERPL-SCNC: 3.8 MMOL/L (ref 3.5–5.2)
PROT UR QL STRIP: ABNORMAL
RBC # BLD AUTO: 3.35 10*6/MM3 (ref 3.77–5.28)
RBC # UR STRIP: ABNORMAL /HPF
REF LAB TEST METHOD: ABNORMAL
SODIUM SERPL-SCNC: 140 MMOL/L (ref 136–145)
SP GR UR STRIP: 1.02 (ref 1–1.03)
SQUAMOUS #/AREA URNS HPF: ABNORMAL /HPF
UROBILINOGEN UR QL STRIP: ABNORMAL
WBC # UR STRIP: ABNORMAL /HPF
WBC NRBC COR # BLD AUTO: 14.62 10*3/MM3 (ref 3.4–10.8)

## 2025-04-21 PROCEDURE — 25010000002 ENOXAPARIN PER 10 MG: Performed by: INTERNAL MEDICINE

## 2025-04-21 PROCEDURE — 87186 SC STD MICRODIL/AGAR DIL: CPT | Performed by: STUDENT IN AN ORGANIZED HEALTH CARE EDUCATION/TRAINING PROGRAM

## 2025-04-21 PROCEDURE — 81001 URINALYSIS AUTO W/SCOPE: CPT | Performed by: STUDENT IN AN ORGANIZED HEALTH CARE EDUCATION/TRAINING PROGRAM

## 2025-04-21 PROCEDURE — 83735 ASSAY OF MAGNESIUM: CPT | Performed by: STUDENT IN AN ORGANIZED HEALTH CARE EDUCATION/TRAINING PROGRAM

## 2025-04-21 PROCEDURE — 63710000001 ONDANSETRON ODT 4 MG TABLET DISPERSIBLE: Performed by: STUDENT IN AN ORGANIZED HEALTH CARE EDUCATION/TRAINING PROGRAM

## 2025-04-21 PROCEDURE — 80048 BASIC METABOLIC PNL TOTAL CA: CPT | Performed by: STUDENT IN AN ORGANIZED HEALTH CARE EDUCATION/TRAINING PROGRAM

## 2025-04-21 PROCEDURE — 25010000003 DEXTROSE 5 % SOLUTION: Performed by: STUDENT IN AN ORGANIZED HEALTH CARE EDUCATION/TRAINING PROGRAM

## 2025-04-21 PROCEDURE — 84100 ASSAY OF PHOSPHORUS: CPT | Performed by: STUDENT IN AN ORGANIZED HEALTH CARE EDUCATION/TRAINING PROGRAM

## 2025-04-21 PROCEDURE — 87086 URINE CULTURE/COLONY COUNT: CPT | Performed by: STUDENT IN AN ORGANIZED HEALTH CARE EDUCATION/TRAINING PROGRAM

## 2025-04-21 PROCEDURE — 99232 SBSQ HOSP IP/OBS MODERATE 35: CPT | Performed by: STUDENT IN AN ORGANIZED HEALTH CARE EDUCATION/TRAINING PROGRAM

## 2025-04-21 PROCEDURE — 85025 COMPLETE CBC W/AUTO DIFF WBC: CPT | Performed by: STUDENT IN AN ORGANIZED HEALTH CARE EDUCATION/TRAINING PROGRAM

## 2025-04-21 PROCEDURE — 87077 CULTURE AEROBIC IDENTIFY: CPT | Performed by: STUDENT IN AN ORGANIZED HEALTH CARE EDUCATION/TRAINING PROGRAM

## 2025-04-21 RX ORDER — ONDANSETRON 4 MG/1
4 TABLET, ORALLY DISINTEGRATING ORAL EVERY 6 HOURS PRN
Status: DISCONTINUED | OUTPATIENT
Start: 2025-04-21 | End: 2025-04-22 | Stop reason: HOSPADM

## 2025-04-21 RX ORDER — ONDANSETRON 2 MG/ML
4 INJECTION INTRAMUSCULAR; INTRAVENOUS EVERY 8 HOURS PRN
Status: DISCONTINUED | OUTPATIENT
Start: 2025-04-21 | End: 2025-04-22 | Stop reason: HOSPADM

## 2025-04-21 RX ORDER — SCOPOLAMINE 1 MG/3D
1 PATCH, EXTENDED RELEASE TRANSDERMAL
Status: DISCONTINUED | OUTPATIENT
Start: 2025-04-21 | End: 2025-04-22 | Stop reason: HOSPADM

## 2025-04-21 RX ADMIN — SODIUM PHOSPHATE, MONOBASIC, MONOHYDRATE AND SODIUM PHOSPHATE, DIBASIC, ANHYDROUS 15 MMOL: 142; 276 INJECTION, SOLUTION INTRAVENOUS at 08:41

## 2025-04-21 RX ADMIN — SODIUM PHOSPHATE, MONOBASIC, MONOHYDRATE AND SODIUM PHOSPHATE, DIBASIC, ANHYDROUS 15 MMOL: 142; 276 INJECTION, SOLUTION INTRAVENOUS at 12:11

## 2025-04-21 RX ADMIN — ASPIRIN 81 MG: 81 TABLET, CHEWABLE ORAL at 08:41

## 2025-04-21 RX ADMIN — SCOLOPAMINE TRANSDERMAL SYSTEM 1 PATCH: 1 PATCH, EXTENDED RELEASE TRANSDERMAL at 10:41

## 2025-04-21 RX ADMIN — ONDANSETRON 4 MG: 4 TABLET, ORALLY DISINTEGRATING ORAL at 08:57

## 2025-04-21 RX ADMIN — LACTULOSE 30 G: 10 SOLUTION ORAL at 16:16

## 2025-04-21 RX ADMIN — Medication 10 ML: at 22:31

## 2025-04-21 RX ADMIN — Medication 10 ML: at 08:41

## 2025-04-21 RX ADMIN — FLUTICASONE PROPIONATE 2 SPRAY: 50 SPRAY, METERED NASAL at 08:41

## 2025-04-21 RX ADMIN — LACTULOSE 30 G: 10 SOLUTION ORAL at 08:41

## 2025-04-21 RX ADMIN — METOPROLOL SUCCINATE 25 MG: 25 TABLET, EXTENDED RELEASE ORAL at 08:41

## 2025-04-21 RX ADMIN — ACETAMINOPHEN 650 MG: 325 TABLET ORAL at 17:37

## 2025-04-21 RX ADMIN — ENOXAPARIN SODIUM 30 MG: 100 INJECTION SUBCUTANEOUS at 08:41

## 2025-04-21 NOTE — PROGRESS NOTES
[facility]   Hospitalist Progress Note  Date: 2025  Patient Name: Madina Reddy  : 1965  MRN: [id number]  Date of admission: 2025  Room/Bed: 259/1      Subjective   Subjective     Chief Complaint: Weakness, confusion, hypercalcemia    Summary:Madina Reddy is a 60 y.o. female with history of malignant ovarian cancer with carcinomatosis and hepatic lesion currently status post 3 cycles of chemotherapy and hypertension who presented with weakness, confusion and found to have hypercalcemia.  Patient was recently diagnosed with UTI and/9 and was sent home on oral antibiotic.  Calcium during that time was 12.6.  Even after receiving antibiotic, patient did not show any improvement and gradually her symptoms worsen after which her partner brought her to the hospital.  On presentation, calcium was 12.1.  Hemoglobin 9.  Patient started on IV fluids and was admitted for further evaluation management.  On IV ceftriaxone for possible UTI.  Blood culture sent.  Generalized weakness present.  Iron-deficiency anemia, receiving IV iron.  Repleting electrolytes.  Having intermittent confusion.  Patient has remained very weak throughout the hospitalization.  Discussed with  at bedside, agreed on palliative consult for goals of care discussion.  Patient has pending debulking surgery, will schedule for coming Monday.  Physically very weak and likely unfit for possible surgery currently.  Also discussed with  regarding it.  Oncologist following.  Palliative consulted for goals of care.  Patient/family wishes to go home with hospice.  Hospice meeting scheduled for later today.    Interval Followup: No acute events overnight.  Patient laying in bed, still lethargic. Diet changed to regular diet; encouraged PO intake. also stated on scopolamine patch to help her nausea. Patient's  at bedside.  Updated.  Hospice meeting later today; patient to be discharged home with hospice tomorrow.      Review of Systems    All systems reviewed and negative except for what is outlined above.      Objective   Objective     Vitals:   Temp:  [97.3 °F (36.3 °C)-98.8 °F (37.1 °C)] 98.4 °F (36.9 °C)  Heart Rate:  [] 112  Resp:  [16-18] 18  BP: (100-110)/(66-96) 100/86    Physical Exam   General: Awake, alert, seems weak and lethargic, NAD  Cardiovascular: RRR, no murmurs   Pulmonary: CTA bilaterally; no wheezes; no conversational dyspnea  Gastrointestinal: S/ND/NT, +BS  Neuro: Alert, awake, answering questions appropriately; speech clear    Result Review    Result Review:  I have personally reviewed these results:  [x]  Laboratory      Lab 04/21/25  0547 04/20/25  0246 04/19/25  0533   WBC 14.62* 12.38* 12.26*   HEMOGLOBIN 9.2* 9.4* 9.5*   HEMATOCRIT 31.2* 31.0* 31.7*   PLATELETS 232 231 247   NEUTROS ABS 11.61* 9.23* 9.70*   IMMATURE GRANS (ABS) 0.19* 0.23* 0.18*   LYMPHS ABS 1.43 1.40 1.09   MONOS ABS 1.27* 1.45* 1.20*   EOS ABS 0.05 0.02 0.04   MCV 93.1 92.5 91.9         Lab 04/21/25  0547 04/20/25  0246 04/19/25  0533   SODIUM 140 141 139   POTASSIUM 3.8 3.9 3.6   CHLORIDE 105 104 102   CO2 25.2 26.4 28.5   ANION GAP 9.8 10.6 8.5   BUN 13 14 10   CREATININE 0.47* 0.50* 0.38*   EGFR 109.8 108.2 115.6   GLUCOSE 105* 112* 115*   CALCIUM 9.6 10.3 11.5*   MAGNESIUM 2.0 1.8 2.0   PHOSPHORUS 1.9* 2.2* 2.0*         Lab 04/15/25  0609 04/14/25  0952   TOTAL PROTEIN 7.0 7.9   ALBUMIN 3.2* 3.6   GLOBULIN 3.8 4.3   ALT (SGPT) 5 6   AST (SGOT) 17 17   BILIRUBIN 0.2 0.3   ALK PHOS 59 68         Lab 04/14/25  1127 04/14/25  0952   HSTROP T 9 10             Lab 04/17/25  1034 04/17/25  0509 04/15/25  0609 04/14/25  0952   IRON  --   --  15*  --    IRON SATURATION (TSAT)  --   --  8*  --    TIBC  --   --  194*  --    TRANSFERRIN  --   --  130*  --    FERRITIN  --   --  1,713.00*  --    FOLATE  --   --   --  8.01   VITAMIN B 12  --   --   --  917   ABO TYPING A   < >  --   --    RH TYPING Positive   < >  --   --    ANTIBODY  SCREEN Negative  --   --   --     < > = values in this interval not displayed.         Brief Urine Lab Results  (Last result in the past 365 days)        Color   Clarity   Blood   Leuk Est   Nitrite   Protein   CREAT   Urine HCG        04/14/25 1821 Yellow   Cloudy   Negative   Negative   Negative   Trace                 [x]  Microbiology   Microbiology Results (last 10 days)       Procedure Component Value - Date/Time    Blood Culture - Blood, Hand, Left [[id number]]  (Normal) Collected: 04/14/25 1658    Lab Status: Final result Specimen: Blood from Hand, Left Updated: 04/19/25 1715     Blood Culture No growth at 5 days    Narrative:      Less than seven (7) mL's of blood was collected.  Insufficient quantity may yield false negative results.    Blood Culture - Blood, Arm, Right [[id number]]  (Normal) Collected: 04/14/25 1658    Lab Status: Final result Specimen: Blood from Arm, Right Updated: 04/19/25 1715     Blood Culture No growth at 5 days    Narrative:      Less than seven (7) mL's of blood was collected.  Insufficient quantity may yield false negative results.          [x]  Radiology  XR Chest 1 View  Result Date: 4/14/2025  Impression: No active disease. Electronically Signed: James Albrecht MD  4/14/2025 9:30 AM EDT  Workstation ID: [id number]    []  EKG/Telemetry   []  Cardiology/Vascular   []  Pathology  []  Old records  []  Other:    Assessment & Plan   Assessment / Plan     Assessment:  Acute metabolic encephalopathy, improving  Generalized weakness  Hypocalcemia, improving  Hypokalemia, repleted  UTI, unknown organism  Anemia, iron deficiency along with possible chemotherapy-induced  Leukocytosis, resolved  History of malignant ovarian cancer with carcinomatosis  History of hypertension    Plan:  Patient currently being managed medicine service.  S/p IV fluid.  Hypercalcemia, stable.  Likely hyperglycemia due to malignancy. Continue to trend and monitor.  S/p 1 dose of zoledronic acid on 4/18 for  hypercalcemia.  Completed 3-day course of IV ceftriaxone.   Blood culture showed no growth till date.  Urine culture unable to be sent given UA nonsignificant on presentation.  Oncologist following.  Patient s/p 3 cycles of carboplatin and paclitaxel.  She is due to see GYN oncologist today for debulking surgery.  But patient less likely to be able to tolerate it given her current clinical status.  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  Palliative consulted for goals of care discussion.  Agreed with home with hospice.  Hospice met with patient and family today.  Guarded prognosis.  Continue rest of current management.  Oncologist on board, appreciate further input.  Labs in AM.  Clinical course to dictate further management.  Patient to be discharged home with hospice tomorrow.    Discussed with RN.    VTE Prophylaxis:  Pharmacologic VTE prophylaxis orders are present.        CODE STATUS:   Code Status (Patient has no pulse and is not breathing): No CPR (Do Not Attempt to Resuscitate)  Medical Interventions (Patient has pulse or is breathing): Limited Support  Medical Intervention Limits: No intubation (DNI)      Electronically signed by Demario Mireles MD, 04/21/25, 9:14 AM EDT.

## 2025-04-21 NOTE — NURSING NOTE
Palliative care follow up post [facility]. Plans for patient to discharge home with [facility] services tomorrow. Provided education on [facility] discharge- patients family v/u. Emotional support provided. Patient enrolled in meds to bed, provider to place dc orders/meds in the am- [facility] to arrange DME delivery. Patient to dc home via ambulance, education completed on [place] [facility] to serve as EMS DNR. Palliative care will continue to follow up.    Angela MAZA, RN, CHPN  Palliative Care

## 2025-04-21 NOTE — SIGNIFICANT NOTE
04/21/25 0955   Coping/Psychosocial   Observed Emotional State calm   Verbalized Emotional State anxiety;grief   Trust Relationship/Rapport empathic listening provided   Family/Support Persons spouse;daughter   Involvement in Care interacting with patient   Additional Documentation Spiritual Care (Group)   Spiritual Care   Spiritual Care Visit Type initial   Spiritual Care Source palliative care   Receptivity to Spiritual Care visit welcomed   Spiritual Care Interventions supportive conversation provided;prayer support provided;other (see comments)  (the Pt  has advance cancer, but she says she is not afraid.)   Response to Spiritual Care receptive of support;engaged in conversation;thanks expressed   Use of Spiritual Resources prayer;spirituality for coping, indicated strong use of   Spiritual Care Follow-Up follow-up, none required as presently assessed     Length of visit: 12 min

## 2025-04-21 NOTE — PLAN OF CARE
Goal Outcome Evaluation:  Plan of Care Reviewed With: patient        Progress: declining  Outcome Evaluation: patient aox3 with forgetfulness. Sinus tachy on tele. Port needle and dressing changed on shift. Patient spike fever of 100.4, tylenol given and ice packs applied. Lactulose continued. No BM on shift. Patient complaining of lower abdominal pain. Urine sample and culture ordered, need to be collected. Phosphorus level 1.9, Sodium phosphate 15 mmol x2 given. Home with hospice tomorrow. Family is at bedside. Call light in reach.

## 2025-04-21 NOTE — PLAN OF CARE
Goal Outcome Evaluation:  Plan of Care Reviewed With: patient        Progress: no change  Outcome Evaluation: No change in patient. Patient remains alert but can be forgetful. ST on monitor. Vitals WNL. Continuing to administer lactulose for constipation. Q2 turns. Will continue to monitor.

## 2025-04-21 NOTE — SIGNIFICANT NOTE
04/21/25 0841   OTHER   Discipline occupational therapist   Rehab Time/Intention   Session Not Performed   (pending Hospice consult)

## 2025-04-22 VITALS
OXYGEN SATURATION: 97 % | DIASTOLIC BLOOD PRESSURE: 69 MMHG | HEART RATE: 111 BPM | SYSTOLIC BLOOD PRESSURE: 112 MMHG | BODY MASS INDEX: 23.12 KG/M2 | WEIGHT: 130.51 LBS | RESPIRATION RATE: 16 BRPM | HEIGHT: 63 IN | TEMPERATURE: 99.1 F

## 2025-04-22 LAB
ANION GAP SERPL CALCULATED.3IONS-SCNC: 13.5 MMOL/L (ref 5–15)
BASOPHILS # BLD AUTO: 0.06 10*3/MM3 (ref 0–0.2)
BASOPHILS NFR BLD AUTO: 0.3 % (ref 0–1.5)
BUN SERPL-MCNC: 14 MG/DL (ref 6–20)
BUN/CREAT SERPL: 35.9 (ref 7–25)
CALCIUM SPEC-SCNC: 8.8 MG/DL (ref 8.6–10.5)
CHLORIDE SERPL-SCNC: 103 MMOL/L (ref 98–107)
CO2 SERPL-SCNC: 23.5 MMOL/L (ref 22–29)
CREAT SERPL-MCNC: 0.39 MG/DL (ref 0.57–1)
DEPRECATED RDW RBC AUTO: 57.1 FL (ref 37–54)
EGFRCR SERPLBLD CKD-EPI 2021: 114.9 ML/MIN/1.73
EOSINOPHIL # BLD AUTO: 0.02 10*3/MM3 (ref 0–0.4)
EOSINOPHIL NFR BLD AUTO: 0.1 % (ref 0.3–6.2)
ERYTHROCYTE [DISTWIDTH] IN BLOOD BY AUTOMATED COUNT: 16.9 % (ref 12.3–15.4)
GLUCOSE SERPL-MCNC: 118 MG/DL (ref 65–99)
HCT VFR BLD AUTO: 31.1 % (ref 34–46.6)
HGB BLD-MCNC: 9.5 G/DL (ref 12–15.9)
IMM GRANULOCYTES # BLD AUTO: 0.2 10*3/MM3 (ref 0–0.05)
IMM GRANULOCYTES NFR BLD AUTO: 1 % (ref 0–0.5)
LYMPHOCYTES # BLD AUTO: 1.64 10*3/MM3 (ref 0.7–3.1)
LYMPHOCYTES NFR BLD AUTO: 8.5 % (ref 19.6–45.3)
MAGNESIUM SERPL-MCNC: 2 MG/DL (ref 1.6–2.6)
MCH RBC QN AUTO: 28.4 PG (ref 26.6–33)
MCHC RBC AUTO-ENTMCNC: 30.5 G/DL (ref 31.5–35.7)
MCV RBC AUTO: 92.8 FL (ref 79–97)
MONOCYTES # BLD AUTO: 1.6 10*3/MM3 (ref 0.1–0.9)
MONOCYTES NFR BLD AUTO: 8.3 % (ref 5–12)
NEUTROPHILS NFR BLD AUTO: 15.67 10*3/MM3 (ref 1.7–7)
NEUTROPHILS NFR BLD AUTO: 81.8 % (ref 42.7–76)
NRBC BLD AUTO-RTO: 0 /100 WBC (ref 0–0.2)
PHOSPHATE SERPL-MCNC: 2.1 MG/DL (ref 2.5–4.5)
PLATELET # BLD AUTO: 271 10*3/MM3 (ref 140–450)
PMV BLD AUTO: 11 FL (ref 6–12)
POTASSIUM SERPL-SCNC: 3.3 MMOL/L (ref 3.5–5.2)
RBC # BLD AUTO: 3.35 10*6/MM3 (ref 3.77–5.28)
SODIUM SERPL-SCNC: 140 MMOL/L (ref 136–145)
WBC NRBC COR # BLD AUTO: 19.19 10*3/MM3 (ref 3.4–10.8)

## 2025-04-22 PROCEDURE — 25010000002 HEPARIN LOCK FLUSH PER 10 UNITS: Performed by: FAMILY MEDICINE

## 2025-04-22 PROCEDURE — 80048 BASIC METABOLIC PNL TOTAL CA: CPT | Performed by: STUDENT IN AN ORGANIZED HEALTH CARE EDUCATION/TRAINING PROGRAM

## 2025-04-22 PROCEDURE — 99239 HOSP IP/OBS DSCHRG MGMT >30: CPT | Performed by: FAMILY MEDICINE

## 2025-04-22 PROCEDURE — 25010000002 ENOXAPARIN PER 10 MG: Performed by: INTERNAL MEDICINE

## 2025-04-22 PROCEDURE — 83735 ASSAY OF MAGNESIUM: CPT | Performed by: STUDENT IN AN ORGANIZED HEALTH CARE EDUCATION/TRAINING PROGRAM

## 2025-04-22 PROCEDURE — 85025 COMPLETE CBC W/AUTO DIFF WBC: CPT | Performed by: STUDENT IN AN ORGANIZED HEALTH CARE EDUCATION/TRAINING PROGRAM

## 2025-04-22 PROCEDURE — 25010000002 ONDANSETRON PER 1 MG: Performed by: STUDENT IN AN ORGANIZED HEALTH CARE EDUCATION/TRAINING PROGRAM

## 2025-04-22 PROCEDURE — 25810000003 SODIUM CHLORIDE 0.9 % SOLUTION: Performed by: FAMILY MEDICINE

## 2025-04-22 PROCEDURE — 84100 ASSAY OF PHOSPHORUS: CPT | Performed by: STUDENT IN AN ORGANIZED HEALTH CARE EDUCATION/TRAINING PROGRAM

## 2025-04-22 RX ORDER — FENTANYL/ROPIVACAINE/NS/PF 2-625MCG/1
15 PLASTIC BAG, INJECTION (ML) EPIDURAL ONCE
Status: COMPLETED | OUTPATIENT
Start: 2025-04-22 | End: 2025-04-22

## 2025-04-22 RX ORDER — ACETAMINOPHEN 325 MG/1
650 TABLET ORAL EVERY 4 HOURS PRN
Qty: 100 TABLET | Refills: 0 | Status: SHIPPED | OUTPATIENT
Start: 2025-04-22 | End: 2025-05-01

## 2025-04-22 RX ORDER — SCOPOLAMINE 1 MG/3D
1 PATCH, EXTENDED RELEASE TRANSDERMAL
Qty: 10 PATCH | Refills: 0 | Status: SHIPPED | OUTPATIENT
Start: 2025-04-24 | End: 2025-05-01

## 2025-04-22 RX ORDER — HEPARIN SODIUM (PORCINE) LOCK FLUSH IV SOLN 100 UNIT/ML 100 UNIT/ML
5 SOLUTION INTRAVENOUS AS NEEDED
Status: DISCONTINUED | OUTPATIENT
Start: 2025-04-22 | End: 2025-04-22 | Stop reason: HOSPADM

## 2025-04-22 RX ORDER — LORAZEPAM 2 MG/ML
1 CONCENTRATE ORAL EVERY 4 HOURS PRN
Qty: 30 ML | Refills: 0 | Status: SHIPPED | OUTPATIENT
Start: 2025-04-22 | End: 2025-05-01

## 2025-04-22 RX ORDER — MORPHINE SULFATE 100 MG/5ML
5 SOLUTION ORAL
Qty: 30 ML | Refills: 0 | Status: SHIPPED | OUTPATIENT
Start: 2025-04-22 | End: 2025-05-01

## 2025-04-22 RX ADMIN — OLANZAPINE 5 MG: 5 TABLET, FILM COATED ORAL at 00:49

## 2025-04-22 RX ADMIN — ONDANSETRON 4 MG: 2 INJECTION INTRAMUSCULAR; INTRAVENOUS at 00:59

## 2025-04-22 RX ADMIN — Medication 10 ML: at 09:46

## 2025-04-22 RX ADMIN — MONTELUKAST 10 MG: 10 TABLET, FILM COATED ORAL at 00:49

## 2025-04-22 RX ADMIN — ASPIRIN 81 MG: 81 TABLET, CHEWABLE ORAL at 09:45

## 2025-04-22 RX ADMIN — HEPARIN 500 UNITS: 100 SYRINGE at 14:39

## 2025-04-22 RX ADMIN — METOPROLOL SUCCINATE 25 MG: 25 TABLET, EXTENDED RELEASE ORAL at 09:45

## 2025-04-22 RX ADMIN — POTASSIUM PHOSPHATE, MONOBASIC AND POTASSIUM PHOSPHATE, DIBASIC 15 MMOL: 224; 236 INJECTION, SOLUTION, CONCENTRATE INTRAVENOUS at 09:59

## 2025-04-22 RX ADMIN — ENOXAPARIN SODIUM 30 MG: 100 INJECTION SUBCUTANEOUS at 09:45

## 2025-04-22 RX ADMIN — LACTULOSE 30 G: 10 SOLUTION ORAL at 09:44

## 2025-04-22 RX ADMIN — ACETAMINOPHEN 650 MG: 325 TABLET ORAL at 13:44

## 2025-04-22 RX ADMIN — ACETAMINOPHEN 650 MG: 325 TABLET ORAL at 00:49

## 2025-04-22 RX ADMIN — FLUTICASONE PROPIONATE 2 SPRAY: 50 SPRAY, METERED NASAL at 09:47

## 2025-04-22 NOTE — NURSING NOTE
Contacted provider regarding dc orders for patient going home with [facility] today. Notified [facility] for equipment delivery. Primary rn to ensure equipment has been brought to patients house and medications have been brought up via meds to bed before calling EMS for transport. [facility] [place] in chart serves as EMS DNR. Palliative care will continue to follow up.    Angela MAZA, RN, CHPN  Palliative Care

## 2025-04-22 NOTE — DISCHARGE SUMMARY
[facility]         HOSPITALIST  DISCHARGE SUMMARY    Patient Name: Madina Reddy  : 1965  MRN: [id number]    Date of Admission: 2025  Date of Discharge: 2025  Primary Care Physician: Kady Vernon APRN    Consults       Date and Time Order Name Status Description    2025  2:33 PM Inpatient Hospitalist Consult      2025  1:07 PM IP Consult to Hematology and Oncology Completed             Active and Resolved Hospital Problems:  Acute metabolic encephalopathy, improving  Generalized weakness  Hypocalcemia, improving  Hypokalemia, repleted  UTI, unknown organism  Anemia, iron deficiency along with possible chemotherapy-induced  Leukocytosis, resolved  History of malignant ovarian cancer with carcinomatosis  History of hypertension  Active Hospital Problems    Diagnosis POA    **Hypercalcemia [E83.52] Yes    Hyperglycemia [R73.9] Yes    Moderate protein-calorie malnutrition [E44.0] Yes      Resolved Hospital Problems   No resolved problems to display.       Hospital Course     Hospital Course:  Madina Reddy is a 60 y.o. female  with history of malignant ovarian cancer with carcinomatosis and hepatic lesion currently status post 3 cycles of chemotherapy and hypertension who presented with weakness, confusion and found to have hypercalcemia.  Patient was recently diagnosed with UTI and/9 and was sent home on oral antibiotic.  Calcium during that time was 12.6.  Even after receiving antibiotic, patient did not show any improvement and gradually her symptoms worsen after which her partner brought her to the hospital.  On presentation, calcium was 12.1.  Hemoglobin 9.  Patient started on IV fluids and was admitted for further evaluation management.  On IV ceftriaxone for possible UTI.  Blood culture sent.  Generalized weakness present.  Iron-deficiency anemia, receiving IV iron.  Repleting electrolytes.  Having intermittent confusion.  Patient has remained very weak  throughout the hospitalization.  Discussed with  at bedside, agreed on palliative consult for goals of care discussion.  Patient has pending debulking surgery, though patient's functional status has declined significantly.  Physically very weak and likely unfit for possible surgery currently.  Oncologist consulted.  Palliative consulted for goals of care.  Patient/family wishes to go home with hospice.  Home hospice arrangements made.  Patient seen and evaluated on date of discharge and thought stable for discharge home with hospice.        DISCHARGE Follow Up Recommendations for labs and diagnostics: As above      Day of Discharge     Vital Signs:  Temp:  [98.4 °F (36.9 °C)-100.4 °F (38 °C)] 99.3 °F (37.4 °C)  Heart Rate:  [102-108] 108  Resp:  [16-20] 16  BP: (102-116)/(61-82) 102/82  Physical Exam:   Gen. well-developed appearing stated age in no acute distress  HEENT: Normocephalic atraumatic moist membranes pupils equal round reactive light, no scleral icterus no conjunctival injection  Cardiovascular: regular rate and rhythm no murmurs rubs or gallops S1-S2, no lower extremity edema appreciated  Pulmonary: Clear to auscultation bilaterally no wheezes rales or rhonchi symmetric chest expansion, unlabored, no conversational dyspnea appreciated  Gastrointestinal: Soft mildly tender nondistended   Musculoskeletal: No clubbing cyanosis, warm and well-perfused, calves soft symmetric nontender bilaterally  Skin: Clean dry without rashs  Neuro: Cranial nerves II through XII intact grossly no sensorimotor deficits appreciated bilateral upper and lower extremities  Psych: Patient is calm cooperative and appropriate with exam not responding to internal stimuli  : No Arora catheter no bladder distention no suprapubic tenderness      Discharge Details        Discharge Medications        New Medications        Instructions Start Date   acetaminophen 325 MG tablet  Commonly known as: TYLENOL   650 mg, Oral, Every 4  Hours PRN      LORazepam 2 MG/ML concentrated solution  Commonly known as: ATIVAN   1 mg, Oral, Every 4 Hours PRN      morphine 20 MG/ML concentrated solution   5 mg, Oral, Every 2 Hours PRN      naloxone 4 MG/0.1ML nasal spray  Commonly known as: NARCAN   Call 911. Don't prime. Faith in 1 nostril for overdose. Repeat in 2-3 minutes in other nostril if no or minimal breathing/responsiveness.      Scopolamine 1 MG/3DAYS patch   1 patch, Transdermal, Every 72 Hours   Start Date: April 24, 2025            Continue These Medications        Instructions Start Date   aspirin 81 MG chewable tablet   81 mg, Daily      EQ Allergy Relief (Cetirizine) 10 MG tablet  Generic drug: cetirizine   10 mg, Oral, Daily      fluticasone 50 MCG/ACT nasal spray  Commonly known as: FLONASE   2 sprays, Nasal, Daily      lidocaine-prilocaine 2.5-2.5 % cream  Commonly known as: EMLA   APPLY  CREAM TOPICALLY TO AFFECTED AREA AS DIRECTED APPLY  TO  PORT  SITE  30  MINUTES  PRIOR  TO  USE      metoprolol succinate XL 25 MG 24 hr tablet  Commonly known as: TOPROL-XL   1 tablet, Daily      montelukast 10 MG tablet  Commonly known as: SINGULAIR   10 mg, Oral, Nightly      OLANZapine 5 MG tablet  Commonly known as: zyPREXA   5 mg, Oral, Nightly      ondansetron ODT 4 MG disintegrating tablet  Commonly known as: ZOFRAN-ODT   4 mg, Translingual, Every 8 Hours PRN             Stop These Medications      amLODIPine 2.5 MG tablet  Commonly known as: NORVASC     cephalexin 500 MG capsule  Commonly known as: KEFLEX              No Known Allergies    Discharge Disposition:  Hospice/Home    Diet:  Hospital:  Diet Order   Procedures    Diet: Regular/House; Fluid Consistency: Thin (IDDSI 0)       Discharge Activity:   Activity Instructions       Activity as Tolerated              CODE STATUS:  Code Status and Medical Interventions: No CPR (Do Not Attempt to Resuscitate); Limited Support; No intubation (DNI)   Ordered at: 04/18/25 9969     Code Status (Patient  has no pulse and is not breathing):    No CPR (Do Not Attempt to Resuscitate)     Medical Interventions (Patient has pulse or is breathing):    Limited Support     Medical Intervention Limits:    No intubation (DNI)         Future Appointments   Date Time Provider Department Center   5/20/2025  9:00 AM INJ ROOM 01 [facility] OP INFU  [facility] OPIF [facility]   5/20/2025  9:45 AM Waylon Arndt MD [facility] [place] [facility]       Additional Instructions for the Follow-ups that You Need to Schedule       Discharge Follow-up with Specialty: Hospice at home   As directed      Specialty: Hospice at home                Pertinent  and/or Most Recent Results     PROCEDURES:   None    LAB RESULTS:      Lab 04/22/25  0526 04/21/25  0547 04/20/25  0246 04/19/25  0533 04/18/25  0408   WBC 19.19* 14.62* 12.38* 12.26* 11.43*   HEMOGLOBIN 9.5* 9.2* 9.4* 9.5* 9.2*   HEMATOCRIT 31.1* 31.2* 31.0* 31.7* 29.8*   PLATELETS 271 232 231 247 232   NEUTROS ABS 15.67* 11.61* 9.23* 9.70* 8.13*   IMMATURE GRANS (ABS) 0.20* 0.19* 0.23* 0.18* 0.22*   LYMPHS ABS 1.64 1.43 1.40 1.09 1.72   MONOS ABS 1.60* 1.27* 1.45* 1.20* 1.23*   EOS ABS 0.02 0.05 0.02 0.04 0.08   MCV 92.8 93.1 92.5 91.9 89.8         Lab 04/22/25  0526 04/21/25  0547 04/20/25  0246 04/19/25  0533 04/18/25  0408   SODIUM 140 140 141 139 139   POTASSIUM 3.3* 3.8 3.9 3.6 3.7   CHLORIDE 103 105 104 102 104   CO2 23.5 25.2 26.4 28.5 27.0   ANION GAP 13.5 9.8 10.6 8.5 8.0   BUN 14 13 14 10 10   CREATININE 0.39* 0.47* 0.50* 0.38* 0.41*   EGFR 114.9 109.8 108.2 115.6 113.5   GLUCOSE 118* 105* 112* 115* 112*   CALCIUM 8.8 9.6 10.3 11.5* 11.5*   MAGNESIUM 2.0 2.0 1.8 2.0 1.8   PHOSPHORUS 2.1* 1.9* 2.2* 2.0* 2.7                     Lab 04/17/25  1034   ABO TYPING A   RH TYPING Positive   ANTIBODY SCREEN Negative         Brief Urine Lab Results  (Last result in the past 365 days)        Color   Clarity   Blood   Leuk Est   Nitrite   Protein   CREAT   Urine HCG        04/21/25 2241 Yellow   Clear   Negative    Trace   Negative   30 mg/dL (1+)                 Microbiology Results (last 10 days)       Procedure Component Value - Date/Time    Blood Culture - Blood, Hand, Left [[id number]]  (Normal) Collected: 04/14/25 1658    Lab Status: Final result Specimen: Blood from Hand, Left Updated: 04/19/25 1715     Blood Culture No growth at 5 days    Narrative:      Less than seven (7) mL's of blood was collected.  Insufficient quantity may yield false negative results.    Blood Culture - Blood, Arm, Right [[id number]]  (Normal) Collected: 04/14/25 1658    Lab Status: Final result Specimen: Blood from Arm, Right Updated: 04/19/25 1715     Blood Culture No growth at 5 days    Narrative:      Less than seven (7) mL's of blood was collected.  Insufficient quantity may yield false negative results.            XR Chest 1 View  Result Date: 4/14/2025  Impression: Impression: No active disease. Electronically Signed: James Albrecht MD  4/14/2025 9:30 AM EDT  Workstation ID: [id number]    CT Head Without Contrast  Result Date: 4/9/2025  Impression: Impression: No acute intracranial abnormality. Electronically Signed: Adán Dixon MD  4/9/2025 8:14 PM EDT  Workstation ID: [id number]    XR Chest 1 View  Result Date: 4/9/2025  Impression: Impression: No acute cardiopulmonary abnormality. Electronically Signed: Davide Frazier DO  4/9/2025 7:11 PM EDT  Workstation ID: [id number]              Results for orders placed during the hospital encounter of 01/23/23    Adult Transthoracic Echo Complete w/ Color, Spectral and Contrast if necessary per protocol    Interpretation Summary    Left ventricular ejection fraction appears to be 61 - 65%.    There were no apparent intracardiac masses, vegetations or thrombi.      Labs Pending at Discharge:  Pending Labs       Order Current Status    PTH-related Peptide In process    Urine Culture - Urine, Urine, Clean Catch In process              Time spent on Discharge including face to face service: Greater than  35 minutes    Electronically signed by Mike Seth MD, 04/22/25, 10:07 AM EDT.

## 2025-04-22 NOTE — PLAN OF CARE
Goal Outcome Evaluation:              Outcome Evaluation: Patient discharging home with hospice.

## 2025-04-22 NOTE — SIGNIFICANT NOTE
04/22/25 1017   OTHER   Discipline occupational therapist   Rehab Time/Intention   Session Not Performed other (see comments)  (D/C with hospice today)   Therapy Assessment/Plan (PT)   Criteria for Skilled Interventions Met (PT) no;does not meet criteria for skilled intervention

## 2025-04-22 NOTE — PAYOR COMM NOTE
"Madina Reddy (60 y.o. Female)       Date of Birth   1965    Social Security Number       Address   [address] [address]    Home Phone   [phone]    MRN   [id number]       Roman Catholic   Hoahaoism    Marital Status                               Admission Date   2025    Admission Type   Emergency    Admitting Provider   Yadiel Chew MD    Attending Provider   Mike Seth MD    Department, Room/Bed   [address], 259/1       Discharge Date       Discharge Disposition   Hospice/Home    Discharge Destination                                 Attending Provider: Mike Seth MD    Allergies: No Known Allergies    Isolation: None   Infection: None   Code Status: No CPR    Ht: 160 cm (63\")   Wt: 59.2 kg (130 lb 8.2 oz)    Admission Cmt: None   Principal Problem: Hypercalcemia [E83.52]                   Active Insurance as of 2025       Primary Coverage       Payor Plan Insurance Group Employer/Plan Group    [id number]       Payor Plan Address Payor Plan Phone Number Payor Plan Fax Number Effective Dates    PO SAL[address]AR [id number] [phone]  2023 - None Entered    [facility] [id number]         Subscriber Name Subscriber Birth Date Member ID       MADINA REDDY 1965 [id number]                     Emergency Contacts        (Rel.) Home Phone Work Phone Mobile Phone    JAVON REDDY (Spouse) -- -- [phone]    ERON DOWNEY (Son) -- -- [phone]        OBS   INPT     [facility] ,Count includes the [facility] [phone]  F [phone]         History & Physical        Yadiel Chew MD at [phone]             [facility]  [facility]   Patient Name: Madina Reddy  : 1965  MRN: [id number]  Primary Care Physician:  Kady Vernon APRN  Date of admission: 2025    Subjective  Subjective     Chief Complaint: Weakness, abnormal calcium at previous " visit.    HPI:    Madina Reddy is a 60 y.o. female ovarian cancer with carcinomatosis and hepatic lesion, hypertension who presents with confusion weakness    Patient was recently seen in the ER on 4/9 due to weakness was diagnosed with UTI and sent home.  At that time her calcium was 12.6.  Was attempted to send her home see if she could drink enough fluids however she was unable to continue to get weaker and weaker.  Her biggest plaint is limited confusion and increased urination.  Patient was also started on antibiotics for UTI although culture was not sent so unclear what blood we are treating.  Due to the fact the patient did not really show any improvement the last 5 days her partner brought her to the hospital.    In the emergency department patient's vital signs are as follows: Temperature 98.3, pulse 101, respiratory is 22, blood pressure 101/74, 96% on room air.  CBC shows a white blood cell count of 11.3 and hemoglobin of 9.0.  CMP shows a potassium of 2.8 with a chloride of 97 and a bicarb of 30.5.  Glucose is 113 and calcium is 12.1.  It looks like her calcium was also 12.65 days ago.  So she has most likely been maintaining a slightly elevated calcium for several days.  Patient received 1 L normal saline and 40 mEq of potassium.  Patient be admitted to hospital for observation due to elevated calcium.    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    Past Surgical History:  Breast biopsy  EP study  Infant heart heart surgery  Pacemaker placement    Family History:   Asthma  Cancer   heart disease    Social History:   Never smoked.  No alcohol.    Home Medications:  OLANZapine, amLODIPine, aspirin, cephalexin, cetirizine, fluticasone, lidocaine-prilocaine, metoprolol succinate XL, montelukast, and ondansetron ODT    Allergies:  No Known  Allergies        Objective  Objective     Vitals:   Temp:  [98.3 °F (36.8 °C)-98.7 °F (37.1 °C)] 98.3 °F (36.8 °C)  Heart Rate:  [101-111] 101  Resp:  [15-22] 22  BP: (101-118)/(70-74) 101/74    Physical Exam    Constitutional: Chronically ill-appearing.  Unwell.  Nontoxic   Eyes: Pupils equal, sclerae anicteric, no conjunctival injection   HENT: NCAT, mucous membranes moist   Neck: Supple, no thyromegaly, no lymphadenopathy, trachea midline   Respiratory: Clear to auscultation bilaterally, nonlabored respirations    Cardiovascular: Tachycardia, no murmurs, rubs, or gallops, palpable pedal pulses bilaterally   Gastrointestinal: Positive bowel sounds, soft, nontender, nondistended   Musculoskeletal: No bilateral ankle edema, no clubbing or cyanosis to extremities   Psychiatric: Appropriate affect, cooperative   Neurologic: Oriented x 3, strength symmetric in all extremities, Cranial Nerves grossly intact to confrontation, speech clear   Skin: No rashes     Result Review   Result Review:  I have personally reviewed the results from the time of this admission to 4/14/2025 15:09 EDT and agree with these findings:  Calcium 12.1-12.6 over the last 5 days  Potassium of 2.8  Hemoglobin 9.0        Assessment & Plan  Assessment / Plan     Assessment/Plan:   Acute metabolic encephalopathy  Weakness  Hypercalcemia with recent calcium between 12.1 and 12.6 over the last 5 to 6 days  Hypokalemia  Urinary tract infection (treated in ER from 4/9 but no culture was obtained)  Hypertension  Chronic anemia due to ovarian cancer and chemotherapy    Plan:  --With the hospitalist service  -- Discussed the patient's admission to the hospital for hypercalcemia  -- Asked the ER to give 1 more liter of normal saline  - Will start normal saline at 150 an hour-  --patient's calcium is 12.1 today but it was 12.62 days ago and she has been unable to get into a healthy range.  She describes extreme confusion, fatigue, weakness, and excessive  "urination  -- Would consider bisphosphonate before discharge as patient plans to have surgery next week for debulking  -- There is no culture data sent from recent presentation will continue ceftriaxone this is difficult to tell whether urinary tract infection or calcium is causing.  Most likely multifactorial  -- Blood cultures  -- Urine and urine culture unable to obtain although these may be sterile   -- Follow-up potassium level  -- Follow calcium level      VTE Prophylaxis:  Lovenox        CODE STATUS:     Full code    Admission Status:  I believe this patient meets observation status.    Electronically signed by Yadiel Chew MD, 04/14/25, 3:09 PM EDT.             Electronically signed by Yadiel Chew MD at 04/14/25 1624          Emergency Department Notes        Lew Whittington DO at 04/14/25 1347          SHARED VISIT NOTE:    Patient is 60 y.o. year old female that presents to the ED for evaluation of generalized weakness..         ED Course:    /74 (BP Location: Right arm, Patient Position: Lying)   Pulse 101   Temp 98.3 °F (36.8 °C) (Oral)   Resp 22   Ht 160 cm (63\")   Wt 54.3 kg (119 lb 11.4 oz)   SpO2 96%   BMI 21.21 kg/m²   Results for orders placed or performed during the hospital encounter of 04/14/25   ECG 12 Lead Tachycardia    Collection Time: 04/14/25  9:05 AM   Result Value Ref Range    QT Interval 324 ms    QTC Interval 439 ms   POC Glucose Once    Collection Time: 04/14/25  9:10 AM    Specimen: Blood   Result Value Ref Range    Glucose 113 (H) 70 - 99 mg/dL   Comprehensive Metabolic Panel    Collection Time: 04/14/25  9:52 AM    Specimen: Blood   Result Value Ref Range    Glucose 113 (H) 65 - 99 mg/dL    BUN 16 6 - 20 mg/dL    Creatinine 0.54 (L) 0.57 - 1.00 mg/dL    Sodium 140 136 - 145 mmol/L    Potassium 2.8 (L) 3.5 - 5.2 mmol/L    Chloride 97 (L) 98 - 107 mmol/L    CO2 30.5 (H) 22.0 - 29.0 mmol/L    Calcium 12.1 (H) 8.6 - 10.5 mg/dL    Total Protein 7.9 6.0 - 8.5 g/dL    " Albumin 3.6 3.5 - 5.2 g/dL    ALT (SGPT) 6 1 - 33 U/L    AST (SGOT) 17 1 - 32 U/L    Alkaline Phosphatase 68 39 - 117 U/L    Total Bilirubin 0.3 0.0 - 1.2 mg/dL    Globulin 4.3 gm/dL    A/G Ratio 0.8 g/dL    BUN/Creatinine Ratio 29.6 (H) 7.0 - 25.0    Anion Gap 12.5 5.0 - 15.0 mmol/L    eGFR 106.2 >60.0 mL/min/1.73   High Sensitivity Troponin T    Collection Time: 04/14/25  9:52 AM    Specimen: Blood   Result Value Ref Range    HS Troponin T 10 <14 ng/L   Magnesium    Collection Time: 04/14/25  9:52 AM    Specimen: Blood   Result Value Ref Range    Magnesium 1.9 1.6 - 2.6 mg/dL   CBC Auto Differential    Collection Time: 04/14/25  9:52 AM    Specimen: Blood   Result Value Ref Range    WBC 11.38 (H) 3.40 - 10.80 10*3/mm3    RBC 3.27 (L) 3.77 - 5.28 10*6/mm3    Hemoglobin 9.0 (L) 12.0 - 15.9 g/dL    Hematocrit 29.3 (L) 34.0 - 46.6 %    MCV 89.6 79.0 - 97.0 fL    MCH 27.5 26.6 - 33.0 pg    MCHC 30.7 (L) 31.5 - 35.7 g/dL    RDW 15.9 (H) 12.3 - 15.4 %    RDW-SD 52.6 37.0 - 54.0 fl    MPV 10.1 6.0 - 12.0 fL    Platelets 354 140 - 450 10*3/mm3    Neutrophil % 78.3 (H) 42.7 - 76.0 %    Lymphocyte % 11.7 (L) 19.6 - 45.3 %    Monocyte % 9.1 5.0 - 12.0 %    Eosinophil % 0.1 (L) 0.3 - 6.2 %    Basophil % 0.3 0.0 - 1.5 %    Immature Grans % 0.5 0.0 - 0.5 %    Neutrophils, Absolute 8.91 (H) 1.70 - 7.00 10*3/mm3    Lymphocytes, Absolute 1.33 0.70 - 3.10 10*3/mm3    Monocytes, Absolute 1.04 (H) 0.10 - 0.90 10*3/mm3    Eosinophils, Absolute 0.01 0.00 - 0.40 10*3/mm3    Basophils, Absolute 0.03 0.00 - 0.20 10*3/mm3    Immature Grans, Absolute 0.06 (H) 0.00 - 0.05 10*3/mm3    nRBC 0.0 0.0 - 0.2 /100 WBC   Green Top (Gel)    Collection Time: 04/14/25  9:52 AM   Result Value Ref Range    Extra Tube Hold for add-ons.    Lavender Top    Collection Time: 04/14/25  9:52 AM   Result Value Ref Range    Extra Tube hold for add-on    Gold Top - SST    Collection Time: 04/14/25  9:52 AM   Result Value Ref Range    Extra Tube Hold for add-ons.     Light Blue Top    Collection Time: 04/14/25  9:52 AM   Result Value Ref Range    Extra Tube Hold for add-ons.    Gray Top    Collection Time: 04/14/25  9:52 AM   Result Value Ref Range    Extra Tube Hold for add-ons.    High Sensitivity Troponin T 1Hr    Collection Time: 04/14/25 11:27 AM    Specimen: Blood   Result Value Ref Range    HS Troponin T 9 <14 ng/L    Troponin T Numeric Delta -1 Abnormal if >/=3 ng/L     Medications   sodium chloride 0.9 % flush 10 mL (has no administration in time range)   sodium chloride 0.9 % bolus 1,000 mL (has no administration in time range)   sodium chloride 0.9 % bolus 1,000 mL (0 mL Intravenous Stopped 4/14/25 1354)   ondansetron (ZOFRAN) injection 4 mg (4 mg Intravenous Given 4/14/25 1128)   potassium chloride (MICRO-K/KLOR-CON) CR capsule (40 mEq Oral Given 4/14/25 1126)     XR Chest 1 View  Result Date: 4/14/2025  Narrative: XR CHEST 1 VW Date of Exam: 4/14/2025 9:28 AM EDT Indication: Weak/Dizzy/AMS triage protocol Comparison: 4/9/2025 Findings: Heart size is within normal limits. Right-sided chest port terminates in the SVC. Left-sided transvenous pacemaker is in place with leads in the right atrium and ventricle. Lungs are expanded the chest wall and they appear clear. No acute infiltrates are  identified.     Impression: Impression: No active disease. Electronically Signed: James Albrecht MD  4/14/2025 9:30 AM EDT  Workstation ID: [id number]    CT Head Without Contrast  Result Date: 4/9/2025  Narrative: CT HEAD WO CONTRAST Date of Exam: 4/9/2025 7:46 PM EDT Indication: Headache headache. Comparison: CT head without contrast 1/23/2023 Technique: Axial CT images were obtained of the head without contrast administration.  Reconstructed coronal and sagittal images were also obtained. Automated exposure control and iterative construction methods were used. Findings: No intracranial hemorrhage. No mass effect or midline shift. Ventricles and cortical sulci are normally  configured. Gray-white matter differentiation maintained. Posterior fossa without acute abnormality. Globes symmetric. No retro-orbital abnormality. The mastoid air cells are well-aerated with there is no sinus fluid level. Negative for calvarial fracture.     Impression: Impression: No acute intracranial abnormality. Electronically Signed: Adán Dixon MD  4/9/2025 8:14 PM EDT  Workstation ID: [id number]    XR Chest 1 View  Result Date: 4/9/2025  Narrative: XR CHEST 1 VW Date of Exam: 4/9/2025 6:48 PM EDT Indication: Weak/Dizzy/AMS triage protocol Comparison: 1/8/2025 Findings: Lines: None Lungs: Interval placement of right internal jugular port with the distal tip at the level of the cavoatrial junction. Unchanged position of left subclavian dual-lead pacemaker Pleura: No pleural effusion or pneumothorax. Cardiomediastinum: The cardiomediastinal silhouette is normal Soft Tissues: Unremarkable. Bones: No acute osseous abnormality.     Impression: Impression: No acute cardiopulmonary abnormality. Electronically Signed: Davide Frazier DO  4/9/2025 7:11 PM EDT  Workstation ID: [id number]      MDM:    Procedures              SHARED VISIT ATTESTATION:    This visit was performed by both myself and an APC.  I performed the substantive portion of the medical decision making. The management plan was made or approved by me, and I take responsibility for patient management.           Lew Whittington DO  15:14 EDT  04/14/25         Lew Whittington DO  04/14/25 1514      Electronically signed by Lew Whittington DO at 04/14/25 1514       Sindy Castillo APRN at 04/14/25 1315       Attestation signed by Lew Whittington DO at 04/14/25 1600          SHARED APC NON FACE TO FACE: I performed a substantive part of the MDM during the patient's E/M visit. I personally made or approved the documented management plan and acknowledge its risk of complications.   Lew Whittington DO 4/14/2025 16:00 EDT                              Time: 1:15 PM EDT  Date of encounter:  4/14/2025  Independent Historian/Clinical History and Information was obtained by:   Patient    History is limited by: N/A    Chief Complaint: weakness        History of Present Illness:  Patient is a 60 y.o. year old female who presents to the emergency department for evaluation of weakness ongoing for around 1 week worsening over the last 3 days.  Patient has been reports she could not stand at home today and had to be lifted onto the stretcher by EMS to be brought into the hospital.  Patient reports she is currently under treatment for uterine cancer with metastasis and has surgery scheduled next week.  Patient reports mild increase in abdominal pain.  Patient was reports she was seen in this ED 4 days ago and diagnosed with UTI at that time.  She is still currently on antibiotics.  Reports some nausea and vomiting at home.  Denies any fevers, chills, body aches.  Patient reports her last chemotherapy treatment was around 1 month ago.       Patient Care Team  Primary Care Provider: Kady Vernon APRN    Past Medical History:     No Known Allergies  Past Medical History:   Diagnosis Date    Allergies     Dizziness Madina    Headache Madina    Hyperlipidemia March    Hypertension Madina    Malignant neoplasm of ovary 01/28/2025    Pacemaker     Pneumonia Jan 2023    TMJ dysfunction Madina    Uterine cancer      Past Surgical History:   Procedure Laterality Date    BREAST BIOPSY      CARDIAC ELECTROPHYSIOLOGY PROCEDURE N/A 01/25/2023    Procedure: Device Implant;  Surgeon: VINCE Alejandre MD;  Location: [facility] INVASIVE LOCATION;  Service: Cardiology;  Laterality: N/A;    CARDIAC SURGERY      heart surgery as an infant    COLONOSCOPY  August 2017    PACEMAKER IMPLANTATION       Family History   Problem Relation Age of Onset    Cancer Mother     Thyroid disease Mother     Heart disease Father     Asthma Daughter        Home Medications:  Prior to Admission  medications    Medication Sig Start Date End Date Taking? Authorizing Provider   amLODIPine (NORVASC) 2.5 MG tablet Take 1 tablet by mouth once daily 4/15/24   VINCE Alejandre MD   aspirin 81 MG chewable tablet Chew 1 tablet Daily.    ProviderJohnathon MD   cephalexin (KEFLEX) 500 MG capsule Take 1 capsule by mouth 3 (Three) Times a Day. 4/10/25   Norma Hutton MD   EQ Allergy Relief, Cetirizine, 10 MG tablet Take 1 tablet by mouth once daily 1/23/25   Kady Vernon APRN   fluticasone (FLONASE) 50 MCG/ACT nasal spray Administer 2 sprays into the nostril(s) as directed by provider Daily. 3/20/25   Olinda Burk PA-C   lidocaine-prilocaine (EMLA) 2.5-2.5 % cream APPLY  CREAM TOPICALLY TO AFFECTED AREA AS DIRECTED APPLY  TO  PORT  SITE  30  MINUTES  PRIOR  TO  USE 2/25/25   Waylon Arndt MD   metoprolol succinate XL (TOPROL-XL) 25 MG 24 hr tablet Take 1 tablet by mouth Daily. 3/6/24   ProviderJohnathon MD   montelukast (SINGULAIR) 10 MG tablet TAKE 1 TABLET BY MOUTH ONCE DAILY AT NIGHT 3/10/25   Kady Vernon APRN   OLANZapine (zyPREXA) 5 MG tablet Take 1 tablet by mouth Every Night. 3/26/25   Waylon Arndt MD   ondansetron ODT (ZOFRAN-ODT) 4 MG disintegrating tablet Place 1 tablet on the tongue Every 8 (Eight) Hours As Needed for Nausea or Vomiting. 4/10/25   Norma Hutton MD        Social History:   Social History     Tobacco Use    Smoking status: Never     Passive exposure: Never    Smokeless tobacco: Never   Vaping Use    Vaping status: Never Used   Substance Use Topics    Alcohol use: Never    Drug use: Never         Review of Systems:  Review of Systems   Constitutional:  Negative for chills, fatigue and fever.   HENT:  Negative for ear pain, rhinorrhea and sore throat.    Eyes:  Negative for visual disturbance.   Respiratory:  Negative for cough and shortness of breath.    Cardiovascular:  Negative for chest pain.   Gastrointestinal:  Positive for abdominal pain, nausea and  "vomiting. Negative for diarrhea.   Genitourinary:  Negative for difficulty urinating.   Musculoskeletal:  Negative for arthralgias, back pain and myalgias.   Skin:  Negative for rash.   Neurological:  Positive for weakness. Negative for light-headedness and headaches.   Hematological:  Negative for adenopathy.   Psychiatric/Behavioral: Negative.          Physical Exam:  /75   Pulse 107   Temp 98.3 °F (36.8 °C) (Oral)   Resp 22   Ht 160 cm (63\")   Wt 54.3 kg (119 lb 11.4 oz)   SpO2 94%   BMI 21.21 kg/m²     Physical Exam  Vitals and nursing note reviewed.   Constitutional:       General: She is not in acute distress.     Appearance: Normal appearance. She is not toxic-appearing.   HENT:      Head: Normocephalic and atraumatic.      Nose: Nose normal.      Mouth/Throat:      Mouth: Mucous membranes are moist.   Eyes:      Conjunctiva/sclera: Conjunctivae normal.   Cardiovascular:      Rate and Rhythm: Normal rate. Tachycardia present.      Pulses: Normal pulses.      Heart sounds: Normal heart sounds.   Pulmonary:      Effort: Pulmonary effort is normal.      Breath sounds: Normal breath sounds.   Abdominal:      General: Bowel sounds are normal.      Palpations: Abdomen is soft.      Tenderness: There is no abdominal tenderness.   Musculoskeletal:         General: Normal range of motion.      Cervical back: Normal range of motion.   Skin:     General: Skin is warm and dry.   Neurological:      General: No focal deficit present.      Mental Status: She is alert and oriented to person, place, and time.   Psychiatric:         Mood and Affect: Mood normal.         Behavior: Behavior normal.         Thought Content: Thought content normal.         Judgment: Judgment normal.                    Medical Decision Making:      Comorbidities that affect care:    Cancer    External Notes reviewed:    None      The following orders were placed and all results were independently analyzed by me:  Orders Placed This " Encounter   Procedures    XR Chest 1 View    [place] Draw    Comprehensive Metabolic Panel    High Sensitivity Troponin T    Magnesium    Urinalysis With Microscopic If Indicated (No Culture) - Urine, Clean Catch    CBC Auto Differential    High Sensitivity Troponin T 1Hr    NPO Diet NPO Type: Strict NPO    Undress & Gown    Continuous Pulse Oximetry    Vital Signs    Orthostatic Blood Pressure    IP Consult to Hematology and Oncology    Inpatient Hospitalist Consult    Oxygen Therapy- Nasal Cannula; Titrate 1-6 LPM Per SpO2; 90 - 95%    POC Glucose Once    ECG 12 Lead Tachycardia    Insert Peripheral IV    Fall Precautions    CBC & Differential    Green Top (Gel)    Lavender Top    Gold Top - SST    Light Blue Top    Extra Tubes    Gray Top       Medications Given in the Emergency Department:  Medications   sodium chloride 0.9 % flush 10 mL (has no administration in time range)   sodium chloride 0.9 % bolus 1,000 mL (has no administration in time range)   sodium chloride 0.9 % bolus 1,000 mL (0 mL Intravenous Stopped 4/14/25 1354)   ondansetron (ZOFRAN) injection 4 mg (4 mg Intravenous Given 4/14/25 1128)   potassium chloride (MICRO-K/KLOR-CON) CR capsule (40 mEq Oral Given 4/14/25 1126)        ED Course:         Labs:    Lab Results (last 24 hours)       Procedure Component Value Units Date/Time    POC Glucose Once [[id number]]  (Abnormal) Collected: 04/14/25 0910    Specimen: Blood Updated: 04/14/25 0911     Glucose 113 mg/dL      Comment: Serial Number: [id number]:  [id number]       CBC & Differential [[id number]]  (Abnormal) Collected: 04/14/25 0952    Specimen: Blood Updated: 04/14/25 0959    Narrative:      The following orders were created for panel order CBC & Differential.  Procedure                               Abnormality         Status                     ---------                               -----------         ------                     CBC Auto Differential[[id number]]        Abnormal             Final result                 Please view results for these tests on the individual orders.    Comprehensive Metabolic Panel [[id number]]  (Abnormal) Collected: 04/14/25 0952    Specimen: Blood Updated: 04/14/25 1022     Glucose 113 mg/dL      BUN 16 mg/dL      Creatinine 0.54 mg/dL      Sodium 140 mmol/L      Potassium 2.8 mmol/L      Chloride 97 mmol/L      CO2 30.5 mmol/L      Calcium 12.1 mg/dL      Total Protein 7.9 g/dL      Albumin 3.6 g/dL      ALT (SGPT) 6 U/L      AST (SGOT) 17 U/L      Alkaline Phosphatase 68 U/L      Total Bilirubin 0.3 mg/dL      Globulin 4.3 gm/dL      A/G Ratio 0.8 g/dL      BUN/Creatinine Ratio 29.6     Anion Gap 12.5 mmol/L      eGFR 106.2 mL/min/1.73     Narrative:      GFR Categories in Chronic Kidney Disease (CKD)      GFR Category          GFR (mL/min/1.73)    Interpretation  G1                     90 or greater         Normal or high (1)  G2                      60-89                Mild decrease (1)  G3a                   45-59                Mild to moderate decrease  G3b                   30-44                Moderate to severe decrease  G4                    15-29                Severe decrease  G5                    14 or less           Kidney failure          (1)In the absence of evidence of kidney disease, neither GFR category G1 or G2 fulfill the criteria for CKD.    eGFR calculation 2021 CKD-EPI creatinine equation, which does not include race as a factor    High Sensitivity Troponin T [[id number]]  (Normal) Collected: 04/14/25 0952    Specimen: Blood Updated: 04/14/25 1022     HS Troponin T 10 ng/L     Narrative:      High Sensitive Troponin T Reference Range:  <14.0 ng/L- Negative Female for AMI  <22.0 ng/L- Negative Male for AMI  >=14 - Abnormal Female indicating possible myocardial injury.  >=22 - Abnormal Male indicating possible myocardial injury.   Clinicians would have to utilize clinical acumen, EKG, Troponin, and serial changes to determine if it is an Acute  Myocardial Infarction or myocardial injury due to an underlying chronic condition.         Magnesium [[id number]]  (Normal) Collected: 04/14/25 0952    Specimen: Blood Updated: 04/14/25 1022     Magnesium 1.9 mg/dL     CBC Auto Differential [[id number]]  (Abnormal) Collected: 04/14/25 0952    Specimen: Blood Updated: 04/14/25 0959     WBC 11.38 10*3/mm3      RBC 3.27 10*6/mm3      Hemoglobin 9.0 g/dL      Hematocrit 29.3 %      MCV 89.6 fL      MCH 27.5 pg      MCHC 30.7 g/dL      RDW 15.9 %      RDW-SD 52.6 fl      MPV 10.1 fL      Platelets 354 10*3/mm3      Neutrophil % 78.3 %      Lymphocyte % 11.7 %      Monocyte % 9.1 %      Eosinophil % 0.1 %      Basophil % 0.3 %      Immature Grans % 0.5 %      Neutrophils, Absolute 8.91 10*3/mm3      Lymphocytes, Absolute 1.33 10*3/mm3      Monocytes, Absolute 1.04 10*3/mm3      Eosinophils, Absolute 0.01 10*3/mm3      Basophils, Absolute 0.03 10*3/mm3      Immature Grans, Absolute 0.06 10*3/mm3      nRBC 0.0 /100 WBC     High Sensitivity Troponin T 1Hr [[id number]]  (Normal) Collected: 04/14/25 1127    Specimen: Blood Updated: 04/14/25 1204     HS Troponin T 9 ng/L      Troponin T Numeric Delta -1 ng/L     Narrative:      High Sensitive Troponin T Reference Range:  <14.0 ng/L- Negative Female for AMI  <22.0 ng/L- Negative Male for AMI  >=14 - Abnormal Female indicating possible myocardial injury.  >=22 - Abnormal Male indicating possible myocardial injury.   Clinicians would have to utilize clinical acumen, EKG, Troponin, and serial changes to determine if it is an Acute Myocardial Infarction or myocardial injury due to an underlying chronic condition.                  Imaging:    XR Chest 1 View  Result Date: 4/14/2025  XR CHEST 1 VW Date of Exam: 4/14/2025 9:28 AM EDT Indication: Weak/Dizzy/AMS triage protocol Comparison: 4/9/2025 Findings: Heart size is within normal limits. Right-sided chest port terminates in the SVC. Left-sided transvenous pacemaker is in place with  leads in the right atrium and ventricle. Lungs are expanded the chest wall and they appear clear. No acute infiltrates are  identified.     Impression: No active disease. Electronically Signed: James Albrecht MD  4/14/2025 9:30 AM EDT  Workstation ID: [id number]        Differential Diagnosis and Discussion:    Weakness: Based on the patient's history, signs, and symptoms, the diffential diagnosis includes but is not limited to meningitis, stroke, sepsis, subarachnoid hemorrhage, intracranial bleeding, encephalitis, acute uti, dehydration, MS, myasthenia gravis, Guillan Delmar, migraine variant, neuromuscular disorders vertigo, electrolyte imbalance, and metabolic disorders.    PROCEDURES:    Labs were collected in the emergency department and all labs were reviewed and interpreted by me.  An EKG was performed and the EKG was interpreted by supervising attending.    ECG 12 Lead Tachycardia   Preliminary Result   HEART YCFJ=448  bpm   RR Jztdxnvh=229  ms   FL Bgkxesap=202  ms   P Horizontal Axis=11  deg   P Front Axis=71  deg   QRSD Interval=86  ms   QT Hgcgroom=814  ms   IVuJ=285  ms   QRS Axis=43  deg   T Wave Axis=-23  deg   - ABNORMAL ECG -   Sinus tachycardia   Consider left atrial enlargement   Inferior infarct, old   Nonspecific T abnormalities, lateral leads   Date and Time of Study:2025-04-14 09:05:45          Procedures    MDM     Amount and/or Complexity of Data Reviewed  Decide to obtain previous medical records or to obtain history from someone other than the patient: yes                       Patient Care Considerations:    SEPSIS was considered but is NOT present in the emergency department as SIRS criteria is not present.      Consultants/Shared Management Plan:    Hospitalist: I have discussed the case with Dr. Chew who agrees to accept the patient for admission.  SHARED VISIT: I have discussed the case with my supervising physician, Dr. Whittington who statespatient can be admitted for dehydration,  hypercalcemia and hypokalemia. The substantive portion of the medical decision was made by the attesting physician who made or approve the management plan and will take responsibility for the patient.  Clinical findings were discussed and ultimate disposition was made in consult with supervising physician.  Consultant: I have discussed the case with Dr. Arndt who states patient can be discharged home with close follow up or admitted to hospitalist.     Social Determinants of Health:    Patient has presented with family members who are responsible, reliable and will ensure follow up care.      Disposition and Care Coordination:    Admit:   Through independent evaluation of the patient's history, physical, and imperical data, the patient meets criteria for inpatient admission to the hospital.    I have explained the patient´s condition, diagnoses and treatment plan based on the information available to me at this time. I have answered questions and addressed any concerns. The patient has a good  understanding of the patient´s diagnosis, condition, and treatment plan as can be expected at this point. The vital signs have been stable. The patient´s condition is stable and appropriate for discharge from the emergency department.      The patient will pursue further outpatient evaluation with the primary care physician or other designated or consulting physician as outlined in the discharge instructions. They are agreeable to this plan of care and follow-up instructions have been explained in detail. The patient has received these instructions in written format and has expressed an understanding of the discharge instructions. The patient is aware that any significant change in condition or worsening of symptoms should prompt an immediate return to this or the closest emergency department or call to 911.  I have explained discharge medications and the need for follow up with the patient/caretakers. This was also printed  in the discharge instructions. Patient was discharged with the following medications and follow up:      Medication List      No changes were made to your prescriptions during this visit.      No follow-up provider specified.     Final diagnoses:   Dehydration   Hypercalcemia   Hypokalemia   Weakness        ED Disposition       ED Disposition   Decision to Admit    Condition   --    Comment   --               This medical record created using voice recognition software.             Sindy Castillo, APRN  04/14/25 1533      Electronically signed by Lew Whittington DO at 04/14/25 1600       Vital Signs (last 2 days)       Date/Time Temp Temp src Pulse Resp BP Patient Position SpO2    04/22/25 1105 99.1 (37.3) Oral 111 16 112/69 Lying 97    04/22/25 0736 99.3 (37.4) Oral 108 16 102/82 Lying 96    04/21/25 2300 --  -- -- --  --  Lying --     Temp: pt requested that I not do vitals. Nurse aware at 04/21/25 2300    Resp: pt requested that I not do vitals. Nurse aware at 04/21/25 2300    BP: pt requested that I not do vitals. Nurse aware at 04/21/25 2300    SpO2: pt requested that I not do vitals. Nurse aware at 04/21/25 2300    04/21/25 2003 98.8 (37.1) Oral 102 20 116/61 Lying 96    04/21/25 1724 100.4 (38) Oral -- -- -- -- --    04/21/25 1522 100.2 (37.9) Oral 105 18 -- -- 92    04/21/25 1133 98.4 (36.9) Oral 107 18 111/73 Lying 94    04/21/25 0727 98.4 (36.9) Oral 112 18 100/86 Lying 90    04/21/25 0400 98.2 (36.8) Oral 103 18 110/96 Lying 94    04/20/25 2235 97.7 (36.5) Oral 98 18 105/69 Lying 95    04/20/25 1920 97.3 (36.3) Oral 98 18 104/70 Lying 94    04/20/25 1631 98.8 (37.1) Oral 98 18 108/66 Lying 94    04/20/25 1138 98.4 (36.9) Oral 112 16 104/67 Lying 94    04/20/25 0727 99.1 (37.3) Oral 113 16 106/77 Lying 94    04/20/25 0350 99.4 (37.4) Oral 104 16 118/76 Lying --          Lab Results (last 24 hours)       Procedure Component Value Units Date/Time    Urine Culture - Urine, Urine, Clean Catch [[id number]]   (Normal) Collected: 04/21/25 2241    Specimen: Urine, Clean Catch Updated: 04/22/25 1032     Urine Culture Culture in progress    Basic Metabolic Panel [[id number]]  (Abnormal) Collected: 04/22/25 0526    Specimen: Blood Updated: 04/22/25 0604     Glucose 118 mg/dL      BUN 14 mg/dL      Creatinine 0.39 mg/dL      Sodium 140 mmol/L      Potassium 3.3 mmol/L      Chloride 103 mmol/L      CO2 23.5 mmol/L      Calcium 8.8 mg/dL      BUN/Creatinine Ratio 35.9     Anion Gap 13.5 mmol/L      eGFR 114.9 mL/min/1.73     Narrative:      GFR Categories in Chronic Kidney Disease (CKD)      GFR Category          GFR (mL/min/1.73)    Interpretation  G1                     90 or greater         Normal or high (1)  G2                      60-89                Mild decrease (1)  G3a                   45-59                Mild to moderate decrease  G3b                   30-44                Moderate to severe decrease  G4                    15-29                Severe decrease  G5                    14 or less           Kidney failure          (1)In the absence of evidence of kidney disease, neither GFR category G1 or G2 fulfill the criteria for CKD.    eGFR calculation 2021 CKD-EPI creatinine equation, which does not include race as a factor    Magnesium [[id number]]  (Normal) Collected: 04/22/25 0526    Specimen: Blood Updated: 04/22/25 0604     Magnesium 2.0 mg/dL     Phosphorus [[id number]]  (Abnormal) Collected: 04/22/25 0526    Specimen: Blood Updated: 04/22/25 0604     Phosphorus 2.1 mg/dL     CBC & Differential [[id number]]  (Abnormal) Collected: 04/22/25 0526    Specimen: Blood Updated: 04/22/25 0539    Narrative:      The following orders were created for panel order CBC & Differential.  Procedure                               Abnormality         Status                     ---------                               -----------         ------                     CBC Auto Differential[[id number]]        Abnormal            Final  result                 Please view results for these tests on the individual orders.    CBC Auto Differential [[id number]]  (Abnormal) Collected: 04/22/25 0526    Specimen: Blood Updated: 04/22/25 0539     WBC 19.19 10*3/mm3      RBC 3.35 10*6/mm3      Hemoglobin 9.5 g/dL      Hematocrit 31.1 %      MCV 92.8 fL      MCH 28.4 pg      MCHC 30.5 g/dL      RDW 16.9 %      RDW-SD 57.1 fl      MPV 11.0 fL      Platelets 271 10*3/mm3      Neutrophil % 81.8 %      Lymphocyte % 8.5 %      Monocyte % 8.3 %      Eosinophil % 0.1 %      Basophil % 0.3 %      Immature Grans % 1.0 %      Neutrophils, Absolute 15.67 10*3/mm3      Lymphocytes, Absolute 1.64 10*3/mm3      Monocytes, Absolute 1.60 10*3/mm3      Eosinophils, Absolute 0.02 10*3/mm3      Basophils, Absolute 0.06 10*3/mm3      Immature Grans, Absolute 0.20 10*3/mm3      nRBC 0.0 /100 WBC     Urinalysis, Microscopic Only - Urine, Clean Catch [[id number]]  (Abnormal) Collected: 04/21/25 2241    Specimen: Urine, Clean Catch Updated: 04/21/25 2302     RBC, UA 0-2 /HPF      WBC, UA 6-10 /HPF      Bacteria, UA None Seen /HPF      Squamous Epithelial Cells, UA 3-6 /HPF      Hyaline Casts, UA None Seen /LPF      Methodology Automated Microscopy    Urinalysis With Culture If Indicated - Urine, Clean Catch [[id number]]  (Abnormal) Collected: 04/21/25 2241    Specimen: Urine, Clean Catch Updated: 04/21/25 2258     Color, UA Yellow     Appearance, UA Clear     pH, UA 5.5     Specific Gravity, UA 1.023     Glucose, UA Negative     Ketones, UA Negative     Bilirubin, UA Negative     Blood, UA Negative     Protein, UA 30 mg/dL (1+)     Leuk Esterase, UA Trace     Nitrite, UA Negative     Urobilinogen, UA 1.0 E.U./dL    Narrative:      In absence of clinical symptoms, the presence of pyuria, bacteria, and/or nitrites on the urinalysis result does not correlate with infection.          Imaging Results (Last 48 Hours)       ** No results found for the last 48 hours. **          ECG/EMG  Results (last 24 hours)       ** No results found for the last 24 hours. **          Operative/Procedure Notes (last 48 hours)  Notes from 25 1234 through 25 1234   No notes of this type exist for this encounter.          Physician Progress Notes (last 72 hours)        Demario Mireles MD at 25 0847           [facility]  [facility]  Patient Name: Madina Reddy  : 1965  MRN: [id number]  Date of admission: 2025  Room/Bed: 259/1      Subjective   Subjective     Chief Complaint: Weakness, confusion, hypercalcemia    Summary:Madina Reddy is a 60 y.o. female with history of malignant ovarian cancer with carcinomatosis and hepatic lesion currently status post 3 cycles of chemotherapy and hypertension who presented with weakness, confusion and found to have hypercalcemia.  Patient was recently diagnosed with UTI and/9 and was sent home on oral antibiotic.  Calcium during that time was 12.6.  Even after receiving antibiotic, patient did not show any improvement and gradually her symptoms worsen after which her partner brought her to the hospital.  On presentation, calcium was 12.1.  Hemoglobin 9.  Patient started on IV fluids and was admitted for further evaluation management.  On IV ceftriaxone for possible UTI.  Blood culture sent.  Generalized weakness present.  Iron-deficiency anemia, receiving IV iron.  Repleting electrolytes.  Having intermittent confusion.  Patient has remained very weak throughout the hospitalization.  Discussed with  at bedside, agreed on palliative consult for goals of care discussion.  Patient has pending debulking surgery, will schedule for coming Monday.  Physically very weak and likely unfit for possible surgery currently.  Also discussed with  regarding it.  Oncologist following.  Palliative consulted for goals of care.  Patient/family wishes to go home with hospice.  Hospice meeting scheduled for later  today.    Interval Followup: No acute events overnight.  Patient laying in bed, still lethargic. Diet changed to regular diet; encouraged PO intake. also stated on scopolamine patch to help her nausea. Patient's  at bedside.  Updated.  Hospice meeting later today; patient to be discharged home with hospice tomorrow.     Review of Systems    All systems reviewed and negative except for what is outlined above.      Objective   Objective     Vitals:   Temp:  [97.3 °F (36.3 °C)-98.8 °F (37.1 °C)] 98.4 °F (36.9 °C)  Heart Rate:  [] 112  Resp:  [16-18] 18  BP: (100-110)/(66-96) 100/86    Physical Exam   General: Awake, alert, seems weak and lethargic, NAD  Cardiovascular: RRR, no murmurs   Pulmonary: CTA bilaterally; no wheezes; no conversational dyspnea  Gastrointestinal: S/ND/NT, +BS  Neuro: Alert, awake, answering questions appropriately; speech clear    Result Review    Result Review:  I have personally reviewed these results:  [x]  Laboratory      Lab 04/21/25  0547 04/20/25  0246 04/19/25  0533   WBC 14.62* 12.38* 12.26*   HEMOGLOBIN 9.2* 9.4* 9.5*   HEMATOCRIT 31.2* 31.0* 31.7*   PLATELETS 232 231 247   NEUTROS ABS 11.61* 9.23* 9.70*   IMMATURE GRANS (ABS) 0.19* 0.23* 0.18*   LYMPHS ABS 1.43 1.40 1.09   MONOS ABS 1.27* 1.45* 1.20*   EOS ABS 0.05 0.02 0.04   MCV 93.1 92.5 91.9         Lab 04/21/25  0547 04/20/25  0246 04/19/25  0533   SODIUM 140 141 139   POTASSIUM 3.8 3.9 3.6   CHLORIDE 105 104 102   CO2 25.2 26.4 28.5   ANION GAP 9.8 10.6 8.5   BUN 13 14 10   CREATININE 0.47* 0.50* 0.38*   EGFR 109.8 108.2 115.6   GLUCOSE 105* 112* 115*   CALCIUM 9.6 10.3 11.5*   MAGNESIUM 2.0 1.8 2.0   PHOSPHORUS 1.9* 2.2* 2.0*         Lab 04/15/25  0609 04/14/25  0952   TOTAL PROTEIN 7.0 7.9   ALBUMIN 3.2* 3.6   GLOBULIN 3.8 4.3   ALT (SGPT) 5 6   AST (SGOT) 17 17   BILIRUBIN 0.2 0.3   ALK PHOS 59 68         Lab 04/14/25  1127 04/14/25  0952   HSTROP T 9 10             Lab 04/17/25  1034 04/17/25  0509  04/15/25  0609 04/14/25  0952   IRON  --   --  15*  --    IRON SATURATION (TSAT)  --   --  8*  --    TIBC  --   --  194*  --    TRANSFERRIN  --   --  130*  --    FERRITIN  --   --  1,713.00*  --    FOLATE  --   --   --  8.01   VITAMIN B 12  --   --   --  917   ABO TYPING A   < >  --   --    RH TYPING Positive   < >  --   --    ANTIBODY SCREEN Negative  --   --   --     < > = values in this interval not displayed.         Brief Urine Lab Results  (Last result in the past 365 days)        Color   Clarity   Blood   Leuk Est   Nitrite   Protein   CREAT   Urine HCG        04/14/25 1821 Yellow   Cloudy   Negative   Negative   Negative   Trace                 [x]  Microbiology   Microbiology Results (last 10 days)       Procedure Component Value - Date/Time    Blood Culture - Blood, Hand, Left [[id number]]  (Normal) Collected: 04/14/25 1658    Lab Status: Final result Specimen: Blood from Hand, Left Updated: 04/19/25 1715     Blood Culture No growth at 5 days    Narrative:      Less than seven (7) mL's of blood was collected.  Insufficient quantity may yield false negative results.    Blood Culture - Blood, Arm, Right [[id number]]  (Normal) Collected: 04/14/25 1658    Lab Status: Final result Specimen: Blood from Arm, Right Updated: 04/19/25 1715     Blood Culture No growth at 5 days    Narrative:      Less than seven (7) mL's of blood was collected.  Insufficient quantity may yield false negative results.          [x]  Radiology  XR Chest 1 View  Result Date: 4/14/2025  Impression: No active disease. Electronically Signed: James Albrecht MD  4/14/2025 9:30 AM EDT  Workstation ID: [id number]    []  EKG/Telemetry   []  Cardiology/Vascular   []  Pathology  []  Old records  []  Other:    Assessment & Plan   Assessment / Plan     Assessment:  Acute metabolic encephalopathy, improving  Generalized weakness  Hypocalcemia, improving  Hypokalemia, repleted  UTI, unknown organism  Anemia, iron deficiency along with possible  chemotherapy-induced  Leukocytosis, resolved  History of malignant ovarian cancer with carcinomatosis  History of hypertension    Plan:  Patient currently being managed medicine service.  S/p IV fluid.  Hypercalcemia, stable.  Likely hyperglycemia due to malignancy. Continue to trend and monitor.  S/p 1 dose of zoledronic acid on  for hypercalcemia.  Completed 3-day course of IV ceftriaxone.   Blood culture showed no growth till date.  Urine culture unable to be sent given UA nonsignificant on presentation.  Oncologist following.  Patient s/p 3 cycles of carboplatin and paclitaxel.  She is due to see GYN oncologist today for debulking surgery.  But patient less likely to be able to tolerate it given her current clinical status.  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  Palliative consulted for goals of care discussion.  Agreed with home with hospice.  Hospice met with patient and family today.  Guarded prognosis.  Continue rest of current management.  Oncologist on board, appreciate further input.  Labs in AM.  Clinical course to dictate further management.  Patient to be discharged home with hospice tomorrow.    Discussed with RN.    VTE Prophylaxis:  Pharmacologic VTE prophylaxis orders are present.        CODE STATUS:   Code Status (Patient has no pulse and is not breathing): No CPR (Do Not Attempt to Resuscitate)  Medical Interventions (Patient has pulse or is breathing): Limited Support  Medical Intervention Limits: No intubation (DNI)      Electronically signed by Demario Mireles MD, 25, 9:14 AM EDT.                       Electronically signed by Demario Mireles MD at 25 1624       Demario Mireles MD at 25 0831           [facility] Hospitalist Progress Note  Date: 2025  Patient Name: Madina Reddy  : 1965  MRN: [id number]  Date of admission: 2025  Room/Bed: 259/1      Subjective   Subjective     Chief Complaint: Weakness, confusion,  hypercalcemia    Summary:Madina Reddy is a 60 y.o. female with history of malignant ovarian cancer with carcinomatosis and hepatic lesion currently status post 3 cycles of chemotherapy and hypertension who presented with weakness, confusion and found to have hypercalcemia.  Patient was recently diagnosed with UTI and/9 and was sent home on oral antibiotic.  Calcium during that time was 12.6.  Even after receiving antibiotic, patient did not show any improvement and gradually her symptoms worsen after which her partner brought her to the hospital.  On presentation, calcium was 12.1.  Hemoglobin 9.  Patient started on IV fluids and was admitted for further evaluation management.  On IV ceftriaxone for possible UTI.  Blood culture sent.  Generalized weakness present.  Iron-deficiency anemia, receiving IV iron.  Repleting electrolytes.  Having intermittent confusion.  Patient has remained very weak throughout the hospitalization.  Discussed with  at bedside, agreed on palliative consult for goals of care discussion.  Patient has pending debulking surgery, will schedule for coming Monday.  Physically very weak and likely unfit for possible surgery currently.  Also discussed with  regarding it.  Oncologist following.  Palliative consulted for goals of care.  Patient/family wishes to go home with hospice.  Hospice meeting scheduled on 4/21.    Interval Followup: No acute events overnight.  Patient laying in bed, more alert compared to yesterday but still lethargic.  Complaining of right hip pain, asking for pain medication.  Intermittently confused.  Patient's  at bedside.  Updated.  Hospice meeting tomorrow at 12 PM.  Phosphorus repleted.    Review of Systems    All systems reviewed and negative except for what is outlined above.      Objective   Objective     Vitals:   Temp:  [98.6 °F (37 °C)-99.7 °F (37.6 °C)] 99.1 °F (37.3 °C)  Heart Rate:  [104-118] 113  Resp:  [16-18] 16  BP: (106-123)/(76-99)  106/77    Physical Exam   General: Awake, more alert compared to yesterday, seems weak and lethargic, NAD  Cardiovascular: RRR, no murmurs   Pulmonary: CTA bilaterally; no wheezes; no conversational dyspnea  Gastrointestinal: S/ND/NT, +BS  Neuro: Alert, awake, answering questions appropriately; speech clear    Result Review    Result Review:  I have personally reviewed these results:  [x]  Laboratory      Lab 04/20/25  0246 04/19/25  0533 04/18/25  0408   WBC 12.38* 12.26* 11.43*   HEMOGLOBIN 9.4* 9.5* 9.2*   HEMATOCRIT 31.0* 31.7* 29.8*   PLATELETS 231 247 232   NEUTROS ABS 9.23* 9.70* 8.13*   IMMATURE GRANS (ABS) 0.23* 0.18* 0.22*   LYMPHS ABS 1.40 1.09 1.72   MONOS ABS 1.45* 1.20* 1.23*   EOS ABS 0.02 0.04 0.08   MCV 92.5 91.9 89.8         Lab 04/20/25  0246 04/19/25  0533 04/18/25  0408   SODIUM 141 139 139   POTASSIUM 3.9 3.6 3.7   CHLORIDE 104 102 104   CO2 26.4 28.5 27.0   ANION GAP 10.6 8.5 8.0   BUN 14 10 10   CREATININE 0.50* 0.38* 0.41*   EGFR 108.2 115.6 113.5   GLUCOSE 112* 115* 112*   CALCIUM 10.3 11.5* 11.5*   MAGNESIUM 1.8 2.0 1.8   PHOSPHORUS 2.2* 2.0* 2.7         Lab 04/15/25  0609 04/14/25  0952   TOTAL PROTEIN 7.0 7.9   ALBUMIN 3.2* 3.6   GLOBULIN 3.8 4.3   ALT (SGPT) 5 6   AST (SGOT) 17 17   BILIRUBIN 0.2 0.3   ALK PHOS 59 68         Lab 04/14/25  1127 04/14/25  0952   HSTROP T 9 10             Lab 04/17/25  1034 04/17/25  0509 04/15/25  0609 04/14/25  0952   IRON  --   --  15*  --    IRON SATURATION (TSAT)  --   --  8*  --    TIBC  --   --  194*  --    TRANSFERRIN  --   --  130*  --    FERRITIN  --   --  1,713.00*  --    FOLATE  --   --   --  8.01   VITAMIN B 12  --   --   --  917   ABO TYPING A   < >  --   --    RH TYPING Positive   < >  --   --    ANTIBODY SCREEN Negative  --   --   --     < > = values in this interval not displayed.         Brief Urine Lab Results  (Last result in the past 365 days)        Color   Clarity   Blood   Leuk Est   Nitrite   Protein   CREAT   Urine HCG         04/14/25 1821 Yellow   Cloudy   Negative   Negative   Negative   Trace                 [x]  Microbiology   Microbiology Results (last 10 days)       Procedure Component Value - Date/Time    Blood Culture - Blood, Hand, Left [[id number]]  (Normal) Collected: 04/14/25 1658    Lab Status: Final result Specimen: Blood from Hand, Left Updated: 04/19/25 1715     Blood Culture No growth at 5 days    Narrative:      Less than seven (7) mL's of blood was collected.  Insufficient quantity may yield false negative results.    Blood Culture - Blood, Arm, Right [[id number]]  (Normal) Collected: 04/14/25 1658    Lab Status: Final result Specimen: Blood from Arm, Right Updated: 04/19/25 1715     Blood Culture No growth at 5 days    Narrative:      Less than seven (7) mL's of blood was collected.  Insufficient quantity may yield false negative results.          [x]  Radiology  XR Chest 1 View  Result Date: 4/14/2025  Impression: No active disease. Electronically Signed: James Albrecht MD  4/14/2025 9:30 AM EDT  Workstation ID: [id number]    []  EKG/Telemetry   []  Cardiology/Vascular   []  Pathology  []  Old records  []  Other:    Assessment & Plan   Assessment / Plan     Assessment:  Acute metabolic encephalopathy, improving  Generalized weakness  Hypocalcemia, improving  Hypokalemia, repleted  UTI, unknown organism  Anemia, iron deficiency along with possible chemotherapy-induced  Leukocytosis, resolved  History of malignant ovarian cancer with carcinomatosis  History of hypertension    Plan:  Patient currently being managed medicine service.  S/p IV fluid.  Hypercalcemia, stable.  Likely hyperglycemia due to malignancy. Continue to trend and monitor.  S/p 1 dose of zoledronic acid on 4/18 for hypercalcemia.  Completed 3-day course of IV ceftriaxone.   Blood culture showed no growth till date.  Urine culture unable to be sent given UA nonsignificant on presentation.  Oncologist following.  Patient s/p 3 cycles of carboplatin and  paclitaxel.  She is due to see GYN oncologist this Monday for debulking surgery.  But less likely patient will be able to tolerate surgery given her current clinical status.  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  Palliative consulted for goals of care discussion.  Agreed with home with hospice.  Hospice meeting scheduled for tomorrow 4/21 at 12 PM.  Guarded prognosis.  Continue rest of current management.  Oncologist on board, appreciate further input.  Labs in AM.  Clinical course to dictate further management.    Discussed with RN.    VTE Prophylaxis:  Pharmacologic VTE prophylaxis orders are present.        CODE STATUS:   Code Status (Patient has no pulse and is not breathing): No CPR (Do Not Attempt to Resuscitate)  Medical Interventions (Patient has pulse or is breathing): Limited Support  Medical Intervention Limits: No intubation (DNI)      Electronically signed by Demario Mireles MD, 04/20/25, 9:14 AM EDT.                       Electronically signed by Demario Mireles MD at 04/20/25 1110       Consult Notes (last 72 hours)  Notes from 04/19/25 1234 through 04/22/25 1234   No notes of this type exist for this encounter.  ++PLAN HOME WITH HOSPICE POSSIBLE__+++   Radha S.  [facility] [place] ,[place]   [phone]-  F [phone]

## 2025-04-22 NOTE — PLAN OF CARE
Goal Outcome Evaluation:  Plan of Care Reviewed With: patient        Progress: no change  Outcome Evaluation: patient aox3 with forgetfulness, ST on tele, RA, UA colledted, IV Zofran given 1x, pt resting, no requests at this time

## 2025-04-22 NOTE — SIGNIFICANT NOTE
04/22/25 1121   Plan   Plan Pt will discharge home with Hospice today.   Final Discharge Disposition Code 50 - home with hospice   Final Note Pt will discharge home with Hospice today.

## 2025-04-23 LAB — PTH RELATED PROT SERPL-SCNC: <2 PMOL/L

## 2025-04-23 RX ORDER — CETIRIZINE HYDROCHLORIDE 10 MG/1
10 TABLET, FILM COATED ORAL DAILY
Qty: 90 TABLET | Refills: 0 | Status: SHIPPED | OUTPATIENT
Start: 2025-04-23

## 2025-04-24 LAB
BACTERIA SPEC AEROBE CULT: ABNORMAL
QT INTERVAL: 324 MS
QTC INTERVAL: 439 MS

## 2025-06-05 RX ORDER — MONTELUKAST SODIUM 10 MG/1
10 TABLET ORAL NIGHTLY
Qty: 90 TABLET | Refills: 0 | OUTPATIENT
Start: 2025-06-05

## (undated) DEVICE — TP SXN YANKR BULB STRL

## (undated) DEVICE — KT INTRO MIC VSI SMOTH STFF 4F 40CM 7CM

## (undated) DEVICE — 8 FOOT DISPOSABLE EXTENSION CABLE WITH SAFE CONNECT / ALLIGATOR CLIP

## (undated) DEVICE — ADHS SKIN PREMIERPRO EXOFIN TOPICAL HI/VISC .5ML

## (undated) DEVICE — SUT SILK 0/0 CT2 18IN C027D

## (undated) DEVICE — SUT VIC 2/0 SH 27IN

## (undated) DEVICE — SUT VIC 3/0 SH 27IN J416H

## (undated) DEVICE — INTRO TEAR AWAY/LVD W/SD PRT 6F 13CM

## (undated) DEVICE — TUBING, SUCTION, 1/4" X 12', STRAIGHT: Brand: MEDLINE

## (undated) DEVICE — PENCL E/S SMOKEEVAC W/TELESCP CANN